# Patient Record
Sex: MALE | Race: WHITE | Employment: OTHER | URBAN - METROPOLITAN AREA
[De-identification: names, ages, dates, MRNs, and addresses within clinical notes are randomized per-mention and may not be internally consistent; named-entity substitution may affect disease eponyms.]

---

## 2017-01-17 RX ORDER — IBUPROFEN 600 MG/1
TABLET ORAL
Qty: 180 TABLET | Refills: 0 | Status: SHIPPED | OUTPATIENT
Start: 2017-01-17 | End: 2019-02-08

## 2017-01-17 NOTE — TELEPHONE ENCOUNTER
Refill Protocol Appointment Criteria  · Appointment scheduled in the past 6 months or in the next 3 months    Future Appointments       Provider Department Appt Notes    In 4 months Michelle Leonard, [address] [facility], [address] [facility]

## 2017-01-25 ENCOUNTER — OFFICE VISIT (OUTPATIENT)
Dept: INTERNAL MEDICINE CLINIC | Facility: CLINIC | Age: 67
End: 2017-01-25

## 2017-01-25 VITALS
DIASTOLIC BLOOD PRESSURE: 73 MMHG | RESPIRATION RATE: 16 BRPM | SYSTOLIC BLOOD PRESSURE: 128 MMHG | BODY MASS INDEX: 30.78 KG/M2 | HEART RATE: 63 BPM | HEIGHT: 70 IN | WEIGHT: 215 LBS

## 2017-01-25 DIAGNOSIS — E66.01 MORBID OBESITY DUE TO EXCESS CALORIES (HCC): ICD-10-CM

## 2017-01-25 DIAGNOSIS — M54.2 CERVICALGIA: ICD-10-CM

## 2017-01-25 DIAGNOSIS — L98.9 SKIN LESION: ICD-10-CM

## 2017-01-25 DIAGNOSIS — R05.9 COUGHING: Primary | ICD-10-CM

## 2017-01-25 PROCEDURE — G0463 HOSPITAL OUTPT CLINIC VISIT: HCPCS | Performed by: INTERNAL MEDICINE

## 2017-01-25 PROCEDURE — 99214 OFFICE O/P EST MOD 30 MIN: CPT | Performed by: INTERNAL MEDICINE

## 2017-01-25 RX ORDER — HYDROCODONE BITARTRATE AND ACETAMINOPHEN 10; 325 MG/1; MG/1
TABLET ORAL
Qty: 90 TABLET | Refills: 0 | Status: SHIPPED | OUTPATIENT
Start: 2017-01-25 | End: 2017-07-11

## 2017-01-25 RX ORDER — BENZONATATE 100 MG/1
100 CAPSULE ORAL 3 TIMES DAILY PRN
Qty: 30 CAPSULE | Refills: 1 | Status: SHIPPED | OUTPATIENT
Start: 2017-01-25 | End: 2017-04-05

## 2017-01-25 NOTE — PROGRESS NOTES
Valentino Barroso is a 77year old male. HPI:   1. Coughing    Has been coughing the last few weeks up to 5-7 times in a row. No known allergens now or in the past but the sneezing has increased over the last week.      2. Cervicalgia    Has had some interm 1960   • Appendicitis 1963   • Rotator cuff tear      R - 2007   • Elevated PSA 2008     bx negative for cancer   • Obesity, unspecified    • PVD (peripheral vascular disease) ([facility] [address]       Social History:    Smoking Status: Never Smoker too painful. 3. Morbid obesity due to excess calories ([address]    The patient is obese with BMI near 32. Discussed with the patient obesity complications including risk for cardiovascular risk events, also degenerative arthritis of joints and spine.  Discu

## 2017-03-27 RX ORDER — HYDROCHLOROTHIAZIDE 25 MG/1
TABLET ORAL
Qty: 90 TABLET | Refills: 0 | Status: SHIPPED | OUTPATIENT
Start: 2017-03-27 | End: 2017-06-23

## 2017-03-27 RX ORDER — SIMVASTATIN 20 MG
TABLET ORAL
Qty: 90 TABLET | Refills: 0 | Status: SHIPPED | OUTPATIENT
Start: 2017-03-27 | End: 2017-06-23

## 2017-04-05 ENCOUNTER — HOSPITAL ENCOUNTER (OUTPATIENT)
Dept: GENERAL RADIOLOGY | Facility: HOSPITAL | Age: 67
Discharge: HOME | End: 2017-04-05
Attending: INTERNAL MEDICINE | Admitting: INTERNAL MEDICINE
Payer: MEDICARE

## 2017-04-05 ENCOUNTER — OFFICE VISIT (OUTPATIENT)
Dept: INTERNAL MEDICINE CLINIC | Facility: CLINIC | Age: 67
End: 2017-04-05

## 2017-04-05 VITALS
DIASTOLIC BLOOD PRESSURE: 75 MMHG | BODY MASS INDEX: 31.5 KG/M2 | RESPIRATION RATE: 16 BRPM | SYSTOLIC BLOOD PRESSURE: 122 MMHG | WEIGHT: 220 LBS | HEIGHT: 70 IN | HEART RATE: 51 BPM

## 2017-04-05 DIAGNOSIS — E78.2 MIXED HYPERLIPIDEMIA: ICD-10-CM

## 2017-04-05 DIAGNOSIS — M25.562 ACUTE PAIN OF LEFT KNEE: Primary | ICD-10-CM

## 2017-04-05 DIAGNOSIS — M25.562 ACUTE PAIN OF LEFT KNEE: ICD-10-CM

## 2017-04-05 DIAGNOSIS — I10 ESSENTIAL HYPERTENSION WITH GOAL BLOOD PRESSURE LESS THAN 140/90: ICD-10-CM

## 2017-04-05 DIAGNOSIS — E66.3 OVERWEIGHT(278.02): ICD-10-CM

## 2017-04-05 DIAGNOSIS — Z12.5 SCREENING FOR PROSTATE CANCER: ICD-10-CM

## 2017-04-05 PROCEDURE — 73564 X-RAY EXAM KNEE 4 OR MORE: CPT

## 2017-04-05 PROCEDURE — G0463 HOSPITAL OUTPT CLINIC VISIT: HCPCS | Performed by: INTERNAL MEDICINE

## 2017-04-05 PROCEDURE — 99214 OFFICE O/P EST MOD 30 MIN: CPT | Performed by: INTERNAL MEDICINE

## 2017-04-05 RX ORDER — HYDROCODONE BITARTRATE AND ACETAMINOPHEN 10; 325 MG/1; MG/1
1 TABLET ORAL EVERY 4 HOURS PRN
Qty: 75 TABLET | Refills: 0 | Status: SHIPPED | OUTPATIENT
Start: 2017-04-05 | End: 2017-07-11

## 2017-04-05 RX ORDER — HYDROCODONE BITARTRATE AND ACETAMINOPHEN 10; 325 MG/1; MG/1
1 TABLET ORAL EVERY 4 HOURS PRN
Qty: 75 TABLET | Refills: 0 | Status: SHIPPED | OUTPATIENT
Start: 2017-06-04 | End: 2017-07-11

## 2017-04-05 RX ORDER — HYDROCODONE BITARTRATE AND ACETAMINOPHEN 10; 325 MG/1; MG/1
1 TABLET ORAL EVERY 4 HOURS PRN
Qty: 75 TABLET | Refills: 0 | Status: SHIPPED | OUTPATIENT
Start: 2017-05-05 | End: 2017-07-11

## 2017-04-05 NOTE — PROGRESS NOTES
Jayce Pearl is a 77year old male. HPI:   1. Acute pain of Left knee pain    Has developed a sharp pain in his left knee a week ago. Hurt to bend the knee or walk on it well.  He awoke with pain one morning and had no trauma, fall or other injury to t tear 2007     right   • Prostate cancer [facility])      brachytherapy   • Carpal tunnel syndrome    • Benign prostatic hypertrophy    • History of brachytherapy    • Tonsillitis 1960   • Appendicitis 1963   • Rotator cuff tear      R - 2007   • Elevated  weight and lead to better blood pressure control. Record blood pressures at home and bring readings to your next office visit to review.     3. Mixed hyperlipidemia    Patient instructed to take cholesterol lowering medications as prescribed and to continue

## 2017-04-07 ENCOUNTER — TELEPHONE (OUTPATIENT)
Dept: INTERNAL MEDICINE CLINIC | Facility: CLINIC | Age: 67
End: 2017-04-07

## 2017-04-07 NOTE — TELEPHONE ENCOUNTER
----- Message from Brigette Moore MD sent at 4/6/2017  4:34 PM CDT -----  X rays show mild to moderate artjhritis is developing in both knees. Weight loss should greatly improve the discomfort. Letter mailed to pt.

## 2017-05-16 ENCOUNTER — APPOINTMENT (OUTPATIENT)
Dept: LAB | Facility: HOSPITAL | Age: 67
End: 2017-05-16
Attending: UROLOGY
Payer: MEDICARE

## 2017-05-16 DIAGNOSIS — C61 PROSTATE CANCER (HCC): ICD-10-CM

## 2017-05-16 DIAGNOSIS — R35.1 NOCTURIA: ICD-10-CM

## 2017-05-16 PROCEDURE — 84153 ASSAY OF PSA TOTAL: CPT

## 2017-05-16 PROCEDURE — 81003 URINALYSIS AUTO W/O SCOPE: CPT

## 2017-05-16 PROCEDURE — 36415 COLL VENOUS BLD VENIPUNCTURE: CPT

## 2017-05-19 ENCOUNTER — OFFICE VISIT (OUTPATIENT)
Dept: SURGERY | Facility: CLINIC | Age: 67
End: 2017-05-19

## 2017-05-19 VITALS
HEIGHT: 70 IN | RESPIRATION RATE: 16 BRPM | BODY MASS INDEX: 31.5 KG/M2 | TEMPERATURE: 98 F | WEIGHT: 220 LBS | DIASTOLIC BLOOD PRESSURE: 74 MMHG | HEART RATE: 50 BPM | SYSTOLIC BLOOD PRESSURE: 118 MMHG

## 2017-05-19 DIAGNOSIS — N19 RENAL FAILURE, UNSPECIFIED CHRONICITY: ICD-10-CM

## 2017-05-19 DIAGNOSIS — C61 PROSTATE CANCER (HCC): Primary | ICD-10-CM

## 2017-05-19 DIAGNOSIS — R35.1 NOCTURIA: ICD-10-CM

## 2017-05-19 DIAGNOSIS — N52.01 ERECTILE DYSFUNCTION DUE TO ARTERIAL INSUFFICIENCY: ICD-10-CM

## 2017-05-19 PROCEDURE — 99214 OFFICE O/P EST MOD 30 MIN: CPT | Performed by: UROLOGY

## 2017-05-19 PROCEDURE — G0463 HOSPITAL OUTPT CLINIC VISIT: HCPCS | Performed by: UROLOGY

## 2017-05-19 RX ORDER — TADALAFIL 20 MG/1
TABLET ORAL
Qty: 6 TABLET | Refills: 6 | Status: SHIPPED | OUTPATIENT
Start: 2017-05-19 | End: 2017-09-28

## 2017-05-19 NOTE — PROGRESS NOTES
HPI:    Patient ID: Leon Mandel is a 77year old male. HPI       1.   Voiding Dysfunction  Patient has current AUA score of 9.5, moderate voiding dysfunction category, compared to previous score of   14, moderate voiding dysfunction category, on 11/ inflammation but no cancer. Erectile dysfunction already before brachytherapy; on 9/5/13 I prescribed Cialis and 5-10 mg every 48 hours when necessary sexual activity.    1/9/14, saturation biopsy (40 biopsies performed) = left anterior apex, Tito 3+4 ; (peripheral vascular disease) ([facility] [address]           Past Surgical History    TONSILLECTOMY      APPENDECTOMY  1963    KNEE ARTHROSCOPY      Comment per NG: bilat knees 2x each; arthroscopy - brash    PROSTATE BIOPSIES  2013    REPAIR ROTATOR CUFF,ACUTE Right 200 Normocephalic and atraumatic. Eyes: Conjunctivae and EOM are normal.   Neck: Normal range of motion. Pulmonary/Chest: Effort normal. No respiratory distress. Genitourinary:   Prostate: small without any palpable nodules or indurations.     Musculoskel he will seriously consider penile self injection therapy. If that were not to be successful, I explained that penile implant at a [facility] would also be an option.  Patient has also brought in an advertisement for NON- FDA approved erectile dysfu direction and in the presence of Betty Jacobsen MD.   Electronically Signed: Jorge Alberto Rai, 5/19/2017, 12:28 PM.    Montana Mcnair MD,  personally performed the services described in this documentation.  All medical record entries made by the scrib

## 2017-05-19 NOTE — PATIENT INSTRUCTIONS
1. If you wish, you may try decreasing tamsulosin to 1 daily. If voiding problem remains unchanged you may continue. If after decreasing dose voiding problems worsens or you are unhappy, please go back to BID.      2. Take cialis 20 mg a few hours before

## 2017-06-19 ENCOUNTER — TELEPHONE (OUTPATIENT)
Dept: INTERNAL MEDICINE CLINIC | Facility: CLINIC | Age: 67
End: 2017-06-19

## 2017-06-19 NOTE — TELEPHONE ENCOUNTER
Pt was given orders for blood work by Dr. Mag Hathaway, and would like to speak with RN so that they can explain the orders to him, especially the PSA order. If pt does not answer, please, leave a message and he will return the call.

## 2017-06-23 RX ORDER — SIMVASTATIN 20 MG
TABLET ORAL
Qty: 90 TABLET | Refills: 0 | Status: SHIPPED | OUTPATIENT
Start: 2017-06-23 | End: 2017-09-19

## 2017-06-23 RX ORDER — HYDROCHLOROTHIAZIDE 25 MG/1
TABLET ORAL
Qty: 90 TABLET | Refills: 0 | Status: SHIPPED | OUTPATIENT
Start: 2017-06-23 | End: 2017-11-22

## 2017-07-11 ENCOUNTER — OFFICE VISIT (OUTPATIENT)
Dept: INTERNAL MEDICINE CLINIC | Facility: CLINIC | Age: 67
End: 2017-07-11

## 2017-07-11 VITALS
DIASTOLIC BLOOD PRESSURE: 83 MMHG | WEIGHT: 215 LBS | BODY MASS INDEX: 30.78 KG/M2 | HEIGHT: 70 IN | HEART RATE: 54 BPM | SYSTOLIC BLOOD PRESSURE: 139 MMHG

## 2017-07-11 DIAGNOSIS — I10 ESSENTIAL HYPERTENSION WITH GOAL BLOOD PRESSURE LESS THAN 140/90: ICD-10-CM

## 2017-07-11 DIAGNOSIS — E66.3 OVERWEIGHT: ICD-10-CM

## 2017-07-11 DIAGNOSIS — R10.32 PAIN IN THE GROIN, LEFT: Primary | ICD-10-CM

## 2017-07-11 DIAGNOSIS — E78.2 MIXED HYPERLIPIDEMIA: ICD-10-CM

## 2017-07-11 PROCEDURE — 99214 OFFICE O/P EST MOD 30 MIN: CPT | Performed by: INTERNAL MEDICINE

## 2017-07-11 PROCEDURE — G0463 HOSPITAL OUTPT CLINIC VISIT: HCPCS | Performed by: INTERNAL MEDICINE

## 2017-07-11 RX ORDER — HYDROCODONE BITARTRATE AND ACETAMINOPHEN 10; 325 MG/1; MG/1
1 TABLET ORAL EVERY 8 HOURS PRN
Qty: 90 TABLET | Refills: 0 | Status: SHIPPED | OUTPATIENT
Start: 2017-07-11 | End: 2017-08-10

## 2017-07-11 RX ORDER — HYDROCODONE BITARTRATE AND ACETAMINOPHEN 10; 325 MG/1; MG/1
1 TABLET ORAL EVERY 8 HOURS PRN
Qty: 90 TABLET | Refills: 0 | Status: SHIPPED | OUTPATIENT
Start: 2017-09-09 | End: 2017-10-03

## 2017-07-11 RX ORDER — HYDROCODONE BITARTRATE AND ACETAMINOPHEN 10; 325 MG/1; MG/1
1 TABLET ORAL EVERY 8 HOURS PRN
Qty: 90 TABLET | Refills: 0 | Status: SHIPPED | OUTPATIENT
Start: 2017-08-10 | End: 2017-09-09

## 2017-07-11 NOTE — PROGRESS NOTES
Linnette Loyd is a 77year old male. HPI:   1. Acute pain of Left groin    Has developed a sharp pain in his left groin a week ago. Hurts to bend the knee or walk on it well.  He awoke with pain one morning and had no trauma, fall or other injury to the Oral Tab TAKE 1 TABLET EVERY NIGHT Disp: 90 tablet Rfl: 0   HYDROCHLOROTHIAZIDE 25 MG Oral Tab TAKE 1 TABLET EVERY DAY Disp: 90 tablet Rfl: 0   METOPROLOL TARTRATE 25 MG Oral Tab TAKE 1 TABLET TWICE DAILY Disp: 180 tablet Rfl: 1   IBUPROFEN 600 MG Oral Tab in the left groin area. EXTREMITIES: no cyanosis, clubbing or edema/ Has a sore on left medial ankle. ASSESSMENT AND PLAN:   1. Left groin and leg pain. Has a strain in left groin area. 1 week ago the pain started. Norco helps the pain.  No injury months.

## 2017-07-18 ENCOUNTER — TELEPHONE (OUTPATIENT)
Dept: INTERNAL MEDICINE CLINIC | Facility: CLINIC | Age: 67
End: 2017-07-18

## 2017-07-18 NOTE — TELEPHONE ENCOUNTER
Pt stts he wants information prostate order. Pt wanted to know if he is due for another one.  Please advise

## 2017-09-11 ENCOUNTER — OFFICE VISIT (OUTPATIENT)
Dept: DERMATOLOGY CLINIC | Facility: CLINIC | Age: 67
End: 2017-09-11

## 2017-09-11 DIAGNOSIS — D23.70 BENIGN NEOPLASM OF SKIN OF LOWER LIMB, INCLUDING HIP, UNSPECIFIED LATERALITY: ICD-10-CM

## 2017-09-11 DIAGNOSIS — D23.4 BENIGN NEOPLASM OF SCALP AND SKIN OF NECK: ICD-10-CM

## 2017-09-11 DIAGNOSIS — D48.5 NEOPLASM OF UNCERTAIN BEHAVIOR OF SKIN: Primary | ICD-10-CM

## 2017-09-11 DIAGNOSIS — D23.60 BENIGN NEOPLASM OF SKIN OF UPPER LIMB, INCLUDING SHOULDER, UNSPECIFIED LATERALITY: ICD-10-CM

## 2017-09-11 DIAGNOSIS — D23.30 BENIGN NEOPLASM OF SKIN OF FACE: ICD-10-CM

## 2017-09-11 DIAGNOSIS — D23.5 BENIGN NEOPLASM OF SKIN OF TRUNK, EXCEPT SCROTUM: ICD-10-CM

## 2017-09-11 DIAGNOSIS — L30.9 DERMATITIS: ICD-10-CM

## 2017-09-11 DIAGNOSIS — D22.9 MULTIPLE NEVI: ICD-10-CM

## 2017-09-11 PROCEDURE — 11101 BIOPSY, EACH ADDED LESION: CPT | Performed by: DERMATOLOGY

## 2017-09-11 PROCEDURE — 88305 TISSUE EXAM BY PATHOLOGIST: CPT | Performed by: DERMATOLOGY

## 2017-09-11 PROCEDURE — 99202 OFFICE O/P NEW SF 15 MIN: CPT | Performed by: DERMATOLOGY

## 2017-09-11 PROCEDURE — 11100 BIOPSY OF SKIN LESION: CPT | Performed by: DERMATOLOGY

## 2017-09-13 NOTE — PROGRESS NOTES
The pathology report from last visit showed compound nevus, no treatment right mid abdomen. Left flank-mildly atypical nevus-re-check in 4 -5 months. Skin check then also. We will watch the darker moles on back also   . Please log in test results.   Theodore

## 2017-09-21 NOTE — TELEPHONE ENCOUNTER
please advise as does not meet RN protocol for refill criteria        Hypertensive Medications  Protocol Criteria:  · Appointment scheduled in the past 6 months or in the next 3 months  · BMP or CMP in the past 12 months  · Creatinine result < 2  Recent O <130   Recent Outpatient Visits            1 week ago Neoplasm of uncertain behavior of skin    [facility] Jaciel Rosenbaum MD    Office Visit    2 months ago Pain in the groin, left    [address], [address] [facility]

## 2017-09-22 RX ORDER — SIMVASTATIN 20 MG
TABLET ORAL
Qty: 90 TABLET | Refills: 0 | Status: SHIPPED | OUTPATIENT
Start: 2017-09-22 | End: 2018-02-05

## 2017-09-24 NOTE — PROGRESS NOTES
Mariam Kaminski is a 77year old male. HPI:     CC:  Patient presents with:  Full Skin Exam: established pt. presents for a skin exam, last visit in 2012 by SD - pt concerned about crusty lesions to right temple.  No hx of skin CA        Allergies:  Revie capsule Rfl: 3   METOPROLOL TARTRATE 25 MG Oral Tab TAKE 1 TABLET TWICE DAILY Disp: 180 tablet Rfl: 1   SIMVASTATIN 20 MG Oral Tab TAKE 1 TABLET EVERY NIGHT Disp: 90 tablet Rfl: 0     Allergies:   No Known Allergies    Past Medical History:   Diagnosis Ang above. Patient has been in their usual state of health. History, medications, allergies reviewed as noted. ROS:  Denies any other systemic complaints. No new or changeing lesions other than noted above.  No fevers, chills, night sweats, unusual casiano Remarkable for:    Dark brown black no elevated papule at left flank hazy border. Irregular red brown darker brown papules eccentric pigmentation at right mid abdomen. Darker brown macule 8 mm at right inframammary area observe.     Rule out atypical lesi

## 2017-09-28 ENCOUNTER — OFFICE VISIT (OUTPATIENT)
Dept: PAIN CLINIC | Facility: HOSPITAL | Age: 67
End: 2017-09-28
Attending: ANESTHESIOLOGY
Payer: MEDICARE

## 2017-09-28 VITALS
WEIGHT: 215 LBS | HEIGHT: 70 IN | BODY MASS INDEX: 30.78 KG/M2 | SYSTOLIC BLOOD PRESSURE: 140 MMHG | DIASTOLIC BLOOD PRESSURE: 78 MMHG | HEART RATE: 58 BPM | RESPIRATION RATE: 18 BRPM

## 2017-09-28 DIAGNOSIS — M54.2 CERVICALGIA: Primary | ICD-10-CM

## 2017-09-28 DIAGNOSIS — M54.50 CHRONIC BILATERAL LOW BACK PAIN WITHOUT SCIATICA: ICD-10-CM

## 2017-09-28 DIAGNOSIS — G89.29 CHRONIC BILATERAL LOW BACK PAIN WITHOUT SCIATICA: ICD-10-CM

## 2017-09-28 PROCEDURE — 99201 HC OUTPT EVAL AND MGNT NEW PT LEVEL 1: CPT

## 2017-09-28 NOTE — PROGRESS NOTES
09/28/17  PRESENTS AMBULATORY TO CPM;  NEW CONSULT C/O OF CERVICAL NECK PAIN RADIATING INTO THE BILAT SHLDRS, AND LBP;  PT STATES 1 1/2 YR HX OF PAIN;  \"JUST DEALT WITH THE PAIN\";  HAS USED IBUPROFEN, NOW USING NORCO  10/325 USING 3/DAY;   REPORTS HX OF

## 2017-09-28 NOTE — CHRONIC PAIN
Initial Consultation Note      HISTORY OF PRESENT ILLNESS:  Garrison Mosquera is a 77year old old male referred to the pain clinic  for evaluation and treatment of his chronic low back pain and chronic neck pain he also has shoulder issues with inability t TWICE DAILY Disp: 180 capsule Rfl: 3        ALLERGIES:  No Known Allergies    SURGICAL HISTORY:  Past Surgical History:  1963: APPENDECTOMY  No date: KNEE ARTHROSCOPY      Comment: per NG: bilat knees 2x each; arthroscopy -                bras  2013: Maylin Jeo tobacco: Never Used    Alcohol use No    Drug use: No    Sexual activity: Not on file     Other Topics Concern    Caffeine Concern Yes    Comment: soda, 1 liter/day    Pt has a pacemaker No    Pt has a defibrillator No    Reaction to local anesthetic No (L) 03/10/2016   MCV 79.4 (L) 03/10/2016   MCH 25.3 (L) 03/10/2016   MCHC 31.9 (L) 03/10/2016   RDW 17.3 (H) 03/10/2016    03/10/2016   MPV 8.1 03/10/2016       Lab Results  Component Value Date    02/15/2016   K 4.0 02/15/2016    02/15/

## 2017-10-03 ENCOUNTER — OFFICE VISIT (OUTPATIENT)
Dept: INTERNAL MEDICINE CLINIC | Facility: CLINIC | Age: 67
End: 2017-10-03

## 2017-10-03 VITALS
DIASTOLIC BLOOD PRESSURE: 79 MMHG | WEIGHT: 215 LBS | HEIGHT: 70 IN | SYSTOLIC BLOOD PRESSURE: 130 MMHG | HEART RATE: 55 BPM | BODY MASS INDEX: 30.78 KG/M2

## 2017-10-03 DIAGNOSIS — E66.3 OVERWEIGHT: ICD-10-CM

## 2017-10-03 DIAGNOSIS — E78.2 MIXED HYPERLIPIDEMIA: ICD-10-CM

## 2017-10-03 DIAGNOSIS — G89.29 CHRONIC LEFT SHOULDER PAIN: Primary | ICD-10-CM

## 2017-10-03 DIAGNOSIS — I10 ESSENTIAL HYPERTENSION WITH GOAL BLOOD PRESSURE LESS THAN 140/90: ICD-10-CM

## 2017-10-03 DIAGNOSIS — M25.512 CHRONIC LEFT SHOULDER PAIN: Primary | ICD-10-CM

## 2017-10-03 PROCEDURE — G0463 HOSPITAL OUTPT CLINIC VISIT: HCPCS | Performed by: INTERNAL MEDICINE

## 2017-10-03 PROCEDURE — 99214 OFFICE O/P EST MOD 30 MIN: CPT | Performed by: INTERNAL MEDICINE

## 2017-10-03 RX ORDER — HYDROCODONE BITARTRATE AND ACETAMINOPHEN 10; 325 MG/1; MG/1
1 TABLET ORAL EVERY 8 HOURS PRN
Qty: 75 TABLET | Refills: 0 | Status: SHIPPED | OUTPATIENT
Start: 2017-10-03 | End: 2017-11-02

## 2017-10-03 RX ORDER — HYDROCODONE BITARTRATE AND ACETAMINOPHEN 10; 325 MG/1; MG/1
1 TABLET ORAL EVERY 8 HOURS PRN
Qty: 75 TABLET | Refills: 0 | Status: SHIPPED | OUTPATIENT
Start: 2017-12-02 | End: 2018-01-01

## 2017-10-03 RX ORDER — HYDROCODONE BITARTRATE AND ACETAMINOPHEN 10; 325 MG/1; MG/1
1 TABLET ORAL EVERY 8 HOURS PRN
Qty: 75 TABLET | Refills: 0 | Status: SHIPPED | OUTPATIENT
Start: 2017-11-02 | End: 2017-11-21

## 2017-10-03 RX ORDER — HYDROCODONE BITARTRATE AND ACETAMINOPHEN 10; 325 MG/1; MG/1
1 TABLET ORAL EVERY 8 HOURS PRN
Qty: 90 TABLET | Refills: 0 | Status: SHIPPED | OUTPATIENT
Start: 2017-10-03 | End: 2017-11-02

## 2017-10-03 NOTE — PROGRESS NOTES
Francisco J Duvall is a 77year old male. HPI:   1. Acute pain of Left Shoulder    Has developed a sharp pain in his left shoulder area the last few months. No injury noted to the left shoulder recently.  Hurts to lift the shoulder with pain in the biceps ar Diagnosis Date   • Appendicitis 1963   • Back problem    • Benign prostatic hypertrophy    • Carpal tunnel syndrome    • Dysplastic nevus 2017    left flank   • Elevated PSA 2008    bx negative for cancer   • H/O arthroscopy of knee bilateral   • History Prabhjot of the pain clinic for low back and SHOULDER pain. Will give #75 norco  a month x 3 months. No refills until 1/11/2018. We will decrease #15 per month every 3 months.     2. Essential hypertension with goal blood pressure less than 140/90    Azanushka Alberta

## 2017-10-07 ENCOUNTER — HOSPITAL ENCOUNTER (OUTPATIENT)
Dept: MRI IMAGING | Facility: HOSPITAL | Age: 67
Discharge: HOME | End: 2017-10-07
Attending: ANESTHESIOLOGY
Payer: MEDICARE

## 2017-10-07 DIAGNOSIS — G89.29 CHRONIC BILATERAL LOW BACK PAIN WITHOUT SCIATICA: ICD-10-CM

## 2017-10-07 DIAGNOSIS — M54.2 CERVICALGIA: ICD-10-CM

## 2017-10-07 DIAGNOSIS — M54.50 CHRONIC BILATERAL LOW BACK PAIN WITHOUT SCIATICA: ICD-10-CM

## 2017-10-07 PROCEDURE — 72148 MRI LUMBAR SPINE W/O DYE: CPT | Performed by: ANESTHESIOLOGY

## 2017-10-12 ENCOUNTER — HOSPITAL ENCOUNTER (OUTPATIENT)
Dept: MRI IMAGING | Facility: HOSPITAL | Age: 67
Discharge: HOME | End: 2017-10-12
Attending: ANESTHESIOLOGY
Payer: MEDICARE

## 2017-10-12 PROCEDURE — 72141 MRI NECK SPINE W/O DYE: CPT | Performed by: ANESTHESIOLOGY

## 2017-10-17 ENCOUNTER — OFFICE VISIT (OUTPATIENT)
Dept: PAIN CLINIC | Facility: HOSPITAL | Age: 67
End: 2017-10-17
Attending: ANESTHESIOLOGY
Payer: MEDICARE

## 2017-10-17 VITALS
SYSTOLIC BLOOD PRESSURE: 138 MMHG | BODY MASS INDEX: 30.78 KG/M2 | HEIGHT: 70 IN | WEIGHT: 215 LBS | DIASTOLIC BLOOD PRESSURE: 78 MMHG

## 2017-10-17 DIAGNOSIS — N88.9 CERVICAL LESION: ICD-10-CM

## 2017-10-17 DIAGNOSIS — M48.061 SPINAL STENOSIS OF LUMBAR REGION WITHOUT NEUROGENIC CLAUDICATION: ICD-10-CM

## 2017-10-17 DIAGNOSIS — M48.02 SPINAL STENOSIS IN CERVICAL REGION: ICD-10-CM

## 2017-10-17 DIAGNOSIS — G96.89 SPINAL CORD CYSTS: Primary | ICD-10-CM

## 2017-10-17 PROCEDURE — 99211 OFF/OP EST MAY X REQ PHY/QHP: CPT

## 2017-10-17 NOTE — CHRONIC PAIN
Follow-up Note    HISTORY OF PRESENT ILLNESS:  Jaron Taoist is a 79year old old male, returns to the clinic for evaluation treatment of his cervical and lumbar pain   went over the ordered MRI scans and his last visit cervical and lumbar stenosis are REVIEW OF SYSTEMS:   Bowel/Bladder Incontinence: as above  Coughing/sneezing/straining does not exacerbate the pain.   Numbness/tingling: as above  Weakness: as above  Weight Loss: Negative   Fever: Negative   Cardiovascular:  No current chest pain o Right  No date: TONSILLECTOMY    FAMILY HISTORY:  Family History   Problem Relation Age of Onset   • Diabetes Mother    • Diabetes Sister    • Obesity Sister    • Lipids Other      family h/o hyperlipidemia and vascular disease       SOCIAL HISTORY:    Soc male, with lumbar and cervical spinal stenosis chronic pain been on narcotics for quite some time currently prescribed Dr. Obey Patrick MRI finding in the cervical spine was suggest possible arachnoid cyst but the intramedullary extradural lesion cannot

## 2017-10-19 ENCOUNTER — TELEPHONE (OUTPATIENT)
Dept: PAIN CLINIC | Facility: HOSPITAL | Age: 67
End: 2017-10-19

## 2017-10-20 ENCOUNTER — HOSPITAL ENCOUNTER (OUTPATIENT)
Dept: MRI IMAGING | Age: 67
Discharge: HOME | End: 2017-10-20
Attending: ANESTHESIOLOGY
Payer: MEDICARE

## 2017-10-20 DIAGNOSIS — N88.9 CERVICAL LESION: ICD-10-CM

## 2017-10-20 DIAGNOSIS — G96.89 SPINAL CORD CYSTS: ICD-10-CM

## 2017-10-20 PROCEDURE — 70553 MRI BRAIN STEM W/O & W/DYE: CPT | Performed by: ANESTHESIOLOGY

## 2017-10-20 PROCEDURE — 82565 ASSAY OF CREATININE: CPT

## 2017-10-20 PROCEDURE — A9575 INJ GADOTERATE MEGLUMI 0.1ML: HCPCS | Performed by: ANESTHESIOLOGY

## 2017-11-21 ENCOUNTER — OFFICE VISIT (OUTPATIENT)
Dept: PAIN CLINIC | Facility: HOSPITAL | Age: 67
End: 2017-11-21
Attending: ANESTHESIOLOGY
Payer: MEDICARE

## 2017-11-21 ENCOUNTER — TELEPHONE (OUTPATIENT)
Dept: SURGERY | Facility: CLINIC | Age: 67
End: 2017-11-21

## 2017-11-21 ENCOUNTER — APPOINTMENT (OUTPATIENT)
Dept: LAB | Facility: HOSPITAL | Age: 67
End: 2017-11-21
Attending: ANESTHESIOLOGY
Payer: MEDICARE

## 2017-11-21 VITALS
HEART RATE: 61 BPM | BODY MASS INDEX: 30.78 KG/M2 | DIASTOLIC BLOOD PRESSURE: 80 MMHG | HEIGHT: 70 IN | SYSTOLIC BLOOD PRESSURE: 136 MMHG | WEIGHT: 215 LBS | RESPIRATION RATE: 18 BRPM

## 2017-11-21 DIAGNOSIS — M48.02 SPINAL STENOSIS IN CERVICAL REGION: Primary | ICD-10-CM

## 2017-11-21 DIAGNOSIS — C61 PROSTATE CANCER (HCC): ICD-10-CM

## 2017-11-21 DIAGNOSIS — M48.061 SPINAL STENOSIS OF LUMBAR REGION WITHOUT NEUROGENIC CLAUDICATION: ICD-10-CM

## 2017-11-21 PROCEDURE — 36415 COLL VENOUS BLD VENIPUNCTURE: CPT

## 2017-11-21 PROCEDURE — 84153 ASSAY OF PSA TOTAL: CPT

## 2017-11-21 PROCEDURE — 99211 OFF/OP EST MAY X REQ PHY/QHP: CPT

## 2017-11-21 RX ORDER — HYDROCODONE BITARTRATE AND ACETAMINOPHEN 10; 325 MG/1; MG/1
1 TABLET ORAL EVERY 8 HOURS PRN
Qty: 90 TABLET | Refills: 0 | Status: SHIPPED | OUTPATIENT
Start: 2017-11-21 | End: 2017-11-21

## 2017-11-21 RX ORDER — HYDROCODONE BITARTRATE AND ACETAMINOPHEN 10; 325 MG/1; MG/1
1 TABLET ORAL EVERY 8 HOURS PRN
Qty: 90 TABLET | Refills: 0 | Status: SHIPPED | OUTPATIENT
Start: 2017-12-21 | End: 2018-01-15

## 2017-11-21 NOTE — CHRONIC PAIN
Follow-up Note    HISTORY OF PRESENT ILLNESS:  Linnette Loyd is a 79year old old male, returns to the clinic for evaluation of her lumbar and cervical pain we did a lumbar epidural steroid injection in late October he got really good results with that ulcer  Genitourinary:  Negative  Integumentary :  Negative  Psychiatric:  Negative  Hematologic: No active bleeding  Lymphatic: No current lymphedema  Allergic/Immunologic:  Negative  Musculoskeletal: As above  Neurological: As above  Denies chest pain, sh N/A  Number of children: 2     Occupational History  ordering  retired     Social History Main Topics   Smoking status: Never Smoker    Smokeless tobacco: Never Used    Alcohol use No    Drug use: No    Sexual activity: Not on file     Other Topics Concern on how he is feeling at that time

## 2017-11-21 NOTE — TELEPHONE ENCOUNTER
[facility] lab called. Pt came to the lab today but only had order for a blood test (psa). In the past pt would also have a urine test. Caller also took urine and will need the order. Urine is in the frig. Please send order.

## 2017-11-21 NOTE — PROGRESS NOTES
10/17/17  PRESENTS AMBULATORY TO CPM;  F/U REVIEW MRI'S OF CERVICAL & LUMBAR SPINE;  SEEN BY DR. DOWELL;  REFER TO ENCOUNTER FOR THE CONTINUED POC.     11/21/17  PRESENTS AMBULATORY TO CPM;  F/U POST LESI 10/24/17;  PT REPORTS \"IT DID HELP SOME\"  SEEN B

## 2017-11-21 NOTE — TELEPHONE ENCOUNTER
Pt states he went downstairs to get his blood work(PSA) done and he has also been giving urine, however was that an order was not there pt wants to confirm if urine was needed pls advise.

## 2017-11-24 RX ORDER — TAMSULOSIN HYDROCHLORIDE 0.4 MG/1
CAPSULE ORAL
Qty: 180 CAPSULE | Refills: 3 | Status: SHIPPED | OUTPATIENT
Start: 2017-11-24 | End: 2018-05-14

## 2017-11-24 NOTE — TELEPHONE ENCOUNTER
Dr. Juan Carlos Treviño, pt [address] [facility] 5/19/17 pt pharmacy requesting refill on tamsulosin if you agree please review and sign med, thank you! 1. If you wish, you may try decreasing tamsulosin to 1 daily. If voiding problem remains unchanged you may continue.  If after d

## 2017-11-24 NOTE — TELEPHONE ENCOUNTER
DR TORO:  unable to fill per triage dept protocol criteria. please advise on further refills.        Hypertensive Medications  Protocol Criteria:  · Appointment scheduled in the past 6 months or in the next 3 months  · BMP or CMP in the past 12 months  · Cre

## 2017-11-25 RX ORDER — HYDROCHLOROTHIAZIDE 25 MG/1
TABLET ORAL
Qty: 90 TABLET | Refills: 0 | Status: SHIPPED | OUTPATIENT
Start: 2017-11-25 | End: 2018-03-26

## 2017-11-27 ENCOUNTER — OFFICE VISIT (OUTPATIENT)
Dept: SURGERY | Facility: CLINIC | Age: 67
End: 2017-11-27

## 2017-11-27 VITALS
TEMPERATURE: 98 F | RESPIRATION RATE: 16 BRPM | WEIGHT: 215 LBS | DIASTOLIC BLOOD PRESSURE: 82 MMHG | BODY MASS INDEX: 30.78 KG/M2 | HEART RATE: 53 BPM | HEIGHT: 70 IN | SYSTOLIC BLOOD PRESSURE: 126 MMHG

## 2017-11-27 DIAGNOSIS — C61 PROSTATE CANCER (HCC): Primary | ICD-10-CM

## 2017-11-27 DIAGNOSIS — R35.1 NOCTURIA: ICD-10-CM

## 2017-11-27 DIAGNOSIS — N52.01 ERECTILE DYSFUNCTION DUE TO ARTERIAL INSUFFICIENCY: ICD-10-CM

## 2017-11-27 PROCEDURE — G0463 HOSPITAL OUTPT CLINIC VISIT: HCPCS | Performed by: UROLOGY

## 2017-11-27 PROCEDURE — 99214 OFFICE O/P EST MOD 30 MIN: CPT | Performed by: UROLOGY

## 2017-11-27 RX ORDER — TADALAFIL 5 MG/1
5 TABLET ORAL
Qty: 30 TABLET | Refills: 11 | Status: SHIPPED | OUTPATIENT
Start: 2017-11-27 | End: 2018-11-16

## 2017-11-27 NOTE — PATIENT INSTRUCTIONS
1.  Continue to tamsulosin daily. 2.  Continue Cialis 20 mg a few hours or several hours before planned sexual activity    3. Visit in 3 months. Blood draw for PSA 1--7 days before visit.   No sexual stimulation whatsoever for 5 days before the actual injection process to you. · Risks and complications may include pain, bleeding, bruising, or scarring. Get medical help right away if you have an erection that lasts for longer than 4 hours.     Medication injected into the side of the penis causes an erec

## 2017-11-27 NOTE — PROGRESS NOTES
HPI:    Patient ID: Cinthya Reis is a 79year old male. HPI     1.   Voiding Dysfunction  Patient has current AUA score of 12.5, moderate voiding dysfunction category, worse compared to previous score of 9.5, moderate voiding dysfunction category, on SECOND prostate biopsy; prostate volume 39.3 g; 14 biopsy; pathology showed chronic and acute inflammation but no cancer.   Erectile dysfunction already before brachytherapy; on 9/5/13 I prescribed Cialis and 5-10 mg every 48 hours when necessary sexual act not nervous/anxious.         HISTORY:  Past Medical History:   Diagnosis Date   • Appendicitis 1963   • Back problem    • Benign prostatic hypertrophy    • Carpal tunnel syndrome    • Dysplastic nevus 2017    left flank   • Elevated PSA 2008    bx negative Oral Tab TAKE 1 TABLET EVERY NIGHT Disp: 90 tablet Rfl: 0   IBUPROFEN 600 MG Oral Tab TAKE 1 TABLET EVERY 12 HOURS AS NEEDED FOR PAIN (SUBSTITUTED FOR MOTRIN) Disp: 180 tablet Rfl: 0     Allergies:No Known Allergies   PHYSICAL EXAM:   Physical Exam   Genit chooses to restart Cialis 20 mg.    (N19) Renal failure, unspecified chronicity  10/20/2017 Post Creatinine = ISTAT Creatinine - 1.2; eGFR = 60.  Patient feels this is stable and wants to observe.     (R35.1) Nocturia  Patient has current AUA score of 12.5,

## 2018-01-16 ENCOUNTER — OFFICE VISIT (OUTPATIENT)
Dept: PAIN CLINIC | Facility: HOSPITAL | Age: 68
End: 2018-01-16
Attending: ANESTHESIOLOGY
Payer: MEDICARE

## 2018-01-16 VITALS
WEIGHT: 215 LBS | DIASTOLIC BLOOD PRESSURE: 78 MMHG | BODY MASS INDEX: 30.78 KG/M2 | SYSTOLIC BLOOD PRESSURE: 132 MMHG | HEART RATE: 55 BPM | HEIGHT: 70 IN

## 2018-01-16 DIAGNOSIS — M48.02 SPINAL STENOSIS IN CERVICAL REGION: ICD-10-CM

## 2018-01-16 DIAGNOSIS — M48.061 SPINAL STENOSIS OF LUMBAR REGION WITHOUT NEUROGENIC CLAUDICATION: Primary | ICD-10-CM

## 2018-01-16 PROCEDURE — 99211 OFF/OP EST MAY X REQ PHY/QHP: CPT

## 2018-01-16 RX ORDER — HYDROCODONE BITARTRATE AND ACETAMINOPHEN 10; 325 MG/1; MG/1
1 TABLET ORAL EVERY 8 HOURS PRN
Qty: 90 TABLET | Refills: 0 | Status: SHIPPED | OUTPATIENT
Start: 2018-01-19 | End: 2018-02-18

## 2018-01-16 RX ORDER — HYDROCODONE BITARTRATE AND ACETAMINOPHEN 10; 325 MG/1; MG/1
1 TABLET ORAL EVERY 8 HOURS PRN
Qty: 90 TABLET | Refills: 0 | Status: SHIPPED | OUTPATIENT
Start: 2018-02-17 | End: 2018-03-13

## 2018-01-16 NOTE — CHRONIC PAIN
Follow-up Note    HISTORY OF PRESENT ILLNESS:  Valentino Barroso is a 79year old old male, returns to the clinic for evaluation treatment of his low back pain and cervical neck pain Urmila Ramon underwent an epidural steroid injection lumbar back in October 201 above  Coughing/sneezing/straining does not exacerbate the pain.   Numbness/tingling: as above  Weakness: as above  Weight Loss: Negative   Fever: Negative   Cardiovascular:  No current chest pain or palpitations   Respiratory:  No current shortness of ellie History   Problem Relation Age of Onset   • Diabetes Mother    • Diabetes Sister    • Obesity Sister    • Lipids Other      family h/o hyperlipidemia and vascular disease       SOCIAL HISTORY:    Social History  Social History   Marital status:   Sp injection lumbar with good results he reports about a 60-70% improvement with the combination of 3 Norco a day and his prior lumbar epidural steroid injection seen by Dr. Naif Agee  for his cervical MRI and was cleared       PLAN:  RECOMMENDATIONS:  Maintain on

## 2018-01-16 NOTE — PROGRESS NOTES
j01/16/18  PRESENTS AMBULATORY TO CPM; MEDICAL MANAGEMENT CHRONIC LUPE/NECK PAIN;  RATES HIS LEVEL 2/10 WITH MEDS;  FOR HIS SHLDR PAIN HE SAW ORTHO AT [facility] OFFICE RECEIVED LT SHLDR INJECTION WHICH HAS REALLY HELPED;  \"SO WITH MY NECK INJECTION, THE

## 2018-02-05 RX ORDER — SIMVASTATIN 20 MG
TABLET ORAL
Qty: 90 TABLET | Refills: 0 | Status: SHIPPED | OUTPATIENT
Start: 2018-02-05 | End: 2018-04-09

## 2018-02-20 ENCOUNTER — HOSPITAL ENCOUNTER (OUTPATIENT)
Dept: GENERAL RADIOLOGY | Facility: HOSPITAL | Age: 68
Discharge: HOME | End: 2018-02-20
Attending: INTERNAL MEDICINE | Admitting: INTERNAL MEDICINE
Payer: MEDICARE

## 2018-02-20 ENCOUNTER — OFFICE VISIT (OUTPATIENT)
Dept: INTERNAL MEDICINE CLINIC | Facility: CLINIC | Age: 68
End: 2018-02-20

## 2018-02-20 VITALS
DIASTOLIC BLOOD PRESSURE: 90 MMHG | HEART RATE: 57 BPM | WEIGHT: 215 LBS | RESPIRATION RATE: 16 BRPM | SYSTOLIC BLOOD PRESSURE: 144 MMHG | BODY MASS INDEX: 30.78 KG/M2 | HEIGHT: 70 IN | TEMPERATURE: 98 F

## 2018-02-20 DIAGNOSIS — I10 ESSENTIAL HYPERTENSION WITH GOAL BLOOD PRESSURE LESS THAN 140/90: ICD-10-CM

## 2018-02-20 DIAGNOSIS — E78.2 MIXED HYPERLIPIDEMIA: ICD-10-CM

## 2018-02-20 DIAGNOSIS — R05.9 COUGH: ICD-10-CM

## 2018-02-20 DIAGNOSIS — R05.9 COUGH: Primary | ICD-10-CM

## 2018-02-20 PROCEDURE — 99214 OFFICE O/P EST MOD 30 MIN: CPT | Performed by: INTERNAL MEDICINE

## 2018-02-20 PROCEDURE — G0463 HOSPITAL OUTPT CLINIC VISIT: HCPCS | Performed by: INTERNAL MEDICINE

## 2018-02-20 PROCEDURE — 71046 X-RAY EXAM CHEST 2 VIEWS: CPT | Performed by: INTERNAL MEDICINE

## 2018-02-20 NOTE — PROGRESS NOTES
Nic Leavitt is a 79year old male. HPI:   1. Cough    Has a chronic cough. Has been coughing for at least 2 weeks and some phlegm. No fever or chills. Throat is not too sore now.     2. Essential hypertension with goal blood pressure less than 140/90 Date   • Appendicitis 1963   • Back problem    • Benign prostatic hypertrophy    • Carpal tunnel syndrome    • Dysplastic nevus 2017    left flank   • Elevated PSA 2008    bx negative for cancer   • H/O arthroscopy of knee bilateral   • History of brachyth 140/90    Patient instructed to take anti-hypertensive medicines exactly as prescribed and to follow a low salt diet as discussed.  Regular exercise at least 3 times weekly will help to curb one's appetite, control weight and lead to better blood pressure c

## 2018-02-26 ENCOUNTER — APPOINTMENT (OUTPATIENT)
Dept: LAB | Facility: HOSPITAL | Age: 68
End: 2018-02-26
Attending: UROLOGY
Payer: MEDICARE

## 2018-02-26 DIAGNOSIS — C61 PROSTATE CANCER (HCC): ICD-10-CM

## 2018-02-26 LAB — PSA SERPL-MCNC: 1.6 NG/ML (ref 0–4)

## 2018-02-26 PROCEDURE — 84153 ASSAY OF PSA TOTAL: CPT

## 2018-02-26 PROCEDURE — 36415 COLL VENOUS BLD VENIPUNCTURE: CPT

## 2018-03-01 ENCOUNTER — OFFICE VISIT (OUTPATIENT)
Dept: SURGERY | Facility: CLINIC | Age: 68
End: 2018-03-01

## 2018-03-01 VITALS
DIASTOLIC BLOOD PRESSURE: 64 MMHG | WEIGHT: 215 LBS | BODY MASS INDEX: 30.78 KG/M2 | HEIGHT: 70 IN | SYSTOLIC BLOOD PRESSURE: 138 MMHG

## 2018-03-01 DIAGNOSIS — C61 PROSTATE CANCER (HCC): Primary | ICD-10-CM

## 2018-03-01 DIAGNOSIS — R35.1 NOCTURIA: ICD-10-CM

## 2018-03-01 DIAGNOSIS — N19 RENAL FAILURE, UNSPECIFIED CHRONICITY: ICD-10-CM

## 2018-03-01 DIAGNOSIS — N52.01 ERECTILE DYSFUNCTION DUE TO ARTERIAL INSUFFICIENCY: ICD-10-CM

## 2018-03-01 PROCEDURE — 99214 OFFICE O/P EST MOD 30 MIN: CPT | Performed by: UROLOGY

## 2018-03-01 PROCEDURE — G0463 HOSPITAL OUTPT CLINIC VISIT: HCPCS | Performed by: UROLOGY

## 2018-03-01 NOTE — PROGRESS NOTES
HPI:    Patient ID: Khushi Maddox is a 79year old male. HPI     1. Prostate Cancer   Status post brachytherapy February 2014.  The patient denies any symptoms to suggest prostate cancer such as new bone pain, appetite change or unintended weight los volume 39.3 g; 14 biopsy; pathology showed chronic and acute inflammation but no cancer. Erectile dysfunction already before brachytherapy; on 9/5/13 I prescribed Cialis and 5-10 mg every 48 hours when necessary sexual activity.    1/9/14, saturation biops Negative for speech difficulty. Psychiatric/Behavioral: The patient is not nervous/anxious.             Current Outpatient Prescriptions:  SIMVASTATIN 20 MG Oral Tab TAKE 1 TABLET EVERY NIGHT Disp: 90 tablet Rfl: 0   HYDROcodone-acetaminophen  MG Or Smokeless tobacco: Never Used                      Alcohol use:  No                 Over the past month, how often have you had a sensation of not emptying your bladder completely after you finish urinating?: Less irineo index is 30.85 kg/m².     LABORATORIES  02/26/2018 PSA = 1.6  11/21/2017 PSA = 1.6  10/20/2017 Post Creatinine = ISTAT Creatinine - 1.2; eGFR = 60  5/16/17 PSA 1.3  5/16/17 Microscopic not indicated; nml UA.  11/15/16 PSA 1.4  5/11/2016 PSA 1.6  1/12/2016 P Reken to be completed in the next 4 weeks; await results of the test.     I fully explained benefits, risks, and alternatives of treatment options listed above. Treatment Plan & Patient Instructions    1. Continue   tamsulosin 0.4 mg twice daily.

## 2018-03-01 NOTE — PATIENT INSTRUCTIONS
1.  Continue   tamsulosin 0.4 mg twice daily. 2.  Continue Cialis 20 mg a few hours or several hours before planned sexual activity    3. Visit in 4 months. Blood draw for PSA    1--7 days before visit.   No sexual stimulation whatsoever for 5 days bef

## 2018-03-13 ENCOUNTER — OFFICE VISIT (OUTPATIENT)
Dept: PAIN CLINIC | Facility: HOSPITAL | Age: 68
End: 2018-03-13
Attending: ANESTHESIOLOGY
Payer: MEDICARE

## 2018-03-13 VITALS — SYSTOLIC BLOOD PRESSURE: 141 MMHG | DIASTOLIC BLOOD PRESSURE: 73 MMHG | OXYGEN SATURATION: 96 % | HEART RATE: 53 BPM

## 2018-03-13 DIAGNOSIS — M48.061 SPINAL STENOSIS OF LUMBAR REGION WITHOUT NEUROGENIC CLAUDICATION: Primary | ICD-10-CM

## 2018-03-13 DIAGNOSIS — M48.02 SPINAL STENOSIS IN CERVICAL REGION: ICD-10-CM

## 2018-03-13 PROCEDURE — 99211 OFF/OP EST MAY X REQ PHY/QHP: CPT | Performed by: NURSE PRACTITIONER

## 2018-03-13 RX ORDER — HYDROCODONE BITARTRATE AND ACETAMINOPHEN 10; 325 MG/1; MG/1
1 TABLET ORAL EVERY 8 HOURS PRN
Qty: 90 TABLET | Refills: 0 | Status: SHIPPED | OUTPATIENT
Start: 2018-03-18 | End: 2018-03-13

## 2018-03-13 RX ORDER — HYDROCODONE BITARTRATE AND ACETAMINOPHEN 10; 325 MG/1; MG/1
1 TABLET ORAL EVERY 8 HOURS PRN
Qty: 90 TABLET | Refills: 0 | Status: SHIPPED | OUTPATIENT
Start: 2018-04-17 | End: 2018-05-15

## 2018-03-13 NOTE — PROGRESS NOTES
Pt in for follow up appointment. He is hoping to avoid surgery - he is inquiring about injections. States he is unable to straighten his spine. The Pain medication is helping. MD Geiger to assess, see providers notes.    Patient denies any new medicatio

## 2018-03-13 NOTE — CHRONIC PAIN
Follow-up Note    HISTORY OF PRESENT ILLNESS:  Ellie Chavez is a 79year old old male, returns to the clinic for evaluation treatment of his low back pain and cervical neck pain Jo Ann Pardo underwent an epidural steroid injection lumbar back in October 201 above  Weakness: as above  Weight Loss: Negative   Fever: Negative   Cardiovascular:  No current chest pain or palpitations   Respiratory:  No current shortness of breath   Gastrointestinal:  No active ulcer  Genitourinary:  Negative  Integumentary :  Faisal Petty Sister    • Lipids Other      family h/o hyperlipidemia and vascular disease       SOCIAL HISTORY:    Social History  Social History   Marital status:   Spouse name: N/A    Years of education: N/A  Number of children: 2     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 Hector Mcdaniels  for his cervical MRI and was cleared       PLAN:  RECOMMENDATIONS:  Maintain on 3 Norco a day  He will follow-up with me in a couple months in     So far as lumbar injec

## 2018-03-15 ENCOUNTER — TELEPHONE (OUTPATIENT)
Dept: PAIN CLINIC | Facility: HOSPITAL | Age: 68
End: 2018-03-15

## 2018-03-26 NOTE — TELEPHONE ENCOUNTER
Please call pt to schedule follow up.        Hypertensive Medications  Protocol Criteria:  · Appointment scheduled in the past 6 months or in the next 3 months  · BMP or CMP in the past 12 months  · Creatinine result < 2  Recent Outpatient Visits

## 2018-03-27 RX ORDER — HYDROCHLOROTHIAZIDE 25 MG/1
25 TABLET ORAL
Qty: 90 TABLET | Refills: 0 | Status: SHIPPED | OUTPATIENT
Start: 2018-03-27 | End: 2018-06-21

## 2018-03-29 ENCOUNTER — LAB ENCOUNTER (OUTPATIENT)
Dept: LAB | Facility: HOSPITAL | Age: 68
End: 2018-03-29
Attending: INTERNAL MEDICINE
Payer: MEDICARE

## 2018-03-29 DIAGNOSIS — I10 ESSENTIAL HYPERTENSION WITH GOAL BLOOD PRESSURE LESS THAN 140/90: ICD-10-CM

## 2018-03-29 DIAGNOSIS — E78.2 MIXED HYPERLIPIDEMIA: ICD-10-CM

## 2018-03-29 LAB
ALBUMIN SERPL BCP-MCNC: 3.8 G/DL (ref 3.5–4.8)
ALBUMIN/GLOB SERPL: 0.9 {RATIO} (ref 1–2)
ALP SERPL-CCNC: 101 U/L (ref 32–100)
ALT SERPL-CCNC: 24 U/L (ref 17–63)
ANION GAP SERPL CALC-SCNC: 9 MMOL/L (ref 0–18)
AST SERPL-CCNC: 18 U/L (ref 15–41)
BASOPHILS # BLD: 0.1 K/UL (ref 0–0.2)
BASOPHILS NFR BLD: 1 %
BILIRUB SERPL-MCNC: 0.7 MG/DL (ref 0.3–1.2)
BILIRUB UR QL: NEGATIVE
BUN SERPL-MCNC: 25 MG/DL (ref 8–20)
BUN/CREAT SERPL: 19.2 (ref 10–20)
CALCIUM SERPL-MCNC: 9.4 MG/DL (ref 8.5–10.5)
CHLORIDE SERPL-SCNC: 101 MMOL/L (ref 95–110)
CHOLEST SERPL-MCNC: 170 MG/DL (ref 110–200)
CLARITY UR: CLEAR
CO2 SERPL-SCNC: 30 MMOL/L (ref 22–32)
COLOR UR: YELLOW
CREAT SERPL-MCNC: 1.3 MG/DL (ref 0.5–1.5)
EOSINOPHIL # BLD: 0.1 K/UL (ref 0–0.7)
EOSINOPHIL NFR BLD: 1 %
ERYTHROCYTE [DISTWIDTH] IN BLOOD BY AUTOMATED COUNT: 18 % (ref 11–15)
GLOBULIN PLAS-MCNC: 4.1 G/DL (ref 2.5–3.7)
GLUCOSE SERPL-MCNC: 91 MG/DL (ref 70–99)
GLUCOSE UR-MCNC: NEGATIVE MG/DL
HCT VFR BLD AUTO: 46.3 % (ref 41–52)
HDLC SERPL-MCNC: 60 MG/DL
HGB BLD-MCNC: 15.1 G/DL (ref 13.5–17.5)
KETONES UR-MCNC: NEGATIVE MG/DL
LDLC SERPL CALC-MCNC: 94 MG/DL (ref 0–99)
LEUKOCYTE ESTERASE UR QL STRIP.AUTO: NEGATIVE
LYMPHOCYTES # BLD: 1.4 K/UL (ref 1–4)
LYMPHOCYTES NFR BLD: 12 %
MCH RBC QN AUTO: 26.5 PG (ref 27–32)
MCHC RBC AUTO-ENTMCNC: 32.6 G/DL (ref 32–37)
MCV RBC AUTO: 81.1 FL (ref 80–100)
MONOCYTES # BLD: 0.8 K/UL (ref 0–1)
MONOCYTES NFR BLD: 6 %
NEUTROPHILS # BLD AUTO: 10.1 K/UL (ref 1.8–7.7)
NEUTROPHILS NFR BLD: 81 %
NITRITE UR QL STRIP.AUTO: NEGATIVE
NONHDLC SERPL-MCNC: 110 MG/DL
OSMOLALITY UR CALC.SUM OF ELEC: 294 MOSM/KG (ref 275–295)
PATIENT FASTING: YES
PH UR: 5 [PH] (ref 5–8)
PLATELET # BLD AUTO: 404 K/UL (ref 140–400)
PMV BLD AUTO: 8.3 FL (ref 7.4–10.3)
POTASSIUM SERPL-SCNC: 4.3 MMOL/L (ref 3.3–5.1)
PROT SERPL-MCNC: 7.9 G/DL (ref 5.9–8.4)
PROT UR-MCNC: 30 MG/DL
RBC # BLD AUTO: 5.71 M/UL (ref 4.5–5.9)
RBC #/AREA URNS AUTO: 2 /HPF
SODIUM SERPL-SCNC: 140 MMOL/L (ref 136–144)
SP GR UR STRIP: 1.02 (ref 1–1.03)
TRIGL SERPL-MCNC: 82 MG/DL (ref 1–149)
TSH SERPL-ACNC: 2.2 UIU/ML (ref 0.45–5.33)
UROBILINOGEN UR STRIP-ACNC: <2
VIT C UR-MCNC: NEGATIVE MG/DL
WBC # BLD AUTO: 12.5 K/UL (ref 4–11)
WBC #/AREA URNS AUTO: 1 /HPF

## 2018-03-29 PROCEDURE — 85025 COMPLETE CBC W/AUTO DIFF WBC: CPT

## 2018-03-29 PROCEDURE — 80061 LIPID PANEL: CPT

## 2018-03-29 PROCEDURE — 80053 COMPREHEN METABOLIC PANEL: CPT

## 2018-03-29 PROCEDURE — 81001 URINALYSIS AUTO W/SCOPE: CPT

## 2018-03-29 PROCEDURE — 36415 COLL VENOUS BLD VENIPUNCTURE: CPT

## 2018-03-29 PROCEDURE — 84443 ASSAY THYROID STIM HORMONE: CPT

## 2018-04-05 ENCOUNTER — TELEPHONE (OUTPATIENT)
Dept: INTERNAL MEDICINE CLINIC | Facility: CLINIC | Age: 68
End: 2018-04-05

## 2018-04-05 DIAGNOSIS — I10 ESSENTIAL HYPERTENSION WITH GOAL BLOOD PRESSURE LESS THAN 140/90: Primary | ICD-10-CM

## 2018-04-05 NOTE — TELEPHONE ENCOUNTER
LMTCB transfer to triage    Notes recorded by Alden Stevenson MD on 4/5/2018 at 2:28 PM CDT  Blood test is good with normal blood  sugar, liver, thyroid, urine, lipids and kidney tests.  Your white blood count and platelet counts are a little elevated

## 2018-04-05 NOTE — TELEPHONE ENCOUNTER
Spoke with patient (identified name and ) ,results reviewed and agrees with  plan. Patient requesting a copy of the test results to be mail to the address on file. Lab order generated. States that he had cold symptoms the time they victoriano the blood, but

## 2018-04-09 RX ORDER — SIMVASTATIN 20 MG
TABLET ORAL
Qty: 90 TABLET | Refills: 0 | Status: SHIPPED | OUTPATIENT
Start: 2018-04-09 | End: 2018-08-02

## 2018-05-03 ENCOUNTER — OFFICE VISIT (OUTPATIENT)
Dept: PODIATRY CLINIC | Facility: CLINIC | Age: 68
End: 2018-05-03

## 2018-05-03 DIAGNOSIS — Q82.8 POROKERATOSIS: ICD-10-CM

## 2018-05-03 DIAGNOSIS — M79.672 LEFT FOOT PAIN: Primary | ICD-10-CM

## 2018-05-03 PROCEDURE — G0463 HOSPITAL OUTPT CLINIC VISIT: HCPCS | Performed by: PODIATRIST

## 2018-05-03 PROCEDURE — 99213 OFFICE O/P EST LOW 20 MIN: CPT | Performed by: PODIATRIST

## 2018-05-03 NOTE — PROGRESS NOTES
HPI:    Patient ID: Eladia Quinones is a 79year old male. HPI  This 66-year-old male presents as a new patient to me. He saw Dr. Kinjal Albright well over 3 years ago. He is having pain with the left foot that has been present for the last month or so.   The nucleation. It is not open or draining. It appears to be porokeratotic. It does not seem to be associated with any significant lateral prominence of the metatarsal head. It is not a wart.   After complete debridement I covered the area and expect comple

## 2018-05-10 ENCOUNTER — NURSE TRIAGE (OUTPATIENT)
Dept: OTHER | Age: 68
End: 2018-05-10

## 2018-05-10 NOTE — TELEPHONE ENCOUNTER
Maybe a local reaction but if he is concerned he should seen an earlier apt - no signs of infection which is reassuring but if any such sympt should develop as well as if he feels it is getting bigger than he needs to have it evaluated

## 2018-05-10 NOTE — TELEPHONE ENCOUNTER
Action Requested: Summary for Provider     []  Critical Lab, Recommendations Needed  [] Need Additional Advice  []   FYI    []   Need Orders  [] Need Medications Sent to Pharmacy  []  Other     SUMMARY: ID.[id number] for Eduarda Yun to wait until Monday?

## 2018-05-14 ENCOUNTER — OFFICE VISIT (OUTPATIENT)
Dept: INTERNAL MEDICINE CLINIC | Facility: CLINIC | Age: 68
End: 2018-05-14

## 2018-05-14 ENCOUNTER — LAB ENCOUNTER (OUTPATIENT)
Dept: LAB | Facility: HOSPITAL | Age: 68
End: 2018-05-14
Attending: INTERNAL MEDICINE
Payer: MEDICARE

## 2018-05-14 VITALS
WEIGHT: 220 LBS | BODY MASS INDEX: 31.5 KG/M2 | DIASTOLIC BLOOD PRESSURE: 89 MMHG | HEART RATE: 53 BPM | SYSTOLIC BLOOD PRESSURE: 138 MMHG | RESPIRATION RATE: 16 BRPM | HEIGHT: 70 IN

## 2018-05-14 DIAGNOSIS — M79.89 LEFT ARM SWELLING: Primary | ICD-10-CM

## 2018-05-14 DIAGNOSIS — I10 ESSENTIAL HYPERTENSION WITH GOAL BLOOD PRESSURE LESS THAN 140/90: ICD-10-CM

## 2018-05-14 DIAGNOSIS — E66.9 OBESITY (BMI 30.0-34.9): ICD-10-CM

## 2018-05-14 DIAGNOSIS — E78.2 MIXED HYPERLIPIDEMIA: ICD-10-CM

## 2018-05-14 PROBLEM — E66.811 OBESITY (BMI 30.0-34.9): Status: ACTIVE | Noted: 2018-05-14

## 2018-05-14 PROCEDURE — 99214 OFFICE O/P EST MOD 30 MIN: CPT | Performed by: INTERNAL MEDICINE

## 2018-05-14 PROCEDURE — 36415 COLL VENOUS BLD VENIPUNCTURE: CPT

## 2018-05-14 PROCEDURE — G0463 HOSPITAL OUTPT CLINIC VISIT: HCPCS | Performed by: INTERNAL MEDICINE

## 2018-05-14 PROCEDURE — 81001 URINALYSIS AUTO W/SCOPE: CPT

## 2018-05-14 PROCEDURE — 85025 COMPLETE CBC W/AUTO DIFF WBC: CPT

## 2018-05-14 NOTE — PROGRESS NOTES
Ellie Chavez is a 79year old male. HPI:   1. Swelling of Left arm    Has developed a mild pain in his left elbow area the last few months. No injury noted but did some heavy lifting a few months age. Mild pain in the left biceps area.  Has been using mouth daily as needed (for voiding dysfunction from enlarged prostate).  Disp: 30 tablet Rfl: 11   IBUPROFEN 600 MG Oral Tab TAKE 1 TABLET EVERY 12 HOURS AS NEEDED FOR PAIN (SUBSTITUTED FOR MOTRIN) Disp: 180 tablet Rfl: 0   TAMSULOSIN HCL 0.4 MG Oral Cap TA have torn something at that time. Ice joint area intermittently as tolerated for short periods of time to decrease any inflammation and give some pain relief.  Take ibuprofen 400 mg (2 of the 200 mg pills or capsules) every 6 hours or tylenol XS (500 mg) 3 months.

## 2018-05-15 RX ORDER — HYDROCODONE BITARTRATE AND ACETAMINOPHEN 10; 325 MG/1; MG/1
1 TABLET ORAL EVERY 8 HOURS PRN
COMMUNITY
End: 2018-05-15

## 2018-05-17 ENCOUNTER — OFFICE VISIT (OUTPATIENT)
Dept: PAIN CLINIC | Facility: HOSPITAL | Age: 68
End: 2018-05-17
Attending: ANESTHESIOLOGY
Payer: MEDICARE

## 2018-05-17 VITALS
RESPIRATION RATE: 18 BRPM | BODY MASS INDEX: 30.78 KG/M2 | HEIGHT: 70 IN | WEIGHT: 215 LBS | SYSTOLIC BLOOD PRESSURE: 142 MMHG | DIASTOLIC BLOOD PRESSURE: 82 MMHG

## 2018-05-17 DIAGNOSIS — M48.061 SPINAL STENOSIS OF LUMBAR REGION WITHOUT NEUROGENIC CLAUDICATION: Primary | ICD-10-CM

## 2018-05-17 DIAGNOSIS — M48.02 SPINAL STENOSIS IN CERVICAL REGION: ICD-10-CM

## 2018-05-17 PROCEDURE — 99211 OFF/OP EST MAY X REQ PHY/QHP: CPT

## 2018-05-17 RX ORDER — HYDROCODONE BITARTRATE AND ACETAMINOPHEN 10; 325 MG/1; MG/1
1 TABLET ORAL EVERY 8 HOURS PRN
Qty: 90 TABLET | Refills: 0 | Status: SHIPPED | OUTPATIENT
Start: 2018-05-17 | End: 2018-06-16

## 2018-05-17 RX ORDER — HYDROCODONE BITARTRATE AND ACETAMINOPHEN 10; 325 MG/1; MG/1
1 TABLET ORAL EVERY 8 HOURS PRN
Qty: 90 TABLET | Refills: 0 | Status: SHIPPED | OUTPATIENT
Start: 2018-06-15 | End: 2018-07-15

## 2018-05-17 NOTE — PROGRESS NOTES
NEW PT SINCE  09/28/17   C/O OF CERVICAL NECK PAIN RADIATING INTO THE BILAT SHLDRS, AND LBP;  PT STATES 1 1/2 YR HX OF PAIN;  \"JUST DEALT WITH THE PAIN\";  HAS USED IBUPROFEN, NOW USING NORCO  10/325 USING 3/DAY;   REPORTS HX OF SHLDR SURGERY-\"DON'T JO ANN

## 2018-05-17 NOTE — CHRONIC PAIN
Initial Consultation Note      HISTORY OF PRESENT ILLNESS:  Kaley Leon is a 79year old old male referred to the pain clinic  for evaluation treatment of his low back and cervical neck pain Tammy Benntet received a cervical injection a few weeks ago withou Right  No date: TONSILLECTOMY    REVIEW OF SYSTEMS:   A comprehensive review of systems was negative except for:   MEDICAL HISTORY:  Patient Active Problem List:     Prostate cancer [facility])     Erectile dysfunction     Knee pain     Pain in the groin     Anastasia Reaction to local anesthetic No     Social History Narrative   None on file       ADVANCE CARE PLANNING:  Advance Care Plan NOT discussed.     PHYSICAL EXAMINATION:   05/17/18  1157   BP: 142/82   Resp: 18   Weight: 215 lb (97.5 kg)   Height: 5' 10\" (1.778 03/29/2018   BUN 25 (H) 03/29/2018   GLU 91 03/29/2018   CA 9.4 03/29/2018     No results found for: PT    [place] PHYSICIAN MONITORING PROGRAM REVIEWED  Yes      ASSESSMENT:   79year old old male with cervical lumbar spinal stenosis status post lumbar i

## 2018-05-22 ENCOUNTER — TELEPHONE (OUTPATIENT)
Dept: OTHER | Age: 68
End: 2018-05-22

## 2018-05-22 DIAGNOSIS — D72.829 LEUKOCYTOSIS, UNSPECIFIED TYPE: Primary | ICD-10-CM

## 2018-05-22 NOTE — TELEPHONE ENCOUNTER
Pt informed of lab result & MD recommendation, pt stated understanding. Hematology info provided to pt and order placed. Letter mailed as pt requested.

## 2018-05-22 NOTE — TELEPHONE ENCOUNTER
----- Message from Diego Matt, Isi Parrish Sharon sent at 5/22/2018  9:19 AM CDT -----  Routed to RN            Notes recorded by Toy Torres MD on 5/22/2018 at 8:17 AM CDT  White blood count remains elevated as does platelet count.  I would suggest patient

## 2018-05-25 ENCOUNTER — OFFICE VISIT (OUTPATIENT)
Dept: HEMATOLOGY/ONCOLOGY | Facility: HOSPITAL | Age: 68
End: 2018-05-25
Attending: INTERNAL MEDICINE
Payer: MEDICARE

## 2018-05-25 VITALS
WEIGHT: 220 LBS | BODY MASS INDEX: 31.5 KG/M2 | RESPIRATION RATE: 18 BRPM | SYSTOLIC BLOOD PRESSURE: 126 MMHG | TEMPERATURE: 99 F | HEART RATE: 52 BPM | DIASTOLIC BLOOD PRESSURE: 63 MMHG | HEIGHT: 70 IN

## 2018-05-25 DIAGNOSIS — C61 PROSTATE CANCER (HCC): ICD-10-CM

## 2018-05-25 DIAGNOSIS — D72.829 LEUKOCYTOSIS, UNSPECIFIED TYPE: ICD-10-CM

## 2018-05-25 DIAGNOSIS — D75.839 THROMBOCYTOSIS: Primary | ICD-10-CM

## 2018-05-25 PROCEDURE — 99205 OFFICE O/P NEW HI 60 MIN: CPT | Performed by: INTERNAL MEDICINE

## 2018-05-25 NOTE — CONSULTS
[facility]    PATIENT'S NAME: Yvonne Mcclellandlist   CONSULTING PHYSICIAN: Gloris Meckel.  Danielle Duke MD   PATIENT ACCOUNT #: [de-identified] LOCATION: [address] [place] [address] RECORD #: [id number] YOB: 1950   CONSULTATION DATE: 05/25/2018       CANCER CE maternal grandmother diagnosed with breast cancer in her 76s. His mother has been diagnosed breast cancer in her [de-identified]. SOCIAL HISTORY:  The patient is a never smoker. He denies any current alcohol or illicit drug use.       REVIEW OF SYSTEMS:  All other localized Tito 3 + 4 prostate cancer, treated with brachytherapy in February 2014, he has had no evidence of recurrence/progression. He does continue to follow with Urology.     Thank you very much for the consultation request and for allowing us to par

## 2018-06-22 RX ORDER — HYDROCHLOROTHIAZIDE 25 MG/1
25 TABLET ORAL
Qty: 90 TABLET | Refills: 0 | Status: SHIPPED | OUTPATIENT
Start: 2018-06-22 | End: 2018-10-15

## 2018-06-22 NOTE — TELEPHONE ENCOUNTER
LOV: 5-14-18 Last rX; 3-27-18    No protocol     Please advise in regards to refill request. Thank You

## 2018-06-28 ENCOUNTER — LAB ENCOUNTER (OUTPATIENT)
Dept: LAB | Facility: HOSPITAL | Age: 68
End: 2018-06-28
Attending: UROLOGY
Payer: MEDICARE

## 2018-06-28 DIAGNOSIS — C61 PROSTATE CANCER (HCC): ICD-10-CM

## 2018-06-28 DIAGNOSIS — D75.839 THROMBOCYTOSIS: ICD-10-CM

## 2018-06-28 PROCEDURE — 85025 COMPLETE CBC W/AUTO DIFF WBC: CPT

## 2018-06-28 PROCEDURE — 81270 JAK2 GENE: CPT

## 2018-06-28 PROCEDURE — 36415 COLL VENOUS BLD VENIPUNCTURE: CPT

## 2018-07-03 ENCOUNTER — APPOINTMENT (OUTPATIENT)
Dept: LAB | Facility: HOSPITAL | Age: 68
End: 2018-07-03
Attending: UROLOGY
Payer: MEDICARE

## 2018-07-03 ENCOUNTER — OFFICE VISIT (OUTPATIENT)
Dept: SURGERY | Facility: CLINIC | Age: 68
End: 2018-07-03

## 2018-07-03 VITALS — HEART RATE: 47 BPM | TEMPERATURE: 98 F | DIASTOLIC BLOOD PRESSURE: 66 MMHG | SYSTOLIC BLOOD PRESSURE: 118 MMHG

## 2018-07-03 DIAGNOSIS — N52.01 ERECTILE DYSFUNCTION DUE TO ARTERIAL INSUFFICIENCY: ICD-10-CM

## 2018-07-03 DIAGNOSIS — C61 PROSTATE CANCER (HCC): Primary | ICD-10-CM

## 2018-07-03 DIAGNOSIS — R35.1 NOCTURIA: ICD-10-CM

## 2018-07-03 DIAGNOSIS — N19 RENAL FAILURE, UNSPECIFIED CHRONICITY: ICD-10-CM

## 2018-07-03 LAB — PSA SERPL-MCNC: 1.5 NG/ML (ref 0–4)

## 2018-07-03 PROCEDURE — G0463 HOSPITAL OUTPT CLINIC VISIT: HCPCS | Performed by: UROLOGY

## 2018-07-03 PROCEDURE — 36415 COLL VENOUS BLD VENIPUNCTURE: CPT

## 2018-07-03 PROCEDURE — 84153 ASSAY OF PSA TOTAL: CPT

## 2018-07-03 PROCEDURE — 99214 OFFICE O/P EST MOD 30 MIN: CPT | Performed by: UROLOGY

## 2018-07-03 NOTE — PROGRESS NOTES
HPI:    Patient ID: Braden Zuleta is a 79year old male. HPI    1. Prostate Cancer   Chronic. Status post brachytherapy February 2014.  The patient denies any symptoms to suggest prostate cancer such as new bone pain, appetite change or unintended we biopsy; prostate volume 39.3 g; 14 biopsy; pathology showed chronic and acute inflammation but no cancer. Erectile dysfunction already before brachytherapy; on 9/5/13 I prescribed Cialis and 5-10 mg every 48 hours when necessary sexual activity.    1/9/14, left flank   • Elevated PSA 2008    bx negative for cancer   • H/O arthroscopy of knee bilateral   • History of brachytherapy    • Neck pain    • Obesity, unspecified    • Prostate cancer [facility])     brachytherapy   • PVD (peripheral vascular disease) ([facility] Pulmonary/Chest: No respiratory distress. Genitourinary:   Genitourinary Comments: On PARISH, prostate small, flat, no palpable nodules or indurations      Musculoskeletal: Normal range of motion. Neurological: He is alert. Skin: Skin is dry.    Psychi treatment and I answered questions on treatment; patient understands all of this and wants to proceed.    (N52.01) Erectile dysfunction due to arterial insufficiency  Plan: Patient is not currently sexually active and is not taking Cialis 20 mg; pt feels t Instructions    1. Please get blood draw for PSA during the next 2 weeks; you can call and leave message after the blood draw and we will get back to you.   Please note that 2 /26/18 PSA was 1.6 and it was the same 11/21/17 PSA 1.6.    2.    IF voiding pro Christa Jordan MD, 7/3/2018, 5:36 PM

## 2018-07-03 NOTE — PATIENT INSTRUCTIONS
1.  Please get blood draw for PSA during the next 2 weeks; you can call and leave message after the blood draw and we will get back to you.   Please note that 2 /26/18 PSA was 1.6 and it was the same 11/21/17 PSA 1.6.    2.    IF voiding problem were to bec

## 2018-07-05 ENCOUNTER — TELEPHONE (OUTPATIENT)
Dept: SURGERY | Facility: CLINIC | Age: 68
End: 2018-07-05

## 2018-07-05 NOTE — TELEPHONE ENCOUNTER
Phoned pt and spoke with him. Read to him 's result note as outlined below. Pt verbalized understanding, agrees to plan, and is thankful.

## 2018-07-05 NOTE — TELEPHONE ENCOUNTER
PSA   Order: [id number]   Collected:  7/3/2018  1:55 PM Status:  Final result Dx:  Prostate cancer ([facility] [address]   Notes recorded by Mariza Culver MD on 7/4/2018 at 11:33 PM CDT  Nurses, please call patient.      7/3/18 PSA 1.5, slightly improved compared to 4

## 2018-07-05 NOTE — TELEPHONE ENCOUNTER
Pt requesting 7/3 PSA results, pt states he wants cb by 9:00am today, [facility] did not guarantee cb by that time. Pls call thank you.

## 2018-07-06 ENCOUNTER — TELEPHONE (OUTPATIENT)
Dept: SURGERY | Facility: CLINIC | Age: 68
End: 2018-07-06

## 2018-07-06 NOTE — TELEPHONE ENCOUNTER
Notes recorded by Cele Feng MD on 7/4/2018 at 11:33 PM CDT  Nurses, please call patient.      7/3/18 PSA 1.5, slightly improved compared to 4 months ago when PSA was 1.6.  Please continue urology plans as last discussed.  I wish you the best of he

## 2018-07-06 NOTE — TELEPHONE ENCOUNTER
----- Message from Taina Loaiza RN sent at 7/5/2018  8:44 AM CDT -----      ----- Message -----  From: Haydee Shabazz MD  Sent: 7/4/2018  11:33 PM  To: [facility] Clinical Staff    Nurses, please call patient.       7/3/18 PSA 1.5, slightly improved c

## 2018-07-13 ENCOUNTER — OFFICE VISIT (OUTPATIENT)
Dept: HEMATOLOGY/ONCOLOGY | Facility: HOSPITAL | Age: 68
End: 2018-07-13
Attending: INTERNAL MEDICINE
Payer: MEDICARE

## 2018-07-13 ENCOUNTER — TELEPHONE (OUTPATIENT)
Dept: SURGERY | Facility: CLINIC | Age: 68
End: 2018-07-13

## 2018-07-13 VITALS
HEART RATE: 47 BPM | WEIGHT: 215 LBS | RESPIRATION RATE: 16 BRPM | TEMPERATURE: 98 F | HEIGHT: 70 IN | SYSTOLIC BLOOD PRESSURE: 138 MMHG | DIASTOLIC BLOOD PRESSURE: 65 MMHG | BODY MASS INDEX: 30.78 KG/M2

## 2018-07-13 DIAGNOSIS — D75.839 THROMBOCYTOSIS: Primary | ICD-10-CM

## 2018-07-13 DIAGNOSIS — D72.829 LEUKOCYTOSIS, UNSPECIFIED TYPE: ICD-10-CM

## 2018-07-13 DIAGNOSIS — C61 PROSTATE CANCER (HCC): ICD-10-CM

## 2018-07-13 PROCEDURE — 99214 OFFICE O/P EST MOD 30 MIN: CPT | Performed by: INTERNAL MEDICINE

## 2018-07-13 NOTE — TELEPHONE ENCOUNTER
Pt at [address] [place] [address] / Pt states he saw PVK 7/3/18 and was given orders for labwork/     Pt completed the lab work & discussed results with PVK    Pt is asking for a written report of his lab results/    Advised Pt would send a message to the RNS    Pt mikaela

## 2018-07-13 NOTE — PROGRESS NOTES
Cancer Center Progress Note    Patient Name: Gina Barrera   YOB: 1950   Medical Record Number: [id number]   Attending Physician: Snehal Brown M.D. 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 Lymphatics: There is no palpable peripheral lymphadenopathy   Chest: Symmetric expansion, nonlabored breathing  Cardiovascular: Regular with palpable distal pulses   Abdomen: Soft, non tender. Extremities: No edema.   Priyanka discussed with the pateint and family.      Nikita Nichole MD

## 2018-07-17 NOTE — PROGRESS NOTES
Patient presents to [facility] ambulatory for med eval.  He has chronic neck and back pain. He takes norco tid which helps him function. He states that he is controlling his pain with the medication. He states injections have not helped.   He tried to continue

## 2018-07-17 NOTE — CHRONIC PAIN
MEDICATION EVALUATION  HISTORY OF PRESENT ILLNESS:  Ros Chew is a 79year old old male with history of Chronic, continuous use of opioids  (primary encounter diagnosis) who returns for medication evaluation.  Patient continues to report neck pain an exacerbate the pain.   Numbness/tingling: as above  Weakness: as above  Weight Loss: Negative   Fever: Negative   Cardiovascular:  No current chest pain or palpitations   Respiratory:  No current shortness of breath   Gastrointestinal:  No active ulcer  Gen family h/o hyperlipidemia and vascular disease       SOCIAL HISTORY:    Social History  Social History   Marital status:   Spouse name: N/A    Years of education: N/A  Number of children: 2     Occupational History  ordering  retired     Social Hist while taking this medication.  Patient verbalized understanding.  Informed patient that no refills will be given over the phone. Instructed patient he is responsible for medications and  refills.  Patient verbalized understanding.   - Narcan: NA, less than

## 2018-08-02 RX ORDER — SIMVASTATIN 20 MG
TABLET ORAL
Qty: 90 TABLET | Refills: 0 | Status: SHIPPED | OUTPATIENT
Start: 2018-08-02 | End: 2018-10-15

## 2018-08-02 RX ORDER — SIMVASTATIN 20 MG
TABLET ORAL
Qty: 90 TABLET | Refills: 0 | OUTPATIENT
Start: 2018-08-02

## 2018-08-03 NOTE — TELEPHONE ENCOUNTER
Cholesterol Medications  Protocol Criteria:  · Appointment scheduled in the past 12 months or in the next 3 months  · ALT & LDL on file in the past 12 months  · ALT result < 80  · LDL result <130   Recent Outpatient Visits            2 weeks ago Chronic, c

## 2018-09-17 ENCOUNTER — NURSE TRIAGE (OUTPATIENT)
Dept: INTERNAL MEDICINE CLINIC | Facility: CLINIC | Age: 68
End: 2018-09-17

## 2018-09-17 RX ORDER — HYDROCODONE BITARTRATE AND ACETAMINOPHEN 10; 325 MG/1; MG/1
1 TABLET ORAL EVERY 8 HOURS PRN
COMMUNITY
End: 2018-09-18

## 2018-09-17 NOTE — TELEPHONE ENCOUNTER
Action Requested: Summary for Provider     []  Critical Lab, Recommendations Needed  [] Need Additional Advice  [x]   FYI    []   Need Orders  [] Need Medications Sent to Pharmacy  []  Other     SUMMARY: Appt scheduled for Tue 9/18/18 11:20am with Dr Ana Evans,

## 2018-09-18 ENCOUNTER — OFFICE VISIT (OUTPATIENT)
Dept: INTERNAL MEDICINE CLINIC | Facility: CLINIC | Age: 68
End: 2018-09-18
Payer: MEDICARE

## 2018-09-18 VITALS
BODY MASS INDEX: 31 KG/M2 | HEART RATE: 53 BPM | SYSTOLIC BLOOD PRESSURE: 138 MMHG | RESPIRATION RATE: 16 BRPM | WEIGHT: 215 LBS | DIASTOLIC BLOOD PRESSURE: 88 MMHG

## 2018-09-18 DIAGNOSIS — I83.003 VENOUS STASIS ULCER OF ANKLE LIMITED TO BREAKDOWN OF SKIN WITH VARICOSE VEINS, UNSPECIFIED LATERALITY (HCC): Primary | ICD-10-CM

## 2018-09-18 DIAGNOSIS — I10 ESSENTIAL HYPERTENSION WITH GOAL BLOOD PRESSURE LESS THAN 140/90: ICD-10-CM

## 2018-09-18 DIAGNOSIS — E66.9 OBESITY (BMI 30.0-34.9): ICD-10-CM

## 2018-09-18 DIAGNOSIS — E78.2 MIXED HYPERLIPIDEMIA: ICD-10-CM

## 2018-09-18 DIAGNOSIS — L97.301 VENOUS STASIS ULCER OF ANKLE LIMITED TO BREAKDOWN OF SKIN WITH VARICOSE VEINS, UNSPECIFIED LATERALITY (HCC): Primary | ICD-10-CM

## 2018-09-18 PROCEDURE — G0463 HOSPITAL OUTPT CLINIC VISIT: HCPCS | Performed by: INTERNAL MEDICINE

## 2018-09-18 PROCEDURE — 99214 OFFICE O/P EST MOD 30 MIN: CPT | Performed by: INTERNAL MEDICINE

## 2018-09-18 RX ORDER — FUROSEMIDE 20 MG/1
20 TABLET ORAL DAILY
Qty: 30 TABLET | Refills: 0 | Status: SHIPPED | OUTPATIENT
Start: 2018-09-18 | End: 2018-10-16

## 2018-09-18 NOTE — PROGRESS NOTES
Niecy Hall is a 79year old male. HPI:   1. Venous stasis ulcers of legs    Patient has had a venous ulcer on LLE for months and has now developed another bleb on the lateral aspect of the left ankle.  Has few blebs appearing on medial right ankle as TABLET (25 MG TOTAL) BY MOUTH ONCE DAILY.  Disp: 90 tablet Rfl: 0   METOPROLOL TARTRATE 25 MG Oral Tab TAKE 1 TABLET TWICE DAILY Disp: 180 tablet Rfl: 1   tadalafil (CIALIS) 5 MG Oral Tab Take 1 tablet (5 mg total) by mouth daily as needed (for voiding dysf lateral ankle as well. ASSESSMENT AND PLAN:     1. Venous stasis ulcer of ankle limited to breakdown of skin with varicose veins, unspecified laterality ([address]    Advised to start furosemide 20 mg daily and increase fresh fruit intake.  Use low compressi

## 2018-09-21 NOTE — CHRONIC PAIN
Initial Consultation Note      HISTORY OF PRESENT ILLNESS:  Bessy Marques is a 79year old old male referred to the pain clinic  for evaluation treatment of his low back and cervical neck pain Yajaira Almaguer received a cervical injection a few weeks ago withou arthroscopy - bras  2013: PROSTATE BIOPSIES  2007: [address]; Right  No date: TONSILLECTOMY    REVIEW OF SYSTEMS:   A comprehensive review of systems was negative except for:   MEDICAL HISTORY:  Patient Active Problem List:     Prostate ca on file      Food insecurity - worry: Not on file      Food insecurity - inability: Not on file      Transportation needs - medical: Not on file      Transportation needs - non-medical: Not on file    Occupational History      Occupation: ordering        C Hand 5/5 5/5    5/5 5/5     LOWER EXTREMITY      LEFT RIGHT   Iliopsoas 5/5 5/5   Quadriceps 5/5 5/5   Foot DF 5/5 5/5   Foot EHL 5/5 5/5   Gastrocnemius 5/5 5/5     PULSES      LEFT RIGHT   Radial 2/4 2/4   Dorsalis Pedis 2/4 2/4   Posterior Tibial 2/

## 2018-09-21 NOTE — PROGRESS NOTES
NEW PT SINCE  09/28/17   C/O OF CERVICAL NECK PAIN RADIATING INTO THE BILAT SHLDRS, AND LBP;  PT STATES 1 1/2 YR HX OF PAIN;  \"JUST DEALT WITH THE PAIN\";  HAS USED IBUPROFEN, NOW USING NORCO  10/325 USING 3/DAY;   REPORTS HX OF SHLDR SURGERY-\"DON'T REM

## 2018-09-25 ENCOUNTER — NURSE TRIAGE (OUTPATIENT)
Dept: INTERNAL MEDICINE CLINIC | Facility: CLINIC | Age: 68
End: 2018-09-25

## 2018-09-25 NOTE — TELEPHONE ENCOUNTER
Action Requested: Summary for Provider     []  Critical Lab, Recommendations Needed  [x] Need Additional Advice  []   FYI    []   Need Orders  [] Need Medications Sent to Pharmacy  []  Other     SUMMARY: Dr Merline Rather, please advise. Add to schedule today?

## 2018-09-25 NOTE — TELEPHONE ENCOUNTER
Scheduled to see Dr Carlos Conejos 9/26/18 at 10:10 am Reba. Advised the patient that if pain worsens, to go to ER, and if any chest pain, dyspnea to call 911; he agreed.

## 2018-09-26 ENCOUNTER — OFFICE VISIT (OUTPATIENT)
Dept: INTERNAL MEDICINE CLINIC | Facility: CLINIC | Age: 68
End: 2018-09-26
Payer: MEDICARE

## 2018-09-26 VITALS
DIASTOLIC BLOOD PRESSURE: 81 MMHG | HEIGHT: 70 IN | BODY MASS INDEX: 30.78 KG/M2 | RESPIRATION RATE: 16 BRPM | HEART RATE: 51 BPM | SYSTOLIC BLOOD PRESSURE: 136 MMHG | WEIGHT: 215 LBS

## 2018-09-26 DIAGNOSIS — I83.003 VENOUS STASIS ULCER OF ANKLE WITH OTHER ULCER SEVERITY WITH VARICOSE VEINS, UNSPECIFIED LATERALITY (HCC): ICD-10-CM

## 2018-09-26 DIAGNOSIS — E66.09 CLASS 1 OBESITY DUE TO EXCESS CALORIES WITH SERIOUS COMORBIDITY IN ADULT, UNSPECIFIED BMI: ICD-10-CM

## 2018-09-26 DIAGNOSIS — L97.308 VENOUS STASIS ULCER OF ANKLE WITH OTHER ULCER SEVERITY WITH VARICOSE VEINS, UNSPECIFIED LATERALITY (HCC): ICD-10-CM

## 2018-09-26 DIAGNOSIS — M25.562 ACUTE PAIN OF LEFT KNEE: Primary | ICD-10-CM

## 2018-09-26 DIAGNOSIS — I10 ESSENTIAL HYPERTENSION WITH GOAL BLOOD PRESSURE LESS THAN 140/90: ICD-10-CM

## 2018-09-26 PROBLEM — E66.811 CLASS 1 OBESITY DUE TO EXCESS CALORIES IN ADULT: Status: ACTIVE | Noted: 2018-09-26

## 2018-09-26 PROCEDURE — 99214 OFFICE O/P EST MOD 30 MIN: CPT | Performed by: INTERNAL MEDICINE

## 2018-09-26 PROCEDURE — G0463 HOSPITAL OUTPT CLINIC VISIT: HCPCS | Performed by: INTERNAL MEDICINE

## 2018-09-26 NOTE — PROGRESS NOTES
Cinthya Reis is a 79year old male. HPI:   2. Pain in left knee    Has been having some increased warmth and pain left knee the last 3-5 days. No injury sustained. Hurts to walk on the left knee. Hurts to walk on the knee Denies palpitations or SOB.  U HYDROcodone-acetaminophen  MG Oral Tab Take 1 tablet by mouth every 6 (six) hours as needed for Pain. Disp: 90 tablet Rfl: 0   furosemide 20 MG Oral Tab Take 1 tablet (20 mg total) by mouth daily.  Disp: 30 tablet Rfl: 0   simvastatin 20 MG Oral Tab distress  SKIN: no rashes,no suspicious lesions  HEENT: atraumatic, normocephalic,ears and throat are clear  NECK: supple,no adenopathy,no bruits  LUNGS: clear to auscultation  CARDIO: RRR without murmur  GI: good BS's,no masses, HSM or tenderness  EXTREMI comorbidity in adult, unspecified BMI    The patient is obese with BMI near 31. Discussed with the patient obesity complications including risk for cardiovascular risk events, also degenerative arthritis of joints and spine.  Discussed weight reduction diet

## 2018-09-28 ENCOUNTER — HOSPITAL ENCOUNTER (OUTPATIENT)
Dept: ULTRASOUND IMAGING | Facility: HOSPITAL | Age: 68
Discharge: HOME | End: 2018-09-28
Attending: ORTHOPAEDIC SURGERY
Payer: MEDICARE

## 2018-09-28 DIAGNOSIS — R60.9 SWELLING: ICD-10-CM

## 2018-09-28 PROCEDURE — 93971 EXTREMITY STUDY: CPT | Performed by: ORTHOPAEDIC SURGERY

## 2018-10-10 ENCOUNTER — APPOINTMENT (OUTPATIENT)
Dept: LAB | Facility: HOSPITAL | Age: 68
End: 2018-10-10
Attending: INTERNAL MEDICINE
Payer: MEDICARE

## 2018-10-10 DIAGNOSIS — I10 ESSENTIAL HYPERTENSION WITH GOAL BLOOD PRESSURE LESS THAN 140/90: ICD-10-CM

## 2018-10-10 PROCEDURE — 36415 COLL VENOUS BLD VENIPUNCTURE: CPT

## 2018-10-10 PROCEDURE — 80048 BASIC METABOLIC PNL TOTAL CA: CPT

## 2018-10-15 ENCOUNTER — OFFICE VISIT (OUTPATIENT)
Dept: INTERNAL MEDICINE CLINIC | Facility: CLINIC | Age: 68
End: 2018-10-15
Payer: MEDICARE

## 2018-10-15 ENCOUNTER — TELEPHONE (OUTPATIENT)
Dept: OTHER | Age: 68
End: 2018-10-15

## 2018-10-15 VITALS
RESPIRATION RATE: 16 BRPM | SYSTOLIC BLOOD PRESSURE: 140 MMHG | BODY MASS INDEX: 30.78 KG/M2 | WEIGHT: 215 LBS | DIASTOLIC BLOOD PRESSURE: 84 MMHG | HEIGHT: 70 IN | HEART RATE: 48 BPM

## 2018-10-15 DIAGNOSIS — E78.2 MIXED HYPERLIPIDEMIA: ICD-10-CM

## 2018-10-15 DIAGNOSIS — I83.001: ICD-10-CM

## 2018-10-15 DIAGNOSIS — L97.308 VENOUS STASIS ULCER OF ANKLE WITH OTHER ULCER SEVERITY WITH VARICOSE VEINS, UNSPECIFIED LATERALITY (HCC): Primary | ICD-10-CM

## 2018-10-15 DIAGNOSIS — I83.003 VENOUS STASIS ULCER OF ANKLE WITH OTHER ULCER SEVERITY WITH VARICOSE VEINS, UNSPECIFIED LATERALITY (HCC): Primary | ICD-10-CM

## 2018-10-15 DIAGNOSIS — L97.103: ICD-10-CM

## 2018-10-15 DIAGNOSIS — I10 ESSENTIAL HYPERTENSION WITH GOAL BLOOD PRESSURE LESS THAN 140/90: ICD-10-CM

## 2018-10-15 PROCEDURE — G0463 HOSPITAL OUTPT CLINIC VISIT: HCPCS | Performed by: INTERNAL MEDICINE

## 2018-10-15 PROCEDURE — 99214 OFFICE O/P EST MOD 30 MIN: CPT | Performed by: INTERNAL MEDICINE

## 2018-10-15 RX ORDER — POTASSIUM CHLORIDE 14.9 MG/ML
INJECTION INTRAVENOUS
Refills: 0 | OUTPATIENT
Start: 2018-10-15

## 2018-10-15 RX ORDER — SIMVASTATIN 20 MG
TABLET ORAL
Qty: 90 TABLET | Refills: 3 | Status: SHIPPED | OUTPATIENT
Start: 2018-10-15 | End: 2019-10-24

## 2018-10-15 RX ORDER — POTASSIUM CHLORIDE 750 MG/1
10 TABLET, FILM COATED, EXTENDED RELEASE ORAL DAILY
Qty: 90 TABLET | Refills: 3 | Status: SHIPPED | OUTPATIENT
Start: 2018-10-15 | End: 2019-03-19

## 2018-10-15 NOTE — PROGRESS NOTES
Guevara Joshi is a 76year old male. HPI:   1. Venous stasis ulcers of legs    Patient has had a venous ulcer on LLE for months and has now developed another bleb on the lateral aspect of the left ankle.  Has few blebs appearing on medial right ankle as Medications:  simvastatin 20 MG Oral Tab TAKE 1 TABLET EVERY NIGHT Disp: 90 tablet Rfl: 3   metoprolol Tartrate 25 MG Oral Tab TAKE 1 TABLET TWICE DAILY Disp: 180 tablet Rfl: 3   Potassium Chloride ER 10 MEQ Oral Tab CR Take 1 tablet (10 mEq total) by lico well developed, well nourished,in no apparent distress  SKIN: no rashes,no suspicious lesions  HEENT: atraumatic, normocephalic,ears and throat are clear  NECK: supple,no adenopathy,no bruits  LUNGS: clear to auscultation  CARDIO: RRR without murmur  GI: g BMI near 31. Discussed with the patient obesity complications including risk for cardiovascular risk events, also degenerative arthritis of joints and spine. Discussed weight reduction diet and the need to exercise routinely at least 3 times per week.  This

## 2018-10-15 NOTE — TELEPHONE ENCOUNTER
----- Message from Bibiana Mcwilliams MD sent at 10/15/2018  2:38 PM CDT -----  Discussed potassium.  Start KCL 10 daily

## 2018-10-16 RX ORDER — FUROSEMIDE 20 MG/1
20 TABLET ORAL DAILY
Qty: 90 TABLET | Refills: 0 | Status: SHIPPED | OUTPATIENT
Start: 2018-10-16 | End: 2019-03-19

## 2018-10-16 NOTE — TELEPHONE ENCOUNTER
Pt called in stating that during his OV yesterday he was supposed to get a new script for medication below, but it was not given. Pt states he has 4 pills left, but would like the script to go to his [facility] mail order pharmacy on chart. Please advise.

## 2018-10-16 NOTE — TELEPHONE ENCOUNTER
Refilled per protocol    Hypertensive Medications  Protocol Criteria:  · Appointment scheduled in the past 6 months or in the next 3 months  · BMP or CMP in the past 12 months  · Creatinine result < 2  Recent Outpatient Visits            Yesterday Venous s

## 2018-11-02 ENCOUNTER — APPOINTMENT (OUTPATIENT)
Dept: LAB | Facility: HOSPITAL | Age: 68
End: 2018-11-02
Attending: UROLOGY
Payer: MEDICARE

## 2018-11-02 DIAGNOSIS — C61 PROSTATE CANCER (HCC): ICD-10-CM

## 2018-11-02 PROCEDURE — 36415 COLL VENOUS BLD VENIPUNCTURE: CPT

## 2018-11-02 PROCEDURE — 84153 ASSAY OF PSA TOTAL: CPT

## 2018-11-06 ENCOUNTER — OFFICE VISIT (OUTPATIENT)
Dept: SURGERY | Facility: CLINIC | Age: 68
End: 2018-11-06
Payer: MEDICARE

## 2018-11-06 ENCOUNTER — TELEPHONE (OUTPATIENT)
Dept: SURGERY | Facility: CLINIC | Age: 68
End: 2018-11-06

## 2018-11-06 VITALS
HEART RATE: 69 BPM | HEIGHT: 70 IN | BODY MASS INDEX: 30.78 KG/M2 | DIASTOLIC BLOOD PRESSURE: 78 MMHG | SYSTOLIC BLOOD PRESSURE: 140 MMHG | WEIGHT: 215 LBS

## 2018-11-06 DIAGNOSIS — N52.01 ERECTILE DYSFUNCTION DUE TO ARTERIAL INSUFFICIENCY: ICD-10-CM

## 2018-11-06 DIAGNOSIS — R35.1 NOCTURIA: ICD-10-CM

## 2018-11-06 DIAGNOSIS — R97.21 RISING PSA FOLLOWING TREATMENT FOR MALIGNANT NEOPLASM OF PROSTATE: ICD-10-CM

## 2018-11-06 DIAGNOSIS — C61 PROSTATE CANCER (HCC): Primary | ICD-10-CM

## 2018-11-06 DIAGNOSIS — N19 RENAL FAILURE, UNSPECIFIED CHRONICITY: ICD-10-CM

## 2018-11-06 PROCEDURE — G0463 HOSPITAL OUTPT CLINIC VISIT: HCPCS | Performed by: UROLOGY

## 2018-11-06 PROCEDURE — 99214 OFFICE O/P EST MOD 30 MIN: CPT | Performed by: UROLOGY

## 2018-11-06 NOTE — PROGRESS NOTES
HPI:    Patient ID: Mariam Kaminski is a 76year old male. HPI     Prostate Cancer   Chronic. Status post brachytherapy February 2014.  The patient denies any symptoms to suggest prostate cancer such as new bone pain, appetite change or unintended weigh and pathology Negative. // 12-23-11, because of elevated PSA, SECOND prostate biopsy; prostate volume 39.3 g; 14 biopsy; pathology showed chronic and acute inflammation but no cancer.   Erectile dysfunction already before brachytherapy; on 9/5/13 I prescrib prostatic hypertrophy    • Carpal tunnel syndrome    • Dysplastic nevus 2017    left flank   • Elevated PSA 2008    bx negative for cancer   • H/O arthroscopy of knee bilateral   • History of brachytherapy    • Neck pain    • Obesity, unspecified    • Pros appears well-developed and well-nourished. No distress. HENT:   Head: Normocephalic and atraumatic. Eyes: EOM are normal.   Neck: Normal range of motion. Pulmonary/Chest: No respiratory distress. Genitourinary:   Genitourinary Comments:  On PARISH, pro patient the benefits, risks, complications, side effects, reasons for, nature of, alternatives of MRI of the prostate vs opinion from Dr. Janeth Lombardo vs observation and I answered patient's questions on treatment; patient understands all of this and decides to r answered questions on treatment; patient understands all of this and wants to proceed with continuing observation. I fully discussed benefits, risks and alternatives to the treatment plan. Treatment Plan & Patient Instructions    1.    Conside

## 2018-11-06 NOTE — TELEPHONE ENCOUNTER
Patient has appt today with PVK. States wasn't feeling well and was looking to possibly cancel. Patient requesting PSA done last week. - call was transferred to RN.

## 2018-11-06 NOTE — PATIENT INSTRUCTIONS
1.   Consider applying over the counter DESITIN cream to the anus  / inner buttock after bowel movements as needed     2. Visit in 4 months. Blood draw for PSA 1--10 days before visit.   NO sexual stimulation whatsoever for 5 days before the actual PSA bl

## 2018-11-06 NOTE — TELEPHONE ENCOUNTER
Spoke to patient. Patient states he has a cold, and is asking for his PSA result, and is contemplating coming to today's appt. Informed patient that his 11/2/18 PSA = 2.0. Patient's last office visit 7/2018.   Patient states he would like to keep his a

## 2018-11-16 ENCOUNTER — OFFICE VISIT (OUTPATIENT)
Dept: PAIN CLINIC | Facility: HOSPITAL | Age: 68
End: 2018-11-16
Attending: ANESTHESIOLOGY
Payer: MEDICARE

## 2018-11-16 VITALS
HEIGHT: 70 IN | WEIGHT: 215 LBS | HEART RATE: 49 BPM | DIASTOLIC BLOOD PRESSURE: 79 MMHG | BODY MASS INDEX: 30.78 KG/M2 | SYSTOLIC BLOOD PRESSURE: 143 MMHG

## 2018-11-16 DIAGNOSIS — M48.061 SPINAL STENOSIS OF LUMBAR REGION WITHOUT NEUROGENIC CLAUDICATION: Primary | ICD-10-CM

## 2018-11-16 DIAGNOSIS — M48.02 SPINAL STENOSIS IN CERVICAL REGION: ICD-10-CM

## 2018-11-16 PROCEDURE — 99211 OFF/OP EST MAY X REQ PHY/QHP: CPT

## 2018-11-16 RX ORDER — HYDROCODONE BITARTRATE AND ACETAMINOPHEN 10; 325 MG/1; MG/1
1 TABLET ORAL EVERY 8 HOURS PRN
Qty: 90 TABLET | Refills: 0 | Status: SHIPPED | OUTPATIENT
Start: 2018-12-19 | End: 2019-01-07

## 2018-11-16 RX ORDER — HYDROCODONE BITARTRATE AND ACETAMINOPHEN 10; 325 MG/1; MG/1
1 TABLET ORAL EVERY 8 HOURS PRN
Qty: 90 TABLET | Refills: 0 | Status: SHIPPED | OUTPATIENT
Start: 2018-11-19 | End: 2018-12-19

## 2018-11-16 NOTE — CHRONIC PAIN
Initial Consultation Note      HISTORY OF PRESENT ILLNESS:  Crispin Schmitt is a 76year old old male referred to the pain clinic  for evaluation treatment of his low back and cervical neck pain .    he continues to take 3 Norco a day for pain control over TONSILLECTOMY         REVIEW OF SYSTEMS:   A comprehensive review of systems was negative except for:   MEDICAL HISTORY:  Patient Active Problem List:     Prostate cancer [facility])     Erectile dysfunction     Acute pain of left knee     Pain in the groin History      Occupation: ordering        Comment: retired    Tobacco Use      Smoking status: Never Smoker      Smokeless tobacco: Never Used    Substance and Sexual Activity      Alcohol use: No        Alcohol/week: 0.0 oz      Drug use: No      Sexual ac Posterior Tibial 2/4 2/4   Brachial 2/4 2/4     SLR: negative  SIJ tenderness negative  Joint Exam: Normal  TPs:  Present within lumbo-sacral paraspinal muscles    Right Deep tendon reflexes: Normal   Left Deep tendon reflexes: Normal   Babinski Reflex:

## 2018-11-19 ENCOUNTER — TELEPHONE (OUTPATIENT)
Dept: INTERNAL MEDICINE CLINIC | Facility: CLINIC | Age: 68
End: 2018-11-19

## 2018-11-19 RX ORDER — HYDROCHLOROTHIAZIDE 25 MG/1
25 TABLET ORAL
Qty: 90 TABLET | Refills: 1 | Status: SHIPPED | OUTPATIENT
Start: 2018-11-19 | End: 2019-06-05

## 2018-11-19 NOTE — TELEPHONE ENCOUNTER
Pt states used to be on HCTZ but at [address] [place] [address] (10/15/18) PVR changed it to furosemide due to leg swelling and ulcers.  Pt states he no longer has the leg ulcers or swelling and would like to go back to HCTZ as, per pt, since starting  furosemide has had to [facility]

## 2018-11-19 NOTE — TELEPHONE ENCOUNTER
Verified pt name and . Reviewed doctor's medication instructions as noted below. Pt agreed with plan of care.

## 2019-01-14 ENCOUNTER — OFFICE VISIT (OUTPATIENT)
Dept: PAIN CLINIC | Facility: HOSPITAL | Age: 69
End: 2019-01-14
Attending: ANESTHESIOLOGY
Payer: MEDICARE

## 2019-01-14 VITALS
WEIGHT: 215 LBS | SYSTOLIC BLOOD PRESSURE: 132 MMHG | HEIGHT: 70 IN | HEART RATE: 60 BPM | BODY MASS INDEX: 30.78 KG/M2 | DIASTOLIC BLOOD PRESSURE: 74 MMHG

## 2019-01-14 DIAGNOSIS — M48.061 SPINAL STENOSIS OF LUMBAR REGION WITHOUT NEUROGENIC CLAUDICATION: ICD-10-CM

## 2019-01-14 DIAGNOSIS — M54.2 NECK PAIN ON LEFT SIDE: Primary | ICD-10-CM

## 2019-01-14 DIAGNOSIS — F11.90 CHRONIC, CONTINUOUS USE OF OPIOIDS: ICD-10-CM

## 2019-01-14 PROCEDURE — 20552 NJX 1/MLT TRIGGER POINT 1/2: CPT

## 2019-01-14 PROCEDURE — 99213 OFFICE O/P EST LOW 20 MIN: CPT

## 2019-01-14 PROCEDURE — 99211 OFF/OP EST MAY X REQ PHY/QHP: CPT

## 2019-01-14 RX ORDER — HYDROCODONE BITARTRATE AND ACETAMINOPHEN 10; 325 MG/1; MG/1
1 TABLET ORAL EVERY 8 HOURS PRN
Qty: 90 TABLET | Refills: 0 | Status: SHIPPED | OUTPATIENT
Start: 2019-01-18 | End: 2019-02-17

## 2019-01-14 RX ORDER — BUPIVACAINE HYDROCHLORIDE 2.5 MG/ML
10 INJECTION, SOLUTION EPIDURAL; INFILTRATION; INTRACAUDAL ONCE
Status: DISCONTINUED | OUTPATIENT
Start: 2019-01-14 | End: 2019-03-19

## 2019-01-14 RX ORDER — METHYLPREDNISOLONE ACETATE 40 MG/ML
40 INJECTION, SUSPENSION INTRA-ARTICULAR; INTRALESIONAL; INTRAMUSCULAR; SOFT TISSUE ONCE
Status: DISCONTINUED | OUTPATIENT
Start: 2019-01-14 | End: 2019-03-19

## 2019-01-14 RX ORDER — HYDROCODONE BITARTRATE AND ACETAMINOPHEN 10; 325 MG/1; MG/1
1 TABLET ORAL EVERY 8 HOURS PRN
Qty: 90 TABLET | Refills: 0 | Status: SHIPPED | OUTPATIENT
Start: 2019-02-17 | End: 2019-03-11

## 2019-01-14 NOTE — PROGRESS NOTES
01/14/18  PATIENT PRESENTS TO [facility] AMBULATORY. 01/14/2019. HERE FOR MED EVAL. 3/10 PAIN. HERE TO FOR CHRONIC NECK AND SHOULDER PAIN. ADDITIONALLY C/O MINOR HIP PAIN. CARE PROVIDED BY DR Dion Haque. REFER TO DICTATION FOR POC.

## 2019-01-14 NOTE — CHRONIC PAIN
Initial Consultation Note      HISTORY OF PRESENT ILLNESS:  Evy Dougherty is a 76year old old male referred to the pain clinic  for evaluation treatment of his low back and cervical neck pain .    he continues to take 3 Norco a day for pain control over Laterality Date   • APPENDECTOMY  1963   • KNEE ARTHROSCOPY      per NG: bilat knees 2x each; arthroscopy - bras   • PROSTATE BIOPSIES  2013   • REPAIR ROTATOR CUFF,ACUTE Right 2007   • TONSILLECTOMY         REVIEW OF SYSTEMS:   A comprehensive review of insecurity - worry: Not on file      Food insecurity - inability: Not on file      Transportation needs - medical: Not on file      Transportation needs - non-medical: Not on file    Occupational History      Occupation: ordering        Comment: retired LEFT RIGHT   Iliopsoas 5/5 5/5   Quadriceps 5/5 5/5   Foot DF 5/5 5/5   Foot EHL 5/5 5/5   Gastrocnemius 5/5 5/5     PULSES      LEFT RIGHT   Radial 2/4 2/4   Dorsalis Pedis 2/4 2/4   Posterior Tibial 2/4 2/4   Brachial 2/4 2/4     SLR: negative  SIJ does not want to pursue that currently  We will plan on doing a left occipital trigger point injection today he was going to pursue that     procedure note left occipital trigger point injection indication left occipital neuralgia all the risk and benefits

## 2019-02-08 NOTE — TELEPHONE ENCOUNTER
Received fax from [address] requesting Ibufrofen 600mg---90 day supply    Current Outpatient Medications:   •  IBUPROFEN 600 MG Oral Tab, TAKE 1 TABLET EVERY 12 HOURS AS NEEDED FOR PAIN (SUBSTITUTED FOR MOTRIN), Disp: 180 tablet, Rfl: 0

## 2019-02-09 NOTE — TELEPHONE ENCOUNTER
Please advise in regards to refill request. Thank You  Refill Protocol Appointment Criteria  · Appointment scheduled in the past 6 months or in the next 3 months  Recent Outpatient Visits            3 weeks ago Neck pain on left side    [facility]

## 2019-02-12 RX ORDER — IBUPROFEN 600 MG/1
TABLET ORAL
Qty: 180 TABLET | Refills: 0 | Status: SHIPPED | OUTPATIENT
Start: 2019-02-12 | End: 2020-03-11

## 2019-03-01 ENCOUNTER — APPOINTMENT (OUTPATIENT)
Dept: LAB | Facility: HOSPITAL | Age: 69
End: 2019-03-01
Attending: UROLOGY
Payer: MEDICARE

## 2019-03-01 DIAGNOSIS — C61 PROSTATE CANCER (HCC): ICD-10-CM

## 2019-03-01 LAB — PSA SERPL-MCNC: 1.73 NG/ML (ref ?–4)

## 2019-03-01 PROCEDURE — 84153 ASSAY OF PSA TOTAL: CPT

## 2019-03-01 PROCEDURE — 36415 COLL VENOUS BLD VENIPUNCTURE: CPT

## 2019-03-04 ENCOUNTER — OFFICE VISIT (OUTPATIENT)
Dept: SURGERY | Facility: CLINIC | Age: 69
End: 2019-03-04
Payer: MEDICARE

## 2019-03-04 VITALS — SYSTOLIC BLOOD PRESSURE: 126 MMHG | DIASTOLIC BLOOD PRESSURE: 77 MMHG | TEMPERATURE: 98 F | HEART RATE: 54 BPM

## 2019-03-04 DIAGNOSIS — C61 PROSTATE CANCER (HCC): Primary | ICD-10-CM

## 2019-03-04 DIAGNOSIS — R97.21 RISING PSA FOLLOWING TREATMENT FOR MALIGNANT NEOPLASM OF PROSTATE: ICD-10-CM

## 2019-03-04 DIAGNOSIS — R35.1 NOCTURIA: ICD-10-CM

## 2019-03-04 DIAGNOSIS — N52.01 ERECTILE DYSFUNCTION DUE TO ARTERIAL INSUFFICIENCY: ICD-10-CM

## 2019-03-04 PROCEDURE — 99214 OFFICE O/P EST MOD 30 MIN: CPT | Performed by: UROLOGY

## 2019-03-04 PROCEDURE — G0463 HOSPITAL OUTPT CLINIC VISIT: HCPCS | Performed by: UROLOGY

## 2019-03-04 RX ORDER — TADALAFIL 10 MG/1
10 TABLET ORAL
Qty: 30 TABLET | Refills: 3 | Status: SHIPPED | OUTPATIENT
Start: 2019-03-04 | End: 2019-03-19

## 2019-03-04 NOTE — PROGRESS NOTES
HPI:    Patient ID: Nae Armstrong is a 76year old male. HPI  Prostate Cancer   Chronic. Status post brachytherapy February 2014.  The patient denies any symptoms to suggest prostate cancer such as new bone pain, appetite change, or unintended weight biopsies in the past --11-27-09 office prostate biopsy because of PSA 4.9 (September 2009); prostate volume 38.3 g; relatively large amounts of echogenic corpora amylacea; 14 biopsies and pathology Negative. // 12-23-11, because of elevated PSA, SECOND pro Genitourinary: Negative for dysuria, flank pain and hematuria ([place]).         Positive for sensation of not emptying bladder, urinary frequency less than 2 hours, intermittent stream, difficulty postponing urination, weak stream, urinary hesitancy and no APPENDECTOMY  1963   • KNEE ARTHROSCOPY      per NG: bilat knees 2x each; arthroscopy - [facility]   • PROSTATE BIOPSIES  2013   • REPAIR ROTATOR CUFF,ACUTE Right 2007   • TONSILLECTOMY        Family History   Problem Relation Age of Onset   • Diabetes Mother low          ASSESSMENT/PLAN:     (C61) Prostate cancer ([facility] [address]  (primary encounter diagnosis)  Status post brachytherapy February 2014. PSA 3/1/19 = 1.73 compared to PSA 11/2/18 PSA 2.0. On 5/16/17 PSA junior 1.3.  He is not on any hormone ablation therapy or this medication, it will be in your \"system\" for more than 24 hours.   If 10 mg dose is not adequate to produce functional rigid erection, and if no side effects from the medication, next time you can take 2 tablets 1 hour before planned sexual activity a

## 2019-03-04 NOTE — PATIENT INSTRUCTIONS
Harrison Hollingsworth M.D.    1.  For erectile dysfunction, tadalafil (generic Cialis) 10 mg tablet at least 1 hour before planned sexual activity; once you take this medication, it will be in your \"system\" for more than 24 hours.

## 2019-03-12 ENCOUNTER — LAB ENCOUNTER (OUTPATIENT)
Dept: LAB | Facility: HOSPITAL | Age: 69
End: 2019-03-12
Attending: INTERNAL MEDICINE
Payer: MEDICARE

## 2019-03-12 DIAGNOSIS — D75.839 THROMBOCYTOSIS: ICD-10-CM

## 2019-03-12 DIAGNOSIS — D72.829 LEUKOCYTOSIS, UNSPECIFIED TYPE: ICD-10-CM

## 2019-03-12 LAB
BASOPHILS # BLD AUTO: 0.09 X10(3) UL (ref 0–0.2)
BASOPHILS NFR BLD AUTO: 0.8 %
DEPRECATED RDW RBC AUTO: 49.5 FL (ref 35.1–46.3)
EOSINOPHIL # BLD AUTO: 0.27 X10(3) UL (ref 0–0.7)
EOSINOPHIL NFR BLD AUTO: 2.5 %
ERYTHROCYTE [DISTWIDTH] IN BLOOD BY AUTOMATED COUNT: 16.7 % (ref 11–15)
HCT VFR BLD AUTO: 49 % (ref 39–53)
HGB BLD-MCNC: 15.1 G/DL (ref 13–17.5)
IMM GRANULOCYTES # BLD AUTO: 0.05 X10(3) UL (ref 0–1)
IMM GRANULOCYTES NFR BLD: 0.5 %
LYMPHOCYTES # BLD AUTO: 1.32 X10(3) UL (ref 1–4)
LYMPHOCYTES NFR BLD AUTO: 12.4 %
MCH RBC QN AUTO: 26.1 PG (ref 26–34)
MCHC RBC AUTO-ENTMCNC: 30.8 G/DL (ref 31–37)
MCV RBC AUTO: 84.6 FL (ref 80–100)
MONOCYTES # BLD AUTO: 0.73 X10(3) UL (ref 0.1–1)
MONOCYTES NFR BLD AUTO: 6.9 %
NEUTROPHILS # BLD AUTO: 8.15 X10 (3) UL (ref 1.5–7.7)
NEUTROPHILS # BLD AUTO: 8.15 X10(3) UL (ref 1.5–7.7)
NEUTROPHILS NFR BLD AUTO: 76.9 %
PLATELET # BLD AUTO: 363 10(3)UL (ref 150–450)
RBC # BLD AUTO: 5.79 X10(6)UL (ref 3.8–5.8)
WBC # BLD AUTO: 10.6 X10(3) UL (ref 4–11)

## 2019-03-12 PROCEDURE — 36415 COLL VENOUS BLD VENIPUNCTURE: CPT

## 2019-03-12 PROCEDURE — 85025 COMPLETE CBC W/AUTO DIFF WBC: CPT

## 2019-03-15 ENCOUNTER — OFFICE VISIT (OUTPATIENT)
Dept: HEMATOLOGY/ONCOLOGY | Facility: HOSPITAL | Age: 69
End: 2019-03-15
Attending: INTERNAL MEDICINE
Payer: MEDICARE

## 2019-03-15 VITALS
DIASTOLIC BLOOD PRESSURE: 81 MMHG | TEMPERATURE: 98 F | BODY MASS INDEX: 31 KG/M2 | HEIGHT: 70 IN | RESPIRATION RATE: 18 BRPM | SYSTOLIC BLOOD PRESSURE: 164 MMHG | HEART RATE: 56 BPM

## 2019-03-15 DIAGNOSIS — D75.839 THROMBOCYTOSIS: Primary | ICD-10-CM

## 2019-03-15 DIAGNOSIS — D72.829 LEUKOCYTOSIS, UNSPECIFIED TYPE: ICD-10-CM

## 2019-03-15 DIAGNOSIS — C61 PROSTATE CANCER (HCC): ICD-10-CM

## 2019-03-15 PROCEDURE — 99214 OFFICE O/P EST MOD 30 MIN: CPT | Performed by: INTERNAL MEDICINE

## 2019-03-15 NOTE — PROGRESS NOTES
Cancer Center Progress Note    Patient Name: Nae Armstrong   YOB: 1950   Medical Record Number: [id number]   Attending Physician: Lissy Tellez M.D. Chief Complaint:   Thrombocytosis leukocytosis history of prostate cancer    History of Sister    • Lipids Other         family h/o hyperlipidemia and vascular disease       Social History:  Social History    Socioeconomic History      Marital status:       Spouse name: Not on file      Number of children: 2      Years of education: No Asked        Self-Exams: Not Asked        Grew up on a farm: Not Asked        History of tanning: Not Asked        Outdoor occupation: Not Asked        Pt has a pacemaker: No        Pt has a defibrillator: No        Reaction to local anesthetic: No    Soci pulses   Abdomen: Soft, non tender. Extremities: No edema. Neurological: 5/5 motor x4.         Laboratory:  Recent Labs   Lab  03/12/19   1039   WBC  10.6   HGB  15.1   PLT  363.0   NE  8.15*         Lab Results   Component Value Date    GLU 89 10/10/201

## 2019-03-19 ENCOUNTER — OFFICE VISIT (OUTPATIENT)
Dept: PAIN CLINIC | Facility: HOSPITAL | Age: 69
End: 2019-03-19
Attending: ANESTHESIOLOGY
Payer: MEDICARE

## 2019-03-19 VITALS
BODY MASS INDEX: 30.78 KG/M2 | SYSTOLIC BLOOD PRESSURE: 132 MMHG | WEIGHT: 215 LBS | HEIGHT: 70 IN | HEART RATE: 60 BPM | DIASTOLIC BLOOD PRESSURE: 72 MMHG

## 2019-03-19 DIAGNOSIS — M48.02 SPINAL STENOSIS IN CERVICAL REGION: ICD-10-CM

## 2019-03-19 DIAGNOSIS — M48.061 SPINAL STENOSIS OF LUMBAR REGION WITHOUT NEUROGENIC CLAUDICATION: Primary | ICD-10-CM

## 2019-03-19 PROCEDURE — 99211 OFF/OP EST MAY X REQ PHY/QHP: CPT

## 2019-03-19 RX ORDER — HYDROCODONE BITARTRATE AND ACETAMINOPHEN 10; 325 MG/1; MG/1
1 TABLET ORAL EVERY 8 HOURS PRN
Qty: 90 TABLET | Refills: 0 | Status: SHIPPED | OUTPATIENT
Start: 2019-04-18 | End: 2019-05-01

## 2019-03-19 RX ORDER — HYDROCODONE BITARTRATE AND ACETAMINOPHEN 10; 325 MG/1; MG/1
1 TABLET ORAL EVERY 8 HOURS PRN
Qty: 90 TABLET | Refills: 0 | Status: SHIPPED | OUTPATIENT
Start: 2019-03-19 | End: 2019-04-18

## 2019-03-19 NOTE — PROGRESS NOTES
03/19/19  PATIENT PRESENTS  AMBULATORY TO [facility];  MEDICAL MANAGEMENT CHRONIC NECK & BACK PAIN;  RATES 3/10 WITH MEDICATIONS;  STATES HE IS WELL;  MEDICATIONS ARE HELPFUL;  SEEN BY DR. DOWELL;  REFER TO DICTATION FOR THE POC.   NA 5/17/18  UDS 7/17/18 MEDICA

## 2019-03-19 NOTE — CHRONIC PAIN
Initial Consultation Note      HISTORY OF PRESENT ILLNESS:  Darvin Blum is a 76year old old male referred to the pain clinic  for evaluation treatment of his low back and cervical neck pain .    he continues to take 3 Norco a day for pain control over A comprehensive review of systems was negative except for:   MEDICAL HISTORY:  Patient Active Problem List:     Prostate cancer [facility])     Erectile dysfunction     Acute pain of left knee     Pain in the groin     Diarrhea     Cervical radicular pain Tobacco Use      Smoking status: Never Smoker      Smokeless tobacco: Never Used    Substance and Sexual Activity      Alcohol use: No        Alcohol/week: 0.0 oz      Drug use: No      Sexual activity: Not on file    Lifestyle      Physical activity: RRR  Respiratory: CTAB  Gait: Normal; cane user - No  Spine: Normal    ROM:   Lumbar spine  Flexion  dec  Extension dec  Cervical Spine  Flexion dec  Extensiondec  MOTOR EXAMINATION:  UPPER EXTREMITY      LEFT RIGHT   Deltoid 5/5 5/5   Biceps 5/5 5/5   Tri of his low back pain cervical injection did not help that much  Chronic use of small amount of Norco a day for pain control  Currently happy with his pain control       PLAN:  RECOMMENDATIONS:  Maintain on 3 Norco a day we will hold off on any further inje

## 2019-05-16 ENCOUNTER — OFFICE VISIT (OUTPATIENT)
Dept: PAIN CLINIC | Facility: HOSPITAL | Age: 69
End: 2019-05-16
Attending: ANESTHESIOLOGY
Payer: MEDICARE

## 2019-05-16 VITALS
HEIGHT: 70 IN | DIASTOLIC BLOOD PRESSURE: 78 MMHG | WEIGHT: 215 LBS | RESPIRATION RATE: 18 BRPM | SYSTOLIC BLOOD PRESSURE: 140 MMHG | BODY MASS INDEX: 30.78 KG/M2 | HEART RATE: 58 BPM

## 2019-05-16 DIAGNOSIS — M48.061 SPINAL STENOSIS OF LUMBAR REGION WITHOUT NEUROGENIC CLAUDICATION: Primary | ICD-10-CM

## 2019-05-16 PROCEDURE — 99211 OFF/OP EST MAY X REQ PHY/QHP: CPT

## 2019-05-16 RX ORDER — HYDROCODONE BITARTRATE AND ACETAMINOPHEN 10; 325 MG/1; MG/1
1 TABLET ORAL EVERY 8 HOURS PRN
Qty: 90 TABLET | Refills: 0 | Status: SHIPPED | OUTPATIENT
Start: 2019-05-18 | End: 2019-06-17

## 2019-05-16 RX ORDER — HYDROCODONE BITARTRATE AND ACETAMINOPHEN 10; 325 MG/1; MG/1
1 TABLET ORAL EVERY 8 HOURS PRN
Qty: 90 TABLET | Refills: 0 | Status: SHIPPED | OUTPATIENT
Start: 2019-06-17 | End: 2019-07-09

## 2019-05-16 NOTE — CHRONIC PAIN
Initial Consultation Note      HISTORY OF PRESENT ILLNESS:  Linnette Gutierrez is a 76year old old male referred to the pain clinic  for evaluation treatment of his low back and cervical neck pain .    he continues to take 3 Norco a day for pain control over SYSTEMS:   A comprehensive review of systems was negative except for:   MEDICAL HISTORY:  Patient Active Problem List:     Prostate cancer [facility])     Erectile dysfunction     Acute pain of left knee     Pain in the groin     Diarrhea     Cervical radicular file    Tobacco Use      Smoking status: Never Smoker      Smokeless tobacco: Never Used    Substance and Sexual Activity      Alcohol use: No        Alcohol/week: 0.0 oz      Drug use: No      Sexual activity: Not on file    Lifestyle      Physical Lincoln Schrader injection  Chest: S1, S2, RRR  Respiratory: CTAB  Gait: Normal; cane user - No  Spine: Normal    ROM:   Lumbar spine  Flexion  dec  Extension dec  Cervical Spine  Flexion dec  Extensiondec  MOTOR EXAMINATION:  UPPER EXTREMITY      LEFT RIGHT   Deltoid 5/5 injection with good relief of his low back pain cervical injection did not help that much  Chronic use of small amount of Norco a day for pain control  Would like to try another epidural injection the last lumbar injection helped quite a bit       PLAN:  R

## 2019-05-16 NOTE — PROGRESS NOTES
05/16/19   PATIENT PRESENTS  AMBULATORY TO [facility];  MEDICAL MANAGEMENT CHRONIC NECK & BACK PAIN;  RATES 3/10 WITH MEDICATIONS;  STATES HE HAD A SHLR & KNEE INJECTION 3WKS AGO BY DR. FULTON;  PT STATES HE IS HAVE LBP >ON THE LT;  MEDICATIONS ARE HELPFU

## 2019-05-17 ENCOUNTER — DOCUMENTATION ONLY (OUTPATIENT)
Dept: PAIN CLINIC | Facility: HOSPITAL | Age: 69
End: 2019-05-17

## 2019-05-17 NOTE — PROGRESS NOTES
Procedure code [id number] scheduled for 05/28/19    No PA needed per pt's insurance     Order form sent to the surgery center, confirmation rcv'd.

## 2019-06-06 RX ORDER — HYDROCHLOROTHIAZIDE 25 MG/1
TABLET ORAL
Qty: 90 TABLET | Refills: 0 | Status: SHIPPED | OUTPATIENT
Start: 2019-06-06 | End: 2019-09-04

## 2019-06-07 ENCOUNTER — TELEPHONE (OUTPATIENT)
Dept: INTERNAL MEDICINE CLINIC | Facility: CLINIC | Age: 69
End: 2019-06-07

## 2019-06-07 DIAGNOSIS — I10 ESSENTIAL HYPERTENSION WITH GOAL BLOOD PRESSURE LESS THAN 140/90: Primary | ICD-10-CM

## 2019-06-07 DIAGNOSIS — C61 PROSTATE CANCER (HCC): ICD-10-CM

## 2019-06-07 DIAGNOSIS — E78.2 MIXED HYPERLIPIDEMIA: ICD-10-CM

## 2019-06-07 NOTE — TELEPHONE ENCOUNTER
Pt scheduled his medicare physical for 7-8-19 and would like an order to get his blood work done prior to his appointment. Please, call pt when the order is in the system.

## 2019-06-10 NOTE — TELEPHONE ENCOUNTER
Lab orders have been entered, please call and let patient know to have them done before physical and make sure he is fasting 12 hours, can drink water

## 2019-06-10 NOTE — TELEPHONE ENCOUNTER
Patient notified that labs have been completed and shoukd be fasting 12 hours before having completed

## 2019-07-03 ENCOUNTER — LAB ENCOUNTER (OUTPATIENT)
Dept: LAB | Facility: HOSPITAL | Age: 69
End: 2019-07-03
Attending: PHYSICIAN ASSISTANT
Payer: MEDICARE

## 2019-07-03 DIAGNOSIS — E78.2 MIXED HYPERLIPIDEMIA: ICD-10-CM

## 2019-07-03 DIAGNOSIS — I10 ESSENTIAL HYPERTENSION WITH GOAL BLOOD PRESSURE LESS THAN 140/90: ICD-10-CM

## 2019-07-03 LAB
ALBUMIN SERPL-MCNC: 3.3 G/DL (ref 3.4–5)
ALBUMIN/GLOB SERPL: 0.8 {RATIO} (ref 1–2)
ALP LIVER SERPL-CCNC: 102 U/L (ref 45–117)
ALT SERPL-CCNC: 27 U/L (ref 16–61)
ANION GAP SERPL CALC-SCNC: 6 MMOL/L (ref 0–18)
AST SERPL-CCNC: 20 U/L (ref 15–37)
BACTERIA UR QL AUTO: NEGATIVE /HPF
BASOPHILS # BLD AUTO: 0.08 X10(3) UL (ref 0–0.2)
BASOPHILS NFR BLD AUTO: 0.7 %
BILIRUB SERPL-MCNC: 0.6 MG/DL (ref 0.1–2)
BILIRUB UR QL: NEGATIVE
BUN BLD-MCNC: 21 MG/DL (ref 7–18)
BUN/CREAT SERPL: 17.5 (ref 10–20)
CALCIUM BLD-MCNC: 9.3 MG/DL (ref 8.5–10.1)
CHLORIDE SERPL-SCNC: 103 MMOL/L (ref 98–112)
CHOLEST SMN-MCNC: 155 MG/DL (ref ?–200)
CLARITY UR: CLEAR
CO2 SERPL-SCNC: 28 MMOL/L (ref 21–32)
COLOR UR: YELLOW
CREAT BLD-MCNC: 1.2 MG/DL (ref 0.7–1.3)
DEPRECATED RDW RBC AUTO: 54.6 FL (ref 35.1–46.3)
EOSINOPHIL # BLD AUTO: 0.2 X10(3) UL (ref 0–0.7)
EOSINOPHIL NFR BLD AUTO: 1.8 %
ERYTHROCYTE [DISTWIDTH] IN BLOOD BY AUTOMATED COUNT: 18.3 % (ref 11–15)
GLOBULIN PLAS-MCNC: 4.4 G/DL (ref 2.8–4.4)
GLUCOSE BLD-MCNC: 90 MG/DL (ref 70–99)
GLUCOSE UR-MCNC: NEGATIVE MG/DL
HCT VFR BLD AUTO: 46.8 % (ref 39–53)
HDLC SERPL-MCNC: 57 MG/DL (ref 40–59)
HGB BLD-MCNC: 14.7 G/DL (ref 13–17.5)
IMM GRANULOCYTES # BLD AUTO: 0.08 X10(3) UL (ref 0–1)
IMM GRANULOCYTES NFR BLD: 0.7 %
KETONES UR-MCNC: NEGATIVE MG/DL
LDLC SERPL CALC-MCNC: 82 MG/DL (ref ?–100)
LEUKOCYTE ESTERASE UR QL STRIP.AUTO: NEGATIVE
LYMPHOCYTES # BLD AUTO: 1.32 X10(3) UL (ref 1–4)
LYMPHOCYTES NFR BLD AUTO: 12.1 %
M PROTEIN MFR SERPL ELPH: 7.7 G/DL (ref 6.4–8.2)
MCH RBC QN AUTO: 26.9 PG (ref 26–34)
MCHC RBC AUTO-ENTMCNC: 31.4 G/DL (ref 31–37)
MCV RBC AUTO: 85.6 FL (ref 80–100)
MONOCYTES # BLD AUTO: 0.69 X10(3) UL (ref 0.1–1)
MONOCYTES NFR BLD AUTO: 6.3 %
NEUTROPHILS # BLD AUTO: 8.51 X10 (3) UL (ref 1.5–7.7)
NEUTROPHILS # BLD AUTO: 8.51 X10(3) UL (ref 1.5–7.7)
NEUTROPHILS NFR BLD AUTO: 78.4 %
NITRITE UR QL STRIP.AUTO: NEGATIVE
NONHDLC SERPL-MCNC: 98 MG/DL (ref ?–130)
OSMOLALITY SERPL CALC.SUM OF ELEC: 287 MOSM/KG (ref 275–295)
PATIENT FASTING: YES
PATIENT FASTING: YES
PH UR: 6 [PH] (ref 5–8)
PLATELET # BLD AUTO: 375 10(3)UL (ref 150–450)
POTASSIUM SERPL-SCNC: 3.5 MMOL/L (ref 3.5–5.1)
PROT UR-MCNC: 30 MG/DL
RBC # BLD AUTO: 5.47 X10(6)UL (ref 3.8–5.8)
RBC #/AREA URNS AUTO: <1 /HPF
SODIUM SERPL-SCNC: 137 MMOL/L (ref 136–145)
SP GR UR STRIP: 1.02 (ref 1–1.03)
TRIGL SERPL-MCNC: 81 MG/DL (ref 30–149)
TSI SER-ACNC: 2.32 MIU/ML (ref 0.36–3.74)
UROBILINOGEN UR STRIP-ACNC: <2
VIT C UR-MCNC: NEGATIVE MG/DL
VLDLC SERPL CALC-MCNC: 16 MG/DL (ref 0–30)
WBC # BLD AUTO: 10.9 X10(3) UL (ref 4–11)
WBC #/AREA URNS AUTO: 2 /HPF

## 2019-07-03 PROCEDURE — 85025 COMPLETE CBC W/AUTO DIFF WBC: CPT

## 2019-07-03 PROCEDURE — 36415 COLL VENOUS BLD VENIPUNCTURE: CPT

## 2019-07-03 PROCEDURE — 80061 LIPID PANEL: CPT

## 2019-07-03 PROCEDURE — 81001 URINALYSIS AUTO W/SCOPE: CPT

## 2019-07-03 PROCEDURE — 84443 ASSAY THYROID STIM HORMONE: CPT

## 2019-07-03 PROCEDURE — 80053 COMPREHEN METABOLIC PANEL: CPT

## 2019-07-08 ENCOUNTER — OFFICE VISIT (OUTPATIENT)
Dept: INTERNAL MEDICINE CLINIC | Facility: CLINIC | Age: 69
End: 2019-07-08
Payer: MEDICARE

## 2019-07-08 VITALS — SYSTOLIC BLOOD PRESSURE: 142 MMHG | DIASTOLIC BLOOD PRESSURE: 9 MMHG | HEART RATE: 76 BPM

## 2019-07-08 DIAGNOSIS — H54.7 DECREASED VISUAL ACUITY: ICD-10-CM

## 2019-07-08 DIAGNOSIS — E66.9 OBESITY (BMI 30.0-34.9): ICD-10-CM

## 2019-07-08 DIAGNOSIS — Z00.00 PHYSICAL EXAM, ANNUAL: Primary | ICD-10-CM

## 2019-07-08 DIAGNOSIS — E78.2 MIXED HYPERLIPIDEMIA: ICD-10-CM

## 2019-07-08 PROBLEM — E66.811 CLASS 1 OBESITY DUE TO EXCESS CALORIES WITH SERIOUS COMORBIDITY IN ADULT: Status: ACTIVE | Noted: 2018-09-26

## 2019-07-08 PROBLEM — E66.09 CLASS 1 OBESITY DUE TO EXCESS CALORIES WITH SERIOUS COMORBIDITY IN ADULT: Status: ACTIVE | Noted: 2018-09-26

## 2019-07-08 PROCEDURE — G0439 PPPS, SUBSEQ VISIT: HCPCS | Performed by: INTERNAL MEDICINE

## 2019-07-08 NOTE — PROGRESS NOTES
REASON FOR VISIT:    Yaz Boles is a 76year old male who presents for a Medicare Annual Wellness visit.      Patient Care Team: Patient Care Team:  Bharti Mcgovern MD as PCP - General (Internal Medicine)  John Madrigal MD as PCP - [facility]  Diana Wu mg/dL 57 60 39 39(L) 33(L) 39(L) 36(L)   LDL <100 mg/dL 82 94 81 98 97 89 86     BUN and Cr Latest Ref Rng & Units 7/3/2019 10/10/2018 3/29/2018 2/15/2016 3/31/2015 7/28/2013 11/1/2012   BUN 7 - 18 mg/dL 21(H) 20 25(H) 20 22(H) 20 20   Creatinine 0.70 - 1. No  Vision Problems? : No  Difficulty walking?: No  Difficulty dressing or bathing?: No  Problems with daily activities? : No  Memory Problems?: No      Fall/Risk Assessment     Have you fallen in the last 12 months?: 0-No  Fall/Risk Scorin        Depr (mg/dL)   Date Value   07/03/2019 1.20       Digoxin Serum Conc  Annually No results found for: DIGOXIN          ALLERGIES:   No Known Allergies  MEDICAL INFORMATION:   Past Medical History:   Diagnosis Date   • Appendicitis 1963   • Back problem    • [facility] denies hx of allergy or asthma    EXAM:   BP (!) 142/9 (BP Location: Right arm, Patient Position: Sitting, Cuff Size: large)   Pulse 76    > BP Readings from Last 3 Encounters:  07/08/19 : (!) 142/9  05/16/19 : 140/78  03/19/19 : 132/72    GENERAL: well de Discussed decreasing alcohol consumption Try to exercise at least 3 times weekly to build strength, burn calories and help to achieve ideal body weight. 4. Obesity (BMI 30.0-34. 9)    The patient is obese with BMI near 34  Discussed with the patient obes

## 2019-07-16 NOTE — CHRONIC PAIN
Initial Consultation Note      HISTORY OF PRESENT ILLNESS:  Nic Leavitt is a 76year old old male referred to the pain clinic  for evaluation treatment of his low back and cervical neck pain .    he continues to take 3 Norco a day for pain control over Active Problem List:     Prostate cancer [facility])     Erectile dysfunction     Acute pain of left knee     Pain in the groin     Diarrhea     Cervical radicular pain     Mixed hyperlipidemia     Arthritis     Iron deficiency anemia     Ulcer, venous stasis (H Smoking status: Never Smoker      Smokeless tobacco: Never Used    Substance and Sexual Activity      Alcohol use: No        Alcohol/week: 0.0 oz      Drug use: No      Sexual activity: Not on file    Lifestyle      Physical activity:        Days per wee CTAB  Gait: Normal; cane user - No  Spine: Normal    ROM:   Lumbar spine  Flexion  dec  Extension dec  Cervical Spine  Flexion dec  Extensiondec  MOTOR EXAMINATION:  UPPER EXTREMITY      LEFT RIGHT   Deltoid 5/5 5/5   Biceps 5/5 5/5   Triceps 5/5 5/5   Bra pain cervical injection did not help that much  Chronic use of small amount of Norco a day for pain control         PLAN:  RECOMMENDATIONS:  Maintain on 3 Norco a day      good results with the epidural injection will hold off on further injection therapy

## 2019-07-16 NOTE — PROGRESS NOTES
07/17/19  PRESENTS AMBULATORY TO [facility];  F/U POST LESI 5/28/19  [id number];  PT REPORTS 25% IMPROVEMENT POST INJECTION;  RATES HIS PIAN 4/10;  ALSO MEDICAL MANAGEMENT CHRONIC LBP & CERVICAL NECK PAIN;  CURRENTLY ON NORCO 10/325 Q8 #90/MO;  MEDICATIONS ARE

## 2019-07-22 ENCOUNTER — NURSE TRIAGE (OUTPATIENT)
Dept: OTHER | Age: 69
End: 2019-07-22

## 2019-07-22 NOTE — TELEPHONE ENCOUNTER
Action Requested: Summary for Provider     []  Critical Lab, Recommendations Needed  [] Need Additional Advice  []   FYI    []   Need Orders  [] Need Medications Sent to Pharmacy  []  Other     SUMMARY: Pt reports blister on left leg above the ankle.  Onset

## 2019-07-23 ENCOUNTER — OFFICE VISIT (OUTPATIENT)
Dept: INTERNAL MEDICINE CLINIC | Facility: CLINIC | Age: 69
End: 2019-07-23
Payer: MEDICARE

## 2019-07-23 VITALS — HEART RATE: 54 BPM | SYSTOLIC BLOOD PRESSURE: 149 MMHG | DIASTOLIC BLOOD PRESSURE: 81 MMHG

## 2019-07-23 DIAGNOSIS — S80.822A BLISTER OF LEFT LOWER EXTREMITY, INITIAL ENCOUNTER: Primary | ICD-10-CM

## 2019-07-23 PROCEDURE — 99213 OFFICE O/P EST LOW 20 MIN: CPT | Performed by: INTERNAL MEDICINE

## 2019-07-23 PROCEDURE — G0463 HOSPITAL OUTPT CLINIC VISIT: HCPCS | Performed by: INTERNAL MEDICINE

## 2019-07-23 NOTE — TELEPHONE ENCOUNTER
Pt is upset that no one called him back yesterday. I advised and scheduled an OV today as his leg has not improved and he says that this sore appears intermittently. The sore is the size of a quarter and is fluid filled. The area is swollen.   Pt only wa

## 2019-07-23 NOTE — PROGRESS NOTES
Kailey Hernandes is a 76year old male. Patient presents with:  Ulcer: left lower leg. Knee Pain: right knee pain    HPI:   Recently he has noticed a blister which remains intact on the medial aspect of his left lower leg.   He has had a venous ulcer at t • Rotator cuff tear 2007    right   • Rotator cuff tear     R - 2007   • Tonsillitis 1960     Past Surgical History:   Procedure Laterality Date   • APPENDECTOMY  1963   • KNEE ARTHROSCOPY      per NG: bilat knees 2x each; arthroscopy - brash   • PROSTAT

## 2019-07-23 NOTE — PATIENT INSTRUCTIONS
Apply a dry dressing regularly to your blister on your lower left leg. Monitor closely for symptoms and signs of infection.

## 2019-08-29 ENCOUNTER — APPOINTMENT (OUTPATIENT)
Dept: LAB | Facility: HOSPITAL | Age: 69
End: 2019-08-29
Attending: UROLOGY
Payer: MEDICARE

## 2019-08-29 DIAGNOSIS — R35.1 NOCTURIA: ICD-10-CM

## 2019-08-29 DIAGNOSIS — C61 PROSTATE CANCER (HCC): ICD-10-CM

## 2019-08-29 LAB
BACTERIA UR QL AUTO: NEGATIVE /HPF
BILIRUB UR QL: NEGATIVE
CLARITY UR: CLEAR
COLOR UR: YELLOW
GLUCOSE UR-MCNC: NEGATIVE MG/DL
HGB UR QL STRIP.AUTO: NEGATIVE
KETONES UR-MCNC: NEGATIVE MG/DL
LEUKOCYTE ESTERASE UR QL STRIP.AUTO: NEGATIVE
NITRITE UR QL STRIP.AUTO: NEGATIVE
PH UR: 6 [PH] (ref 5–8)
PROT UR-MCNC: 30 MG/DL
PSA SERPL-MCNC: 1.89 NG/ML (ref ?–4)
RBC #/AREA URNS AUTO: 1 /HPF
SP GR UR STRIP: 1.02 (ref 1–1.03)
UROBILINOGEN UR STRIP-ACNC: <2
VIT C UR-MCNC: NEGATIVE MG/DL
WBC #/AREA URNS AUTO: 2 /HPF

## 2019-08-29 PROCEDURE — 81001 URINALYSIS AUTO W/SCOPE: CPT

## 2019-08-29 PROCEDURE — 84153 ASSAY OF PSA TOTAL: CPT

## 2019-08-29 PROCEDURE — 36415 COLL VENOUS BLD VENIPUNCTURE: CPT

## 2019-09-04 ENCOUNTER — OFFICE VISIT (OUTPATIENT)
Dept: SURGERY | Facility: CLINIC | Age: 69
End: 2019-09-04
Payer: MEDICARE

## 2019-09-04 VITALS
BODY MASS INDEX: 31 KG/M2 | HEART RATE: 47 BPM | WEIGHT: 215 LBS | SYSTOLIC BLOOD PRESSURE: 148 MMHG | DIASTOLIC BLOOD PRESSURE: 76 MMHG

## 2019-09-04 DIAGNOSIS — R80.1 PERSISTENT PROTEINURIA: ICD-10-CM

## 2019-09-04 DIAGNOSIS — R35.1 NOCTURIA: ICD-10-CM

## 2019-09-04 DIAGNOSIS — N52.01 ERECTILE DYSFUNCTION DUE TO ARTERIAL INSUFFICIENCY: ICD-10-CM

## 2019-09-04 DIAGNOSIS — R97.21 RISING PSA FOLLOWING TREATMENT FOR MALIGNANT NEOPLASM OF PROSTATE: ICD-10-CM

## 2019-09-04 DIAGNOSIS — C61 PROSTATE CANCER (HCC): Primary | ICD-10-CM

## 2019-09-04 PROCEDURE — 99214 OFFICE O/P EST MOD 30 MIN: CPT | Performed by: UROLOGY

## 2019-09-04 PROCEDURE — G0463 HOSPITAL OUTPT CLINIC VISIT: HCPCS | Performed by: UROLOGY

## 2019-09-04 RX ORDER — TADALAFIL 20 MG/1
20 TABLET ORAL
Qty: 10 TABLET | Refills: 11 | Status: SHIPPED | OUTPATIENT
Start: 2019-09-04 | End: 2020-12-15

## 2019-09-04 NOTE — PATIENT INSTRUCTIONS
Mindy Vazquez M.D.    1.  Tadalafil (generic Cialis) 20 mg tablet--take 1/2 tablet 1 to 2 hours before planned sexual activity.   If upon trying that dose you do not experience a good functional erection, then NEX

## 2019-09-04 NOTE — PROGRESS NOTES
HPI:    Patient ID: Bing Glover is a 76year old male. HPI  Prostate Cancer with Rising PSA  Chronic. Status post brachytherapy 2/2014. PSA junior 1.3 on 5/16/17.  The patient denies any symptoms to suggest prostate cancer such as new bone pain, appe biopsies in the past --11-27-09 office prostate biopsy because of PSA 4.9 (September 2009); prostate volume 38.3 g; relatively large amounts of echogenic corpora amylacea; 14 biopsies and pathology Negative. // 12-23-11, because of elevated PSA, SECOND pro 10 mg dose is not adequate to produce functional rigid erection, and if no side effects from the medication, next time you can take 2 tablets 1 hour before planned sexual activity as needed; PSA and urinalysis ordered      Review of Systems   Constitutiona brachytherapy    • Neck pain    • Obesity, unspecified    • Prostate cancer [facility])     brachytherapy   • PVD (peripheral vascular disease) ([facility] [address]    • Rotator cuff tear 2007    right   • Rotator cuff tear     R - 2007   • Tonsillitis 1960      Past Surgical Physical Exam   Constitutional: He is oriented to person, place, and time. He appears well-developed and well-nourished. No distress. HENT:   Head: Normocephalic and atraumatic. Eyes: EOM are normal.   Neck: Normal range of motion.    Pulmonary/Chest: E On PARISH, prostate very small and flat, no palpable nodules or indurations. We discussed follow up in 4 to 6 months.  He decides to follow up in 4 months with PSA before the visit.    (R97.21) Rising PSA following treatment for malignant neoplasm of prostate Signed: Rayne Dobbins, 9/4/2019, 11:20 AM.    I, Colan Homans, MD,  personally performed the services described in this documentation. All medical record entries made by the scribe were at my direction and in my presence.   I have reviewed the silver

## 2019-09-05 RX ORDER — HYDROCHLOROTHIAZIDE 25 MG/1
25 TABLET ORAL DAILY
Qty: 90 TABLET | Refills: 1 | Status: SHIPPED | OUTPATIENT
Start: 2019-09-05 | End: 2020-01-30

## 2019-09-05 NOTE — TELEPHONE ENCOUNTER
Refill passed per [facility], [facility] protocol.   Hypertensive Medications  Protocol Criteria:  · Appointment scheduled in the past 6 months or in the next 3 months  · BMP or CMP in the past 12 months  · Creatinine result < 2  Recent Outpatient Visits

## 2019-09-12 ENCOUNTER — OFFICE VISIT (OUTPATIENT)
Dept: PAIN CLINIC | Facility: HOSPITAL | Age: 69
End: 2019-09-12
Attending: ANESTHESIOLOGY
Payer: MEDICARE

## 2019-09-12 VITALS — DIASTOLIC BLOOD PRESSURE: 73 MMHG | SYSTOLIC BLOOD PRESSURE: 122 MMHG | HEART RATE: 49 BPM | RESPIRATION RATE: 16 BRPM

## 2019-09-12 DIAGNOSIS — M48.061 SPINAL STENOSIS OF LUMBAR REGION WITHOUT NEUROGENIC CLAUDICATION: ICD-10-CM

## 2019-09-12 DIAGNOSIS — M25.562 BILATERAL CHRONIC KNEE PAIN: Primary | ICD-10-CM

## 2019-09-12 DIAGNOSIS — M25.561 BILATERAL CHRONIC KNEE PAIN: Primary | ICD-10-CM

## 2019-09-12 DIAGNOSIS — G89.29 BILATERAL CHRONIC KNEE PAIN: Primary | ICD-10-CM

## 2019-09-12 PROCEDURE — 99211 OFF/OP EST MAY X REQ PHY/QHP: CPT

## 2019-09-12 RX ORDER — HYDROCODONE BITARTRATE AND ACETAMINOPHEN 10; 325 MG/1; MG/1
1 TABLET ORAL EVERY 8 HOURS PRN
Qty: 90 TABLET | Refills: 0 | Status: SHIPPED | OUTPATIENT
Start: 2019-09-16 | End: 2019-10-16

## 2019-09-12 RX ORDER — HYDROCODONE BITARTRATE AND ACETAMINOPHEN 10; 325 MG/1; MG/1
1 TABLET ORAL EVERY 8 HOURS PRN
Qty: 90 TABLET | Refills: 0 | Status: SHIPPED | OUTPATIENT
Start: 2019-10-15 | End: 2019-11-06

## 2019-09-12 NOTE — CHRONIC PAIN
Initial Consultation Note      HISTORY OF PRESENT ILLNESS:  Cinthya Reis is a 76year old old male referred to the pain clinic  for evaluation treatment of his low back and cervical neck pain .    he continues to take 3 Norco a day for pain control over Surgical History:   Procedure Laterality Date   • APPENDECTOMY  1963   • KNEE ARTHROSCOPY      per NG: bilat knees 2x each; arthroscopy - bras   • PROSTATE BIOPSIES  2013   • REPAIR ROTATOR CUFF,ACUTE Right 2007   • TONSILLECTOMY         REVIEW OF SYSTEMS Highest education level: Not on file    Occupational History      Occupation: ordering        Comment: retired    Social Needs      Financial resource strain: Not on file      Food insecurity:        Worry: Not on file        Inability: Not on file      Tr History Narrative      Not on file      ADVANCE CARE PLANNING:  Advance Care Plan NOT discussed.     PHYSICAL EXAMINATION:   09/12/19  1105   BP: 122/73   Pulse: (!) 49   Resp: 16     General: Alert and oriented x3  Affect:  NAD  Head: normocephalic, atraum 07/03/2019    K 3.5 07/03/2019     07/03/2019    CO2 28.0 07/03/2019    BUN 21 (H) 07/03/2019    GLU 90 07/03/2019    CA 9.3 07/03/2019     No results found for: PT    [place] PHYSICIAN MONITORING PROGRAM REVIEWED  Yes      ASSESSMENT:   76year old

## 2019-09-12 NOTE — PROGRESS NOTES
Pt presents to [facility] ambulatory for med evaluation, c/o low back pain and new onset of bilateral knee pain. Pt did see Dr. Ketty Hankins and had synvisc injection with no relief. Pt is getting a second opinion tomorrow.   Pt state he is taking Norco 10/325mg 1 pil

## 2019-10-25 NOTE — TELEPHONE ENCOUNTER
Refill passed per [facility], [facility] protocol.   Hypertensive Medications  Protocol Criteria:  · Appointment scheduled in the past 6 months or in the next 3 months  · BMP or CMP in the past 12 months  · Creatinine result < 2  Recent Outpatient Visits

## 2019-10-26 RX ORDER — SIMVASTATIN 20 MG
TABLET ORAL
Qty: 90 TABLET | Refills: 1 | Status: SHIPPED | OUTPATIENT
Start: 2019-10-26 | End: 2020-05-09

## 2019-10-26 NOTE — TELEPHONE ENCOUNTER
Refill passed per [facility], [facility] protocol.   Cholesterol Medications  Protocol Criteria:  · Appointment scheduled in the past 12 months or in the next 3 months  · ALT & LDL on file in the past 12 months  · ALT result < 80  · LDL result <130   Recent Outpat

## 2019-11-13 ENCOUNTER — OFFICE VISIT (OUTPATIENT)
Dept: PAIN CLINIC | Facility: HOSPITAL | Age: 69
End: 2019-11-13
Attending: ANESTHESIOLOGY
Payer: MEDICARE

## 2019-11-13 VITALS
BODY MASS INDEX: 30.78 KG/M2 | SYSTOLIC BLOOD PRESSURE: 126 MMHG | DIASTOLIC BLOOD PRESSURE: 73 MMHG | HEIGHT: 70 IN | WEIGHT: 215 LBS | HEART RATE: 56 BPM

## 2019-11-13 DIAGNOSIS — M48.061 SPINAL STENOSIS OF LUMBAR REGION WITHOUT NEUROGENIC CLAUDICATION: ICD-10-CM

## 2019-11-13 DIAGNOSIS — M25.561 BILATERAL CHRONIC KNEE PAIN: Primary | ICD-10-CM

## 2019-11-13 DIAGNOSIS — M25.562 BILATERAL CHRONIC KNEE PAIN: Primary | ICD-10-CM

## 2019-11-13 DIAGNOSIS — G89.29 BILATERAL CHRONIC KNEE PAIN: Primary | ICD-10-CM

## 2019-11-13 DIAGNOSIS — M48.02 SPINAL STENOSIS IN CERVICAL REGION: ICD-10-CM

## 2019-11-13 DIAGNOSIS — F11.90 CHRONIC, CONTINUOUS USE OF OPIOIDS: ICD-10-CM

## 2019-11-13 RX ORDER — HYDROCODONE BITARTRATE AND ACETAMINOPHEN 10; 325 MG/1; MG/1
1 TABLET ORAL EVERY 8 HOURS PRN
Qty: 90 TABLET | Refills: 0 | Status: SHIPPED | OUTPATIENT
Start: 2019-12-14 | End: 2020-01-02

## 2019-11-13 RX ORDER — HYDROCODONE BITARTRATE AND ACETAMINOPHEN 10; 325 MG/1; MG/1
1 TABLET ORAL EVERY 8 HOURS PRN
Qty: 90 TABLET | Refills: 0 | Status: SHIPPED | OUTPATIENT
Start: 2019-11-14 | End: 2019-12-14

## 2019-11-13 NOTE — CHRONIC PAIN
Initial Consultation Note      HISTORY OF PRESENT ILLNESS:  Ellen Gaytan is a 71year old old male referred to the pain clinic  for evaluation treatment of his low back and cervical neck pain .    he continues to take 3 Norco a day for pain control over BIOPSIES  2013   • REPAIR ROTATOR CUFF,ACUTE Right 2007   • TONSILLECTOMY         REVIEW OF SYSTEMS:   A comprehensive review of systems was negative except for:   MEDICAL HISTORY:  Patient Active Problem List:     Prostate cancer [facility])     Erectile dysfun on file      Food insecurity:        Worry: Not on file        Inability: Not on file      Transportation needs:        Medical: Not on file        Non-medical: Not on file    Tobacco Use      Smoking status: Never Smoker      Smokeless tobacco: Never Used Weight: 215 lb (97.5 kg)   Height: 5' 10\" (1.778 m)     General: Alert and oriented x3  Affect:  NAD  Head: normocephalic, atraumatic  Eyes: anicteric; no injection  Chest: S1, S2, RRR  Respiratory: CTAB  Gait: Normal; cane user - No  Spine: Normal    R 9.3 07/03/2019     No results found for: PT    [place] PHYSICIAN MONITORING PROGRAM REVIEWED  Yes      ASSESSMENT:   71year old old male with cervical lumbar spinal stenosis status post lumbar injection with good relief of his low back pain cervical inje

## 2019-11-13 NOTE — PROGRESS NOTES
11/13/19     PATIENT PRESENTS  AMBULATORY TO [facility];  MEDICAL MANAGEMENT CHRONIC NECK & BACK PAIN;  RATES 3/10 WITH MEDICATIONS;   MEDICATIONS ARE HELPFUL;  SEEN BY DR. DOWELL;  REFER TO DICTATION FOR THE POC.   NA RENEWED  5/16/19  UDS 7/16/19    MEDICAL MA

## 2020-01-07 ENCOUNTER — OFFICE VISIT (OUTPATIENT)
Dept: PAIN CLINIC | Facility: HOSPITAL | Age: 70
End: 2020-01-07
Attending: ANESTHESIOLOGY
Payer: MEDICARE

## 2020-01-07 VITALS
RESPIRATION RATE: 18 BRPM | WEIGHT: 215 LBS | SYSTOLIC BLOOD PRESSURE: 128 MMHG | DIASTOLIC BLOOD PRESSURE: 78 MMHG | BODY MASS INDEX: 30.78 KG/M2 | HEIGHT: 70 IN

## 2020-01-07 DIAGNOSIS — M25.562 BILATERAL CHRONIC KNEE PAIN: Primary | ICD-10-CM

## 2020-01-07 DIAGNOSIS — M25.561 BILATERAL CHRONIC KNEE PAIN: Primary | ICD-10-CM

## 2020-01-07 DIAGNOSIS — F11.90 CHRONIC, CONTINUOUS USE OF OPIOIDS: ICD-10-CM

## 2020-01-07 DIAGNOSIS — G89.29 BILATERAL CHRONIC KNEE PAIN: Primary | ICD-10-CM

## 2020-01-07 DIAGNOSIS — M48.061 SPINAL STENOSIS OF LUMBAR REGION WITHOUT NEUROGENIC CLAUDICATION: ICD-10-CM

## 2020-01-07 RX ORDER — HYDROCODONE BITARTRATE AND ACETAMINOPHEN 10; 325 MG/1; MG/1
1 TABLET ORAL EVERY 8 HOURS PRN
Qty: 90 TABLET | Refills: 0 | Status: SHIPPED | OUTPATIENT
Start: 2020-02-12 | End: 2020-03-04

## 2020-01-07 RX ORDER — HYDROCODONE BITARTRATE AND ACETAMINOPHEN 10; 325 MG/1; MG/1
1 TABLET ORAL EVERY 6 HOURS PRN
Qty: 90 TABLET | Refills: 0 | Status: SHIPPED | OUTPATIENT
Start: 2020-01-13 | End: 2020-02-12

## 2020-01-07 NOTE — CHRONIC PAIN
Initial Consultation Note      HISTORY OF PRESENT ILLNESS:  Yaz Boles is a 71year old old male referred to the pain clinic  for evaluation treatment of his low back and cervical neck pain .    he continues to take 3 Norco a day for pain control over total) by mouth daily as needed for Erectile Dysfunction.  10 tablet 11   • ibuprofen 600 MG Oral Tab TAKE 1 TABLET EVERY 12 HOURS AS NEEDED FOR PAIN (SUBSTITUTED FOR MOTRIN) 180 tablet 0        ALLERGIES:  No Known Allergies    SURGICAL HISTORY:  Past Surg family h/o hyperlipidemia and vascular disease       SOCIAL HISTORY:  Social History    Socioeconomic History      Marital status:       Spouse name: Not on file      Number of children: 2      Years of education: Not on file      [facility] edu Self-Exams: Not Asked        Grew up on a farm: Not Asked        History of tanning: Not Asked        Outdoor occupation: Not Asked        Pt has a pacemaker: No        Pt has a defibrillator: No        Reaction to local anesthetic: No    Social History Na WBC 10.9 07/03/2019    RBC 5.47 07/03/2019    HGB 14.7 07/03/2019    HCT 46.8 07/03/2019    MCV 85.6 07/03/2019    MCH 26.9 07/03/2019    MCHC 31.4 07/03/2019    RDW 18.3 (H) 07/03/2019    .0 07/03/2019    MPV 8.1 06/28/2018     Lab Results   Com

## 2020-01-07 NOTE — PROGRESS NOTES
01/07/2020  PRESENTS AMBULATORY TO [facility];  MEDICAL MANAGEMENT CHRONIC NECK, LB, RT HIP;  PT ALSO STATES\"MY KNEES ARE GETTING BAD-IF I STAND FOR 10MIN I START GETTING PIN IN MY BUTTOCK THAT GOES DOWN THE  LEGS , LIKE A DULL ACHE\";  TODAY STATE PAIN IS 4/10;

## 2020-01-28 NOTE — TELEPHONE ENCOUNTER
Martha/ Technician is requesting refill of patient's medications METOPROLOL TARTRATE 25 MG Oral Tab, hydrochlorothiazide 25 MG Oral Tab, and ibuprofen 600 MG Oral Tab. Technician states patient will be out of medication in the next few days.

## 2020-01-30 RX ORDER — HYDROCHLOROTHIAZIDE 25 MG/1
25 TABLET ORAL DAILY
Qty: 90 TABLET | Refills: 0 | Status: SHIPPED | OUTPATIENT
Start: 2020-01-30 | End: 2020-05-08

## 2020-01-30 NOTE — TELEPHONE ENCOUNTER
Refill passed per [facility], [facility] protocol.     Hypertensive Medications  Protocol Criteria:  · Appointment scheduled in the past 6 months or in the next 3 months  · BMP or CMP in the past 12 months  · Creatinine result < 2  Recent Outpatient Visits

## 2020-02-05 ENCOUNTER — APPOINTMENT (OUTPATIENT)
Dept: LAB | Facility: HOSPITAL | Age: 70
End: 2020-02-05
Attending: UROLOGY
Payer: MEDICARE

## 2020-02-05 DIAGNOSIS — C61 PROSTATE CANCER (HCC): ICD-10-CM

## 2020-02-05 LAB — PSA SERPL-MCNC: 2.15 NG/ML (ref ?–4)

## 2020-02-05 PROCEDURE — 36415 COLL VENOUS BLD VENIPUNCTURE: CPT

## 2020-02-05 PROCEDURE — 84153 ASSAY OF PSA TOTAL: CPT

## 2020-02-07 ENCOUNTER — OFFICE VISIT (OUTPATIENT)
Dept: SURGERY | Facility: CLINIC | Age: 70
End: 2020-02-07
Payer: MEDICARE

## 2020-02-07 VITALS
WEIGHT: 220 LBS | SYSTOLIC BLOOD PRESSURE: 138 MMHG | HEIGHT: 69 IN | HEART RATE: 56 BPM | BODY MASS INDEX: 32.58 KG/M2 | TEMPERATURE: 98 F | DIASTOLIC BLOOD PRESSURE: 85 MMHG

## 2020-02-07 DIAGNOSIS — N52.01 ERECTILE DYSFUNCTION DUE TO ARTERIAL INSUFFICIENCY: ICD-10-CM

## 2020-02-07 DIAGNOSIS — R35.1 NOCTURIA: ICD-10-CM

## 2020-02-07 DIAGNOSIS — R97.21 RISING PSA FOLLOWING TREATMENT FOR MALIGNANT NEOPLASM OF PROSTATE: ICD-10-CM

## 2020-02-07 DIAGNOSIS — R80.1 PERSISTENT PROTEINURIA: ICD-10-CM

## 2020-02-07 DIAGNOSIS — C61 PROSTATE CANCER (HCC): Primary | ICD-10-CM

## 2020-02-07 PROCEDURE — 99214 OFFICE O/P EST MOD 30 MIN: CPT | Performed by: UROLOGY

## 2020-02-07 PROCEDURE — G0463 HOSPITAL OUTPT CLINIC VISIT: HCPCS | Performed by: UROLOGY

## 2020-02-07 NOTE — PROGRESS NOTES
HPI:    Patient ID: Zackery Wright is a 71year old male. HPI     Prostate Cancer with Rising PSA  Chronic. Status post brachytherapy 2/2014.  The patient denies any symptoms to suggest prostate cancer such as new bone pain, appetite change, or uninten because of elevated PSA, SECOND prostate biopsy; prostate volume 39.3 g; 14 biopsy; pathology showed chronic and acute inflammation but no cancer.   Erectile dysfunction already before brachytherapy; on 9/5/13 I prescribed Cialis and 5-10 mg every 48 hours ordered  09/04/2019 Office visit with me;  On PARISH, prostate very small and flat, no palpable nodules or indurations; start tadalafil (generic Cialis) 20 mg tablet--take 1/2 tablet 1 to 2 hours before planned sexual activity; PSA ordered.           Review of Dysplastic nevus 2017    left flank   • Elevated PSA 2008    bx negative for cancer   • H/O arthroscopy of knee bilateral   • History of brachytherapy    • Neck pain    • Obesity, unspecified    • Prostate cancer [facility])     brachytherapy   • PVD (peripheral PSA = 1.89; UA protein = 30, WBC = 2, RBC = 1  7/3/19 UA protein = 30, RBC < 1, WBC = 2; creatinine = 1.20, GFR = 62   3/1/19 PSA = 1.73  11/2/18 PSA = 2.0  10/10/18 Creatinine = 1.17; eGFR >60  7/3/18 PSA = 1.5  5/14/18 UA RBC = 2; WBC = 2; spec gravity = on Cialis.      (R80.1) Persistent proteinuria  On 8/29/19 UA protein = 30, stable compared to 7/3/19 UA protein = 30.  I recommend UA before next visit, patient agrees.      (R35.1) Nocturia  Patient has current AUA score of 21, severe voiding dysfunction

## 2020-02-07 NOTE — PATIENT INSTRUCTIONS
Chantell Dickey M.D.     1. Continue Tadalafil (generic Cialis) 20 mg tablet--take 1/2 tablet 1 to 2 hours before planned sexual activity.   If upon trying that dose you do not experience a good functional erection

## 2020-02-10 ENCOUNTER — TELEPHONE (OUTPATIENT)
Dept: SURGERY | Facility: CLINIC | Age: 70
End: 2020-02-10

## 2020-03-09 ENCOUNTER — OFFICE VISIT (OUTPATIENT)
Dept: PAIN CLINIC | Facility: HOSPITAL | Age: 70
End: 2020-03-09
Attending: ANESTHESIOLOGY
Payer: MEDICARE

## 2020-03-09 VITALS
SYSTOLIC BLOOD PRESSURE: 126 MMHG | BODY MASS INDEX: 32.58 KG/M2 | HEIGHT: 69 IN | WEIGHT: 220 LBS | HEART RATE: 56 BPM | DIASTOLIC BLOOD PRESSURE: 75 MMHG | RESPIRATION RATE: 18 BRPM

## 2020-03-09 DIAGNOSIS — M48.061 SPINAL STENOSIS OF LUMBAR REGION WITHOUT NEUROGENIC CLAUDICATION: ICD-10-CM

## 2020-03-09 DIAGNOSIS — M25.561 BILATERAL CHRONIC KNEE PAIN: Primary | ICD-10-CM

## 2020-03-09 DIAGNOSIS — G89.29 BILATERAL CHRONIC KNEE PAIN: Primary | ICD-10-CM

## 2020-03-09 DIAGNOSIS — M25.562 BILATERAL CHRONIC KNEE PAIN: Primary | ICD-10-CM

## 2020-03-09 DIAGNOSIS — F11.90 CHRONIC NARCOTIC USE: ICD-10-CM

## 2020-03-09 PROCEDURE — 99211 OFF/OP EST MAY X REQ PHY/QHP: CPT

## 2020-03-09 RX ORDER — HYDROCODONE BITARTRATE AND ACETAMINOPHEN 10; 325 MG/1; MG/1
1 TABLET ORAL EVERY 8 HOURS PRN
Qty: 90 TABLET | Refills: 0 | Status: SHIPPED | OUTPATIENT
Start: 2020-03-13 | End: 2020-04-12

## 2020-03-09 RX ORDER — HYDROCODONE BITARTRATE AND ACETAMINOPHEN 10; 325 MG/1; MG/1
1 TABLET ORAL EVERY 8 HOURS PRN
Qty: 90 TABLET | Refills: 0 | Status: SHIPPED | OUTPATIENT
Start: 2020-04-12 | End: 2020-05-07

## 2020-03-09 NOTE — CHRONIC PAIN
Initial Consultation Note      HISTORY OF PRESENT ILLNESS:  Jaron Yazdanism is a 71year old old male referred to the pain clinic  for evaluation treatment of his low back and cervical neck pain .    he continues to take 3 Norco a day for pain control over for Pain. 90 tablet 0   • metoprolol Tartrate 25 MG Oral Tab TAKE 1 TABLET TWICE DAILY 180 tablet 0   • hydrochlorothiazide 25 MG Oral Tab Take 1 tablet (25 mg total) by mouth daily.  90 tablet 0   • simvastatin 20 MG Oral Tab TAKE 1 TABLET BY MOUTH IN THE Prostate cancer [facility])     brachytherapy   • PVD (peripheral vascular disease) ([facility] [address]    • Rotator cuff tear 2007    right   • Rotator cuff tear     R - 2007   • Tonsillitis 1960       FAMILY HISTORY:  Family History   Problem Relation Age of Onset   • Diabe liter/day        Occupational Exposure: Not Asked        Hobby Hazards: Not Asked        Sleep Concern: Not Asked        Stress Concern: Not Asked        Weight Concern: Not Asked        Special Diet: Not Asked        Back Care: Not Asked        Exercise: bilateral upper and lower extremities  Edema - Absent  Stasis ulcer on the left leg is healed    Sensation (light touch/pinprick/temperature):     Right Lower Extremity:  Normal  Left Lower Extremity: Normal  Right Upper Extremity:  Normal  Left Upper Extr

## 2020-03-09 NOTE — TELEPHONE ENCOUNTER
Patient is requesting prescription strength ibuprofen 600 mg be sent to pharmacy listed. Please advise patient when script has been sent. Thank you.

## 2020-03-09 NOTE — PROGRESS NOTES
3/9/2020     PATIENT PRESENTS  AMBULATORY TO [facility];  MEDICAL MANAGEMENT CHRONIC NECK & BACK PAIN;  RATES 3-4/10 WITH MEDICATIONS; PT C/O PAIN IS RADIATING DOWN THE BILAT.  THIGHS; STATES HE ALSO HAS BILAT KNEE PAIN -\"MY KNEES FEEL STIFF AND FULL, PRESSURE ES

## 2020-03-11 RX ORDER — IBUPROFEN 600 MG/1
TABLET ORAL
Qty: 180 TABLET | Refills: 0 | Status: SHIPPED | OUTPATIENT
Start: 2020-03-11 | End: 2021-03-09

## 2020-03-18 ENCOUNTER — TELEPHONE (OUTPATIENT)
Dept: INTERNAL MEDICINE CLINIC | Facility: CLINIC | Age: 70
End: 2020-03-18

## 2020-03-18 DIAGNOSIS — I10 ESSENTIAL HYPERTENSION WITH GOAL BLOOD PRESSURE LESS THAN 140/90: ICD-10-CM

## 2020-03-18 DIAGNOSIS — E78.2 MIXED HYPERLIPIDEMIA: Primary | ICD-10-CM

## 2020-03-18 DIAGNOSIS — C61 PROSTATE CANCER (HCC): ICD-10-CM

## 2020-03-18 NOTE — TELEPHONE ENCOUNTER
Patient requesting lab work for his physical appointment scheduled for 7/9. States he does not need a PSA because he already has one in system.

## 2020-03-18 NOTE — TELEPHONE ENCOUNTER
Dr. Staci Woo, patient is schedule for a Physical on 07/09/2020. Patient is requesting blood test order for appointment. Please advise.

## 2020-05-08 RX ORDER — HYDROCHLOROTHIAZIDE 25 MG/1
TABLET ORAL
Qty: 30 TABLET | Refills: 0 | Status: SHIPPED | OUTPATIENT
Start: 2020-05-08 | End: 2020-06-07

## 2020-05-09 RX ORDER — SIMVASTATIN 20 MG
TABLET ORAL
Qty: 90 TABLET | Refills: 0 | Status: SHIPPED | OUTPATIENT
Start: 2020-05-09 | End: 2020-07-09

## 2020-06-05 ENCOUNTER — MED REC SCAN ONLY (OUTPATIENT)
Dept: INTERNAL MEDICINE CLINIC | Facility: CLINIC | Age: 70
End: 2020-06-05

## 2020-06-08 NOTE — TELEPHONE ENCOUNTER
Patient is following up on refill request. Physical is scheduled for 7/9. Patient is requesting a 90 day supply. He is out of medication. Please advise.

## 2020-06-11 ENCOUNTER — TELEPHONE (OUTPATIENT)
Dept: SURGERY | Facility: CLINIC | Age: 70
End: 2020-06-11

## 2020-06-11 ENCOUNTER — OFFICE VISIT (OUTPATIENT)
Dept: PAIN CLINIC | Facility: HOSPITAL | Age: 70
End: 2020-06-11
Attending: ANESTHESIOLOGY
Payer: MEDICARE

## 2020-06-11 VITALS
SYSTOLIC BLOOD PRESSURE: 112 MMHG | BODY MASS INDEX: 32.58 KG/M2 | DIASTOLIC BLOOD PRESSURE: 71 MMHG | HEART RATE: 43 BPM | HEIGHT: 69 IN | WEIGHT: 220 LBS

## 2020-06-11 DIAGNOSIS — M48.061 SPINAL STENOSIS OF LUMBAR REGION WITHOUT NEUROGENIC CLAUDICATION: Primary | ICD-10-CM

## 2020-06-11 DIAGNOSIS — F11.90 CHRONIC, CONTINUOUS USE OF OPIOIDS: ICD-10-CM

## 2020-06-11 PROCEDURE — 99211 OFF/OP EST MAY X REQ PHY/QHP: CPT

## 2020-06-11 RX ORDER — HYDROCODONE BITARTRATE AND ACETAMINOPHEN 10; 325 MG/1; MG/1
1 TABLET ORAL EVERY 8 HOURS PRN
Qty: 90 TABLET | Refills: 0 | Status: SHIPPED | OUTPATIENT
Start: 2020-07-11 | End: 2020-07-28

## 2020-06-11 RX ORDER — HYDROCODONE BITARTRATE AND ACETAMINOPHEN 10; 325 MG/1; MG/1
1 TABLET ORAL EVERY 8 HOURS PRN
Qty: 90 TABLET | Refills: 0 | Status: SHIPPED | OUTPATIENT
Start: 2020-06-11 | End: 2020-07-09

## 2020-06-11 NOTE — PROGRESS NOTES
6/11/2020  Patient presents to [facility] ambulatory for med eval.  He has chronic neck and back pain. Rates his pain 2 1/2-3;  Medications are helpful;  Reports he had bilat. Knee injections last week that has helped;  Seen by Dr. Dimple Alexander;   Refer to dictation f

## 2020-06-11 NOTE — TELEPHONE ENCOUNTER
LMTCKULWINDER and I explained on the identified VM why we wanted to change his appt to phoned video visit because of the Covid-19 pandemic the MD's are limiting their time in the office to 2 days a week and they are trying to do as many phone or video visits inste

## 2020-06-11 NOTE — TELEPHONE ENCOUNTER
Pt returned the call. Offered to change appointment on 6-19-20 to video or a phone visit. Pt declined video. Pt states why should pt change the appointment if pt needs to have tests done. Pt declined to be transferred to the lab.   Unable to reach the [employer]

## 2020-06-11 NOTE — TELEPHONE ENCOUNTER
Tried to reach pt on the home and cell #'s listed and I LM on identified VM's that we need to reschd his o/v appt on 6/19 to a phone or video visit if we can get him signed into my chart.

## 2020-06-16 ENCOUNTER — APPOINTMENT (OUTPATIENT)
Dept: LAB | Facility: HOSPITAL | Age: 70
End: 2020-06-16
Attending: UROLOGY
Payer: MEDICARE

## 2020-06-16 DIAGNOSIS — R80.1 PERSISTENT PROTEINURIA: ICD-10-CM

## 2020-06-16 DIAGNOSIS — C61 PROSTATE CANCER (HCC): ICD-10-CM

## 2020-06-16 PROCEDURE — 84153 ASSAY OF PSA TOTAL: CPT

## 2020-06-16 PROCEDURE — 36415 COLL VENOUS BLD VENIPUNCTURE: CPT

## 2020-06-16 PROCEDURE — 81001 URINALYSIS AUTO W/SCOPE: CPT

## 2020-06-19 ENCOUNTER — VIRTUAL PHONE E/M (OUTPATIENT)
Dept: SURGERY | Facility: CLINIC | Age: 70
End: 2020-06-19
Payer: MEDICARE

## 2020-06-19 DIAGNOSIS — C61 PROSTATE CANCER (HCC): Primary | ICD-10-CM

## 2020-06-19 DIAGNOSIS — R80.1 PERSISTENT PROTEINURIA: ICD-10-CM

## 2020-06-19 DIAGNOSIS — R35.1 NOCTURIA: ICD-10-CM

## 2020-06-19 DIAGNOSIS — N19 RENAL FAILURE, UNSPECIFIED CHRONICITY: ICD-10-CM

## 2020-06-19 DIAGNOSIS — N52.01 ERECTILE DYSFUNCTION DUE TO ARTERIAL INSUFFICIENCY: ICD-10-CM

## 2020-06-19 PROCEDURE — 99442 PHONE E/M BY PHYS 11-20 MIN: CPT | Performed by: UROLOGY

## 2020-06-19 NOTE — PROGRESS NOTES
Virtual Telephone Check-In    Zackery Wright verbally consents to a Virtual/Telephone Check-In visit on 06/19/20. Patient has been referred to the [facility] website at [email or web address] to review the yearly Consent to Treat document.  This  is  a COVID last time. He states he urinates every 2 hours and voids small volumes. He states that he is use to his urination problems. Patient denies snoring.  He states that he does not restrict fluids before bedtime because he takes medication at night and his throa office visit 9/18/15 reports suboptimal response. 11/18/16 Patient is not longer taking cialis 5 mg.  5/16/17 PSA 1.3 junior  5/19/2017 office visit with me; Prostate: small without any palpable nodules or indurations;  If you wish, you may try decreasing or indurations         LABORATORIES   06/16/2020 PSA = 2.18; UA blood = small; WBC = 1; RBC = 1; protein = negative  02/05/2020 PSA = 2.15   8/29/19 PSA = 1.89; UA protein = 30, WBC = 2, RBC = 1  7/3/19 UA protein = 30, RBC < 1, WBC = 2; creatinine = 1.20, voiding dysfunction category; nocturia 4x. Today, patient complains of nocturia 4x. He was previously taking tamsulosin but stopped this medication because he felt that it was not helping his urinary symptoms and he had side effects of muscle aches.  I full record reflects my personal performance and is accurate and complete.   Dariana Ryan MD, 6/19/2020, 12:30 PM

## 2020-06-19 NOTE — PATIENT INSTRUCTIONS
Kathie Tompkins M.D.    1.    To try to decrease the number of times you wake up at night to urinate, drink very little liquids for 3 hours before bedtime; if you are thirsty, you need just sips of water also gargl

## 2020-07-01 ENCOUNTER — MED REC SCAN ONLY (OUTPATIENT)
Dept: INTERNAL MEDICINE CLINIC | Facility: CLINIC | Age: 70
End: 2020-07-01

## 2020-07-06 ENCOUNTER — LAB ENCOUNTER (OUTPATIENT)
Dept: LAB | Facility: REFERENCE LAB | Age: 70
End: 2020-07-06
Attending: UROLOGY
Payer: MEDICARE

## 2020-07-06 DIAGNOSIS — E78.2 MIXED HYPERLIPIDEMIA: ICD-10-CM

## 2020-07-06 DIAGNOSIS — I10 ESSENTIAL HYPERTENSION WITH GOAL BLOOD PRESSURE LESS THAN 140/90: ICD-10-CM

## 2020-07-06 LAB
ALBUMIN SERPL-MCNC: 3.2 G/DL (ref 3.4–5)
ALBUMIN/GLOB SERPL: 0.8 {RATIO} (ref 1–2)
ALP LIVER SERPL-CCNC: 104 U/L (ref 45–117)
ALT SERPL-CCNC: 25 U/L (ref 16–61)
ANION GAP SERPL CALC-SCNC: 3 MMOL/L (ref 0–18)
AST SERPL-CCNC: 20 U/L (ref 15–37)
BASOPHILS # BLD AUTO: 0.07 X10(3) UL (ref 0–0.2)
BASOPHILS NFR BLD AUTO: 0.7 %
BILIRUB SERPL-MCNC: 0.7 MG/DL (ref 0.1–2)
BILIRUB UR QL: NEGATIVE
BUN BLD-MCNC: 25 MG/DL (ref 7–18)
BUN/CREAT SERPL: 19.4 (ref 10–20)
CALCIUM BLD-MCNC: 8.8 MG/DL (ref 8.5–10.1)
CHLORIDE SERPL-SCNC: 107 MMOL/L (ref 98–112)
CHOLEST SMN-MCNC: 174 MG/DL (ref ?–200)
CLARITY UR: CLEAR
CO2 SERPL-SCNC: 29 MMOL/L (ref 21–32)
COLOR UR: YELLOW
CREAT BLD-MCNC: 1.29 MG/DL (ref 0.7–1.3)
DEPRECATED RDW RBC AUTO: 54.4 FL (ref 35.1–46.3)
EOSINOPHIL # BLD AUTO: 0.18 X10(3) UL (ref 0–0.7)
EOSINOPHIL NFR BLD AUTO: 1.8 %
ERYTHROCYTE [DISTWIDTH] IN BLOOD BY AUTOMATED COUNT: 19 % (ref 11–15)
GLOBULIN PLAS-MCNC: 4.2 G/DL (ref 2.8–4.4)
GLUCOSE BLD-MCNC: 92 MG/DL (ref 70–99)
GLUCOSE UR-MCNC: NEGATIVE MG/DL
HCT VFR BLD AUTO: 45.2 % (ref 39–53)
HDLC SERPL-MCNC: 54 MG/DL (ref 40–59)
HGB BLD-MCNC: 14.1 G/DL (ref 13–17.5)
HGB UR QL STRIP.AUTO: NEGATIVE
IMM GRANULOCYTES # BLD AUTO: 0.09 X10(3) UL (ref 0–1)
IMM GRANULOCYTES NFR BLD: 0.9 %
KETONES UR-MCNC: NEGATIVE MG/DL
LDLC SERPL CALC-MCNC: 103 MG/DL (ref ?–100)
LEUKOCYTE ESTERASE UR QL STRIP.AUTO: NEGATIVE
LYMPHOCYTES # BLD AUTO: 1.43 X10(3) UL (ref 1–4)
LYMPHOCYTES NFR BLD AUTO: 13.9 %
M PROTEIN MFR SERPL ELPH: 7.4 G/DL (ref 6.4–8.2)
MCH RBC QN AUTO: 25.9 PG (ref 26–34)
MCHC RBC AUTO-ENTMCNC: 31.2 G/DL (ref 31–37)
MCV RBC AUTO: 82.9 FL (ref 80–100)
MONOCYTES # BLD AUTO: 0.83 X10(3) UL (ref 0.1–1)
MONOCYTES NFR BLD AUTO: 8.1 %
NEUTROPHILS # BLD AUTO: 7.68 X10 (3) UL (ref 1.5–7.7)
NEUTROPHILS # BLD AUTO: 7.68 X10(3) UL (ref 1.5–7.7)
NEUTROPHILS NFR BLD AUTO: 74.6 %
NITRITE UR QL STRIP.AUTO: NEGATIVE
NONHDLC SERPL-MCNC: 120 MG/DL (ref ?–130)
OSMOLALITY SERPL CALC.SUM OF ELEC: 292 MOSM/KG (ref 275–295)
PATIENT FASTING Y/N/NP: YES
PATIENT FASTING Y/N/NP: YES
PH UR: 5 [PH] (ref 5–8)
PLATELET # BLD AUTO: 315 10(3)UL (ref 150–450)
POTASSIUM SERPL-SCNC: 3.8 MMOL/L (ref 3.5–5.1)
PROT UR-MCNC: NEGATIVE MG/DL
RBC # BLD AUTO: 5.45 X10(6)UL (ref 3.8–5.8)
SODIUM SERPL-SCNC: 139 MMOL/L (ref 136–145)
SP GR UR STRIP: 1.02 (ref 1–1.03)
TRIGL SERPL-MCNC: 87 MG/DL (ref 30–149)
TSI SER-ACNC: 3.28 MIU/ML (ref 0.36–3.74)
UROBILINOGEN UR STRIP-ACNC: <2
VLDLC SERPL CALC-MCNC: 17 MG/DL (ref 0–30)
WBC # BLD AUTO: 10.3 X10(3) UL (ref 4–11)

## 2020-07-06 PROCEDURE — 36415 COLL VENOUS BLD VENIPUNCTURE: CPT

## 2020-07-06 PROCEDURE — 84443 ASSAY THYROID STIM HORMONE: CPT

## 2020-07-06 PROCEDURE — 81003 URINALYSIS AUTO W/O SCOPE: CPT

## 2020-07-06 PROCEDURE — 80053 COMPREHEN METABOLIC PANEL: CPT

## 2020-07-06 PROCEDURE — 80061 LIPID PANEL: CPT

## 2020-07-06 PROCEDURE — 85025 COMPLETE CBC W/AUTO DIFF WBC: CPT

## 2020-07-06 NOTE — TELEPHONE ENCOUNTER
Please see patient's message below and advise    Patient does have Medicare Annual Visit already scheduled 7/09/2020    Medication re-pended for 90 day supply

## 2020-07-06 NOTE — TELEPHONE ENCOUNTER
Patient is requesting a refill. He stated the pharmacy will not send his refill request. Patient is upset that he has to call in for a refill and is requesting a 90 day supply of medication.      metoprolol Tartrate 25 MG Oral Tab

## 2020-07-09 ENCOUNTER — OFFICE VISIT (OUTPATIENT)
Dept: INTERNAL MEDICINE CLINIC | Facility: CLINIC | Age: 70
End: 2020-07-09
Payer: MEDICARE

## 2020-07-09 VITALS
HEART RATE: 52 BPM | WEIGHT: 220 LBS | BODY MASS INDEX: 31.5 KG/M2 | DIASTOLIC BLOOD PRESSURE: 74 MMHG | HEIGHT: 70 IN | SYSTOLIC BLOOD PRESSURE: 134 MMHG | RESPIRATION RATE: 16 BRPM

## 2020-07-09 DIAGNOSIS — E66.09 CLASS 1 OBESITY DUE TO EXCESS CALORIES WITH SERIOUS COMORBIDITY IN ADULT, UNSPECIFIED BMI: ICD-10-CM

## 2020-07-09 DIAGNOSIS — C61 PROSTATE CANCER (HCC): ICD-10-CM

## 2020-07-09 DIAGNOSIS — N52.01 ERECTILE DYSFUNCTION DUE TO ARTERIAL INSUFFICIENCY: ICD-10-CM

## 2020-07-09 DIAGNOSIS — H54.7 DECREASED VISUAL ACUITY: ICD-10-CM

## 2020-07-09 DIAGNOSIS — Z28.21 IMMUNIZATION NOT CARRIED OUT BECAUSE OF PATIENT REFUSAL: ICD-10-CM

## 2020-07-09 DIAGNOSIS — E78.2 MIXED HYPERLIPIDEMIA: ICD-10-CM

## 2020-07-09 DIAGNOSIS — I10 ESSENTIAL HYPERTENSION WITH GOAL BLOOD PRESSURE LESS THAN 140/90: ICD-10-CM

## 2020-07-09 DIAGNOSIS — M54.12 CERVICAL RADICULAR PAIN: ICD-10-CM

## 2020-07-09 DIAGNOSIS — Z00.00 PHYSICAL EXAM, ANNUAL: Primary | ICD-10-CM

## 2020-07-09 PROBLEM — M25.512 CHRONIC LEFT SHOULDER PAIN: Status: RESOLVED | Noted: 2017-10-03 | Resolved: 2020-07-09

## 2020-07-09 PROBLEM — G89.29 CHRONIC LEFT SHOULDER PAIN: Status: RESOLVED | Noted: 2017-10-03 | Resolved: 2020-07-09

## 2020-07-09 PROBLEM — M79.89 LEFT ARM SWELLING: Status: RESOLVED | Noted: 2018-05-14 | Resolved: 2020-07-09

## 2020-07-09 PROBLEM — R05.9 COUGHING: Status: RESOLVED | Noted: 2017-01-25 | Resolved: 2020-07-09

## 2020-07-09 PROBLEM — L98.9 SKIN LESION: Status: RESOLVED | Noted: 2017-01-25 | Resolved: 2020-07-09

## 2020-07-09 PROBLEM — I83.003 VENOUS STASIS ULCER OF ANKLE LIMITED TO BREAKDOWN OF SKIN WITH VARICOSE VEINS (HCC): Status: RESOLVED | Noted: 2018-09-18 | Resolved: 2020-07-09

## 2020-07-09 PROBLEM — L97.301 VENOUS STASIS ULCER OF ANKLE LIMITED TO BREAKDOWN OF SKIN WITH VARICOSE VEINS (HCC): Status: RESOLVED | Noted: 2018-09-18 | Resolved: 2020-07-09

## 2020-07-09 PROBLEM — M54.2 CERVICALGIA: Status: RESOLVED | Noted: 2017-01-25 | Resolved: 2020-07-09

## 2020-07-09 PROCEDURE — G0439 PPPS, SUBSEQ VISIT: HCPCS | Performed by: INTERNAL MEDICINE

## 2020-07-09 PROCEDURE — 90670 PCV13 VACCINE IM: CPT | Performed by: INTERNAL MEDICINE

## 2020-07-09 PROCEDURE — G0009 ADMIN PNEUMOCOCCAL VACCINE: HCPCS | Performed by: INTERNAL MEDICINE

## 2020-07-09 RX ORDER — SIMVASTATIN 20 MG
20 TABLET ORAL EVERY EVENING
Qty: 90 TABLET | Refills: 1 | Status: SHIPPED | OUTPATIENT
Start: 2020-07-09 | End: 2021-01-19

## 2020-07-09 RX ORDER — HYDROCHLOROTHIAZIDE 25 MG/1
25 TABLET ORAL DAILY
Qty: 90 TABLET | Refills: 3 | Status: SHIPPED | OUTPATIENT
Start: 2020-07-09 | End: 2020-07-21

## 2020-07-09 NOTE — PROGRESS NOTES
REASON FOR VISIT:    Mariam Kaminski is a 71year old male who presents for a Medicare Annual Wellness visit.      Patient Care Team: Patient Care Team:  Genna So MD as PCP - General (Internal Medicine)  Leonardo Moran MD as PCP - [facility]  Cristina Duke 7/28/2013   BUN 7 - 18 mg/dL 25(H) 21(H) 20 25(H) 20 22(H) 20   Creatinine 0.70 - 1.30 mg/dL 1.29 1.20 1.17 1.30 1.24 1.29 1.40   Some recent data might be hidden     AST and ALT Latest Ref Rng & Units 7/6/2020 7/3/2019 3/29/2018 2/15/2016 3/31/2015 1/15/2 No  Problems with daily activities? : No  Memory Problems?: No      Fall/Risk Assessment     Have you fallen in the last 12 months?: 0-No  Fall/Risk Scorin        Depression Screening (PHQ-2/PHQ-9): Over the LAST 2 WEEKS   Little interest or pleasure i results found for: DIGOXIN          ALLERGIES:   No Known Allergies  MEDICAL INFORMATION:   Past Medical History:   Diagnosis Date   • Appendicitis 1963   • Back problem    • Benign prostatic hypertrophy    • Carpal tunnel syndrome    • Dysplastic nevus 20 anemia  ENDOCRINE: denies thyroid history  ALL/ASTHMA: denies hx of allergy or asthma    EXAM:   /74   Pulse 52   Resp 16   Ht 5' 10\" (1.778 m)   Wt 220 lb (99.8 kg)   BMI 31.57 kg/m²    > BP Readings from Last 3 Encounters:  07/09/20 : 134/74  06/1 to review. 3. Prostate cancer ([facility] [address]    Was treated with radiation seeds in 2014. Currently stable condition. Follow up with specialist Dr Pamla Brine monitoring condition as needed. 4. Obesity (BMI 30.0-34. 9)    The patient is obese with BMI near 58 [facility] of motion if not too painful. 8. Decreased visual acuity    Has been having some vision problems lately. Cannot see as clearly lately. Will send for vision evaluation.      9. Immunization not carried out because of patient refusal    REFUSES ALL VACCINE

## 2020-07-21 RX ORDER — HYDROCHLOROTHIAZIDE 25 MG/1
TABLET ORAL
Qty: 90 TABLET | Refills: 0 | Status: SHIPPED | OUTPATIENT
Start: 2020-07-21 | End: 2020-10-19

## 2020-07-21 NOTE — TELEPHONE ENCOUNTER
Patient stated [facility] did not received our order on 7/9. Please resend  hydrochlorothiazide 25 MG Oral Tab- He is completely out of this.  simvastatin 20 MG Oral Tab  metoprolol Tartrate 25 MG Oral Tab    Thank you.

## 2020-07-21 NOTE — TELEPHONE ENCOUNTER
I called the pharmacy and both are on hold  Due to are not ready to be released. I called the patient and let him know. Also stated has 1 refill.

## 2020-07-30 ENCOUNTER — OFFICE VISIT (OUTPATIENT)
Dept: PAIN CLINIC | Facility: HOSPITAL | Age: 70
End: 2020-07-30
Attending: ANESTHESIOLOGY
Payer: MEDICARE

## 2020-07-30 DIAGNOSIS — Z51.81 ENCOUNTER FOR MONITORING OPIOID MAINTENANCE THERAPY: Primary | ICD-10-CM

## 2020-07-30 DIAGNOSIS — M25.562 BILATERAL CHRONIC KNEE PAIN: ICD-10-CM

## 2020-07-30 DIAGNOSIS — Z79.891 ENCOUNTER FOR MONITORING OPIOID MAINTENANCE THERAPY: Primary | ICD-10-CM

## 2020-07-30 DIAGNOSIS — G89.29 BILATERAL CHRONIC KNEE PAIN: ICD-10-CM

## 2020-07-30 DIAGNOSIS — M48.061 SPINAL STENOSIS OF LUMBAR REGION WITHOUT NEUROGENIC CLAUDICATION: ICD-10-CM

## 2020-07-30 DIAGNOSIS — M25.561 BILATERAL CHRONIC KNEE PAIN: ICD-10-CM

## 2020-07-30 LAB
AMPHET UR QL SCN: NEGATIVE
BARBITURATES UR QL SCN: NEGATIVE
BENZODIAZ UR QL SCN: NEGATIVE
CANNABINOIDS UR QL SCN: NEGATIVE
COCAINE UR QL: NEGATIVE
CREAT UR-SCNC: 168 MG/DL
MDMA UR QL SCN: NEGATIVE
METHADONE UR QL SCN: NEGATIVE
OXYCODONE UR QL SCN: NEGATIVE
PCP UR QL SCN: NEGATIVE

## 2020-07-30 PROCEDURE — 99212 OFFICE O/P EST SF 10 MIN: CPT

## 2020-07-30 PROCEDURE — 80307 DRUG TEST PRSMV CHEM ANLYZR: CPT | Performed by: ANESTHESIOLOGY

## 2020-07-30 PROCEDURE — 80361 OPIATES 1 OR MORE: CPT | Performed by: ANESTHESIOLOGY

## 2020-07-30 RX ORDER — HYDROCODONE BITARTRATE AND ACETAMINOPHEN 10; 325 MG/1; MG/1
1 TABLET ORAL EVERY 8 HOURS PRN
Qty: 90 TABLET | Refills: 0 | Status: SHIPPED | OUTPATIENT
Start: 2020-09-09 | End: 2020-10-01

## 2020-07-30 RX ORDER — HYDROCODONE BITARTRATE AND ACETAMINOPHEN 10; 325 MG/1; MG/1
1 TABLET ORAL EVERY 8 HOURS PRN
Qty: 90 TABLET | Refills: 0 | Status: SHIPPED | OUTPATIENT
Start: 2020-08-10 | End: 2020-09-09

## 2020-07-30 NOTE — CHRONIC PAIN
Initial Consultation Note      HISTORY OF PRESENT ILLNESS:  Linnette Gutierrez is a 71year old old male referred to the pain clinic  for evaluation treatment of his low back and cervical neck pain .    he continues to take 3 Norco a day for pain control over tablet 1   • simvastatin 20 MG Oral Tab Take 1 tablet (20 mg total) by mouth every evening.  90 tablet 1   • ibuprofen 600 MG Oral Tab TAKE 1 TABLET EVERY 12 HOURS AS NEEDED FOR PAIN (SUBSTITUTED FOR MOTRIN) 180 tablet 0   • Tadalafil 20 MG Oral Tab Take 1 children: 2      Years of education: Not on file      Highest education level: Not on file    Occupational History      Occupation: ordering        Comment: retired    Social Needs      Financial resource strain: Not on file      Food insecurity:         Wo No        Reaction to local anesthetic: No    Social History Narrative      Not on file      ADVANCE CARE PLANNING:  Advance Care Plan NOT discussed. PHYSICAL EXAMINATION:  There were no vitals filed for this visit.   General: Alert and oriented x3  Affe Results   Component Value Date     07/06/2020    K 3.8 07/06/2020     07/06/2020    CO2 29.0 07/06/2020    BUN 25 (H) 07/06/2020    GLU 92 07/06/2020    CA 8.8 07/06/2020     No results found for: PT    [place] PH[facility] MONITORING PROGRAM REV

## 2020-07-30 NOTE — PROGRESS NOTES
7/30/2020   Patient presents to [facility] ambulatory for med eval.  He has chronic neck and back pain. Rates his pain -3;  Medications are helpful;  Reports the bilat. Knee injections he received from his praful md has really helped his knees;     Seen by Dr. Zane Flores

## 2020-08-03 LAB
OPIATES, UR, 6-ACETYLMORPHINE: <10 NG/ML
OPIATES, URINE, CODEINE: <20 NG/ML
OPIATES, URINE, HYDROCODONE: 1643 NG/ML
OPIATES, URINE, HYDROMORPHONE: 27 NG/ML
OPIATES, URINE, MORPHINE: <20 NG/ML
OPIATES, URINE, NORHYDROCODONE: 1501 NG/ML
OPIATES, URINE, NOROXYCODONE: <20 NG/ML
OPIATES, URINE, NOROXYMORPHONE: <20 NG/ML
OPIATES, URINE, OXYCODONE: <20 NG/ML
OPIATES, URINE, OXYMORPHONE: <20 NG/ML

## 2020-10-07 ENCOUNTER — OFFICE VISIT (OUTPATIENT)
Dept: PAIN CLINIC | Facility: HOSPITAL | Age: 70
End: 2020-10-07
Attending: ANESTHESIOLOGY
Payer: MEDICARE

## 2020-10-07 DIAGNOSIS — Z51.81 ENCOUNTER FOR MONITORING OPIOID MAINTENANCE THERAPY: Primary | ICD-10-CM

## 2020-10-07 DIAGNOSIS — M48.061 SPINAL STENOSIS OF LUMBAR REGION WITHOUT NEUROGENIC CLAUDICATION: ICD-10-CM

## 2020-10-07 DIAGNOSIS — Z79.891 ENCOUNTER FOR MONITORING OPIOID MAINTENANCE THERAPY: Primary | ICD-10-CM

## 2020-10-07 DIAGNOSIS — M48.02 SPINAL STENOSIS IN CERVICAL REGION: ICD-10-CM

## 2020-10-07 PROCEDURE — 99211 OFF/OP EST MAY X REQ PHY/QHP: CPT

## 2020-10-07 RX ORDER — HYDROCODONE BITARTRATE AND ACETAMINOPHEN 10; 325 MG/1; MG/1
1 TABLET ORAL EVERY 8 HOURS PRN
Qty: 90 TABLET | Refills: 0 | Status: SHIPPED | OUTPATIENT
Start: 2020-10-09 | End: 2020-11-08

## 2020-10-07 RX ORDER — HYDROCODONE BITARTRATE AND ACETAMINOPHEN 10; 325 MG/1; MG/1
1 TABLET ORAL EVERY 8 HOURS PRN
Qty: 90 TABLET | Refills: 0 | Status: SHIPPED | OUTPATIENT
Start: 2020-11-08 | End: 2020-12-08

## 2020-10-07 NOTE — PROGRESS NOTES
Pt presents to [facility] ambulatory for medication fill. Pt states has noticed increased pain that starts at the right buttock radiating down to the right thigh, leg, knee, and occasionally to the toes. Pt describes the pain as being sore.  Pt unable to say what

## 2020-10-07 NOTE — CHRONIC PAIN
Initial Consultation Note      HISTORY OF PRESENT ILLNESS:  Nic Laevitt is a 79year old old male referred to the pain clinic  for evaluation treatment of his low back and cervical neck pain .    he continues to take 3 Norco a day for pain control over 180 tablet 1   • simvastatin 20 MG Oral Tab Take 1 tablet (20 mg total) by mouth every evening.  90 tablet 1   • ibuprofen 600 MG Oral Tab TAKE 1 TABLET EVERY 12 HOURS AS NEEDED FOR PAIN (SUBSTITUTED FOR MOTRIN) 180 tablet 0   • Tadalafil 20 MG Oral Tab Doris Combs children: 2      Years of education: Not on file      Highest education level: Not on file    Occupational History      Occupation: ordering        Comment: retired    Social Needs      Financial resource strain: Not on [employer] insecurity        Wor Reaction to local anesthetic: No    Social History Narrative      Not on file      ADVANCE CARE PLANNING:  Advance Care Plan NOT discussed. PHYSICAL EXAMINATION:  There were no vitals filed for this visit.   General: Alert and oriented x3  Affect: Results   Component Value Date     07/06/2020    K 3.8 07/06/2020     07/06/2020    CO2 29.0 07/06/2020    BUN 25 (H) 07/06/2020    GLU 92 07/06/2020    CA 8.8 07/06/2020     No results found for: PT    [place] PH[facility] MONITORING PROGRAM REV

## 2020-10-15 ENCOUNTER — LAB ENCOUNTER (OUTPATIENT)
Dept: LAB | Facility: HOSPITAL | Age: 70
End: 2020-10-15
Attending: UROLOGY
Payer: MEDICARE

## 2020-10-15 DIAGNOSIS — C61 PROSTATE CANCER (HCC): ICD-10-CM

## 2020-10-15 PROCEDURE — 84153 ASSAY OF PSA TOTAL: CPT

## 2020-10-15 PROCEDURE — 36415 COLL VENOUS BLD VENIPUNCTURE: CPT

## 2020-10-19 ENCOUNTER — OFFICE VISIT (OUTPATIENT)
Dept: SURGERY | Facility: CLINIC | Age: 70
End: 2020-10-19
Payer: MEDICARE

## 2020-10-19 VITALS
SYSTOLIC BLOOD PRESSURE: 113 MMHG | WEIGHT: 220 LBS | DIASTOLIC BLOOD PRESSURE: 71 MMHG | HEART RATE: 52 BPM | BODY MASS INDEX: 32 KG/M2

## 2020-10-19 DIAGNOSIS — R97.21 RISING PSA FOLLOWING TREATMENT FOR MALIGNANT NEOPLASM OF PROSTATE: ICD-10-CM

## 2020-10-19 DIAGNOSIS — R35.1 NOCTURIA: ICD-10-CM

## 2020-10-19 DIAGNOSIS — R80.1 PERSISTENT PROTEINURIA: ICD-10-CM

## 2020-10-19 DIAGNOSIS — C61 PROSTATE CANCER (HCC): Primary | ICD-10-CM

## 2020-10-19 DIAGNOSIS — N19 RENAL FAILURE, UNSPECIFIED CHRONICITY: ICD-10-CM

## 2020-10-19 DIAGNOSIS — N52.01 ERECTILE DYSFUNCTION DUE TO ARTERIAL INSUFFICIENCY: ICD-10-CM

## 2020-10-19 PROCEDURE — G0463 HOSPITAL OUTPT CLINIC VISIT: HCPCS | Performed by: UROLOGY

## 2020-10-19 PROCEDURE — 99214 OFFICE O/P EST MOD 30 MIN: CPT | Performed by: UROLOGY

## 2020-10-19 RX ORDER — HYDROCHLOROTHIAZIDE 25 MG/1
TABLET ORAL
Qty: 90 TABLET | Refills: 0 | Status: SHIPPED | OUTPATIENT
Start: 2020-10-19 | End: 2021-01-19

## 2020-10-19 RX ORDER — TAMSULOSIN HYDROCHLORIDE 0.4 MG/1
0.4 CAPSULE ORAL DAILY
Qty: 90 CAPSULE | Refills: 3 | Status: SHIPPED | OUTPATIENT
Start: 2020-10-19 | End: 2020-11-18

## 2020-10-19 NOTE — PATIENT INSTRUCTIONS
Jeet Tovar M.D.      1.     Please start tamsulosin 0.4 mg daily, every morning  for urinating problems. 2.  Return visit in 4 months. Please get blood draw for PSA 1--10 days before visit.   No sexual stim

## 2020-10-19 NOTE — PROGRESS NOTES
HPI:    Patient ID: Ellen Gaytan is a 79year old male. HPI     Prostate Cancer / Rising PSA level   Chronic. S/p 02/2014 brachytherapy. Most recent 10/15/2020 PSA = 2.48. He is not currently undergoing any chemotherapy or hormone ablation therapy. prostate volume 38.3 g; relatively large amounts of echogenic corpora amylacea; 14 biopsies and pathology Negative. // 12-23-11, because of elevated PSA, SECOND prostate biopsy; prostate volume 39.3 g; 14 biopsy; pathology showed chronic and acute inflamma the medication, next time you can take 2 tablets 1 hour before planned sexual activity as needed; PSA and urinalysis ordered  09/04/2019 Office visit with me;  On [place], prostate very small and flat, no palpable nodules or indurations; start tadalafil (atiyai  MG Oral Tab Take 1 tablet by mouth every 8 (eight) hours as needed for Pain (max 3/day). 90 tablet 0   • HYDROcodone-acetaminophen  MG Oral Tab Take 1 tablet by mouth every 8 (eight) hours as needed for Pain (MAX 3/DAY).  90 tablet 0   • metopr emptying your bladder completely after you finish urinating?: Half the time  Over the past month, how often have you had to urinate again less than 2 hours after you finished urinating?: Greater than half the time  Over the past month, how often have you f indicated   06/16/2020 PSA = 2.18; UA blood = small; WBC = 1; RBC = 1; protein = negative  02/05/2020 PSA = 2.15   08/29/19 PSA = 1.89; UA protein = 30, WBC = 2, RBC = 1  7/3/19 UA protein = 30, RBC < 1, WBC = 2; creatinine = 1.20, GFR = 62   3/1/19 PSA = 4 months with PSA before visit.      (R97.21) Rising PSA following treatment for malignant neoplasm of prostate  Please see Prostate Cancer above.      (N52.01) Erectile dysfunction due to arterial insufficiency  Chronic.  Patient has Tadalafil (generic Karoline morning  for urinating problems. 2.   Return visit in 4 months. Please get blood draw for PSA 1--10 days before visit. No sexual stimulation for 5 days before the actual PSA blood draw.     3.    If you wish to take Cialis–tadalafil, do not take it wit

## 2020-10-22 NOTE — PROGRESS NOTES
10/17/17  PRESENTS AMBULATORY TO [facility];  F/U REVIEW MRI'S OF CERVICAL & LUMBAR SPINE;  SEEN BY DR. DOWELL;  REFER TO ENCOUNTER FOR THE CONTINUED POC. No

## 2020-11-20 RX ORDER — HYDROCODONE BITARTRATE AND ACETAMINOPHEN 10; 325 MG/1; MG/1
1 TABLET ORAL EVERY 8 HOURS PRN
Qty: 90 TABLET | Refills: 0 | Status: CANCELLED | OUTPATIENT
Start: 2021-01-07 | End: 2021-02-06

## 2020-11-24 ENCOUNTER — VIRTUAL PHONE E/M (OUTPATIENT)
Dept: PAIN CLINIC | Facility: HOSPITAL | Age: 70
End: 2020-11-24
Attending: ANESTHESIOLOGY
Payer: MEDICARE

## 2020-11-24 DIAGNOSIS — M48.02 SPINAL STENOSIS IN CERVICAL REGION: ICD-10-CM

## 2020-11-24 DIAGNOSIS — G89.29 BILATERAL CHRONIC KNEE PAIN: ICD-10-CM

## 2020-11-24 DIAGNOSIS — M25.561 BILATERAL CHRONIC KNEE PAIN: ICD-10-CM

## 2020-11-24 DIAGNOSIS — Z79.891 ENCOUNTER FOR MONITORING OPIOID MAINTENANCE THERAPY: Primary | ICD-10-CM

## 2020-11-24 DIAGNOSIS — M48.061 SPINAL STENOSIS OF LUMBAR REGION WITHOUT NEUROGENIC CLAUDICATION: ICD-10-CM

## 2020-11-24 DIAGNOSIS — M25.562 BILATERAL CHRONIC KNEE PAIN: ICD-10-CM

## 2020-11-24 DIAGNOSIS — Z51.81 ENCOUNTER FOR MONITORING OPIOID MAINTENANCE THERAPY: Primary | ICD-10-CM

## 2020-11-24 RX ORDER — HYDROCODONE BITARTRATE AND ACETAMINOPHEN 10; 325 MG/1; MG/1
1 TABLET ORAL EVERY 8 HOURS PRN
Qty: 90 TABLET | Refills: 0 | Status: SHIPPED | OUTPATIENT
Start: 2020-12-08 | End: 2020-12-18

## 2020-11-24 NOTE — CHRONIC PAIN
Virtual Telephone Check-In    Martha Irwin verbally consents to a Virtual/Telephone Check-In visit on 11/24/20. Patient has been referred to the [facility] website at [email or web address] to review the yearly Consent to Treat document.     Patient unders

## 2020-12-15 ENCOUNTER — TELEPHONE (OUTPATIENT)
Dept: SURGERY | Facility: CLINIC | Age: 70
End: 2020-12-15

## 2020-12-15 RX ORDER — TADALAFIL 20 MG/1
20 TABLET ORAL
Qty: 10 TABLET | Refills: 3 | Status: SHIPPED | OUTPATIENT
Start: 2020-12-15 | End: 2020-12-16

## 2020-12-15 NOTE — TELEPHONE ENCOUNTER
Pt states medication   Requesting refill please advise         Tadalafil 20 MG Oral Tab 10 tablet 11 2019    Sig:   Take 1 tablet (20 mg total) by mouth daily as needed for Erectile Dysfunction.      Route:   Oral     PRN Reason(s):   Erectile Dy

## 2020-12-15 NOTE — TELEPHONE ENCOUNTER
Erectile dysfunction medications    Protocol Criteria:  • Appointment scheduled in the past 12 months or in the next 2 months  • Patient is not on nitrates (Isosorbide, nitroglycerin)    Recent Outpatient Visits            3 weeks ago Encounter for monitor

## 2020-12-16 RX ORDER — TADALAFIL 20 MG/1
20 TABLET ORAL
Qty: 10 TABLET | Refills: 3 | Status: SHIPPED | OUTPATIENT
Start: 2020-12-16

## 2020-12-16 NOTE — TELEPHONE ENCOUNTER
Per pt rx should go to [address] [place] [address] in [address]. Per pt he told [facility] to cancel rx.  Please advise

## 2020-12-31 ENCOUNTER — OFFICE VISIT (OUTPATIENT)
Dept: PAIN CLINIC | Facility: HOSPITAL | Age: 70
End: 2020-12-31
Attending: ANESTHESIOLOGY
Payer: MEDICARE

## 2020-12-31 VITALS
RESPIRATION RATE: 18 BRPM | SYSTOLIC BLOOD PRESSURE: 147 MMHG | WEIGHT: 220 LBS | BODY MASS INDEX: 31.5 KG/M2 | DIASTOLIC BLOOD PRESSURE: 76 MMHG | HEIGHT: 70 IN | HEART RATE: 92 BPM

## 2020-12-31 DIAGNOSIS — M48.061 SPINAL STENOSIS OF LUMBAR REGION WITHOUT NEUROGENIC CLAUDICATION: ICD-10-CM

## 2020-12-31 DIAGNOSIS — G89.29 BILATERAL CHRONIC KNEE PAIN: ICD-10-CM

## 2020-12-31 DIAGNOSIS — M48.02 SPINAL STENOSIS IN CERVICAL REGION: ICD-10-CM

## 2020-12-31 DIAGNOSIS — M25.561 BILATERAL CHRONIC KNEE PAIN: ICD-10-CM

## 2020-12-31 DIAGNOSIS — Z51.81 ENCOUNTER FOR MONITORING OPIOID MAINTENANCE THERAPY: Primary | ICD-10-CM

## 2020-12-31 DIAGNOSIS — M25.562 BILATERAL CHRONIC KNEE PAIN: ICD-10-CM

## 2020-12-31 DIAGNOSIS — Z79.891 ENCOUNTER FOR MONITORING OPIOID MAINTENANCE THERAPY: Primary | ICD-10-CM

## 2020-12-31 PROCEDURE — 99211 OFF/OP EST MAY X REQ PHY/QHP: CPT

## 2020-12-31 RX ORDER — HYDROCODONE BITARTRATE AND ACETAMINOPHEN 10; 325 MG/1; MG/1
1 TABLET ORAL EVERY 8 HOURS PRN
Qty: 90 TABLET | Refills: 0 | Status: SHIPPED | OUTPATIENT
Start: 2021-01-07 | End: 2021-02-06

## 2020-12-31 RX ORDER — HYDROCODONE BITARTRATE AND ACETAMINOPHEN 10; 325 MG/1; MG/1
1 TABLET ORAL EVERY 8 HOURS PRN
Qty: 90 TABLET | Refills: 0 | Status: SHIPPED | OUTPATIENT
Start: 2021-02-06 | End: 2021-02-23

## 2020-12-31 NOTE — PROGRESS NOTES
12/31/2020-  Patient presents to [facility] ambulatory for med eval.  He has chronic neck and back pain. Rates his pain -3;  Medications are helpful; [address] 10/325 Q8H #90/MO; Reports the bilat.  Knee injections he received from his praful titus 10/30/2020-has really

## 2020-12-31 NOTE — CHRONIC PAIN
Initial Consultation Note      HISTORY OF PRESENT ILLNESS:  Gina Barrera is a 79year old old male referred to the pain clinic  for evaluation treatment of his low back and cervical neck pain .    he continues to take 3 Norco a day for pain control over 180 tablet 1   • simvastatin 20 MG Oral Tab Take 1 tablet (20 mg total) by mouth every evening.  90 tablet 1   • ibuprofen 600 MG Oral Tab TAKE 1 TABLET EVERY 12 HOURS AS NEEDED FOR PAIN (SUBSTITUTED FOR MOTRIN) 180 tablet 0   • Tadalafil 20 MG Oral Tab [facility] children: 2      Years of education: Not on file      Highest education level: Not on file    Occupational History      Occupation: ordering        Comment: retired    Social Needs      Financial resource strain: Not on [employer] insecurity        Wor Reaction to local anesthetic: No    Social History Narrative      Not on file      ADVANCE CARE PLANNING:  Advance Care Plan NOT discussed. PHYSICAL EXAMINATION:  There were no vitals filed for this visit.   General: Alert and oriented x3  Affect: Results   Component Value Date     07/06/2020    K 3.8 07/06/2020     07/06/2020    CO2 29.0 07/06/2020    BUN 25 (H) 07/06/2020    GLU 92 07/06/2020    CA 8.8 07/06/2020     No results found for: PT    [place] PH[facility] MONITORING PROGRAM REV

## 2021-01-19 ENCOUNTER — TELEPHONE (OUTPATIENT)
Dept: PODIATRY CLINIC | Facility: CLINIC | Age: 71
End: 2021-01-19

## 2021-01-19 RX ORDER — HYDROCHLOROTHIAZIDE 25 MG/1
TABLET ORAL
Qty: 90 TABLET | Refills: 0 | Status: SHIPPED | OUTPATIENT
Start: 2021-01-19 | End: 2021-04-22

## 2021-01-19 RX ORDER — SIMVASTATIN 20 MG
TABLET ORAL
Qty: 90 TABLET | Refills: 0 | Status: SHIPPED | OUTPATIENT
Start: 2021-01-19 | End: 2021-04-22

## 2021-01-19 NOTE — TELEPHONE ENCOUNTER
Pt states his toe nail has split and wants to know if he should come in or will the toe nail grow out please advise

## 2021-01-20 ENCOUNTER — DOCUMENTATION ONLY (OUTPATIENT)
Dept: PAIN CLINIC | Facility: HOSPITAL | Age: 71
End: 2021-01-20

## 2021-01-20 NOTE — PROGRESS NOTES
Procedure code 70642--7501/27/2021 APPROVED    Medicare-- NO PA needed per pt's insurance   Order form faxed to the surgery center, confirmation rcv'd

## 2021-01-22 ENCOUNTER — OFFICE VISIT (OUTPATIENT)
Dept: PODIATRY CLINIC | Facility: CLINIC | Age: 71
End: 2021-01-22
Payer: MEDICARE

## 2021-01-22 DIAGNOSIS — L60.0 INGROWN TOENAIL OF RIGHT FOOT: Primary | ICD-10-CM

## 2021-01-22 PROCEDURE — 99213 OFFICE O/P EST LOW 20 MIN: CPT | Performed by: PODIATRIST

## 2021-01-22 NOTE — PROGRESS NOTES
HPI:    Patient ID: Elayne Covarrubias is a 79year old male. 80-year-old male presents as a new patient to me today and he states he has been referred by his wife. The reason for this visit is a painful second toe of the right foot.   Is been a problem for ASSESSMENT/PLAN:   Ingrown toenail of right foot  (primary encounter diagnosis)    No orders of the defined types were placed in this encounter.       Meds This Visit:  Requested Prescriptions      No prescriptions requested or ordered in this encounter

## 2021-02-06 DIAGNOSIS — Z23 NEED FOR VACCINATION: ICD-10-CM

## 2021-02-17 RX ORDER — HYDROCODONE BITARTRATE AND ACETAMINOPHEN 10; 325 MG/1; MG/1
1 TABLET ORAL EVERY 8 HOURS PRN
Qty: 90 TABLET | Refills: 0 | Status: CANCELLED | OUTPATIENT
Start: 2021-03-08 | End: 2021-04-07

## 2021-02-17 RX ORDER — HYDROCODONE BITARTRATE AND ACETAMINOPHEN 10; 325 MG/1; MG/1
1 TABLET ORAL EVERY 8 HOURS PRN
Qty: 90 TABLET | Refills: 0 | Status: CANCELLED | OUTPATIENT
Start: 2021-04-07 | End: 2021-05-07

## 2021-02-19 ENCOUNTER — LAB ENCOUNTER (OUTPATIENT)
Dept: LAB | Facility: HOSPITAL | Age: 71
End: 2021-02-19
Attending: UROLOGY
Payer: MEDICARE

## 2021-02-19 DIAGNOSIS — C61 PROSTATE CANCER (HCC): ICD-10-CM

## 2021-02-19 LAB — PSA SERPL-MCNC: 2.75 NG/ML (ref ?–4)

## 2021-02-19 PROCEDURE — 36415 COLL VENOUS BLD VENIPUNCTURE: CPT

## 2021-02-19 PROCEDURE — 84153 ASSAY OF PSA TOTAL: CPT

## 2021-02-22 ENCOUNTER — OFFICE VISIT (OUTPATIENT)
Dept: SURGERY | Facility: CLINIC | Age: 71
End: 2021-02-22
Payer: MEDICARE

## 2021-02-22 VITALS
DIASTOLIC BLOOD PRESSURE: 79 MMHG | HEART RATE: 50 BPM | SYSTOLIC BLOOD PRESSURE: 133 MMHG | WEIGHT: 220 LBS | BODY MASS INDEX: 31.5 KG/M2 | HEIGHT: 70 IN

## 2021-02-22 DIAGNOSIS — R35.1 NOCTURIA: ICD-10-CM

## 2021-02-22 DIAGNOSIS — R80.1 PERSISTENT PROTEINURIA: ICD-10-CM

## 2021-02-22 DIAGNOSIS — R97.21 RISING PSA FOLLOWING TREATMENT FOR MALIGNANT NEOPLASM OF PROSTATE: ICD-10-CM

## 2021-02-22 DIAGNOSIS — C61 PROSTATE CANCER (HCC): Primary | ICD-10-CM

## 2021-02-22 DIAGNOSIS — N52.01 ERECTILE DYSFUNCTION DUE TO ARTERIAL INSUFFICIENCY: ICD-10-CM

## 2021-02-22 PROCEDURE — 99214 OFFICE O/P EST MOD 30 MIN: CPT | Performed by: UROLOGY

## 2021-02-22 NOTE — PATIENT INSTRUCTIONS
Ivonne Aragon M.D.    1.   Visit in  4 months. Please get blood draw for PSA 1--10 days before visit.

## 2021-02-22 NOTE — PROGRESS NOTES
HPI:    Patient ID: Ellie Chavez is a 79year old male. HPI     Prostate Cancer / Rising PSA level   Chronic. S/p 02/2014 brachytherapy. Most recent 02/19/2021 PSA = 2.75. He is not currently undergoing any chemotherapy or hormone ablation therapy. volume 35.7 mL; a total of 40 biopsies performed. pathology showed 3+4 and [place] 3+3.   patient had previous negative prostate biopsies in the past --11-27-09 office prostate biopsy because of PSA 4.9 (September 2009); prostate volume 38.3 g; relatively l indurations; for erectile dysfunction, tadalafil (generic Cialis) 10 mg tablet at least 1 hour before planned sexual activity, if 10 mg dose is not adequate to produce functional rigid erection, and if no side effects from the medication, next time you can 1 TABLET BY MOUTH ONCE DAILY IN THE EVENING 90 tablet 0   • METOPROLOL TARTRATE 25 MG Oral Tab Take 1 tablet by mouth twice daily 180 tablet 0   • HYDROcodone-acetaminophen  MG Oral Tab Take 1 tablet by mouth every 8 (eight) hours as needed for Pain Neck: Normal range of motion. Pulmonary/Chest: Effort normal. No respiratory distress.    Genitourinary:    Genitourinary Comments: Patient politely declines prostate exam today, however, 10/19/2020 PAR[place], prostate very small, no palpable nodules or indur currently undergoing any chemotherapy or hormone ablation therapy. 10/19/2020 [place], prostate very small, no palpable nodules or indurations. Most recent 02/19/2021 PSA = 2.75 slightly worse compared to 10/15/2020 PSA = 2.48.  I explain to patient that his PS took tamsulosin 0.4 mg daily, but stopped on his own as he didn't feel that it helped his urination. The patient is also currently taking HCTZ daily which he knows can worsen urination.  He feels this is stable and chooses to continue medication as prescrib

## 2021-02-23 ENCOUNTER — OFFICE VISIT (OUTPATIENT)
Dept: PAIN CLINIC | Facility: HOSPITAL | Age: 71
End: 2021-02-23
Attending: ANESTHESIOLOGY
Payer: MEDICARE

## 2021-02-23 VITALS — DIASTOLIC BLOOD PRESSURE: 73 MMHG | SYSTOLIC BLOOD PRESSURE: 116 MMHG | HEART RATE: 45 BPM | OXYGEN SATURATION: 98 %

## 2021-02-23 DIAGNOSIS — M25.561 BILATERAL CHRONIC KNEE PAIN: ICD-10-CM

## 2021-02-23 DIAGNOSIS — M25.562 BILATERAL CHRONIC KNEE PAIN: ICD-10-CM

## 2021-02-23 DIAGNOSIS — M48.061 SPINAL STENOSIS OF LUMBAR REGION WITHOUT NEUROGENIC CLAUDICATION: ICD-10-CM

## 2021-02-23 DIAGNOSIS — M48.02 SPINAL STENOSIS IN CERVICAL REGION: ICD-10-CM

## 2021-02-23 DIAGNOSIS — Z51.81 ENCOUNTER FOR MONITORING OPIOID MAINTENANCE THERAPY: Primary | ICD-10-CM

## 2021-02-23 DIAGNOSIS — Z79.891 ENCOUNTER FOR MONITORING OPIOID MAINTENANCE THERAPY: Primary | ICD-10-CM

## 2021-02-23 DIAGNOSIS — G89.29 BILATERAL CHRONIC KNEE PAIN: ICD-10-CM

## 2021-02-23 PROCEDURE — 99211 OFF/OP EST MAY X REQ PHY/QHP: CPT

## 2021-02-23 RX ORDER — HYDROCODONE BITARTRATE AND ACETAMINOPHEN 10; 325 MG/1; MG/1
1 TABLET ORAL EVERY 8 HOURS PRN
Qty: 90 TABLET | Refills: 0 | Status: SHIPPED | OUTPATIENT
Start: 2021-04-07 | End: 2021-04-20

## 2021-02-23 RX ORDER — HYDROCODONE BITARTRATE AND ACETAMINOPHEN 10; 325 MG/1; MG/1
1 TABLET ORAL EVERY 8 HOURS PRN
Qty: 90 TABLET | Refills: 0 | Status: SHIPPED | OUTPATIENT
Start: 2021-03-08 | End: 2021-04-07

## 2021-02-23 NOTE — CHRONIC PAIN
Initial Consultation Note      HISTORY OF PRESENT ILLNESS:  Bing Glover is a 79year old old male referred to the pain clinic  for evaluation treatment of his low back and cervical neck pain .    he continues to take 3 Norco a day for pain control over MG Oral Tab TAKE 1 TABLET EVERY 12 HOURS AS NEEDED FOR PAIN (SUBSTITUTED FOR MOTRIN) 180 tablet 0   • Tadalafil 20 MG Oral Tab Take 1 tablet (20 mg total) by mouth daily as needed for Erectile Dysfunction.  10 tablet 11        ALLERGIES:  No Known Allergies Occupation: ordering        Comment: retired    Social Needs      Financial resource strain: Not on [employer] insecurity        Worry: Not on file        Inability: Not on file      Transportation needs        Medical: Not on file        Non-medical: Care Plan NOT discussed. PHYSICAL EXAMINATION:  There were no vitals filed for this visit.   General: Alert and oriented x3  Affect:  NAD  Head: normocephalic, atraumatic  Eyes: anicteric; no injection  Chest: S1, S2, RRR  Respiratory: CTAB  Gait: Normal (H) 07/06/2020    GLU 92 07/06/2020    CA 8.8 07/06/2020     No results found for: PT    [place] PH[facility] MONITORING PROGRAM REVIEWED  Yes      ASSESSMENT:   79year old old male with cervical and lumbar spinal stenosis   Low back pain bilateral radicula

## 2021-02-23 NOTE — PROGRESS NOTES
PT presents ambulatory to the [facility]. PT states  25% improvement. DR Glenn Ennis saw PT for LESI L5-S1 F/U. See notes for POC.

## 2021-03-09 RX ORDER — IBUPROFEN 600 MG/1
TABLET ORAL
Qty: 180 TABLET | Refills: 0 | Status: SHIPPED | OUTPATIENT
Start: 2021-03-09 | End: 2022-02-07

## 2021-04-21 NOTE — TELEPHONE ENCOUNTER
•  HYDROCHLOROTHIAZIDE 25 MG Oral Tab, Take 1 tablet by mouth once daily, Disp: 90 tablet, Rfl: 0  •  SIMVASTATIN 20 MG Oral Tab, TAKE 1 TABLET BY MOUTH ONCE DAILY IN THE EVENING, Disp: 90 tablet, Rfl: 0  •  METOPROLOL TARTRATE 25 MG Oral Tab, Take 1 tab

## 2021-04-22 RX ORDER — HYDROCHLOROTHIAZIDE 25 MG/1
25 TABLET ORAL DAILY
Qty: 90 TABLET | Refills: 0 | Status: SHIPPED | OUTPATIENT
Start: 2021-04-22 | End: 2021-07-07

## 2021-04-22 RX ORDER — SIMVASTATIN 20 MG
20 TABLET ORAL EVERY EVENING
Qty: 90 TABLET | Refills: 0 | Status: SHIPPED | OUTPATIENT
Start: 2021-04-22 | End: 2021-07-07

## 2021-04-26 ENCOUNTER — OFFICE VISIT (OUTPATIENT)
Dept: PAIN CLINIC | Facility: HOSPITAL | Age: 71
End: 2021-04-26
Attending: ANESTHESIOLOGY
Payer: MEDICARE

## 2021-04-26 VITALS
HEART RATE: 47 BPM | HEIGHT: 70 IN | WEIGHT: 220 LBS | BODY MASS INDEX: 31.5 KG/M2 | RESPIRATION RATE: 18 BRPM | DIASTOLIC BLOOD PRESSURE: 75 MMHG | SYSTOLIC BLOOD PRESSURE: 130 MMHG

## 2021-04-26 DIAGNOSIS — G89.29 BILATERAL CHRONIC KNEE PAIN: ICD-10-CM

## 2021-04-26 DIAGNOSIS — Z51.81 ENCOUNTER FOR MONITORING OPIOID MAINTENANCE THERAPY: Primary | ICD-10-CM

## 2021-04-26 DIAGNOSIS — M48.02 SPINAL STENOSIS IN CERVICAL REGION: ICD-10-CM

## 2021-04-26 DIAGNOSIS — M25.562 BILATERAL CHRONIC KNEE PAIN: ICD-10-CM

## 2021-04-26 DIAGNOSIS — Z79.891 ENCOUNTER FOR MONITORING OPIOID MAINTENANCE THERAPY: Primary | ICD-10-CM

## 2021-04-26 DIAGNOSIS — M48.061 SPINAL STENOSIS OF LUMBAR REGION WITHOUT NEUROGENIC CLAUDICATION: ICD-10-CM

## 2021-04-26 DIAGNOSIS — M25.561 BILATERAL CHRONIC KNEE PAIN: ICD-10-CM

## 2021-04-26 PROCEDURE — 99211 OFF/OP EST MAY X REQ PHY/QHP: CPT

## 2021-04-26 RX ORDER — HYDROCODONE BITARTRATE AND ACETAMINOPHEN 10; 325 MG/1; MG/1
1 TABLET ORAL EVERY 8 HOURS PRN
Qty: 90 TABLET | Refills: 0 | Status: SHIPPED | OUTPATIENT
Start: 2021-05-07 | End: 2021-06-04

## 2021-04-26 RX ORDER — HYDROCODONE BITARTRATE AND ACETAMINOPHEN 10; 325 MG/1; MG/1
1 TABLET ORAL EVERY 8 HOURS PRN
Qty: 90 TABLET | Refills: 0 | Status: SHIPPED | OUTPATIENT
Start: 2021-06-06 | End: 2021-06-04

## 2021-04-26 NOTE — CHRONIC PAIN
Initial Consultation Note      HISTORY OF PRESENT ILLNESS:  Cinthya Reis is a 79year old old male referred to the pain clinic  for evaluation treatment of his low back and cervical neck pain .    he continues to take 3 Norco a day for pain control over MG Oral Tab TAKE 1 TABLET EVERY 12 HOURS AS NEEDED FOR PAIN (SUBSTITUTED FOR MOTRIN) 180 tablet 0   • Tadalafil 20 MG Oral Tab Take 1 tablet (20 mg total) by mouth daily as needed for Erectile Dysfunction.  10 tablet 11        ALLERGIES:  No Known Allergies Occupation: ordering        Comment: retired    Social Needs      Financial resource strain: Not on [employer] insecurity        Worry: Not on file        Inability: Not on file      Transportation needs        Medical: Not on file        Non-medical: Care Plan NOT discussed. PHYSICAL EXAMINATION:  There were no vitals filed for this visit.   General: Alert and oriented x3  Affect:  NAD  Head: normocephalic, atraumatic  Eyes: anicteric; no injection  Chest: S1, S2, RRR  Respiratory: CTAB  Gait: Normal (H) 07/06/2020    GLU 92 07/06/2020    CA 8.8 07/06/2020     No results found for: PT    [place] PH[facility] MONITORING PROGRAM REVIEWED  Yes      ASSESSMENT:   79year old old male with cervical and lumbar spinal stenosis   Low back pain bilateral radicula

## 2021-06-04 ENCOUNTER — TELEPHONE (OUTPATIENT)
Dept: INTERNAL MEDICINE CLINIC | Facility: CLINIC | Age: 71
End: 2021-06-04

## 2021-06-07 ENCOUNTER — NURSE TRIAGE (OUTPATIENT)
Dept: INTERNAL MEDICINE CLINIC | Facility: CLINIC | Age: 71
End: 2021-06-07

## 2021-06-07 NOTE — TELEPHONE ENCOUNTER
Action Requested: Summary for Provider     []  Critical Lab, Recommendations Needed  [] Need Additional Advice  []   FYI    []   Need Orders  [] Need Medications Sent to Pharmacy  []  Other     SUMMARY: L knee pain, onset 3 or more days, pain is 7/10 with

## 2021-06-07 NOTE — TELEPHONE ENCOUNTER
The patient is undoubtedly going to have to see orthopedics for this discomfort in the knee. It is not responding to nonsteroidals. I would have him see Dr. Scott Rodriguez in our orthopedic group for evaluation of the knee pain and treatment.   My schedule is a

## 2021-06-08 NOTE — TELEPHONE ENCOUNTER
Advised patient of Dr. Gabbie Ha's note. Patient verbalized understanding  Patient states he has seen an orthopedic doctor in  the past outside of [facility]. Patient will make appointment with orthopedic doctor.    Patient did not want to give RN na

## 2021-06-10 NOTE — TELEPHONE ENCOUNTER
Per CSS, patient is calling and wants to make an appointment to see Dr Kristopher Vu only for his foot pain, was about to transfer the call but disconnected.   Called patient back, states that he is not worried about his foot ,he said he is more worried about w
See acute telephone encounter 6/7/21, already addressed.
fair plus

## 2021-06-16 ENCOUNTER — LAB ENCOUNTER (OUTPATIENT)
Dept: LAB | Facility: HOSPITAL | Age: 71
End: 2021-06-16
Attending: UROLOGY
Payer: MEDICARE

## 2021-06-16 DIAGNOSIS — C61 PROSTATE CANCER (HCC): ICD-10-CM

## 2021-06-16 PROCEDURE — 84153 ASSAY OF PSA TOTAL: CPT

## 2021-06-16 PROCEDURE — 36415 COLL VENOUS BLD VENIPUNCTURE: CPT

## 2021-06-18 ENCOUNTER — OFFICE VISIT (OUTPATIENT)
Dept: PAIN CLINIC | Facility: HOSPITAL | Age: 71
End: 2021-06-18
Attending: ANESTHESIOLOGY
Payer: MEDICARE

## 2021-06-18 VITALS
BODY MASS INDEX: 31.5 KG/M2 | WEIGHT: 220 LBS | SYSTOLIC BLOOD PRESSURE: 128 MMHG | HEIGHT: 70 IN | DIASTOLIC BLOOD PRESSURE: 68 MMHG | RESPIRATION RATE: 18 BRPM | HEART RATE: 45 BPM

## 2021-06-18 DIAGNOSIS — G89.29 BILATERAL CHRONIC KNEE PAIN: ICD-10-CM

## 2021-06-18 DIAGNOSIS — Z79.891 ENCOUNTER FOR MONITORING OPIOID MAINTENANCE THERAPY: Primary | ICD-10-CM

## 2021-06-18 DIAGNOSIS — M25.562 BILATERAL CHRONIC KNEE PAIN: ICD-10-CM

## 2021-06-18 DIAGNOSIS — M25.561 BILATERAL CHRONIC KNEE PAIN: ICD-10-CM

## 2021-06-18 DIAGNOSIS — M48.061 SPINAL STENOSIS OF LUMBAR REGION WITHOUT NEUROGENIC CLAUDICATION: ICD-10-CM

## 2021-06-18 DIAGNOSIS — Z51.81 ENCOUNTER FOR MONITORING OPIOID MAINTENANCE THERAPY: Primary | ICD-10-CM

## 2021-06-18 PROCEDURE — 80361 OPIATES 1 OR MORE: CPT | Performed by: ANESTHESIOLOGY

## 2021-06-18 PROCEDURE — 99211 OFF/OP EST MAY X REQ PHY/QHP: CPT

## 2021-06-18 PROCEDURE — 80349 CANNABINOIDS NATURAL: CPT | Performed by: ANESTHESIOLOGY

## 2021-06-18 PROCEDURE — 80307 DRUG TEST PRSMV CHEM ANLYZR: CPT | Performed by: ANESTHESIOLOGY

## 2021-06-18 RX ORDER — HYDROCODONE BITARTRATE AND ACETAMINOPHEN 10; 325 MG/1; MG/1
1 TABLET ORAL EVERY 8 HOURS PRN
Qty: 90 TABLET | Refills: 0 | Status: SHIPPED | OUTPATIENT
Start: 2021-08-05 | End: 2021-08-19

## 2021-06-18 RX ORDER — HYDROCODONE BITARTRATE AND ACETAMINOPHEN 10; 325 MG/1; MG/1
1 TABLET ORAL EVERY 8 HOURS PRN
Qty: 90 TABLET | Refills: 0 | Status: SHIPPED | OUTPATIENT
Start: 2021-07-06 | End: 2021-07-07

## 2021-06-18 NOTE — PROGRESS NOTES
12/31/2020-  Patient presents to [facility] ambulatory for med eval.  He has chronic neck ,back   And bilat knee pain. Rates his pain -3;  Medications are helpful; [address] 10/325 Q8H #90/MO; Seen by Dr. Juani Nunez; Refer to dictation for the continued POC. Simran Baker

## 2021-06-18 NOTE — CHRONIC PAIN
Initial Consultation Note      HISTORY OF PRESENT ILLNESS:  Nae Armstrong is a 79year old old male referred to the pain clinic  for evaluation treatment of his low back and cervical neck pain .    he continues to take 3 Norco a day for pain control over History:   Procedure Laterality Date   • APPENDECTOMY  1963   • KNEE ARTHROSCOPY      per NG: bilat knees 2x each; arthroscopy - bras   • PROSTATE BIOPSIES  2013   • REPAIR ROTATOR CUFF,ACUTE Right 2007   • TONSILLECTOMY         REVIEW OF SYSTEMS:   A com Never Smoker      Smokeless tobacco: Never Used    Substance and Sexual Activity      Alcohol use: No        Alcohol/week: 0.0 standard drinks      Drug use: No      Sexual activity: Not on file    Lifestyle      Physical activity        Days per week: Not Lumbar spine  Flexion  dec  Extension dec  Cervical Spine  Flexion dec  Extensiondec  MOTOR EXAMINATION:  UPPER EXTREMITY      LEFT RIGHT   Deltoid 5/5 5/5   Biceps 5/5 5/5   Triceps 5/5 5/5   Brachioradialis 5/5 5/5   Wrist Flexors 5/5 5/5   Wrist Exten Sangita Sousa a day for pain control     bilateral knee pain the care of Dr. Cmaila Chan for that DJD bilateral knee           PLAN:  RECOMMENDATIONS:  Maintain on 3 Norco a day   lumbar epidural steroid injection   In the near future  Bilateral knee pain he is to see or

## 2021-06-19 ENCOUNTER — HOSPITAL ENCOUNTER (EMERGENCY)
Facility: HOSPITAL | Age: 71
Discharge: HOME | End: 2021-06-19
Attending: EMERGENCY MEDICINE
Payer: MEDICARE

## 2021-06-19 ENCOUNTER — APPOINTMENT (OUTPATIENT)
Dept: GENERAL RADIOLOGY | Facility: HOSPITAL | Age: 71
End: 2021-06-19
Attending: EMERGENCY MEDICINE
Payer: MEDICARE

## 2021-06-19 VITALS
HEART RATE: 64 BPM | HEIGHT: 70 IN | DIASTOLIC BLOOD PRESSURE: 72 MMHG | BODY MASS INDEX: 32.21 KG/M2 | TEMPERATURE: 98 F | OXYGEN SATURATION: 96 % | SYSTOLIC BLOOD PRESSURE: 130 MMHG | WEIGHT: 225 LBS | RESPIRATION RATE: 17 BRPM

## 2021-06-19 DIAGNOSIS — M25.572 ACUTE LEFT ANKLE PAIN: Primary | ICD-10-CM

## 2021-06-19 PROCEDURE — 80048 BASIC METABOLIC PNL TOTAL CA: CPT | Performed by: EMERGENCY MEDICINE

## 2021-06-19 PROCEDURE — 85025 COMPLETE CBC W/AUTO DIFF WBC: CPT | Performed by: EMERGENCY MEDICINE

## 2021-06-19 PROCEDURE — 73610 X-RAY EXAM OF ANKLE: CPT | Performed by: EMERGENCY MEDICINE

## 2021-06-19 PROCEDURE — 84550 ASSAY OF BLOOD/URIC ACID: CPT | Performed by: EMERGENCY MEDICINE

## 2021-06-19 PROCEDURE — 99284 EMERGENCY DEPT VISIT MOD MDM: CPT

## 2021-06-19 PROCEDURE — 96375 TX/PRO/DX INJ NEW DRUG ADDON: CPT

## 2021-06-19 PROCEDURE — 96374 THER/PROPH/DIAG INJ IV PUSH: CPT

## 2021-06-19 PROCEDURE — 96376 TX/PRO/DX INJ SAME DRUG ADON: CPT

## 2021-06-19 RX ORDER — KETOROLAC TROMETHAMINE 15 MG/ML
15 INJECTION, SOLUTION INTRAMUSCULAR; INTRAVENOUS ONCE
Status: COMPLETED | OUTPATIENT
Start: 2021-06-19 | End: 2021-06-19

## 2021-06-19 RX ORDER — METHYLPREDNISOLONE SODIUM SUCCINATE 125 MG/2ML
125 INJECTION, POWDER, LYOPHILIZED, FOR SOLUTION INTRAMUSCULAR; INTRAVENOUS ONCE
Status: COMPLETED | OUTPATIENT
Start: 2021-06-19 | End: 2021-06-19

## 2021-06-19 RX ORDER — PREDNISONE 20 MG/1
60 TABLET ORAL DAILY
Qty: 15 TABLET | Refills: 0 | Status: SHIPPED | OUTPATIENT
Start: 2021-06-19 | End: 2021-06-24

## 2021-06-19 RX ORDER — MORPHINE SULFATE 4 MG/ML
4 INJECTION, SOLUTION INTRAMUSCULAR; INTRAVENOUS ONCE
Status: COMPLETED | OUTPATIENT
Start: 2021-06-19 | End: 2021-06-19

## 2021-06-19 RX ORDER — CEPHALEXIN 500 MG/1
500 CAPSULE ORAL 4 TIMES DAILY
Qty: 28 CAPSULE | Refills: 0 | Status: SHIPPED | OUTPATIENT
Start: 2021-06-19 | End: 2021-06-26

## 2021-06-19 NOTE — ED INITIAL ASSESSMENT (HPI)
Pt reports left ankle / foot swelling with pain that began this am. Pt states he is unable to bare weight.  Pt denies trauma to ext

## 2021-06-19 NOTE — ED PROVIDER NOTES
Patient Seen in: [facility] Emergency Department      History   Patient presents with:  Swelling Edema    Stated Complaint: L foot pain    HPI/Subjective:   HPI    49-year-old male presents for evaluation for left ankle pain.   Pain is been present noted above.     Physical Exam     ED Triage Vitals [06/19/21 1213]   /75   Pulse 53   Resp 18   Temp 98 °F (36.7 °C)   Temp src    SpO2 94 %   O2 Device None (Room air)       Current:/74   Pulse 62   Temp 98 °F (36.7 °C)   Resp 17   Ht 177.8 cm Sodium 135 (*)     Potassium 3.1 (*)     BUN 20 (*)     All other components within normal limits   CBC W/ DIFFERENTIAL - Abnormal; Notable for the following components:    WBC 13.2 (*)     RDW-SD 46.8 (*)     RDW 15.7 (*)     Neutrophil Absolute Prelim midfoot. Mild calcaneal enthesophytes. SOFT TISSUES: Diffuse soft tissue swelling. EFFUSION: None visible. OTHER: Negative. CONCLUSION:   No acute fracture or dislocation. Mild osteoarthritis. Mild calcaneal enthesophytes.   Mild generalized soft times daily for 7 days.   Qty: 28 capsule Refills: 0

## 2021-06-19 NOTE — ED QUICK NOTES
Pt to ED with c/o left lateral ankle pain/swelling that has worsened over the last few days. Pt denies fall or trauma. Pt denies hx of diabetes or gout. Pt denies fever. +left pedal pulse palpated. Pt skin intact.  Circular area of redness noted to left lat

## 2021-06-28 ENCOUNTER — OFFICE VISIT (OUTPATIENT)
Dept: SURGERY | Facility: CLINIC | Age: 71
End: 2021-06-28
Payer: MEDICARE

## 2021-06-28 VITALS
SYSTOLIC BLOOD PRESSURE: 125 MMHG | DIASTOLIC BLOOD PRESSURE: 79 MMHG | HEART RATE: 45 BPM | BODY MASS INDEX: 32 KG/M2 | WEIGHT: 225 LBS

## 2021-06-28 DIAGNOSIS — R97.21 RISING PSA FOLLOWING TREATMENT FOR MALIGNANT NEOPLASM OF PROSTATE: ICD-10-CM

## 2021-06-28 DIAGNOSIS — D72.829 LEUKOCYTOSIS, UNSPECIFIED TYPE: ICD-10-CM

## 2021-06-28 DIAGNOSIS — R35.1 NOCTURIA: ICD-10-CM

## 2021-06-28 DIAGNOSIS — C61 PROSTATE CANCER (HCC): Primary | ICD-10-CM

## 2021-06-28 DIAGNOSIS — R80.1 PERSISTENT PROTEINURIA: ICD-10-CM

## 2021-06-28 DIAGNOSIS — K59.00 CONSTIPATION, UNSPECIFIED CONSTIPATION TYPE: ICD-10-CM

## 2021-06-28 DIAGNOSIS — R33.9 INCOMPLETE EMPTYING OF BLADDER: ICD-10-CM

## 2021-06-28 DIAGNOSIS — N52.01 ERECTILE DYSFUNCTION DUE TO ARTERIAL INSUFFICIENCY: ICD-10-CM

## 2021-06-28 PROCEDURE — 99214 OFFICE O/P EST MOD 30 MIN: CPT | Performed by: UROLOGY

## 2021-06-28 NOTE — PATIENT INSTRUCTIONS
Julien Rubinstein, M.D.      1.  Urine specimen today for complete urinalysis and urine culture--we will notify you the results whether normal abnormal    2. Bladder ultrasound--please schedule [phone]    3.   For

## 2021-06-28 NOTE — PROGRESS NOTES
HPI:    Patient ID: Martha Irwin is a 79year old male. HPI     Prostate Cancer / Rising PSA level   Chronic. S/p 02/2014 brachytherapy. Most recent 06/16/2021 PSA = 2.80. He is not currently undergoing any chemotherapy or hormone ablation therapy. prostate biopsies, saturation biopsy under general anesthesia. [address] 1/9/14 showed prostate volume 35.7 mL; a total of 40 biopsies performed. pathology showed 3+4 and [place] 3+3.   patient had previous negative prostate biopsies in eGFR >60--renal failure resolved  3/4/2019 office visit with me;  On PARISH, prostate very small, no palpable nodules or indurations; for erectile dysfunction, tadalafil (generic Cialis) 10 mg tablet at least 1 hour before planned sexual activity, if 10 mg dos is not nervous/anxious. Current Outpatient Medications   Medication Sig Dispense Refill   • [START ON 8/5/2021] HYDROcodone-acetaminophen  MG Oral Tab Take 1 tablet by mouth every 8 (eight) hours as needed (max 3/day).  90 tablet 0   • [START History    Tobacco Use      Smoking status: Never Smoker      Smokeless tobacco: Never Used    Vaping Use      Vaping Use: Never used    Alcohol use: No      Alcohol/week: 0.0 standard drinks    Drug use: No       Over the past month, how often have you ha normal.        06/28/21  1029   BP: 125/79   Pulse: (!) 45           LABORATORIES   06/19/2021 Serum Uric acid = 6.9; CBC WBC = 13,200 elevated; creatinine = 1.07; GFR = 70  06/16/2021 PSA = 2.80;  Creatinine =1.07; GFR = 70  02/19/2021 PSA = 2.75   10/15/2 bladder      (C61) Prostate cancer ([facility] [address]  (primary encounter diagnosis)   Chronic. S/p 02/2014 brachytherapy. He is not currently undergoing any chemotherapy or hormone ablation therapy.  Most recent 06/16/2021 PSA = 2.80 stable compared to 02/19/2021 PSA = agrees to submit a urine specimen for UA and urine culture today.    (R80.1) Persistent proteinuria  Resolved. On 07/06/2020 UA protein = negative; Microscopic not indicated stable compared to 06/16/2020 when UA protein = negative.  Most recent UA not compl or else your gastroenterologist Dr. Divya Redd. 4.  Until you see one of the above doctors, please start taking MiraLAX or generic equivalent 17 g or 1 capful               in 8 ounces of warm water every morning, soon as you wake up    5.    Visit in 4 month

## 2021-06-29 ENCOUNTER — OFFICE VISIT (OUTPATIENT)
Dept: FAMILY MEDICINE CLINIC | Facility: CLINIC | Age: 71
End: 2021-06-29
Payer: MEDICARE

## 2021-06-29 ENCOUNTER — TELEPHONE (OUTPATIENT)
Dept: INTERNAL MEDICINE CLINIC | Facility: CLINIC | Age: 71
End: 2021-06-29

## 2021-06-29 VITALS
HEART RATE: 51 BPM | DIASTOLIC BLOOD PRESSURE: 72 MMHG | BODY MASS INDEX: 32.21 KG/M2 | WEIGHT: 225 LBS | SYSTOLIC BLOOD PRESSURE: 124 MMHG | HEIGHT: 70 IN

## 2021-06-29 DIAGNOSIS — I10 ESSENTIAL HYPERTENSION: ICD-10-CM

## 2021-06-29 DIAGNOSIS — M10.9 ACUTE GOUT OF LEFT ANKLE, UNSPECIFIED CAUSE: Primary | ICD-10-CM

## 2021-06-29 PROCEDURE — 99213 OFFICE O/P EST LOW 20 MIN: CPT | Performed by: FAMILY MEDICINE

## 2021-06-29 NOTE — PROGRESS NOTES
HPI/Subjective:   Patient ID: Garrison Mosquera is a 79year old male. Pt presents for follow up from the ER for gout. Patient was being treated with medications. Patient states symptoms are better and resolved.  Had unremarkable uric acid and x-ray of an ROTATOR CUFF,ACUTE Right 2007  • TONSILLECTOMY                          Social History    Tobacco Use      Smoking status: Never Smoker      Smokeless tobacco: Never Used    Vaping Use      Vaping Use: Never used    Alcohol use: No      Alcohol/week: 0.0 s range of motion. Left ankle: No swelling, deformity, ecchymosis or lacerations. Tenderness present over the lateral malleolus. Normal range of motion. Anterior drawer test negative. Normal pulse.       Left Achilles Tendon: Normal.      Left foot: Norm the ED and is discharged home with some prednisone. Given his leukocytosis which could be secondary to inflammation from gout will cover for possible cellulitis until he can get into see his primary care provider for reevaluation.   I do not suspect any se treatment plan.  All questions were addressed and answered.                                      Disposition and Plan     Clinical Impression:  Acute left ankle pain  (primary encounter diagnosis)      Disposition:  Discharge  6/19/2021  2:25 pm     Follow- daily as needed for Erectile Dysfunction. 10 tablet 3     Allergies:No Known Allergies    Objective:   Physical Exam  Constitutional:       Appearance: Normal appearance. Musculoskeletal:      Right ankle: Normal. No swelling or deformity. No tenderness.

## 2021-06-29 NOTE — TELEPHONE ENCOUNTER
Pt calling and states seen at [address] [place] [address] and needs a f/u visit for gout. He usually see's Dr Brooks De La Rosa and has not gotten established with a new provider. Pt wants to be seen at [place] [address] or [facility] only and open to providers. Apt made with Dr Lars Marin for today.

## 2021-07-02 ENCOUNTER — TELEPHONE (OUTPATIENT)
Dept: SURGERY | Facility: CLINIC | Age: 71
End: 2021-07-02

## 2021-07-02 NOTE — TELEPHONE ENCOUNTER
Called patient, identified by name and   Aware of results below as outlined by [facility]     Follow up as scheduled with PVK    Patient verbalizes and understands, No further questions or concerns at this time

## 2021-07-02 NOTE — TELEPHONE ENCOUNTER
----- Message from BRITT Gonzalez sent at 7/1/2021  2:03 PM CDT -----  Please let patient know his urinalysis is normal.  Urine culture shows no bacterial growth.   Continue with plans as discussed with ALANIS

## 2021-07-06 ENCOUNTER — TELEPHONE (OUTPATIENT)
Dept: FAMILY MEDICINE CLINIC | Facility: CLINIC | Age: 71
End: 2021-07-06

## 2021-07-06 NOTE — TELEPHONE ENCOUNTER
Spoke with patient ( verified)--saw Dr. Patricio Harding in office for ER f/u gout in left ankle, but now has right elbow swelling and redness as well as left shoulder pain with limited mobility.     In office appt made for tomorrow with Dr. Jill Licona for evaluation

## 2021-07-06 NOTE — TELEPHONE ENCOUNTER
Patient is requesting after office visit summary for 6/29 to be mailed to his home address on file due to him leaving it at the office.

## 2021-07-07 ENCOUNTER — OFFICE VISIT (OUTPATIENT)
Dept: INTERNAL MEDICINE CLINIC | Facility: CLINIC | Age: 71
End: 2021-07-07
Payer: MEDICARE

## 2021-07-07 VITALS
BODY MASS INDEX: 32.93 KG/M2 | DIASTOLIC BLOOD PRESSURE: 87 MMHG | HEART RATE: 56 BPM | RESPIRATION RATE: 16 BRPM | OXYGEN SATURATION: 96 % | SYSTOLIC BLOOD PRESSURE: 159 MMHG | WEIGHT: 230 LBS | HEIGHT: 70 IN

## 2021-07-07 DIAGNOSIS — E78.2 MIXED HYPERLIPIDEMIA: ICD-10-CM

## 2021-07-07 DIAGNOSIS — C61 PROSTATE CANCER (HCC): ICD-10-CM

## 2021-07-07 DIAGNOSIS — I10 ESSENTIAL HYPERTENSION WITH GOAL BLOOD PRESSURE LESS THAN 140/90: Primary | ICD-10-CM

## 2021-07-07 DIAGNOSIS — M70.30 BURSITIS OF ELBOW, UNSPECIFIED BURSA, UNSPECIFIED LATERALITY: ICD-10-CM

## 2021-07-07 PROBLEM — G89.4 CHRONIC PAIN SYNDROME: Status: ACTIVE | Noted: 2021-07-07

## 2021-07-07 PROCEDURE — 99214 OFFICE O/P EST MOD 30 MIN: CPT | Performed by: INTERNAL MEDICINE

## 2021-07-07 RX ORDER — SIMVASTATIN 20 MG
20 TABLET ORAL EVERY EVENING
Qty: 90 TABLET | Refills: 1 | Status: SHIPPED | OUTPATIENT
Start: 2021-07-07 | End: 2021-09-29

## 2021-07-07 RX ORDER — LOSARTAN POTASSIUM 50 MG/1
50 TABLET ORAL DAILY
Qty: 90 TABLET | Refills: 0 | Status: SHIPPED | OUTPATIENT
Start: 2021-07-07 | End: 2021-09-29

## 2021-07-07 NOTE — PROGRESS NOTES
Nic Leavitt is a 79year old male. Patient presents with:  Establish Care  Elbow Pain: Right Elbow Pain and swelling   Gout    HPI:   70-year-old gentleman with a past medical history of prostate cancer, hypertension, dyslipidemia here to establish car bx negative for cancer   • H/O arthroscopy of knee bilateral   • History of brachytherapy    • Neck pain    • Obesity, unspecified    • Prostate cancer [facility])     brachytherapy   • PVD (peripheral vascular disease) ([facility] [address]    • Rotator cuff tear 2007    ri 96%   BMI 33.00 kg/m²     Physical Exam  Constitutional:       Appearance: Normal appearance. HENT:      Head: Normocephalic. Eyes:      Conjunctiva/sclera: Conjunctivae normal.   Cardiovascular:      Rate and Rhythm: Normal rate and regular rhythm. reports that it is significantly better compared to 2 days back. We will continue with NSAIDs, and ice it. As it is getting better, I do not think we need to do any other treatments. Instructed to contact me if there is any worsening symptoms.     Plan:

## 2021-08-05 ENCOUNTER — DOCUMENTATION ONLY (OUTPATIENT)
Dept: PAIN CLINIC | Facility: HOSPITAL | Age: 71
End: 2021-08-05

## 2021-08-05 ENCOUNTER — HOSPITAL ENCOUNTER (OUTPATIENT)
Dept: ULTRASOUND IMAGING | Age: 71
Discharge: HOME | End: 2021-08-05
Attending: UROLOGY
Payer: MEDICARE

## 2021-08-05 DIAGNOSIS — R33.9 INCOMPLETE EMPTYING OF BLADDER: ICD-10-CM

## 2021-08-05 DIAGNOSIS — R35.1 NOCTURIA: ICD-10-CM

## 2021-08-05 PROCEDURE — 76857 US EXAM PELVIC LIMITED: CPT | Performed by: UROLOGY

## 2021-08-05 NOTE — PROGRESS NOTES
Procedure code 54731---16/10/21 APPROVED  Follow up 08/31/2021  At 10 am       Medicare--- No PA needed per pt's insurance

## 2021-08-06 ENCOUNTER — TELEPHONE (OUTPATIENT)
Dept: SURGERY | Facility: CLINIC | Age: 71
End: 2021-08-06

## 2021-08-06 NOTE — TELEPHONE ENCOUNTER
----- Message from BRITT Cuadra sent at 8/6/2021  9:38 AM CDT -----  Please let patient know his bladder ultrasound shows a very mild residual of urine in his bladder at 56 ml.   He should try to treat and prevent constipation as this may help

## 2021-08-06 NOTE — TELEPHONE ENCOUNTER
Spoke to patient and patient's spouse, Betti Homans. Relayed [facility]'s recommendations about patient's bladder US results to the patient. Patient is aware of his test results, recommendations and verbalized understanding.  Patient asked that I inform his wife, Maliha Rob

## 2021-08-31 ENCOUNTER — OFFICE VISIT (OUTPATIENT)
Dept: PAIN CLINIC | Facility: HOSPITAL | Age: 71
End: 2021-08-31
Attending: UROLOGY
Payer: MEDICARE

## 2021-08-31 VITALS — DIASTOLIC BLOOD PRESSURE: 79 MMHG | OXYGEN SATURATION: 99 % | HEART RATE: 55 BPM | SYSTOLIC BLOOD PRESSURE: 125 MMHG

## 2021-08-31 DIAGNOSIS — Z79.891 ENCOUNTER FOR MONITORING OPIOID MAINTENANCE THERAPY: Primary | ICD-10-CM

## 2021-08-31 DIAGNOSIS — M48.02 SPINAL STENOSIS IN CERVICAL REGION: ICD-10-CM

## 2021-08-31 DIAGNOSIS — Z51.81 ENCOUNTER FOR MONITORING OPIOID MAINTENANCE THERAPY: Primary | ICD-10-CM

## 2021-08-31 DIAGNOSIS — M25.562 BILATERAL CHRONIC KNEE PAIN: ICD-10-CM

## 2021-08-31 DIAGNOSIS — G89.29 BILATERAL CHRONIC KNEE PAIN: ICD-10-CM

## 2021-08-31 DIAGNOSIS — M48.061 SPINAL STENOSIS OF LUMBAR REGION WITHOUT NEUROGENIC CLAUDICATION: ICD-10-CM

## 2021-08-31 DIAGNOSIS — M25.561 BILATERAL CHRONIC KNEE PAIN: ICD-10-CM

## 2021-08-31 PROCEDURE — 99211 OFF/OP EST MAY X REQ PHY/QHP: CPT

## 2021-08-31 RX ORDER — HYDROCODONE BITARTRATE AND ACETAMINOPHEN 10; 325 MG/1; MG/1
1 TABLET ORAL EVERY 8 HOURS PRN
Qty: 90 TABLET | Refills: 0 | Status: SHIPPED | OUTPATIENT
Start: 2021-09-04 | End: 2021-09-29

## 2021-08-31 RX ORDER — HYDROCODONE BITARTRATE AND ACETAMINOPHEN 10; 325 MG/1; MG/1
1 TABLET ORAL EVERY 8 HOURS PRN
Qty: 90 TABLET | Refills: 0 | Status: SHIPPED | OUTPATIENT
Start: 2021-10-04 | End: 2021-10-06

## 2021-08-31 NOTE — PROGRESS NOTES
Pt presents to [facility] ambulatory for follow up after having a LESI on 8/10/2021 with Dr. Ja Pagan. Pt states 30%improvement after procedure. Pt is taking Norco 10/325mg with good relief. Dr. Ja Pagan to see pt see notes for POC.

## 2021-08-31 NOTE — CHRONIC PAIN
Initial Consultation Note      HISTORY OF PRESENT ILLNESS:  Josefina Madden is a 79year old old male referred to the pain clinic  for evaluation treatment of his low back and cervical neck pain .    he continues to take 3 Norco a day for pain control over HISTORY:  Past Surgical History:   Procedure Laterality Date   • APPENDECTOMY  1963   • KNEE ARTHROSCOPY      per NG: bilat knees 2x each; arthroscopy - bras   • PROSTATE BIOPSIES  2013   • REPAIR ROTATOR CUFF,ACUTE Right 2007   • TONSILLECTOMY         RE Use      Smoking status: Never Smoker      Smokeless tobacco: Never Used    Substance and Sexual Activity      Alcohol use: No        Alcohol/week: 0.0 standard drinks      Drug use: No      Sexual activity: Not on file    Lifestyle      Physical activity No  Spine: Normal    ROM:   Lumbar spine  Flexion  dec  Extension dec  Cervical Spine  Flexion dec  Extensiondec  MOTOR EXAMINATION:  UPPER EXTREMITY      LEFT RIGHT   Deltoid 5/5 5/5   Biceps 5/5 5/5   Triceps 5/5 5/5   Brachioradialis 5/5 5/5   Wrist Fle pain  Chronic use of small amount of Norco a day for pain control     bilateral knee pain the care of Dr. Eloisa Goodwin for that DJD bilateral knee           PLAN:  RECOMMENDATIONS:  Maintain on 3 Norco a day   lumbar epidural steroid injection as needed  Bilateral

## 2021-09-23 ENCOUNTER — LAB ENCOUNTER (OUTPATIENT)
Dept: LAB | Facility: HOSPITAL | Age: 71
End: 2021-09-23
Attending: INTERNAL MEDICINE
Payer: MEDICARE

## 2021-09-23 DIAGNOSIS — E78.2 MIXED HYPERLIPIDEMIA: ICD-10-CM

## 2021-09-23 DIAGNOSIS — I10 ESSENTIAL HYPERTENSION WITH GOAL BLOOD PRESSURE LESS THAN 140/90: ICD-10-CM

## 2021-09-23 LAB
ALBUMIN SERPL-MCNC: 3.1 G/DL (ref 3.4–5)
ALBUMIN/GLOB SERPL: 0.7 {RATIO} (ref 1–2)
ALP LIVER SERPL-CCNC: 124 U/L
ALT SERPL-CCNC: 25 U/L
ANION GAP SERPL CALC-SCNC: 10 MMOL/L (ref 0–18)
AST SERPL-CCNC: 22 U/L (ref 15–37)
BILIRUB SERPL-MCNC: 0.6 MG/DL (ref 0.1–2)
BUN BLD-MCNC: 16 MG/DL (ref 7–18)
BUN/CREAT SERPL: 15.5 (ref 10–20)
CALCIUM BLD-MCNC: 9.2 MG/DL (ref 8.5–10.1)
CHLORIDE SERPL-SCNC: 107 MMOL/L (ref 98–112)
CHOLEST SERPL-MCNC: 124 MG/DL (ref ?–200)
CO2 SERPL-SCNC: 21 MMOL/L (ref 21–32)
CREAT BLD-MCNC: 1.03 MG/DL
DEPRECATED RDW RBC AUTO: 49.1 FL (ref 35.1–46.3)
ERYTHROCYTE [DISTWIDTH] IN BLOOD BY AUTOMATED COUNT: 16.5 % (ref 11–15)
GLOBULIN PLAS-MCNC: 4.4 G/DL (ref 2.8–4.4)
GLUCOSE BLD-MCNC: 88 MG/DL (ref 70–99)
HCT VFR BLD AUTO: 40.9 %
HDLC SERPL-MCNC: 46 MG/DL (ref 40–59)
HGB BLD-MCNC: 12.5 G/DL
LDLC SERPL CALC-MCNC: 65 MG/DL (ref ?–100)
MCH RBC QN AUTO: 25.1 PG (ref 26–34)
MCHC RBC AUTO-ENTMCNC: 30.6 G/DL (ref 31–37)
MCV RBC AUTO: 82 FL
NONHDLC SERPL-MCNC: 78 MG/DL (ref ?–130)
OSMOLALITY SERPL CALC.SUM OF ELEC: 287 MOSM/KG (ref 275–295)
PATIENT FASTING Y/N/NP: YES
PATIENT FASTING Y/N/NP: YES
PLATELET # BLD AUTO: 412 10(3)UL (ref 150–450)
POTASSIUM SERPL-SCNC: 3.9 MMOL/L (ref 3.5–5.1)
PROT SERPL-MCNC: 7.5 G/DL (ref 6.4–8.2)
RBC # BLD AUTO: 4.99 X10(6)UL
SODIUM SERPL-SCNC: 138 MMOL/L (ref 136–145)
TRIGL SERPL-MCNC: 58 MG/DL (ref 30–149)
TSI SER-ACNC: 1.88 MIU/ML (ref 0.36–3.74)
VLDLC SERPL CALC-MCNC: 9 MG/DL (ref 0–30)
WBC # BLD AUTO: 10 X10(3) UL (ref 4–11)

## 2021-09-23 PROCEDURE — 80053 COMPREHEN METABOLIC PANEL: CPT

## 2021-09-23 PROCEDURE — 84443 ASSAY THYROID STIM HORMONE: CPT

## 2021-09-23 PROCEDURE — 85027 COMPLETE CBC AUTOMATED: CPT

## 2021-09-23 PROCEDURE — 36415 COLL VENOUS BLD VENIPUNCTURE: CPT

## 2021-09-23 PROCEDURE — 80061 LIPID PANEL: CPT

## 2021-09-29 ENCOUNTER — OFFICE VISIT (OUTPATIENT)
Dept: INTERNAL MEDICINE CLINIC | Facility: CLINIC | Age: 71
End: 2021-09-29
Payer: MEDICARE

## 2021-09-29 VITALS
DIASTOLIC BLOOD PRESSURE: 80 MMHG | HEART RATE: 66 BPM | SYSTOLIC BLOOD PRESSURE: 132 MMHG | OXYGEN SATURATION: 96 % | BODY MASS INDEX: 32.93 KG/M2 | HEIGHT: 70 IN | WEIGHT: 230 LBS | RESPIRATION RATE: 14 BRPM

## 2021-09-29 DIAGNOSIS — E78.2 MIXED HYPERLIPIDEMIA: ICD-10-CM

## 2021-09-29 DIAGNOSIS — N52.9 VASCULOGENIC ERECTILE DYSFUNCTION, UNSPECIFIED VASCULOGENIC ERECTILE DYSFUNCTION TYPE: ICD-10-CM

## 2021-09-29 DIAGNOSIS — I10 ESSENTIAL HYPERTENSION WITH GOAL BLOOD PRESSURE LESS THAN 140/90: Primary | ICD-10-CM

## 2021-09-29 DIAGNOSIS — G89.4 CHRONIC PAIN SYNDROME: ICD-10-CM

## 2021-09-29 DIAGNOSIS — M54.12 CERVICAL RADICULAR PAIN: ICD-10-CM

## 2021-09-29 DIAGNOSIS — C61 PROSTATE CANCER (HCC): ICD-10-CM

## 2021-09-29 DIAGNOSIS — E66.09 CLASS 1 OBESITY DUE TO EXCESS CALORIES WITH SERIOUS COMORBIDITY IN ADULT, UNSPECIFIED BMI: ICD-10-CM

## 2021-09-29 DIAGNOSIS — H54.7 DECREASED VISUAL ACUITY: ICD-10-CM

## 2021-09-29 PROCEDURE — G0439 PPPS, SUBSEQ VISIT: HCPCS | Performed by: INTERNAL MEDICINE

## 2021-09-29 RX ORDER — SIMVASTATIN 20 MG
20 TABLET ORAL EVERY EVENING
Qty: 90 TABLET | Refills: 1 | Status: SHIPPED | OUTPATIENT
Start: 2021-09-29 | End: 2022-02-07

## 2021-09-29 RX ORDER — LOSARTAN POTASSIUM 50 MG/1
50 TABLET ORAL DAILY
Qty: 90 TABLET | Refills: 1 | Status: SHIPPED | OUTPATIENT
Start: 2021-09-29 | End: 2022-01-11

## 2021-09-29 NOTE — PROGRESS NOTES
HPI:   Leon Mandel is a 79year old male who presents for a Medicare Subsequent Annual Wellness visit (Pt already had Initial Annual Wellness).     72-year-old gentleman with a past medical history of hypertension, dyslipidemia, prostate cancer here fo Team: Patient Care Team:  Ginger Corey MD as PCP - General (Internal Medicine)  Roya Rader MD (Anesthesiology)    Patient Active Problem List:     Prostate cancer [facility])     Erectile dysfunction     Cervical radicular pain     Mixed hyperlipidemia (2008), H/O arthroscopy of knee (bilateral), History of brachytherapy, Neck pain, Obesity, unspecified, Prostate cancer ([facility] [address] PVD (peripheral vascular disease) ([facility] [address] Rotator cuff tear (2007), Rotator cuff tear, and Tonsillitis (1960).     He  has a past s encounter: 230 lb (104.3 kg).     Medicare Hearing Assessment  (Required for AWV/SWV)    Hearing Screening    Time taken: 9/29/2021 12:16 PM  Screening Method: Questionnaire  I have a problem hearing over the telephone: Sometimes I have trouble following th General: No scleral icterus. Conjunctiva/sclera: Conjunctivae normal.   Neck:      Vascular: No carotid bruit. Cardiovascular:      Rate and Rhythm: Normal rate and regular rhythm. Pulses: Normal pulses. Heart sounds: Normal heart sounds.  [place] Model erectile dysfunction, unspecified vasculogenic erectile dysfunction type    Decreased visual acuity    Other orders  -     metoprolol tartrate 25 MG Oral Tab; Take 1 tablet (25 mg total) by mouth 2 (two) times daily. -     simvastatin 20 MG Oral Tab;  Take frequency, by your insurer. Please check with your insurance carrier before scheduling to verify coverage.    PREVENTATIVE SERVICES FREQUENCY &  COVERAGE DETAILS LAST COMPLETION DATE   Diabetes Screening    Fasting Blood Sugar / Glucose    One screening e of 4 - PPSV23) due on 09/03/2020    Hepatitis B One screening covered for patients with certain risk factors   -  No recommendations at this time    Tetanus Toxoid Not covered by Medicare Part B unless medically necessary (cut with metal); may be covered w

## 2021-10-06 ENCOUNTER — TELEPHONE (OUTPATIENT)
Dept: PAIN CLINIC | Facility: HOSPITAL | Age: 71
End: 2021-10-06

## 2021-10-06 RX ORDER — HYDROCODONE BITARTRATE AND ACETAMINOPHEN 10; 325 MG/1; MG/1
1 TABLET ORAL EVERY 8 HOURS PRN
Qty: 90 TABLET | Refills: 0 | Status: SHIPPED | OUTPATIENT
Start: 2021-10-06 | End: 2021-10-26

## 2021-10-25 ENCOUNTER — TELEPHONE (OUTPATIENT)
Dept: SURGERY | Facility: CLINIC | Age: 71
End: 2021-10-25

## 2021-10-25 NOTE — TELEPHONE ENCOUNTER
Per pt would like to know if he needs urine labs for upcoming appt on 10/29/21. He would like to know if he is needing to fast for PSA lab.  Please advise

## 2021-10-26 ENCOUNTER — LAB ENCOUNTER (OUTPATIENT)
Dept: LAB | Facility: HOSPITAL | Age: 71
End: 2021-10-26
Attending: UROLOGY
Payer: MEDICARE

## 2021-10-26 DIAGNOSIS — C61 PROSTATE CANCER (HCC): ICD-10-CM

## 2021-10-26 PROCEDURE — 84153 ASSAY OF PSA TOTAL: CPT

## 2021-10-26 PROCEDURE — 36415 COLL VENOUS BLD VENIPUNCTURE: CPT

## 2021-10-28 ENCOUNTER — OFFICE VISIT (OUTPATIENT)
Dept: PAIN CLINIC | Facility: HOSPITAL | Age: 71
End: 2021-10-28
Attending: ANESTHESIOLOGY
Payer: MEDICARE

## 2021-10-28 DIAGNOSIS — M48.061 SPINAL STENOSIS OF LUMBAR REGION WITHOUT NEUROGENIC CLAUDICATION: ICD-10-CM

## 2021-10-28 DIAGNOSIS — G89.29 BILATERAL CHRONIC KNEE PAIN: ICD-10-CM

## 2021-10-28 DIAGNOSIS — M25.561 BILATERAL CHRONIC KNEE PAIN: ICD-10-CM

## 2021-10-28 DIAGNOSIS — M25.562 BILATERAL CHRONIC KNEE PAIN: ICD-10-CM

## 2021-10-28 DIAGNOSIS — Z79.891 ENCOUNTER FOR MONITORING OPIOID MAINTENANCE THERAPY: Primary | ICD-10-CM

## 2021-10-28 DIAGNOSIS — M48.02 SPINAL STENOSIS IN CERVICAL REGION: ICD-10-CM

## 2021-10-28 DIAGNOSIS — Z51.81 ENCOUNTER FOR MONITORING OPIOID MAINTENANCE THERAPY: Primary | ICD-10-CM

## 2021-10-28 PROCEDURE — 99211 OFF/OP EST MAY X REQ PHY/QHP: CPT

## 2021-10-28 RX ORDER — HYDROCODONE BITARTRATE AND ACETAMINOPHEN 10; 325 MG/1; MG/1
1 TABLET ORAL EVERY 8 HOURS PRN
Qty: 90 TABLET | Refills: 0 | Status: SHIPPED | OUTPATIENT
Start: 2021-11-05 | End: 2021-12-05

## 2021-10-28 RX ORDER — HYDROCODONE BITARTRATE AND ACETAMINOPHEN 10; 325 MG/1; MG/1
1 TABLET ORAL EVERY 8 HOURS PRN
Qty: 90 TABLET | Refills: 0 | Status: SHIPPED | OUTPATIENT
Start: 2021-12-05 | End: 2021-12-14

## 2021-10-28 NOTE — PROGRESS NOTES
10/28/2021-presents ambulatory to [facility]; medical management chronic neck & back pain; rates 4/10 today;  Reports his knees have been bad and he is looking into a new tx that will inject gel-not Synvisc \"this is different;\"     Seen by Dr. Clara Felix; refer t

## 2021-10-28 NOTE — CHRONIC PAIN
Initial Consultation Note      HISTORY OF PRESENT ILLNESS:  Josefina Madden is a 79year old old male referred to the pain clinic  for evaluation treatment of his low back and cervical neck pain .    he continues to take 3 Norco a day for pain control over HISTORY:  Past Surgical History:   Procedure Laterality Date   • APPENDECTOMY  1963   • KNEE ARTHROSCOPY      per NG: bilat knees 2x each; arthroscopy - bras   • PROSTATE BIOPSIES  2013   • REPAIR ROTATOR CUFF,ACUTE Right 2007   • TONSILLECTOMY         RE Use      Smoking status: Never Smoker      Smokeless tobacco: Never Used    Substance and Sexual Activity      Alcohol use: No        Alcohol/week: 0.0 standard drinks      Drug use: No      Sexual activity: Not on file    Lifestyle      Physical activity No  Spine: Normal    ROM:   Lumbar spine  Flexion  dec  Extension dec  Cervical Spine  Flexion dec  Extensiondec  MOTOR EXAMINATION:  UPPER EXTREMITY      LEFT RIGHT   Deltoid 5/5 5/5   Biceps 5/5 5/5   Triceps 5/5 5/5   Brachioradialis 5/5 5/5   Wrist Fle pain  Chronic use of small amount of Norco a day for pain control     bilateral knee pain the care of Dr. Ankita Rodríguez for that DJD bilateral knee           PLAN:  RECOMMENDATIONS:  Maintain on 3 Norco a day   lumbar epidural steroid injection as needed  Bilateral

## 2021-10-29 ENCOUNTER — OFFICE VISIT (OUTPATIENT)
Dept: SURGERY | Facility: CLINIC | Age: 71
End: 2021-10-29
Payer: MEDICARE

## 2021-10-29 VITALS
RESPIRATION RATE: 16 BRPM | HEART RATE: 52 BPM | BODY MASS INDEX: 32.93 KG/M2 | SYSTOLIC BLOOD PRESSURE: 136 MMHG | WEIGHT: 230 LBS | DIASTOLIC BLOOD PRESSURE: 84 MMHG | HEIGHT: 70 IN

## 2021-10-29 DIAGNOSIS — N52.01 ERECTILE DYSFUNCTION DUE TO ARTERIAL INSUFFICIENCY: ICD-10-CM

## 2021-10-29 DIAGNOSIS — K59.00 CONSTIPATION, UNSPECIFIED CONSTIPATION TYPE: ICD-10-CM

## 2021-10-29 DIAGNOSIS — R33.9 INCOMPLETE EMPTYING OF BLADDER: ICD-10-CM

## 2021-10-29 DIAGNOSIS — R97.21 RISING PSA FOLLOWING TREATMENT FOR MALIGNANT NEOPLASM OF PROSTATE: ICD-10-CM

## 2021-10-29 DIAGNOSIS — R35.1 NOCTURIA: ICD-10-CM

## 2021-10-29 DIAGNOSIS — C61 PROSTATE CANCER (HCC): Primary | ICD-10-CM

## 2021-10-29 PROCEDURE — 99214 OFFICE O/P EST MOD 30 MIN: CPT | Performed by: UROLOGY

## 2021-10-29 RX ORDER — CIPROFLOXACIN 500 MG/1
TABLET, FILM COATED ORAL
Qty: 6 TABLET | Refills: 0 | Status: SHIPPED | OUTPATIENT
Start: 2021-10-29 | End: 2021-12-15

## 2021-10-29 RX ORDER — CEFDINIR 300 MG/1
300 CAPSULE ORAL EVERY 12 HOURS
Qty: 6 CAPSULE | Refills: 0 | Status: SHIPPED | OUTPATIENT
Start: 2021-10-29 | End: 2021-11-01

## 2021-10-29 NOTE — PATIENT INSTRUCTIONS
Juhi Berumen M.D.    1.  Please schedule 3T MRI of the prostate  Please call [phone] to schedule. We will notify you of the results.     If the 3T MRI of the prostate shows no suspicious lesion, I recommend t of magnesia or generic equivalent 2 ounces = 60 mL = 4 tablespoons at 6 PM, evening before the biopsy    5   Please start cefdinir 300 mg twice a day for 3 days -- start  IN THE MORNING ON THE DAY BEFORE THE BIOPSY and continue it until you finish it    6.

## 2021-10-29 NOTE — PROGRESS NOTES
HPI:    Patient ID: Kaley Leon is a 70year old male. HPI         Prostate Cancer / Rising PSA level   Chronic. S/p 02/2014 brachytherapy. Most recent 10/26/2021 PSA = 3.03.  He is not currently undergoing any chemotherapy or hormone ablation thera under general anesthesia. [address] [facility] 1/9/14 showed prostate volume 35.7 mL; a total of 40 biopsies performed. pathology showed 3+4 and [place] 3+3.   patient had previous negative prostate biopsies in the past --11-27-09 office prostate resolved  3/4/2019 office visit with me;  On PRAISH, prostate very small, no palpable nodules or indurations; for erectile dysfunction, tadalafil (generic Cialis) 10 mg tablet at least 1 hour before planned sexual activity, if 10 mg dose is not adequate to pro shortness of breath. Cardiovascular: Negative for chest pain. Gastrointestinal: Negative for abdominal pain and constipation. Genitourinary: Negative for dysuria, flank pain and hematuria. Skin: Negative for color change.    Neurological: Negative (peripheral vascular disease) ([facility] [address]    • Rotator cuff tear 2007    right   • Rotator cuff tear     R - 2007   • Tonsillitis 1960      Past Surgical History:   Procedure Laterality Date   • APPENDECTOMY  1963   • KNEE ARTHROSCOPY      per NG: bilat knees 2x Creatinine =1.07; GFR = 70  02/19/2021 PSA = 2.75   10/15/2020 PSA = 2.48   07/06/2020 Potassium = 3.8; Creatinine = 1.29; GFR = 56; UA blood = negative; Microscopic not indicated   06/16/2020 PSA = 2.18; UA blood = small; WBC = 1; RBC = 1; protein = negat hormone ablation therapy. Most recent 10/26/2021 PSA = 3.03 elevated compared to  06/16/2021 PSA = 2.80 . I explain to patient that his PSA doubling time is 4 years.  I also explain to patient that his PSA does not meet definition of biochemical failure (05 stable and chooses to continue medication as prescribed.        (R35.1) Nocturia  Chronic.  08/05/2021 US bladder only = PVR 56.30 mL. Patient has current IPSS score of 16, moderate voiding dysfunction category; nocturia 3x.  He is not currently taking any you know whether he would be in the office or in the hospital; I would perform the biopsy myself in either location.     The prostate biopsy were performed ultrasound department hospital MRI–ultrasound fusion biopsy, THEN I would strongly recommend that you Refills   • cefdinir 300 MG Oral Cap 6 capsule 0     Sig: Take 1 capsule (300 mg total) by mouth every 12 (twelve) hours for 3 days.  Start the day before the procedure   • ciprofloxacin 500 MG Oral Tab 6 tablet 0     Si tablet twice daily for 3 days; s

## 2021-11-30 ENCOUNTER — HOSPITAL ENCOUNTER (OUTPATIENT)
Dept: MRI IMAGING | Facility: HOSPITAL | Age: 71
Discharge: HOME | End: 2021-11-30
Attending: UROLOGY
Payer: MEDICARE

## 2021-11-30 DIAGNOSIS — C61 PROSTATE CANCER (HCC): ICD-10-CM

## 2021-11-30 PROCEDURE — 82565 ASSAY OF CREATININE: CPT

## 2021-11-30 PROCEDURE — 72197 MRI PELVIS W/O & W/DYE: CPT | Performed by: UROLOGY

## 2021-11-30 PROCEDURE — A9575 INJ GADOTERATE MEGLUMI 0.1ML: HCPCS | Performed by: UROLOGY

## 2021-12-01 ENCOUNTER — TELEPHONE (OUTPATIENT)
Dept: SURGERY | Facility: CLINIC | Age: 71
End: 2021-12-01

## 2021-12-01 NOTE — TELEPHONE ENCOUNTER
----- Message from Chani Branham MD sent at 11/30/2021  9:30 PM CST -----  Please call patient. MRI of the prostate did not show any obvious cancer in the prostate; no obvious signs of cancer in the pelvic lymph nodes.  I will discuss findings with ana

## 2021-12-01 NOTE — TELEPHONE ENCOUNTER
Spoke with pt and gave him PVK result note. Went over plan of care from last OV note. Verbalized understanding. All questions answered.

## 2021-12-06 ENCOUNTER — PROCEDURE (OUTPATIENT)
Dept: SURGERY | Facility: CLINIC | Age: 71
End: 2021-12-06
Payer: MEDICARE

## 2021-12-06 VITALS
WEIGHT: 230 LBS | BODY MASS INDEX: 32.93 KG/M2 | RESPIRATION RATE: 16 BRPM | SYSTOLIC BLOOD PRESSURE: 124 MMHG | DIASTOLIC BLOOD PRESSURE: 70 MMHG | HEART RATE: 48 BPM | HEIGHT: 70 IN

## 2021-12-06 DIAGNOSIS — C61 PROSTATE CANCER (HCC): Primary | ICD-10-CM

## 2021-12-06 DIAGNOSIS — R97.21 RISING PSA FOLLOWING TREATMENT FOR MALIGNANT NEOPLASM OF PROSTATE: ICD-10-CM

## 2021-12-06 PROCEDURE — 76942 ECHO GUIDE FOR BIOPSY: CPT | Performed by: UROLOGY

## 2021-12-06 PROCEDURE — 55700 BIOPSY OF PROSTATE,NEEDLE/PUNCH: CPT | Performed by: UROLOGY

## 2021-12-06 NOTE — PROGRESS NOTES
[address] Patient Status:  Specimen    10/4/1950 MRN [id number]   Location [address] [facility] Attending Jatinder Avila MD   [facility] Day # 0 PCP Magi Denny MD       [address] [place] [address] was determined by imaging the prostate in the transverse and sagittal planes and determining maximum height, width and length dimensions. The decision was then made to proceed with prostate biopsy under ultrasound guidance.  Using a 7\"22 gauge spinal need

## 2021-12-06 NOTE — PATIENT INSTRUCTIONS
Corina Garcia M.D.    1. Finish your prescribed 3 day course of the 2 different antibiotics as directed. 2. No heavy lifting or strenuous physical activity for a few days.     3. No aspirin or NSAIDs for 24 zulma

## 2021-12-08 DIAGNOSIS — C61 PROSTATE CANCER (HCC): Primary | ICD-10-CM

## 2021-12-08 DIAGNOSIS — R97.21 RISING PSA FOLLOWING TREATMENT FOR MALIGNANT NEOPLASM OF PROSTATE: ICD-10-CM

## 2021-12-09 ENCOUNTER — TELEPHONE (OUTPATIENT)
Dept: SURGERY | Facility: CLINIC | Age: 71
End: 2021-12-09

## 2021-12-09 NOTE — TELEPHONE ENCOUNTER
----- Message from Tina Muñoz MD sent at 12/8/2021  6:05 PM CST -----  Urology nurses, please call patient, either cell phone [phone] or else landline [phone].   Please notify him that the pathology (microscope analysis) of prostate perform

## 2021-12-09 NOTE — TELEPHONE ENCOUNTER
Called pt, verified name and , relayed msg below. Pt verbalized understanding. Set pt up for 6 mo appt in  and he will get done his PSA prior. Pt appreciative of the follow up and wishes the staff a very [employer] as well.

## 2021-12-10 ENCOUNTER — NURSE TRIAGE (OUTPATIENT)
Dept: INTERNAL MEDICINE CLINIC | Facility: CLINIC | Age: 71
End: 2021-12-10

## 2021-12-10 NOTE — TELEPHONE ENCOUNTER
Action Requested: Summary for Provider     []  Critical Lab, Recommendations Needed  [] Need Additional Advice  []   FYI    []   Need Orders  [] Need Medications Sent to Pharmacy  []  Other     SUMMARY: Per protocol: OV within 2 weeks.  Patient has chronic

## 2021-12-15 ENCOUNTER — OFFICE VISIT (OUTPATIENT)
Dept: INTERNAL MEDICINE CLINIC | Facility: CLINIC | Age: 71
End: 2021-12-15
Payer: MEDICARE

## 2021-12-15 VITALS
WEIGHT: 230 LBS | DIASTOLIC BLOOD PRESSURE: 93 MMHG | HEART RATE: 62 BPM | HEIGHT: 70 IN | SYSTOLIC BLOOD PRESSURE: 166 MMHG | OXYGEN SATURATION: 96 % | RESPIRATION RATE: 16 BRPM | BODY MASS INDEX: 32.93 KG/M2

## 2021-12-15 DIAGNOSIS — E78.2 MIXED HYPERLIPIDEMIA: ICD-10-CM

## 2021-12-15 DIAGNOSIS — I10 ESSENTIAL HYPERTENSION WITH GOAL BLOOD PRESSURE LESS THAN 140/90: Primary | ICD-10-CM

## 2021-12-15 DIAGNOSIS — M17.0 PRIMARY OSTEOARTHRITIS OF BOTH KNEES: ICD-10-CM

## 2021-12-15 DIAGNOSIS — C61 PROSTATE CANCER (HCC): ICD-10-CM

## 2021-12-15 PROCEDURE — 99214 OFFICE O/P EST MOD 30 MIN: CPT | Performed by: INTERNAL MEDICINE

## 2021-12-15 NOTE — PROGRESS NOTES
Evy Dougherty is a 70year old male. Patient presents with: Follow - Up: 3 mos F/u     HPI:   22-year-old gentleman with medical history significant for hypertension, dyslipidemia, chronic pain syndrome, DJD here for follow-up.   He reports that he is do 2x each; arthroscopy - brash   • PROSTATE BIOPSIES  2013   • REPAIR ROTATOR CUFF,ACUTE Right 2007   • TONSILLECTOMY        Social History:  Social History    Tobacco Use      Smoking status: Never Smoker      Smokeless tobacco: Never Used    Vaping Use Palpations: Abdomen is soft. Tenderness: There is no abdominal tenderness. Musculoskeletal:      Cervical back: Neck supple. Right lower leg: No edema. Left lower leg: No edema. Skin:     General: Skin is warm and dry.    Neurological: have been corrected.  While every attempt is made to correct errors during dictation discrepancies may still exist.

## 2021-12-28 ENCOUNTER — OFFICE VISIT (OUTPATIENT)
Dept: PAIN CLINIC | Facility: HOSPITAL | Age: 71
End: 2021-12-28
Attending: ANESTHESIOLOGY
Payer: MEDICARE

## 2021-12-28 VITALS
RESPIRATION RATE: 18 BRPM | SYSTOLIC BLOOD PRESSURE: 166 MMHG | BODY MASS INDEX: 32.93 KG/M2 | DIASTOLIC BLOOD PRESSURE: 79 MMHG | HEART RATE: 50 BPM | HEIGHT: 70 IN | WEIGHT: 230 LBS

## 2021-12-28 DIAGNOSIS — M48.061 SPINAL STENOSIS OF LUMBAR REGION WITHOUT NEUROGENIC CLAUDICATION: ICD-10-CM

## 2021-12-28 DIAGNOSIS — Z51.81 ENCOUNTER FOR MONITORING OPIOID MAINTENANCE THERAPY: Primary | ICD-10-CM

## 2021-12-28 DIAGNOSIS — G89.29 BILATERAL CHRONIC KNEE PAIN: ICD-10-CM

## 2021-12-28 DIAGNOSIS — M25.561 BILATERAL CHRONIC KNEE PAIN: ICD-10-CM

## 2021-12-28 DIAGNOSIS — M25.562 BILATERAL CHRONIC KNEE PAIN: ICD-10-CM

## 2021-12-28 DIAGNOSIS — Z79.891 ENCOUNTER FOR MONITORING OPIOID MAINTENANCE THERAPY: Primary | ICD-10-CM

## 2021-12-28 PROCEDURE — 99211 OFF/OP EST MAY X REQ PHY/QHP: CPT

## 2021-12-28 RX ORDER — HYDROCODONE BITARTRATE AND ACETAMINOPHEN 10; 325 MG/1; MG/1
1 TABLET ORAL EVERY 8 HOURS PRN
Qty: 90 TABLET | Refills: 0 | Status: SHIPPED | OUTPATIENT
Start: 2022-02-03 | End: 2022-03-05

## 2021-12-28 RX ORDER — HYDROCODONE BITARTRATE AND ACETAMINOPHEN 10; 325 MG/1; MG/1
1 TABLET ORAL EVERY 8 HOURS PRN
Qty: 90 TABLET | Refills: 0 | Status: SHIPPED | OUTPATIENT
Start: 2022-01-04 | End: 2022-02-03

## 2021-12-28 NOTE — PROGRESS NOTES
12/28/2021--presents ambulatory to [facility]; medical management chronic neck & back pain; rates 4/10 today;  Reports his knees have been bad and he recently had both injected with TriLuron;   He states they are so much better;      Seen by Dr. Bessy Gamino; refer to

## 2021-12-28 NOTE — CHRONIC PAIN
Initial Consultation Note      HISTORY OF PRESENT ILLNESS:  Leon Mandel is a 79year old old male referred to the pain clinic  for evaluation treatment of his low back and cervical neck pain . he continues to take 3 Norco a day for pain control.   Hi REPAIR ROTATOR CUFF,ACUTE Right 2007   • TONSILLECTOMY         REVIEW OF SYSTEMS:   A comprehensive review of systems was negative except for:   MEDICAL HISTORY:  Patient Active Problem List:     Prostate cancer [facility])     Erectile dysfunction     Cervical Sexual activity: Not on file    Lifestyle      Physical activity        Days per week: Not on file        Minutes per session: Not on file      Stress: Not on file    Relationships      Social connections        Talks on phone: Not on file        Gets toge 5/5   Triceps 5/5 5/5   Brachioradialis 5/5 5/5   Wrist Flexors 5/5 5/5   Wrist Extensors 5/5 5/5   Intrinsic Hand 5/5 5/5    5/5 5/5     LOWER EXTREMITY      LEFT RIGHT   Iliopsoas 5/5 5/5   Quadriceps 5/5 5/5   Foot DF 5/5 5/5   Foot EHL 5/5 5/5   Ga PLAN:  RECOMMENDATIONS:  Maintain on 3 Norco a day   lumbar epidural steroid injection as needed possibly in March  Bilateral knee pain he is to see orthopedic surgeon status post Synvisc injections which is helped also trying hyaluronic acid injection

## 2022-01-11 RX ORDER — OLMESARTAN MEDOXOMIL 20 MG/1
20 TABLET ORAL DAILY
Qty: 90 TABLET | Refills: 1 | Status: SHIPPED | OUTPATIENT
Start: 2022-01-11 | End: 2022-01-11 | Stop reason: CLARIF

## 2022-01-11 RX ORDER — LOSARTAN POTASSIUM 50 MG/1
50 TABLET ORAL DAILY
Qty: 90 TABLET | Refills: 1 | Status: SHIPPED | OUTPATIENT
Start: 2022-01-11 | End: 2022-01-13

## 2022-01-11 NOTE — TELEPHONE ENCOUNTER
Patient states the medication below is on back order and requesting an alternative medication due to him being out of the medication, requesting callback.      Losartan 50 MG Oral Tab

## 2022-01-11 NOTE — TELEPHONE ENCOUNTER
Patient is out of Losartan 50 mg. His pharmacy on file is out of it. Pleasere send this script to [facility] at [address] [facility] [id number]. Call patient when this script is sent.

## 2022-01-11 NOTE — TELEPHONE ENCOUNTER
Advised patient of Dr. Norma Mccormick note. Patient verbalized understanding. Patient stated that he called the [facility] pharmacy and they had the losartan 50mg in stock. Rx pended.     Spoke to [facility] pharmacy and they do have the losartan 50mg have more than a 90 days

## 2022-01-12 ENCOUNTER — TELEPHONE (OUTPATIENT)
Dept: INTERNAL MEDICINE CLINIC | Facility: CLINIC | Age: 72
End: 2022-01-12

## 2022-01-12 NOTE — TELEPHONE ENCOUNTER
Refill passed per [facility] protocol. Patient advised and olmesartan cancelled at [facility].   Requested Prescriptions   Pending Prescriptions Disp Refills    losartan 50 MG Oral Tab 90 tablet 1     Sig: Take 1 tablet (50 mg total) by mouth d

## 2022-01-12 NOTE — TELEPHONE ENCOUNTER
MEDICATION IS ON BACKORDER PT NEEDS THE SCRIPT CHANGED ASAP    Current Outpatient Medications:   •  losartan 50 MG Oral Tab, Take 1 tablet (50 mg total) by mouth daily. , Disp: 90 tablet, Rfl: 1  •  [START ON 2/3/2022]

## 2022-01-13 RX ORDER — OLMESARTAN MEDOXOMIL 20 MG/1
20 TABLET ORAL DAILY
Qty: 90 TABLET | Refills: 1 | Status: SHIPPED | OUTPATIENT
Start: 2022-01-13 | End: 2022-01-13 | Stop reason: CLARIF

## 2022-01-13 RX ORDER — LOSARTAN POTASSIUM 50 MG/1
50 TABLET ORAL DAILY
Qty: 90 TABLET | Refills: 1 | COMMUNITY
Start: 2022-01-13

## 2022-01-13 NOTE — TELEPHONE ENCOUNTER
See 1/11/2022 refill encounter. Cancelled olmesartan at [facility]. Patient did pickup the losartan 50mg #90 at [facility] pharmacy and still has one more refill there.

## 2022-02-04 ENCOUNTER — NURSE TRIAGE (OUTPATIENT)
Dept: INTERNAL MEDICINE CLINIC | Facility: CLINIC | Age: 72
End: 2022-02-04

## 2022-02-04 NOTE — TELEPHONE ENCOUNTER
If he is doing okay, I can see him on Monday. Okay to use res 24.   If he is having recurrence, he needs to be evaluated in the IC

## 2022-02-04 NOTE — TELEPHONE ENCOUNTER
Followed up with patient, reports to be doing ok, still with some nausea but no further vomiting, has been staying hydrated, eating small meals. Patient agrees to go to ED/IC if symptoms worsen. Reports he will try to buy a BP machine over the weekend. Requested to hold off on scheduling appt for Mon, reports he wants to see how he does over the weekend and he will call us with an update on Monday.

## 2022-02-05 ENCOUNTER — APPOINTMENT (OUTPATIENT)
Dept: CT IMAGING | Facility: HOSPITAL | Age: 72
End: 2022-02-05
Attending: EMERGENCY MEDICINE
Payer: MEDICARE

## 2022-02-05 ENCOUNTER — HOSPITAL ENCOUNTER (OUTPATIENT)
Facility: HOSPITAL | Age: 72
Setting detail: OBSERVATION
Discharge: HOME | End: 2022-02-07
Attending: EMERGENCY MEDICINE | Admitting: INTERNAL MEDICINE
Payer: MEDICARE

## 2022-02-05 ENCOUNTER — APPOINTMENT (OUTPATIENT)
Dept: CT IMAGING | Facility: HOSPITAL | Age: 72
End: 2022-02-05
Attending: Other
Payer: MEDICARE

## 2022-02-05 ENCOUNTER — APPOINTMENT (OUTPATIENT)
Dept: MRI IMAGING | Facility: HOSPITAL | Age: 72
End: 2022-02-05
Attending: EMERGENCY MEDICINE
Payer: MEDICARE

## 2022-02-05 DIAGNOSIS — R23.3 PETECHIAL HEMORRHAGE: Primary | ICD-10-CM

## 2022-02-05 DIAGNOSIS — R42 DIZZINESS: ICD-10-CM

## 2022-02-05 PROBLEM — E87.1 HYPONATREMIA: Status: ACTIVE | Noted: 2022-02-05

## 2022-02-05 LAB
ANION GAP SERPL CALC-SCNC: 5 MMOL/L (ref 0–18)
BASOPHILS # BLD AUTO: 0.07 X10(3) UL (ref 0–0.2)
BASOPHILS NFR BLD AUTO: 0.7 %
BILIRUB UR QL: NEGATIVE
BUN BLD-MCNC: 17 MG/DL (ref 7–18)
BUN/CREAT SERPL: 14.9 (ref 10–20)
CALCIUM BLD-MCNC: 10 MG/DL (ref 8.5–10.1)
CHLORIDE SERPL-SCNC: 100 MMOL/L (ref 98–112)
CHOLEST SERPL-MCNC: 153 MG/DL (ref ?–200)
CO2 SERPL-SCNC: 30 MMOL/L (ref 21–32)
CREAT BLD-MCNC: 1.14 MG/DL
DEPRECATED RDW RBC AUTO: 47.2 FL (ref 35.1–46.3)
EOSINOPHIL # BLD AUTO: 0.1 X10(3) UL (ref 0–0.7)
EOSINOPHIL NFR BLD AUTO: 1 %
ERYTHROCYTE [DISTWIDTH] IN BLOOD BY AUTOMATED COUNT: 16.3 % (ref 11–15)
GLUCOSE BLD-MCNC: 99 MG/DL (ref 70–99)
GLUCOSE BLDC GLUCOMTR-MCNC: 95 MG/DL (ref 70–99)
GLUCOSE UR-MCNC: NEGATIVE MG/DL
HBA1C MFR BLD: 6 % (ref ?–5.7)
HCT VFR BLD AUTO: 44 %
HDLC SERPL-MCNC: 50 MG/DL (ref 40–59)
HGB BLD-MCNC: 13.7 G/DL
IMM GRANULOCYTES # BLD AUTO: 0.05 X10(3) UL (ref 0–1)
IMM GRANULOCYTES NFR BLD: 0.5 %
KETONES UR-MCNC: NEGATIVE MG/DL
LDLC SERPL CALC-MCNC: 89 MG/DL (ref ?–100)
LEUKOCYTE ESTERASE UR QL STRIP.AUTO: NEGATIVE
LYMPHOCYTES # BLD AUTO: 1.08 X10(3) UL (ref 1–4)
LYMPHOCYTES NFR BLD AUTO: 10.6 %
MCH RBC QN AUTO: 24.7 PG (ref 26–34)
MCHC RBC AUTO-ENTMCNC: 31.1 G/DL (ref 31–37)
MCV RBC AUTO: 79.4 FL
MONOCYTES # BLD AUTO: 0.85 X10(3) UL (ref 0.1–1)
MONOCYTES NFR BLD AUTO: 8.3 %
NEUTROPHILS # BLD AUTO: 8.06 X10 (3) UL (ref 1.5–7.7)
NEUTROPHILS # BLD AUTO: 8.06 X10(3) UL (ref 1.5–7.7)
NEUTROPHILS NFR BLD AUTO: 78.9 %
NITRITE UR QL STRIP.AUTO: NEGATIVE
NONHDLC SERPL-MCNC: 103 MG/DL (ref ?–130)
OSMOLALITY SERPL CALC.SUM OF ELEC: 282 MOSM/KG (ref 275–295)
PH UR: 6 [PH] (ref 5–8)
PLATELET # BLD AUTO: 402 10(3)UL (ref 150–450)
POTASSIUM SERPL-SCNC: 4.3 MMOL/L (ref 3.5–5.1)
PROT UR-MCNC: 30 MG/DL
RBC # BLD AUTO: 5.54 X10(6)UL
SARS-COV-2 RNA RESP QL NAA+PROBE: NOT DETECTED
SODIUM SERPL-SCNC: 135 MMOL/L (ref 136–145)
SP GR UR STRIP: 1.02 (ref 1–1.03)
TRIGL SERPL-MCNC: 71 MG/DL (ref 30–149)
TROPONIN I HIGH SENSITIVITY: 9 NG/L
UROBILINOGEN UR STRIP-ACNC: <2
VLDLC SERPL CALC-MCNC: 11 MG/DL (ref 0–30)
WBC # BLD AUTO: 10.2 X10(3) UL (ref 4–11)

## 2022-02-05 PROCEDURE — 70551 MRI BRAIN STEM W/O DYE: CPT | Performed by: EMERGENCY MEDICINE

## 2022-02-05 PROCEDURE — 74177 CT ABD & PELVIS W/CONTRAST: CPT | Performed by: EMERGENCY MEDICINE

## 2022-02-05 PROCEDURE — 70450 CT HEAD/BRAIN W/O DYE: CPT | Performed by: EMERGENCY MEDICINE

## 2022-02-05 RX ORDER — LABETALOL HYDROCHLORIDE 5 MG/ML
10 INJECTION, SOLUTION INTRAVENOUS EVERY 10 MIN PRN
Status: DISCONTINUED | OUTPATIENT
Start: 2022-02-05 | End: 2022-02-07

## 2022-02-05 RX ORDER — HYDRALAZINE HYDROCHLORIDE 20 MG/ML
10 INJECTION INTRAMUSCULAR; INTRAVENOUS EVERY 2 HOUR PRN
Status: DISCONTINUED | OUTPATIENT
Start: 2022-02-05 | End: 2022-02-07

## 2022-02-05 RX ORDER — ATORVASTATIN CALCIUM 40 MG/1
40 TABLET, FILM COATED ORAL NIGHTLY
Status: DISCONTINUED | OUTPATIENT
Start: 2022-02-05 | End: 2022-02-07

## 2022-02-05 RX ORDER — ONDANSETRON 2 MG/ML
4 INJECTION INTRAMUSCULAR; INTRAVENOUS EVERY 6 HOURS PRN
Status: DISCONTINUED | OUTPATIENT
Start: 2022-02-05 | End: 2022-02-07

## 2022-02-05 RX ORDER — SODIUM CHLORIDE 9 MG/ML
INJECTION, SOLUTION INTRAVENOUS CONTINUOUS
Status: DISCONTINUED | OUTPATIENT
Start: 2022-02-05 | End: 2022-02-07

## 2022-02-05 RX ORDER — METOCLOPRAMIDE HYDROCHLORIDE 5 MG/ML
10 INJECTION INTRAMUSCULAR; INTRAVENOUS EVERY 8 HOURS PRN
Status: DISCONTINUED | OUTPATIENT
Start: 2022-02-05 | End: 2022-02-07

## 2022-02-05 RX ORDER — LORAZEPAM 2 MG/ML
1 INJECTION INTRAMUSCULAR
Status: DISCONTINUED | OUTPATIENT
Start: 2022-02-05 | End: 2022-02-07

## 2022-02-05 RX ORDER — MORPHINE SULFATE 2 MG/ML
2 INJECTION, SOLUTION INTRAMUSCULAR; INTRAVENOUS ONCE
Status: COMPLETED | OUTPATIENT
Start: 2022-02-06 | End: 2022-02-06

## 2022-02-05 RX ORDER — ACETAMINOPHEN 500 MG
1000 TABLET ORAL ONCE
Status: COMPLETED | OUTPATIENT
Start: 2022-02-05 | End: 2022-02-05

## 2022-02-05 RX ORDER — LOSARTAN POTASSIUM 50 MG/1
50 TABLET ORAL DAILY
Status: DISCONTINUED | OUTPATIENT
Start: 2022-02-06 | End: 2022-02-07

## 2022-02-05 RX ORDER — MECLIZINE HYDROCHLORIDE 25 MG/1
25 TABLET ORAL ONCE
Status: COMPLETED | OUTPATIENT
Start: 2022-02-05 | End: 2022-02-05

## 2022-02-05 RX ORDER — HYDROCODONE BITARTRATE AND ACETAMINOPHEN 10; 325 MG/1; MG/1
1 TABLET ORAL EVERY 8 HOURS PRN
Status: DISCONTINUED | OUTPATIENT
Start: 2022-02-05 | End: 2022-02-07

## 2022-02-05 RX ORDER — ONDANSETRON 2 MG/ML
4 INJECTION INTRAMUSCULAR; INTRAVENOUS ONCE
Status: COMPLETED | OUTPATIENT
Start: 2022-02-05 | End: 2022-02-05

## 2022-02-05 RX ORDER — ACETAMINOPHEN 325 MG/1
650 TABLET ORAL EVERY 4 HOURS PRN
Status: DISCONTINUED | OUTPATIENT
Start: 2022-02-05 | End: 2022-02-07

## 2022-02-05 RX ORDER — ACETAMINOPHEN 650 MG/1
650 SUPPOSITORY RECTAL EVERY 4 HOURS PRN
Status: DISCONTINUED | OUTPATIENT
Start: 2022-02-05 | End: 2022-02-07

## 2022-02-05 NOTE — ED QUICK NOTES
Received pt from triage. Pt here with c/o dizziness and n/v that began yesterday. States he was woken up from a sleep with nausea/vomiting. Denies any dizziness at this time.

## 2022-02-05 NOTE — ED INITIAL ASSESSMENT (HPI)
Pt reports dizziness that began yesterday. Pt states that he gets episodes of nausea and dizziness after the nausea. Pt also reports lower back pain that radiates into both legs.  Pt denies chest pain

## 2022-02-05 NOTE — TELEPHONE ENCOUNTER
ER f/u 2/7/2022    Pt called this morning as he continues to have episodes of dizziness so he will go to [facility] ER today. He will have his wife take him. Pt does not want to take his blood pressure medication at this time he will take the medication with him to the ER and see how his blood pressure is. Pt is not sure if his blood pressure is low and that is why he feels dizzy.

## 2022-02-06 ENCOUNTER — APPOINTMENT (OUTPATIENT)
Dept: CT IMAGING | Facility: HOSPITAL | Age: 72
End: 2022-02-06
Attending: Other
Payer: MEDICARE

## 2022-02-06 ENCOUNTER — APPOINTMENT (OUTPATIENT)
Dept: CV DIAGNOSTICS | Facility: HOSPITAL | Age: 72
End: 2022-02-06
Attending: Other
Payer: MEDICARE

## 2022-02-06 LAB
GLUCOSE BLDC GLUCOMTR-MCNC: 101 MG/DL (ref 70–99)
GLUCOSE BLDC GLUCOMTR-MCNC: 108 MG/DL (ref 70–99)
GLUCOSE BLDC GLUCOMTR-MCNC: 80 MG/DL (ref 70–99)
GLUCOSE BLDC GLUCOMTR-MCNC: 91 MG/DL (ref 70–99)

## 2022-02-06 PROCEDURE — 99203 OFFICE O/P NEW LOW 30 MIN: CPT | Performed by: OTHER

## 2022-02-06 PROCEDURE — 99219 INITIAL OBSERVATION CARE,LEVL II: CPT | Performed by: INTERNAL MEDICINE

## 2022-02-06 PROCEDURE — 70496 CT ANGIOGRAPHY HEAD: CPT | Performed by: OTHER

## 2022-02-06 PROCEDURE — 93306 TTE W/DOPPLER COMPLETE: CPT | Performed by: OTHER

## 2022-02-06 PROCEDURE — 70498 CT ANGIOGRAPHY NECK: CPT | Performed by: OTHER

## 2022-02-06 RX ORDER — ASPIRIN 81 MG/1
81 TABLET, CHEWABLE ORAL DAILY
Status: DISCONTINUED | OUTPATIENT
Start: 2022-02-06 | End: 2022-02-07

## 2022-02-06 NOTE — ED QUICK NOTES
Orders for admission, patient is aware of plan and ready to go upstairs. Any questions, please call ED RN Shyann at [phone].      Patient Covid vaccination status: Fully vaccinated and immunocompromised     COVID Test Ordered in ED: Rapid SARS-CoV-2 by PCR    COVID Suspicion at Admission: Pending/Low    Running Infusions:  None    Mental Status/LOC at time of transport: AAOX3    Other pertinent information:   CIWA score: N/A   NIH score:  N/A

## 2022-02-06 NOTE — PLAN OF CARE
Norco administered for chronic back pain. VS and neuro assessments q4hrs. No additional complaints of dizziness, nausea, or vomiting. No additional neuro deficits noted. IVF infusing. Plan for 2D echo with bubble study and CTA brain/neck. Patient will require to be pre-medicated with Norco and/or IV Morphine prior to CT imaging studies. Problem: Patient Centered Care  Goal: Patient preferences are identified and integrated in the patient's plan of care  Description: Interventions:  - What would you like us to know as we care for you? Dae Rooney lives with his wife Tiesha Alarcon. Dae Rooney fell and hit his head on the ice three weeks ago. He also has chronic back pain in which he goes to the pain clinic for management. - Provide timely, complete, and accurate information to patient/family  - Incorporate patient and family knowledge, values, beliefs, and cultural backgrounds into the planning and delivery of care  - Encourage patient/family to participate in care and decision-making at the level they choose  - Honor patient and family perspectives and choices  Outcome: Progressing     Problem: Patient/Family Goals  Goal: Patient/Family Long Term Goal  Description: Patient's Long Term Goal: \"to be able to maintain my health so I don't have to be hospitalized\"    Interventions:  - take medications as prescribed at discharge  - follow up with PCP   - See additional Care Plan goals for specific interventions  Outcome: Progressing  Goal: Patient/Family Short Term Goal  Description: Patient's Short Term Goal: \"to be able to get through my CT scans to see if there are any changes.  I'm not sure if I can lay flat for that long\"    Interventions:   - Norco  q8PRN  - IV morphine x 1 dose prior to imaging studies   - See additional Care Plan goals for specific interventions  Outcome: Progressing     Problem: CARDIOVASCULAR - ADULT  Goal: Maintains optimal cardiac output and hemodynamic stability  Description: INTERVENTIONS:  - Monitor vital signs, rhythm, and trends  - Monitor for bleeding, hypotension and signs of decreased cardiac output  - Evaluate effectiveness of vasoactive medications to optimize hemodynamic stability  - Monitor arterial and/or venous puncture sites for bleeding and/or hematoma  - Assess quality of pulses, skin color and temperature  - Assess for signs of decreased coronary artery perfusion - ex. Angina  - Evaluate fluid balance, assess for edema, trend weights  Outcome: Progressing  Goal: Absence of cardiac arrhythmias or at baseline  Description: INTERVENTIONS:  - Continuous cardiac monitoring, monitor vital signs, obtain 12 lead EKG if indicated  - Evaluate effectiveness of antiarrhythmic and heart rate control medications as ordered  - Initiate emergency measures for life threatening arrhythmias  - Monitor electrolytes and administer replacement therapy as ordered  Outcome: Progressing     Problem: NEUROLOGICAL - ADULT  Goal: Achieves stable or improved neurological status  Description: INTERVENTIONS  - Assess for and report changes in neurological status  - Initiate measures to prevent increased intracranial pressure  - Maintain blood pressure and fluid volume within ordered parameters to optimize cerebral perfusion and minimize risk of hemorrhage  - Monitor temperature, glucose, and sodium.  Initiate appropriate interventions as ordered  Outcome: Progressing

## 2022-02-06 NOTE — OCCUPATIONAL THERAPY NOTE
OT orders received and chart reviewed. Pt currently off unit for testing. Neuro consult pending. Per ED note MRI of the brain which showed possible hemorrhage.  OT eval deferred pending testing

## 2022-02-06 NOTE — PHYSICAL THERAPY NOTE
PT orders received, chart reviewed. Pt with multiple tests ordered, possible hemorrhage/hemorrhagic contusion. Also pending neuro consult. Will defer PT until above is resulted.     Annetta Miles, [address] [place] [address]

## 2022-02-07 VITALS
HEART RATE: 69 BPM | RESPIRATION RATE: 18 BRPM | HEIGHT: 69 IN | TEMPERATURE: 98 F | SYSTOLIC BLOOD PRESSURE: 107 MMHG | DIASTOLIC BLOOD PRESSURE: 73 MMHG | BODY MASS INDEX: 33.64 KG/M2 | WEIGHT: 227.13 LBS | OXYGEN SATURATION: 96 %

## 2022-02-07 LAB
GLUCOSE BLDC GLUCOMTR-MCNC: 102 MG/DL (ref 70–99)
GLUCOSE BLDC GLUCOMTR-MCNC: 124 MG/DL (ref 70–99)
GLUCOSE BLDC GLUCOMTR-MCNC: 94 MG/DL (ref 70–99)

## 2022-02-07 PROCEDURE — 99217 OBSERVATION CARE DISCHARGE: CPT | Performed by: INTERNAL MEDICINE

## 2022-02-07 PROCEDURE — 99213 OFFICE O/P EST LOW 20 MIN: CPT | Performed by: OTHER

## 2022-02-07 RX ORDER — ASPIRIN 81 MG/1
81 TABLET, CHEWABLE ORAL DAILY
Qty: 90 TABLET | Refills: 0 | Status: SHIPPED | OUTPATIENT
Start: 2022-02-08

## 2022-02-07 RX ORDER — ATORVASTATIN CALCIUM 40 MG/1
40 TABLET, FILM COATED ORAL NIGHTLY
Qty: 90 TABLET | Refills: 0 | Status: SHIPPED | OUTPATIENT
Start: 2022-02-07

## 2022-02-07 NOTE — PLAN OF CARE
Pt is A&Ox4. On room air. No deficits during neuro checks. Neuro checks q4 until 2/8 0400. PRN given for pain per MAR. PT/OT needs to be seen still. Plan is possible discharge  Problem: Patient Centered Care  Goal: Patient preferences are identified and integrated in the patient's plan of care  Description: Interventions:  - What would you like us to know as we care for you? Sully Vieira lives with his wife Mago Mar. Sully Vieira fell and hit his head on the ice three weeks ago. He also has chronic back pain in which he goes to the pain clinic for management. - Provide timely, complete, and accurate information to patient/family  - Incorporate patient and family knowledge, values, beliefs, and cultural backgrounds into the planning and delivery of care  - Encourage patient/family to participate in care and decision-making at the level they choose  - Honor patient and family perspectives and choices  Outcome: Progressing     Problem: Patient/Family Goals  Goal: Patient/Family Long Term Goal  Description: Patient's Long Term Goal: \"to be able to maintain my health so I don't have to be hospitalized\"    Interventions:  - take medications as prescribed at discharge  - follow up with PCP   - See additional Care Plan goals for specific interventions  Outcome: Progressing  Goal: Patient/Family Short Term Goal  Description: Patient's Short Term Goal: \"to be able to get through my CT scans to see if there are any changes.  I'm not sure if I can lay flat for that long\"    Interventions:   - Norco  q8PRN  - IV morphine x 1 dose prior to imaging studies   - See additional Care Plan goals for specific interventions  Outcome: Progressing     Problem: CARDIOVASCULAR - ADULT  Goal: Maintains optimal cardiac output and hemodynamic stability  Description: INTERVENTIONS:  - Monitor vital signs, rhythm, and trends  - Monitor for bleeding, hypotension and signs of decreased cardiac output  - Evaluate effectiveness of vasoactive medications to optimize hemodynamic stability  - Monitor arterial and/or venous puncture sites for bleeding and/or hematoma  - Assess quality of pulses, skin color and temperature  - Assess for signs of decreased coronary artery perfusion - ex. Angina  - Evaluate fluid balance, assess for edema, trend weights  Outcome: Progressing  Goal: Absence of cardiac arrhythmias or at baseline  Description: INTERVENTIONS:  - Continuous cardiac monitoring, monitor vital signs, obtain 12 lead EKG if indicated  - Evaluate effectiveness of antiarrhythmic and heart rate control medications as ordered  - Initiate emergency measures for life threatening arrhythmias  - Monitor electrolytes and administer replacement therapy as ordered  Outcome: Progressing     Problem: NEUROLOGICAL - ADULT  Goal: Achieves stable or improved neurological status  Description: INTERVENTIONS  - Assess for and report changes in neurological status  - Initiate measures to prevent increased intracranial pressure  - Maintain blood pressure and fluid volume within ordered parameters to optimize cerebral perfusion and minimize risk of hemorrhage  - Monitor temperature, glucose, and sodium.  Initiate appropriate interventions as ordered  Outcome: Progressing

## 2022-02-07 NOTE — CM/SW NOTE
CM CONTACTED [place]. PER PT/OT recs are home health PT/OT. HH orders with face to face order uploaded in aidin [facility] referral.    CM/SW to f/u on available [facility] providers. CM/SW to remain available for support and/or discharge planning.     Amparo Collado RN, CCM    Ext. [phone]

## 2022-02-07 NOTE — CM/SW NOTE
Home/spouse   RA   Self care   Pt/ot to eval.        Plan; TBD    CM/SW to remain available for support and/or discharge planning.     Dewayne Mohs RN, CCM    Ext. [phone]

## 2022-02-08 ENCOUNTER — TELEPHONE (OUTPATIENT)
Dept: INTERNAL MEDICINE CLINIC | Facility: CLINIC | Age: 72
End: 2022-02-08

## 2022-02-08 ENCOUNTER — PATIENT OUTREACH (OUTPATIENT)
Dept: CASE MANAGEMENT | Age: 72
End: 2022-02-08

## 2022-02-08 PROCEDURE — 1111F DSCHRG MED/CURRENT MED MERGE: CPT

## 2022-02-08 NOTE — PLAN OF CARE
Patient alert and oriented x4, saturating well on room air. No complaints of discomfort. No deficits noted with neuro checks. Medically cleared for discharge. Pt discharging to private residence. Discharge instructions including medications and follow-up appointments provided at the bedside with patient and his wife Greman Benitez, both patient and wife verbalized understanding. Tele, ID band, IVs removed. Brought patient down to main entrance and assisted into private vehicle with all belongings. Problem: Patient Centered Care  Goal: Patient preferences are identified and integrated in the patient's plan of care  Description: Interventions:  - What would you like us to know as we care for you? Rina Alcantar lives with his wife German Benitez. Rina Alcantar fell and hit his head on the ice three weeks ago. He also has chronic back pain in which he goes to the pain clinic for management. - Provide timely, complete, and accurate information to patient/family  - Incorporate patient and family knowledge, values, beliefs, and cultural backgrounds into the planning and delivery of care  - Encourage patient/family to participate in care and decision-making at the level they choose  - Honor patient and family perspectives and choices  Outcome: Completed     Problem: Patient/Family Goals  Goal: Patient/Family Long Term Goal  Description: Patient's Long Term Goal: \"to be able to maintain my health so I don't have to be hospitalized\"    Interventions:  - take medications as prescribed at discharge  - follow up with PCP   - See additional Care Plan goals for specific interventions  Outcome: Completed  Goal: Patient/Family Short Term Goal  Description: Patient's Short Term Goal: \"to be able to get through my CT scans to see if there are any changes.  I'm not sure if I can lay flat for that long\"    Interventions:   - Norco  q8PRN  - IV morphine x 1 dose prior to imaging studies   - See additional Care Plan goals for specific interventions  Outcome: Completed     Problem: CARDIOVASCULAR - ADULT  Goal: Maintains optimal cardiac output and hemodynamic stability  Description: INTERVENTIONS:  - Monitor vital signs, rhythm, and trends  - Monitor for bleeding, hypotension and signs of decreased cardiac output  - Evaluate effectiveness of vasoactive medications to optimize hemodynamic stability  - Monitor arterial and/or venous puncture sites for bleeding and/or hematoma  - Assess quality of pulses, skin color and temperature  - Assess for signs of decreased coronary artery perfusion - ex. Angina  - Evaluate fluid balance, assess for edema, trend weights  Outcome: Completed  Goal: Absence of cardiac arrhythmias or at baseline  Description: INTERVENTIONS:  - Continuous cardiac monitoring, monitor vital signs, obtain 12 lead EKG if indicated  - Evaluate effectiveness of antiarrhythmic and heart rate control medications as ordered  - Initiate emergency measures for life threatening arrhythmias  - Monitor electrolytes and administer replacement therapy as ordered  Outcome: Completed     Problem: NEUROLOGICAL - ADULT  Goal: Achieves stable or improved neurological status  Description: INTERVENTIONS  - Assess for and report changes in neurological status  - Initiate measures to prevent increased intracranial pressure  - Maintain blood pressure and fluid volume within ordered parameters to optimize cerebral perfusion and minimize risk of hemorrhage  - Monitor temperature, glucose, and sodium.  Initiate appropriate interventions as ordered  Outcome: Completed

## 2022-02-08 NOTE — TELEPHONE ENCOUNTER
Patient scheduled an ER follow up with Dr. Mckay Thrasher for Thursday. He asked writer what medication he should be on. Writer began going through list and he stopped writer and said something was changed. Writer relayed that his appointment would be the best place to sort out what he should/should not be taking and patient hung up on writer.      Left message to call back if he wished to continue going through list.

## 2022-02-08 NOTE — CM/SW NOTE
SW spoke with the pt's wife who stated the pt. Would prefer to have outpt. PT. The pt. Has a follow up appointment with his PCP on Thursday and will ask for an outpt. PT prescription. The pt or his wife will contact the [facility] near their home once the prescription is received. The pt's wife expressed understanding. [facility] referral has been canceled.       Harika Scott [facility] ext [phone]

## 2022-02-08 NOTE — TELEPHONE ENCOUNTER
Patient called back. Discharge medication list reviewed with patient. Patient had no further questions.

## 2022-02-10 ENCOUNTER — OFFICE VISIT (OUTPATIENT)
Dept: INTERNAL MEDICINE CLINIC | Facility: CLINIC | Age: 72
End: 2022-02-10
Payer: MEDICARE

## 2022-02-10 VITALS
SYSTOLIC BLOOD PRESSURE: 114 MMHG | HEIGHT: 69 IN | BODY MASS INDEX: 33.62 KG/M2 | DIASTOLIC BLOOD PRESSURE: 80 MMHG | OXYGEN SATURATION: 96 % | HEART RATE: 72 BPM | WEIGHT: 227 LBS | RESPIRATION RATE: 14 BRPM

## 2022-02-10 DIAGNOSIS — G45.9 TIA (TRANSIENT ISCHEMIC ATTACK): Primary | ICD-10-CM

## 2022-02-10 DIAGNOSIS — R26.81 GAIT INSTABILITY: ICD-10-CM

## 2022-02-10 PROCEDURE — 99213 OFFICE O/P EST LOW 20 MIN: CPT | Performed by: INTERNAL MEDICINE

## 2022-03-01 ENCOUNTER — OFFICE VISIT (OUTPATIENT)
Dept: PAIN CLINIC | Facility: HOSPITAL | Age: 72
End: 2022-03-01
Attending: ANESTHESIOLOGY
Payer: MEDICARE

## 2022-03-01 DIAGNOSIS — M25.562 BILATERAL CHRONIC KNEE PAIN: ICD-10-CM

## 2022-03-01 DIAGNOSIS — M48.061 SPINAL STENOSIS OF LUMBAR REGION WITHOUT NEUROGENIC CLAUDICATION: ICD-10-CM

## 2022-03-01 DIAGNOSIS — G89.29 BILATERAL CHRONIC KNEE PAIN: ICD-10-CM

## 2022-03-01 DIAGNOSIS — Z51.81 ENCOUNTER FOR MONITORING OPIOID MAINTENANCE THERAPY: Primary | ICD-10-CM

## 2022-03-01 DIAGNOSIS — M25.561 BILATERAL CHRONIC KNEE PAIN: ICD-10-CM

## 2022-03-01 DIAGNOSIS — Z79.891 ENCOUNTER FOR MONITORING OPIOID MAINTENANCE THERAPY: Primary | ICD-10-CM

## 2022-03-01 PROCEDURE — 99211 OFF/OP EST MAY X REQ PHY/QHP: CPT

## 2022-03-01 RX ORDER — HYDROCODONE BITARTRATE AND ACETAMINOPHEN 10; 325 MG/1; MG/1
1 TABLET ORAL EVERY 8 HOURS PRN
Qty: 90 TABLET | Refills: 0 | Status: SHIPPED | OUTPATIENT
Start: 2022-03-05 | End: 2022-04-04

## 2022-03-01 RX ORDER — HYDROCODONE BITARTRATE AND ACETAMINOPHEN 10; 325 MG/1; MG/1
1 TABLET ORAL EVERY 8 HOURS PRN
Qty: 90 TABLET | Refills: 0 | Status: SHIPPED | OUTPATIENT
Start: 2022-04-04 | End: 2022-05-04

## 2022-03-01 NOTE — PROGRESS NOTES
3/1/2021-  Patient presents ambulatory  to [facility];  Medical management for chronic neck ,back   And bilat knee pain. Pt reports he fell on the icy mid January; 3wks later developed Dizziness & nausea requiring admission to [facility];; found sm bleed in the brain; multiple hear test done; everything good; But now has noticed difficulty walking upright; \"I'm like hunched over,  Difficulty looking up\"; no test or films done on his back while in house; Today rates his pain -3;   Seen by Dr. Salena Rueda; Refer to dictation for the continued POC. Juanjo Patrick        NA -6/18/2021  UDS-6/18/2021    MEDICATIONS   NORCO 10/325 Q8 #90/MO     Refills today

## 2022-03-04 ENCOUNTER — MED REC SCAN ONLY (OUTPATIENT)
Dept: INTERNAL MEDICINE CLINIC | Facility: CLINIC | Age: 72
End: 2022-03-04

## 2022-03-08 ENCOUNTER — TELEPHONE (OUTPATIENT)
Dept: INTERNAL MEDICINE CLINIC | Facility: CLINIC | Age: 72
End: 2022-03-08

## 2022-03-08 RX ORDER — NAPROXEN 500 MG/1
500 TABLET ORAL 2 TIMES DAILY PRN
Qty: 60 TABLET | Refills: 0 | Status: SHIPPED | OUTPATIENT
Start: 2022-03-08 | End: 2022-04-07

## 2022-03-08 NOTE — TELEPHONE ENCOUNTER
Patient states he has arthritis and he wants a medication for inflammation. Per patient Aleve helps with joint pain. . patient states to leave a message if he does not answer. Please advise.

## 2022-03-24 ENCOUNTER — OFFICE VISIT (OUTPATIENT)
Dept: NEUROLOGY | Facility: CLINIC | Age: 72
End: 2022-03-24
Payer: MEDICARE

## 2022-03-24 ENCOUNTER — TELEPHONE (OUTPATIENT)
Dept: NEUROLOGY | Facility: CLINIC | Age: 72
End: 2022-03-24

## 2022-03-24 VITALS
WEIGHT: 235 LBS | SYSTOLIC BLOOD PRESSURE: 152 MMHG | OXYGEN SATURATION: 98 % | DIASTOLIC BLOOD PRESSURE: 84 MMHG | RESPIRATION RATE: 16 BRPM | HEART RATE: 67 BPM | HEIGHT: 69 IN | BODY MASS INDEX: 34.8 KG/M2

## 2022-03-24 DIAGNOSIS — S06.350D: ICD-10-CM

## 2022-03-24 DIAGNOSIS — G24.3 CERVICAL DYSTONIA: Primary | ICD-10-CM

## 2022-03-24 PROCEDURE — 99214 OFFICE O/P EST MOD 30 MIN: CPT | Performed by: OTHER

## 2022-03-24 NOTE — TELEPHONE ENCOUNTER
Per Medicare guidelines authorization is not required for Botox , [address] up to 400 units. L/m advising of above. Await return call for scheduling Botox appt.

## 2022-03-25 NOTE — TELEPHONE ENCOUNTER
Patient requesting refill of medication, patient is out of medication. Patient requesting additional refills. metoprolol tartrate 25 MG Oral Tab 180 tablet 1 9/29/2021     Sig - Route:  Take 1 tablet (25 mg total) by mouth 2 (two) times daily. - Oral    Sent to pharmacy as: Metoprolol Tartrate 25 MG Oral Tablet (LOPRESSOR)    E-Prescribing Status: Receipt confirmed by pharmacy (9/29/2021 12:35 PM CDT)

## 2022-03-25 NOTE — TELEPHONE ENCOUNTER
Refill passed per [facility] protocol. Requested Prescriptions   Pending Prescriptions Disp Refills    metoprolol tartrate 25 MG Oral Tab 180 tablet 1     Sig: Take 1 tablet (25 mg total) by mouth 2 (two) times daily.         Hypertensive Medications Protocol Passed - 3/25/2022  3:45 PM        Passed - CMP or BMP in past 12 months        Passed - Appointment in past 6 or next 3 months        Passed - GFR Non- > 50     Lab Results   Component Value Date    GFRNAA 64 02/05/2022                      Recent Outpatient Visits              Yesterday Cervical dystonia    [facility] Oma Kiran MD    Office Visit    3 weeks ago Encounter for monitoring opioid maintenance therapy    [facility] Eulalio Kaminski MD    Office Visit    1 month ago TIA (transient ischemic attack)    [facility], Marco Barker MD    Office Visit    2 months ago Encounter for monitoring opioid maintenance therapy    [facility] Eulalio Kaminski MD    Office Visit    3 months ago Essential hypertension with goal blood pressure less than 140/90    [facility], [address], Marco Barker MD    Office Visit           Future Appointments         Provider Department Appt Notes    In 6 days Keyon Em MD [address] up to 400 units  Charge for Botox    In 1 month Eulalio Kaminski, [address] for Pain Management 2 Month F/U  11/15/18    In 1 month Tianna Larson MD [facility], [facility] 3 mo f/u    In 2 months Sean Osei MD [facility], [address], [place] 6 mo f/up, PSA

## 2022-03-29 ENCOUNTER — TELEPHONE (OUTPATIENT)
Dept: NEUROLOGY | Facility: CLINIC | Age: 72
End: 2022-03-29

## 2022-03-29 NOTE — TELEPHONE ENCOUNTER
Received fax from [facility]. Paperwork placed on Dr. Tima barnes for review. Called patient and LMTCB.

## 2022-03-29 NOTE — TELEPHONE ENCOUNTER
Called [facility] requesting 14 day cardiac monitor results. They will be faxing the results to us. Awaiting for results to call patient.

## 2022-03-30 NOTE — TELEPHONE ENCOUNTER
Discussed with patient results of cardiac monitor as stated below by Dr. Deborah Jackson. Patient verbalized understanding.

## 2022-03-30 NOTE — TELEPHONE ENCOUNTER
[facility] ambulatory telemetry monitor report  Patient was monitored for 13 days, 8 hours, and 54 minutes.   Process date 02/23/2022  Recording started 2/9/2022    Sinus 39-88, average 52 bpm  PACs and PVCs less than 0.5%  Brief atrial tachycardia 10 seconds, 110 bpm  AI VR, 6 beats at 85 bpm and 11 beats irregular at 73 bpm  No atrial fibrillation, no V. tach

## 2022-03-31 ENCOUNTER — OFFICE VISIT (OUTPATIENT)
Dept: NEUROLOGY | Facility: CLINIC | Age: 72
End: 2022-03-31
Payer: MEDICARE

## 2022-03-31 VITALS — BODY MASS INDEX: 19.99 KG/M2 | WEIGHT: 135 LBS | HEIGHT: 69 IN

## 2022-03-31 DIAGNOSIS — G24.3 CERVICAL DYSTONIA: Primary | ICD-10-CM

## 2022-03-31 PROCEDURE — 64616 CHEMODENERV MUSC NECK DYSTON: CPT | Performed by: OTHER

## 2022-03-31 NOTE — PROCEDURES
We did Botox injections for dystonia today. PROCEDURE: Botox Injections (Cervical Dystonia PROTOCOL)   PRE-OPERATIVE DIAGNOSIS: Cervical dystonia, anterocollis. POST-OPERATIVE DIAGNOSIS: same as above   BLOOD LOSS: minimal COMPLICATIONS: none     DESCRIPTION OF PROCEDURE: After a discussion of the risks and benefits, the patient consented to the procedure. The patient was taken to the procedure room. The upper back, neck area was prepped with alcohol swabs. The 2 Botox vials containing 100 units each, were reconstituted with 20 cc saline. Procedure was performed under EMG guidance. Following muscles and units were injected:    Scalenus anterior/medius 50 units on each side (total of 100 units). Sternocleidomastoid muscle bialterally 35 units each (total of 70 units)     Patient tolerated the procedure well. Total of 170 Units of botulinum toxin were used and 30 Units were wasted.

## 2022-04-25 ENCOUNTER — MED REC SCAN ONLY (OUTPATIENT)
Dept: INTERNAL MEDICINE CLINIC | Facility: CLINIC | Age: 72
End: 2022-04-25

## 2022-05-02 ENCOUNTER — OFFICE VISIT (OUTPATIENT)
Dept: PAIN CLINIC | Facility: HOSPITAL | Age: 72
End: 2022-05-02
Attending: ANESTHESIOLOGY
Payer: MEDICARE

## 2022-05-02 DIAGNOSIS — M48.061 SPINAL STENOSIS OF LUMBAR REGION WITHOUT NEUROGENIC CLAUDICATION: ICD-10-CM

## 2022-05-02 DIAGNOSIS — M54.50 LOW BACK PAIN AT MULTIPLE SITES: Primary | ICD-10-CM

## 2022-05-02 DIAGNOSIS — M48.02 SPINAL STENOSIS IN CERVICAL REGION: ICD-10-CM

## 2022-05-02 DIAGNOSIS — Z51.81 ENCOUNTER FOR MONITORING OPIOID MAINTENANCE THERAPY: ICD-10-CM

## 2022-05-02 DIAGNOSIS — Z79.891 ENCOUNTER FOR MONITORING OPIOID MAINTENANCE THERAPY: ICD-10-CM

## 2022-05-02 PROCEDURE — 99211 OFF/OP EST MAY X REQ PHY/QHP: CPT

## 2022-05-02 RX ORDER — HYDROCODONE BITARTRATE AND ACETAMINOPHEN 10; 325 MG/1; MG/1
1 TABLET ORAL EVERY 8 HOURS PRN
Qty: 90 TABLET | Refills: 0 | Status: SHIPPED | OUTPATIENT
Start: 2022-07-03 | End: 2022-08-02

## 2022-05-02 RX ORDER — HYDROCODONE BITARTRATE AND ACETAMINOPHEN 10; 325 MG/1; MG/1
1 TABLET ORAL EVERY 8 HOURS PRN
Qty: 90 TABLET | Refills: 0 | Status: SHIPPED | OUTPATIENT
Start: 2022-06-03 | End: 2022-07-03

## 2022-05-02 RX ORDER — HYDROCODONE BITARTRATE AND ACETAMINOPHEN 10; 325 MG/1; MG/1
1 TABLET ORAL EVERY 8 HOURS PRN
Qty: 90 TABLET | Refills: 0 | Status: SHIPPED | OUTPATIENT
Start: 2022-05-04 | End: 2022-06-03

## 2022-05-02 NOTE — PROGRESS NOTES
5/2/2022-presents ambulatory to [facility]; medical management chronic lbp radiating down bilat. Thighs; 4/10; would liketo discuss lbinjection with the md;  Seen by Dr. Shady Meza; refer to dictation for the plan of care. NA --6/18/2021   UDS-6/18/2021       MEDICATIONS   NORCO 10/325 Q8 #90/MO     Refills given 3 mos.

## 2022-05-03 ENCOUNTER — TELEPHONE (OUTPATIENT)
Dept: PAIN CLINIC | Facility: HOSPITAL | Age: 72
End: 2022-05-03

## 2022-05-03 RX ORDER — ATORVASTATIN CALCIUM 40 MG/1
TABLET, FILM COATED ORAL
Qty: 90 TABLET | Refills: 0 | Status: CANCELLED | OUTPATIENT
Start: 2022-05-03

## 2022-05-03 RX ORDER — ASPIRIN 81 MG/1
TABLET, CHEWABLE ORAL
Qty: 90 TABLET | Refills: 0 | Status: CANCELLED | OUTPATIENT
Start: 2022-05-03

## 2022-05-03 RX ORDER — ATORVASTATIN CALCIUM 40 MG/1
TABLET, FILM COATED ORAL
Qty: 90 TABLET | Refills: 1 | Status: SHIPPED | OUTPATIENT
Start: 2022-05-03

## 2022-05-03 RX ORDER — ASPIRIN 81 MG/1
TABLET, CHEWABLE ORAL
Qty: 90 TABLET | Refills: 1 | Status: SHIPPED | OUTPATIENT
Start: 2022-05-03

## 2022-05-03 NOTE — TELEPHONE ENCOUNTER
Patient called to follow up on status of refill request. See message below. Please contact patient once it is sent to the pharmacy. Patient would like it to go to [facility].

## 2022-05-04 ENCOUNTER — TELEPHONE (OUTPATIENT)
Dept: NEUROLOGY | Facility: CLINIC | Age: 72
End: 2022-05-04

## 2022-05-04 NOTE — TELEPHONE ENCOUNTER
told Patient to call after a month and say how the shots worked. Patient said he is feeling really good and the shots helped a lot. He does feel perhaps a little higher dosage would be even better. He would like to proceed with additional shots.

## 2022-05-04 NOTE — TELEPHONE ENCOUNTER
Per Medicare guidelines authorization is not required for Botox , [address] 200 units. Due 06/28/22.  Scheduled for next series of Botox injections on 06/28/22 at 11:30 am.

## 2022-05-10 ENCOUNTER — OFFICE VISIT (OUTPATIENT)
Dept: INTERNAL MEDICINE CLINIC | Facility: CLINIC | Age: 72
End: 2022-05-10
Payer: MEDICARE

## 2022-05-10 VITALS
SYSTOLIC BLOOD PRESSURE: 136 MMHG | OXYGEN SATURATION: 96 % | RESPIRATION RATE: 14 BRPM | BODY MASS INDEX: 34.07 KG/M2 | HEART RATE: 74 BPM | DIASTOLIC BLOOD PRESSURE: 84 MMHG | WEIGHT: 230 LBS | HEIGHT: 69 IN

## 2022-05-10 DIAGNOSIS — C61 PROSTATE CANCER (HCC): ICD-10-CM

## 2022-05-10 DIAGNOSIS — I10 ESSENTIAL HYPERTENSION WITH GOAL BLOOD PRESSURE LESS THAN 140/90: Primary | ICD-10-CM

## 2022-05-10 DIAGNOSIS — Z23 NEED FOR PNEUMOCOCCAL VACCINATION: ICD-10-CM

## 2022-05-10 DIAGNOSIS — E78.2 MIXED HYPERLIPIDEMIA: ICD-10-CM

## 2022-05-10 PROBLEM — R42 DIZZINESS: Status: RESOLVED | Noted: 2022-02-05 | Resolved: 2022-05-10

## 2022-05-10 PROCEDURE — G0009 ADMIN PNEUMOCOCCAL VACCINE: HCPCS | Performed by: INTERNAL MEDICINE

## 2022-05-10 PROCEDURE — 99214 OFFICE O/P EST MOD 30 MIN: CPT | Performed by: INTERNAL MEDICINE

## 2022-05-10 PROCEDURE — 90732 PPSV23 VACC 2 YRS+ SUBQ/IM: CPT | Performed by: INTERNAL MEDICINE

## 2022-06-03 ENCOUNTER — HOSPITAL ENCOUNTER (OUTPATIENT)
Dept: MRI IMAGING | Facility: HOSPITAL | Age: 72
Discharge: HOME | End: 2022-06-03
Attending: ANESTHESIOLOGY
Payer: MEDICARE

## 2022-06-03 DIAGNOSIS — M54.50 LOW BACK PAIN AT MULTIPLE SITES: ICD-10-CM

## 2022-06-03 PROCEDURE — 72148 MRI LUMBAR SPINE W/O DYE: CPT | Performed by: ANESTHESIOLOGY

## 2022-06-07 ENCOUNTER — LAB ENCOUNTER (OUTPATIENT)
Dept: LAB | Age: 72
End: 2022-06-07
Attending: UROLOGY
Payer: MEDICARE

## 2022-06-07 DIAGNOSIS — R97.21 RISING PSA FOLLOWING TREATMENT FOR MALIGNANT NEOPLASM OF PROSTATE: ICD-10-CM

## 2022-06-07 DIAGNOSIS — C61 PROSTATE CANCER (HCC): ICD-10-CM

## 2022-06-07 LAB — PSA SERPL-MCNC: 4.07 NG/ML (ref ?–4)

## 2022-06-07 PROCEDURE — 84153 ASSAY OF PSA TOTAL: CPT

## 2022-06-08 ENCOUNTER — OFFICE VISIT (OUTPATIENT)
Dept: INTERNAL MEDICINE CLINIC | Facility: CLINIC | Age: 72
End: 2022-06-08
Payer: MEDICARE

## 2022-06-08 ENCOUNTER — HOSPITAL ENCOUNTER (EMERGENCY)
Facility: HOSPITAL | Age: 72
Discharge: HOME | End: 2022-06-08
Attending: EMERGENCY MEDICINE
Payer: MEDICARE

## 2022-06-08 ENCOUNTER — OFFICE VISIT (OUTPATIENT)
Dept: SURGERY | Facility: CLINIC | Age: 72
End: 2022-06-08
Payer: MEDICARE

## 2022-06-08 ENCOUNTER — TELEPHONE (OUTPATIENT)
Dept: INTERNAL MEDICINE CLINIC | Facility: CLINIC | Age: 72
End: 2022-06-08

## 2022-06-08 VITALS
TEMPERATURE: 98 F | WEIGHT: 230 LBS | HEART RATE: 47 BPM | SYSTOLIC BLOOD PRESSURE: 176 MMHG | OXYGEN SATURATION: 98 % | BODY MASS INDEX: 34 KG/M2 | RESPIRATION RATE: 15 BRPM | DIASTOLIC BLOOD PRESSURE: 88 MMHG

## 2022-06-08 VITALS
HEART RATE: 45 BPM | SYSTOLIC BLOOD PRESSURE: 179 MMHG | HEIGHT: 69 IN | WEIGHT: 230 LBS | DIASTOLIC BLOOD PRESSURE: 80 MMHG | BODY MASS INDEX: 34.07 KG/M2

## 2022-06-08 VITALS — HEART RATE: 42 BPM | SYSTOLIC BLOOD PRESSURE: 158 MMHG | DIASTOLIC BLOOD PRESSURE: 90 MMHG

## 2022-06-08 DIAGNOSIS — N52.01 ERECTILE DYSFUNCTION DUE TO ARTERIAL INSUFFICIENCY: ICD-10-CM

## 2022-06-08 DIAGNOSIS — Z92.3 HISTORY OF BRACHYTHERAPY: ICD-10-CM

## 2022-06-08 DIAGNOSIS — I10 HYPERTENSION, UNSPECIFIED TYPE: Primary | ICD-10-CM

## 2022-06-08 DIAGNOSIS — R00.1 BRADYCARDIA: ICD-10-CM

## 2022-06-08 DIAGNOSIS — K59.00 CONSTIPATION, UNSPECIFIED CONSTIPATION TYPE: ICD-10-CM

## 2022-06-08 DIAGNOSIS — R97.21 RISING PSA FOLLOWING TREATMENT FOR MALIGNANT NEOPLASM OF PROSTATE: ICD-10-CM

## 2022-06-08 DIAGNOSIS — C61 PROSTATE CANCER (HCC): Primary | ICD-10-CM

## 2022-06-08 DIAGNOSIS — R35.1 NOCTURIA: ICD-10-CM

## 2022-06-08 DIAGNOSIS — R33.9 INCOMPLETE EMPTYING OF BLADDER: ICD-10-CM

## 2022-06-08 DIAGNOSIS — R00.1 BRADYCARDIA: Primary | ICD-10-CM

## 2022-06-08 DIAGNOSIS — I10 HYPERTENSION, UNSPECIFIED TYPE: ICD-10-CM

## 2022-06-08 LAB
ANION GAP SERPL CALC-SCNC: 5 MMOL/L (ref 0–18)
BASOPHILS # BLD AUTO: 0.1 X10(3) UL (ref 0–0.2)
BASOPHILS NFR BLD AUTO: 1 %
BUN BLD-MCNC: 17 MG/DL (ref 7–18)
BUN/CREAT SERPL: 15.2 (ref 10–20)
CALCIUM BLD-MCNC: 9.8 MG/DL (ref 8.5–10.1)
CHLORIDE SERPL-SCNC: 102 MMOL/L (ref 98–112)
CO2 SERPL-SCNC: 29 MMOL/L (ref 21–32)
CREAT BLD-MCNC: 1.12 MG/DL
DEPRECATED RDW RBC AUTO: 49.1 FL (ref 35.1–46.3)
EOSINOPHIL # BLD AUTO: 0.3 X10(3) UL (ref 0–0.7)
EOSINOPHIL NFR BLD AUTO: 3 %
ERYTHROCYTE [DISTWIDTH] IN BLOOD BY AUTOMATED COUNT: 16.7 % (ref 11–15)
GLUCOSE BLD-MCNC: 104 MG/DL (ref 70–99)
HCT VFR BLD AUTO: 44.3 %
HGB BLD-MCNC: 13.4 G/DL
IMM GRANULOCYTES # BLD AUTO: 0.04 X10(3) UL (ref 0–1)
IMM GRANULOCYTES NFR BLD: 0.4 %
LYMPHOCYTES # BLD AUTO: 1.2 X10(3) UL (ref 1–4)
LYMPHOCYTES NFR BLD AUTO: 12 %
MAGNESIUM SERPL-MCNC: 2.6 MG/DL (ref 1.6–2.6)
MCH RBC QN AUTO: 24.8 PG (ref 26–34)
MCHC RBC AUTO-ENTMCNC: 30.2 G/DL (ref 31–37)
MCV RBC AUTO: 81.9 FL
MONOCYTES # BLD AUTO: 0.75 X10(3) UL (ref 0.1–1)
MONOCYTES NFR BLD AUTO: 7.5 %
NEUTROPHILS # BLD AUTO: 7.65 X10 (3) UL (ref 1.5–7.7)
NEUTROPHILS # BLD AUTO: 7.65 X10(3) UL (ref 1.5–7.7)
NEUTROPHILS NFR BLD AUTO: 76.1 %
OSMOLALITY SERPL CALC.SUM OF ELEC: 284 MOSM/KG (ref 275–295)
PLATELET # BLD AUTO: 344 10(3)UL (ref 150–450)
POTASSIUM SERPL-SCNC: 3.9 MMOL/L (ref 3.5–5.1)
RBC # BLD AUTO: 5.41 X10(6)UL
SODIUM SERPL-SCNC: 136 MMOL/L (ref 136–145)
TSI SER-ACNC: 2.08 MIU/ML (ref 0.36–3.74)
WBC # BLD AUTO: 10 X10(3) UL (ref 4–11)

## 2022-06-08 PROCEDURE — 36415 COLL VENOUS BLD VENIPUNCTURE: CPT

## 2022-06-08 PROCEDURE — 99214 OFFICE O/P EST MOD 30 MIN: CPT | Performed by: UROLOGY

## 2022-06-08 PROCEDURE — 84443 ASSAY THYROID STIM HORMONE: CPT | Performed by: EMERGENCY MEDICINE

## 2022-06-08 PROCEDURE — 80048 BASIC METABOLIC PNL TOTAL CA: CPT | Performed by: EMERGENCY MEDICINE

## 2022-06-08 PROCEDURE — 1126F AMNT PAIN NOTED NONE PRSNT: CPT | Performed by: UROLOGY

## 2022-06-08 PROCEDURE — 99213 OFFICE O/P EST LOW 20 MIN: CPT | Performed by: INTERNAL MEDICINE

## 2022-06-08 PROCEDURE — 85025 COMPLETE CBC W/AUTO DIFF WBC: CPT | Performed by: EMERGENCY MEDICINE

## 2022-06-08 PROCEDURE — 93010 ELECTROCARDIOGRAM REPORT: CPT | Performed by: EMERGENCY MEDICINE

## 2022-06-08 PROCEDURE — 93005 ELECTROCARDIOGRAM TRACING: CPT

## 2022-06-08 PROCEDURE — 99285 EMERGENCY DEPT VISIT HI MDM: CPT

## 2022-06-08 PROCEDURE — 83735 ASSAY OF MAGNESIUM: CPT | Performed by: EMERGENCY MEDICINE

## 2022-06-08 NOTE — ED INITIAL ASSESSMENT (HPI)
Sent by PCP for HR of 45. No prior history of bradycardia. Patient denies dizziness or lightheadedness or any other symptoms at this time.

## 2022-06-08 NOTE — PATIENT INSTRUCTIONS
Joselin Henry M.D.      1.   Please schedule and undergo PET CT for prostate cancer  To schedule, please call [phone]. We will notify you of the results. 2.  Please schedule appointment to see medical oncologist Dr. Nuzhat Rizo. Please call [phone] to schedule. 3.   Please make appointments to see my colleague Dr. Hernando Bowers  October 2022. Please blood draw for PSA 1--7 days before visit with him.

## 2022-06-09 ENCOUNTER — TELEPHONE (OUTPATIENT)
Dept: INTERNAL MEDICINE CLINIC | Facility: CLINIC | Age: 72
End: 2022-06-09

## 2022-06-09 NOTE — TELEPHONE ENCOUNTER
Spoke with patient ( verified) and relayed Dr. Andrea Larios message below--patient verbalizes understanding and agreement-appt scheduled. No further questions/concerns at this time.     Future Appointments   Date Time Provider Song Ritchie   2022 10:15 AM Monik Jacob MD [facility] OF THE [facility]   2022 10:45 AM [facility]   2022 12:00 PM Arita 66 PET [address] PET SCAN [facility] OF [facility] hospital   2022 11:30 AM MD Aravind Gu [facility]   2022 11:30 AM Мария Vaughn MD [facility] PAIN [facility] OF [facility] access hospital   10/19/2022 11:00 AM Prabhakar Bianchi MD [facility]

## 2022-06-14 ENCOUNTER — OFFICE VISIT (OUTPATIENT)
Dept: INTERNAL MEDICINE CLINIC | Facility: CLINIC | Age: 72
End: 2022-06-14
Payer: MEDICARE

## 2022-06-14 VITALS
WEIGHT: 230 LBS | RESPIRATION RATE: 14 BRPM | BODY MASS INDEX: 34.07 KG/M2 | SYSTOLIC BLOOD PRESSURE: 134 MMHG | DIASTOLIC BLOOD PRESSURE: 80 MMHG | HEIGHT: 69 IN | OXYGEN SATURATION: 97 % | HEART RATE: 60 BPM

## 2022-06-14 DIAGNOSIS — R00.1 BRADYCARDIA: ICD-10-CM

## 2022-06-14 DIAGNOSIS — I10 ESSENTIAL HYPERTENSION WITH GOAL BLOOD PRESSURE LESS THAN 140/90: Primary | ICD-10-CM

## 2022-06-14 DIAGNOSIS — C61 PROSTATE CANCER (HCC): ICD-10-CM

## 2022-06-14 PROCEDURE — 99214 OFFICE O/P EST MOD 30 MIN: CPT | Performed by: INTERNAL MEDICINE

## 2022-06-14 PROCEDURE — 1126F AMNT PAIN NOTED NONE PRSNT: CPT | Performed by: INTERNAL MEDICINE

## 2022-06-14 RX ORDER — NAPROXEN 500 MG/1
500 TABLET ORAL AS NEEDED
COMMUNITY

## 2022-06-23 ENCOUNTER — HOSPITAL ENCOUNTER (OUTPATIENT)
Dept: NUCLEAR MEDICINE | Facility: HOSPITAL | Age: 72
Discharge: HOME | End: 2022-06-23
Attending: UROLOGY
Payer: MEDICARE

## 2022-06-23 DIAGNOSIS — Z92.3 HISTORY OF BRACHYTHERAPY: ICD-10-CM

## 2022-06-23 DIAGNOSIS — R97.21 RISING PSA FOLLOWING TREATMENT FOR MALIGNANT NEOPLASM OF PROSTATE: ICD-10-CM

## 2022-06-23 DIAGNOSIS — C61 PROSTATE CANCER (HCC): ICD-10-CM

## 2022-06-23 PROCEDURE — 78815 PET IMAGE W/CT SKULL-THIGH: CPT | Performed by: UROLOGY

## 2022-06-24 ENCOUNTER — TELEPHONE (OUTPATIENT)
Dept: SURGERY | Facility: CLINIC | Age: 72
End: 2022-06-24

## 2022-06-27 NOTE — TELEPHONE ENCOUNTER
I called pt verified name/ gave results of PET scan results from Dr. Florentino Arita below, pt has suzanne wed to see Dr. Marisol Felix this week and suzanne already scheduled with Dr. Bri Johnson for 2022.

## 2022-06-28 ENCOUNTER — OFFICE VISIT (OUTPATIENT)
Dept: NEUROLOGY | Facility: CLINIC | Age: 72
End: 2022-06-28
Payer: MEDICARE

## 2022-06-28 DIAGNOSIS — G24.3 CERVICAL DYSTONIA: Primary | ICD-10-CM

## 2022-06-28 PROCEDURE — 95874 GUIDE NERV DESTR NEEDLE EMG: CPT | Performed by: OTHER

## 2022-06-28 PROCEDURE — 64616 CHEMODENERV MUSC NECK DYSTON: CPT | Performed by: OTHER

## 2022-06-29 ENCOUNTER — OFFICE VISIT (OUTPATIENT)
Dept: HEMATOLOGY/ONCOLOGY | Facility: HOSPITAL | Age: 72
End: 2022-06-29
Attending: STUDENT IN AN ORGANIZED HEALTH CARE EDUCATION/TRAINING PROGRAM
Payer: MEDICARE

## 2022-06-29 VITALS
HEIGHT: 69 IN | WEIGHT: 232 LBS | RESPIRATION RATE: 16 BRPM | BODY MASS INDEX: 34.36 KG/M2 | DIASTOLIC BLOOD PRESSURE: 75 MMHG | HEART RATE: 46 BPM | TEMPERATURE: 99 F | OXYGEN SATURATION: 96 % | SYSTOLIC BLOOD PRESSURE: 152 MMHG

## 2022-06-29 DIAGNOSIS — G89.4 CHRONIC PAIN SYNDROME: ICD-10-CM

## 2022-06-29 DIAGNOSIS — I10 ESSENTIAL HYPERTENSION WITH GOAL BLOOD PRESSURE LESS THAN 140/90: ICD-10-CM

## 2022-06-29 DIAGNOSIS — C61 PROSTATE CANCER (HCC): Primary | ICD-10-CM

## 2022-06-29 DIAGNOSIS — M54.12 CERVICAL RADICULAR PAIN: ICD-10-CM

## 2022-06-29 PROCEDURE — 99211 OFF/OP EST MAY X REQ PHY/QHP: CPT

## 2022-07-11 ENCOUNTER — TELEPHONE (OUTPATIENT)
Dept: HEMATOLOGY/ONCOLOGY | Facility: HOSPITAL | Age: 72
End: 2022-07-11

## 2022-07-11 NOTE — TELEPHONE ENCOUNTER
Discussed tumor board consensus for continued observation, which is also the patient's preference. He is scheduled to see Dr. John Radford in October for PSA check, so no need for medical oncology followup at this time, but he is welcome back as questions or issues arise.      Maggie Castro, [address] [place] [address]

## 2022-07-12 RX ORDER — LOSARTAN POTASSIUM 50 MG/1
50 TABLET ORAL DAILY
Qty: 90 TABLET | Refills: 1 | Status: SHIPPED | OUTPATIENT
Start: 2022-07-12

## 2022-07-12 NOTE — TELEPHONE ENCOUNTER
Pt is calling for status of his medication refill request. Pt states that he is out of medication and can be reached at [phone].

## 2022-07-28 ENCOUNTER — OFFICE VISIT (OUTPATIENT)
Dept: PAIN CLINIC | Facility: HOSPITAL | Age: 72
End: 2022-07-28
Attending: ANESTHESIOLOGY
Payer: MEDICARE

## 2022-07-28 VITALS — SYSTOLIC BLOOD PRESSURE: 149 MMHG | OXYGEN SATURATION: 94 % | HEART RATE: 45 BPM | DIASTOLIC BLOOD PRESSURE: 84 MMHG

## 2022-07-28 DIAGNOSIS — M48.061 SPINAL STENOSIS OF LUMBAR REGION WITHOUT NEUROGENIC CLAUDICATION: ICD-10-CM

## 2022-07-28 DIAGNOSIS — M54.50 LOW BACK PAIN AT MULTIPLE SITES: ICD-10-CM

## 2022-07-28 DIAGNOSIS — Z51.81 ENCOUNTER FOR MONITORING OPIOID MAINTENANCE THERAPY: Primary | ICD-10-CM

## 2022-07-28 DIAGNOSIS — Z79.891 ENCOUNTER FOR MONITORING OPIOID MAINTENANCE THERAPY: Primary | ICD-10-CM

## 2022-07-28 DIAGNOSIS — M48.02 SPINAL STENOSIS IN CERVICAL REGION: ICD-10-CM

## 2022-07-28 LAB
AMPHET UR QL SCN: NEGATIVE
BARBITURATES UR QL SCN: NEGATIVE
BENZODIAZ UR QL SCN: NEGATIVE
CANNABINOIDS UR QL SCN: NEGATIVE
COCAINE UR QL: NEGATIVE
CREAT UR-SCNC: 135 MG/DL
MDMA UR QL SCN: NEGATIVE
METHADONE UR QL SCN: NEGATIVE
OXYCODONE UR QL SCN: NEGATIVE
PCP UR QL SCN: NEGATIVE

## 2022-07-28 PROCEDURE — 80361 OPIATES 1 OR MORE: CPT | Performed by: ANESTHESIOLOGY

## 2022-07-28 PROCEDURE — 99211 OFF/OP EST MAY X REQ PHY/QHP: CPT

## 2022-07-28 PROCEDURE — 80307 DRUG TEST PRSMV CHEM ANLYZR: CPT | Performed by: ANESTHESIOLOGY

## 2022-07-28 RX ORDER — HYDROCODONE BITARTRATE AND ACETAMINOPHEN 10; 325 MG/1; MG/1
1 TABLET ORAL EVERY 8 HOURS PRN
Qty: 90 TABLET | Refills: 0 | Status: SHIPPED | OUTPATIENT
Start: 2022-09-01 | End: 2022-10-01

## 2022-07-28 RX ORDER — HYDROCODONE BITARTRATE AND ACETAMINOPHEN 10; 325 MG/1; MG/1
1 TABLET ORAL EVERY 8 HOURS PRN
Qty: 90 TABLET | Refills: 0 | Status: SHIPPED | OUTPATIENT
Start: 2022-08-02 | End: 2022-09-01

## 2022-07-28 RX ORDER — HYDROCODONE BITARTRATE AND ACETAMINOPHEN 10; 325 MG/1; MG/1
1 TABLET ORAL EVERY 8 HOURS PRN
Qty: 90 TABLET | Refills: 0 | Status: SHIPPED | OUTPATIENT
Start: 2022-09-30 | End: 2022-10-30

## 2022-08-01 LAB
OPIATES, UR, 6-ACETYLMORPHINE: <10 NG/ML
OPIATES, URINE, CODEINE: <20 NG/ML
OPIATES, URINE, HYDROCODONE: 2527 NG/ML
OPIATES, URINE, HYDROMORPHONE: 29 NG/ML
OPIATES, URINE, MORPHINE: <20 NG/ML
OPIATES, URINE, NORHYDROCODONE: 1489 NG/ML
OPIATES, URINE, NOROXYCODONE: <20 NG/ML
OPIATES, URINE, NOROXYMORPHONE: <20 NG/ML
OPIATES, URINE, OXYCODONE: <20 NG/ML
OPIATES, URINE, OXYMORPHONE: <20 NG/ML

## 2022-08-12 RX ORDER — LOSARTAN POTASSIUM 50 MG/1
TABLET ORAL
Qty: 90 TABLET | Refills: 1 | Status: SHIPPED | OUTPATIENT
Start: 2022-08-12

## 2022-08-15 ENCOUNTER — TELEPHONE (OUTPATIENT)
Dept: INTERNAL MEDICINE CLINIC | Facility: CLINIC | Age: 72
End: 2022-08-15

## 2022-08-15 DIAGNOSIS — I10 ESSENTIAL HYPERTENSION WITH GOAL BLOOD PRESSURE LESS THAN 140/90: Primary | ICD-10-CM

## 2022-08-15 NOTE — TELEPHONE ENCOUNTER
Patient requesting order for labs for his physical in September. Please call to update at [phone],ACTXOV.

## 2022-08-18 NOTE — TELEPHONE ENCOUNTER
Patient contacted (name and  of patient verified). Dr. Norma Mccormick message reviewed. Patient verbalizes understanding. Requested review of upcoming appointment information, information reviewed:   Future Appointments   Date Time Provider Song Ritchie   10/4/2022 12:30 PM Raman Abreu MD [facility]   10/19/2022 11:00 AM [facility]ene [facility]   10/21/2022 11:00 AM BRITT Martin [facility] OF [facility]   11/10/2022 11:00 AM Crystal Winters MD [facility] [place]

## 2022-08-18 NOTE — TELEPHONE ENCOUNTER
I have reviewed his blood testing. He does not need any labs at this time. Please inform him. Thank you I will discuss it further during the office visit.

## 2022-09-12 ENCOUNTER — TELEPHONE (OUTPATIENT)
Dept: NEUROLOGY | Facility: CLINIC | Age: 72
End: 2022-09-12

## 2022-09-12 NOTE — TELEPHONE ENCOUNTER
Per Medicare guidelines authorization is not required for Botox , [id number]  Due 09/25/22 Buy & Bill 200 units. L/m requesting a return call for scheduling Botox appt.

## 2022-09-28 LAB — AMB EXT COVID-19 RESULT: DETECTED

## 2022-09-29 ENCOUNTER — NURSE TRIAGE (OUTPATIENT)
Dept: INTERNAL MEDICINE CLINIC | Facility: CLINIC | Age: 72
End: 2022-09-29

## 2022-09-29 NOTE — TELEPHONE ENCOUNTER
Patient called and advised of telephone visit today at 4:30pm. Went over copay and that provider will be calling from a restricted/unknown number to make sure able to accept/pickup those calls. Patient agreed.      Future Appointments   Date Time Provider Song Ritchie   9/29/2022  4:30 PM Ana Gomez MD [facility]

## 2022-09-30 NOTE — TELEPHONE ENCOUNTER
Please review. Protocol failed or has no protocol.     Requested Prescriptions   Pending Prescriptions Disp Refills    ATORVASTATIN 40 MG Oral Tab [Pharmacy Med Name: ATORVASTATIN 40MG TABLETS] 90 tablet 1     Sig: TAKE 1 TABLET(40 MG) BY MOUTH EVERY NIGHT        Cholesterol Medication Protocol Failed - 9/30/2022  4:04 PM        Failed - ALT in past 12 months        Failed - Last ALT < 80       Lab Results   Component Value Date    ALT 25 09/23/2021             Passed - LDL in past 12 months        Passed - Last LDL < 130     Lab Results   Component Value Date    LDL 89 02/05/2022               Passed - In person appointment or virtual visit in the past 12 mos or appointment in next 3 mos       Recent Outpatient Visits              Yesterday COVID-19    Shalini Maldonado MD    Virtual Phone E/M    2 months ago Encounter for monitoring opioid maintenance therapy    [facility] Mariza May MD    Office Visit    3 months ago Prostate cancer [facility] Hematology Oncology Galileo Meléndez DO    Office Visit    3 months ago Cervical dystonia    [facility] Breanna Molina MD    Office Visit    3 months ago Essential hypertension with goal blood pressure less than 140/90    Cayden Guzmán, Lila Cruz MD    Office Visit     Future Appointments         Provider Department Appt Notes    In 2 weeks [facility] 3 Month F/U  11/15/18    In 3 weeks Zuly Fleming MD [facility], [place], [address] [place] [address]    In 1 month Janes Amaral MD [facility] Dermatology new** full skin check     In 1 month Nina Pedraza MD [address] (policy informed, address given)  lst px 9/29/21                     Recent Outpatient Visits              Yesterday COVID-19 Jasmin Smith MD    Virtual Phone E/M    2 months ago Encounter for monitoring opioid maintenance therapy    THE [facility] Amilcar Pino MD    Office Visit    3 months ago Prostate cancer [facility] Hematology Oncology Catrachito Malloy DO    Office Visit    3 months ago Cervical dystonia    [facility] Clifton MD    Office Visit    3 months ago Essential hypertension with goal blood pressure less than 140/90    Jesse Toth MD    Office Visit            Future Appointments         Provider Department Appt Notes    In 2 weeks Amilcar Pino MD [facility] 3 Month F/U  11/15/18    In 3 weeks Clarisa Hannah MD [facility], [address], [address] [place] [address]    In 1 month Lucy Kmible MD [facility] Dermatology new** full skin check     In 1 month Nahun Coles MD [facility] (policy informed, address given)  lst px 9/29/21

## 2022-10-01 RX ORDER — ATORVASTATIN CALCIUM 40 MG/1
40 TABLET, FILM COATED ORAL NIGHTLY
Qty: 90 TABLET | Refills: 3 | Status: SHIPPED | OUTPATIENT
Start: 2022-10-01

## 2022-10-19 ENCOUNTER — LAB ENCOUNTER (OUTPATIENT)
Dept: LAB | Facility: HOSPITAL | Age: 72
End: 2022-10-19
Attending: ANESTHESIOLOGY
Payer: MEDICARE

## 2022-10-19 ENCOUNTER — OFFICE VISIT (OUTPATIENT)
Dept: PAIN CLINIC | Facility: HOSPITAL | Age: 72
End: 2022-10-19
Attending: ANESTHESIOLOGY
Payer: MEDICARE

## 2022-10-19 VITALS
HEART RATE: 55 BPM | WEIGHT: 232 LBS | DIASTOLIC BLOOD PRESSURE: 84 MMHG | SYSTOLIC BLOOD PRESSURE: 153 MMHG | OXYGEN SATURATION: 93 % | BODY MASS INDEX: 34.36 KG/M2 | HEIGHT: 69 IN

## 2022-10-19 DIAGNOSIS — Z79.891 ENCOUNTER FOR MONITORING OPIOID MAINTENANCE THERAPY: Primary | ICD-10-CM

## 2022-10-19 DIAGNOSIS — Z51.81 ENCOUNTER FOR MONITORING OPIOID MAINTENANCE THERAPY: Primary | ICD-10-CM

## 2022-10-19 DIAGNOSIS — R97.21 RISING PSA FOLLOWING TREATMENT FOR MALIGNANT NEOPLASM OF PROSTATE: ICD-10-CM

## 2022-10-19 DIAGNOSIS — M48.02 SPINAL STENOSIS IN CERVICAL REGION: ICD-10-CM

## 2022-10-19 DIAGNOSIS — M25.561 BILATERAL CHRONIC KNEE PAIN: ICD-10-CM

## 2022-10-19 DIAGNOSIS — M48.061 SPINAL STENOSIS OF LUMBAR REGION WITHOUT NEUROGENIC CLAUDICATION: ICD-10-CM

## 2022-10-19 DIAGNOSIS — M25.562 BILATERAL CHRONIC KNEE PAIN: ICD-10-CM

## 2022-10-19 DIAGNOSIS — G89.29 BILATERAL CHRONIC KNEE PAIN: ICD-10-CM

## 2022-10-19 DIAGNOSIS — C61 PROSTATE CANCER (HCC): ICD-10-CM

## 2022-10-19 PROBLEM — M25.512 SHOULDER PAIN, LEFT: Status: ACTIVE | Noted: 2017-11-28

## 2022-10-19 PROBLEM — M47.812 CERVICAL SPONDYLOSIS: Status: ACTIVE | Noted: 2017-11-28

## 2022-10-19 LAB — PSA SERPL-MCNC: 3.76 NG/ML (ref ?–4)

## 2022-10-19 PROCEDURE — 84153 ASSAY OF PSA TOTAL: CPT

## 2022-10-19 PROCEDURE — 99211 OFF/OP EST MAY X REQ PHY/QHP: CPT

## 2022-10-19 PROCEDURE — 36415 COLL VENOUS BLD VENIPUNCTURE: CPT

## 2022-10-19 RX ORDER — HYDROCODONE BITARTRATE AND ACETAMINOPHEN 10; 325 MG/1; MG/1
1 TABLET ORAL EVERY 8 HOURS PRN
Qty: 90 TABLET | Refills: 0 | Status: SHIPPED | OUTPATIENT
Start: 2022-11-01 | End: 2022-12-01

## 2022-10-19 RX ORDER — HYDROCODONE BITARTRATE AND ACETAMINOPHEN 10; 325 MG/1; MG/1
1 TABLET ORAL EVERY 6 HOURS PRN
Qty: 90 TABLET | Refills: 0 | Status: SHIPPED | OUTPATIENT
Start: 2022-12-31 | End: 2023-01-30

## 2022-10-19 RX ORDER — HYDROCODONE BITARTRATE AND ACETAMINOPHEN 10; 325 MG/1; MG/1
1 TABLET ORAL EVERY 8 HOURS PRN
Qty: 90 TABLET | Refills: 0 | Status: SHIPPED | OUTPATIENT
Start: 2022-12-01 | End: 2022-12-31

## 2022-10-19 NOTE — PROGRESS NOTES
Pain Navigator    Pt presents ambulatory to the CPM.  Seen by Pato Yoder  Here for med eval / refills

## 2022-10-24 ENCOUNTER — OFFICE VISIT (OUTPATIENT)
Dept: SURGERY | Facility: CLINIC | Age: 72
End: 2022-10-24
Payer: MEDICARE

## 2022-10-24 VITALS — SYSTOLIC BLOOD PRESSURE: 150 MMHG | DIASTOLIC BLOOD PRESSURE: 88 MMHG | HEART RATE: 88 BPM

## 2022-10-24 DIAGNOSIS — R97.21 RISING PSA FOLLOWING TREATMENT FOR MALIGNANT NEOPLASM OF PROSTATE: Primary | ICD-10-CM

## 2022-10-24 DIAGNOSIS — C61 PROSTATE CANCER (HCC): ICD-10-CM

## 2022-10-24 DIAGNOSIS — N40.1 BPH WITH OBSTRUCTION/LOWER URINARY TRACT SYMPTOMS: ICD-10-CM

## 2022-10-24 DIAGNOSIS — N52.01 ERECTILE DYSFUNCTION DUE TO ARTERIAL INSUFFICIENCY: ICD-10-CM

## 2022-10-24 DIAGNOSIS — N13.8 BPH WITH OBSTRUCTION/LOWER URINARY TRACT SYMPTOMS: ICD-10-CM

## 2022-10-24 PROCEDURE — 99214 OFFICE O/P EST MOD 30 MIN: CPT | Performed by: UROLOGY

## 2022-10-24 NOTE — PROGRESS NOTES
[facility] Urology  Follow-Up Visit    HPI: Erica Peña is a 67year old male presents for a follow up visit. Patient was last seen on 6/8/2022 by Dr. Karen Miles. INTERVAL HISTORY: Patient presents to establish continuity of urological care following Dr. Kendra Guillaume residential. He denies fevers or chills, chest pain or shortness of breath. No bone pain or unintentional weight loss. No change in appetite. No gross hematuria or dysuria. PSA 10/19/2022 decreased to 3.76 ng/mL, decreased from 4.07 ng/mL in 6/2022. 1. Prostate Cancer w/ Biochemical Recurrence  History of multiple previous prostate biopsies. Diagnosed with intermediate risk prostate cancer upon saturation prostate biopsy 1/9/2014 with up to Tito 3+4=7 disease in 8/40 cores. Treated with brachytherapy seed implants at the [facility] prostate center 2/2014. PSA junior reportedly around 1.3 ng/mL. PSA levels have been slowly rising since 2020. PSA up to 4.07 ng/mL 6/7/2022. Patient underwent a TRUS-guided prostate biopsy by Dr. Karen Miles 12/2021 which was negative. PSMA PET scan 6/2022 without evidence of recurrent or metastatic disease. Seen by medical oncology 6/2022. Patient electing continued monitoring as opposed to starting ADT. - 10/2022 F/U: PSA decreased to 3.76 ng/mL. Patient elects continued monitoring. 2. ED  Chronic and longstanding. Previously on tadalafil as needed. Patient no longer sexually active. 3. BPH with LUTS  Chronic and longstanding LUTS. Not on any medical therapy. - 10/2022 F/U: IPSS score is 18 (3/2/2/2/3/2/4) with a quality-of-life score of 3. Patient not interested in any treatment at this point. Reviewed past medical, surgical, family, and social history. Reviewed med list and allergies. REVIEW OF SYSTEMS:  Pertinent positives and negatives per HPI. A 10-point ROS was performed and is otherwise negative.        EXAM:  /88 (BP Location: Left arm, Patient Position: Sitting, Cuff Size: adult)   Pulse 88      Physical Exam  Constitutional:       Appearance: He is well-developed. HENT:      Head: Normocephalic. Eyes:      General: No scleral icterus. Cardiovascular:      Rate and Rhythm: Normal rate. Pulmonary:      Effort: Pulmonary effort is normal.   Skin:     General: Skin is warm and dry. Neurological:      Mental Status: He is alert and oriented to person, place, and time. Psychiatric:         Mood and Affect: Mood normal.         Behavior: Behavior normal.       PATHOLOGY:  See HPI for details. LABS:    Component PSA   Latest Ref Rng & Units <=4.00 ng/mL   10/19/2022 3.76   6/7/2022 4.07 (H)   10/26/2021 3.03   6/16/2021 2.80   2/19/2021 2.75   10/15/2020 2.48   6/16/2020 2.18   2/5/2020 2.15   8/29/2019 1.89   3/1/2019 1.73   11/2/2018 2.0   7/3/2018 1.5   2/26/2018 1.6   11/21/2017 1.6   5/16/2017 1.3   11/15/2016 1.4   5/11/2016 1.6   1/12/2016 1.9   9/15/2015 1.8   3/10/2015 2.8   8/13/2014 4.6 (H)   12/4/2013 7.9 (H)   9/1/2013 8.2 (H)   7/28/2013 10.2 (H)   11/1/2012 7.4 (H)   12/11/2011 8.3 (H)       IMAGING:    PSMA PET scan (6/23/2022): Brachytherapy posttreatment changes in the prostate gland. No areas of increased radiotracer activity are noted to suggest residual or recurrent disease. No evidence for distant metastatic disease. Incidental findings as above including distal colonic diverticulosis and coronary artery calcifications. UROLOGY PROCEDURE:  Not performed today. IMPRESSION:  67year old male with history of prostate cancer status post brachytherapy seed implants in 2014. Patient with biochemical evidence of disease recurrence given slowly rising PSA levels. Negative prostate biopsy 12/2021 and negative PSMA PET scan 6/2022 without evidence of local or distant recurrence of prostate cancer. Recent PSA level slightly decreased/relatively stable.     Findings reviewed with patient at length. Management options discussed including continued monitoring or initiating ADT. Rationale, approach, benefits, risks, and alternatives to ADT were discussed. Patient wishes to continue monitoring for the time being. Baseline BPH with LUTS, not on medical therapy. Discussed initiating medical therapy versus minimally invasive surgical treatments. Patient not interested at this time. Longstanding chronic ED, patient no longer sexually active. Not interested in further treatment at this time. PLAN:  1. Continue to monitor PSA levels given biochemically recurrent prostate cancer without radiographic or pathologic evidence of disease recurrence or spread. We will have him return for follow-up in 6 months with a PSA prior to office visit. 2.  Conservative management/watchful waiting of ED and BPH with LUTS. 27 mintues were spent on this visit including face-to-face discussion involving counseling, further management and treatment planning, reviewing testing and interpreting imaging results, ordering new testing, and documenting in patient's medical record.     Joanne Blackburn MD  10/24/2022

## 2022-11-16 ENCOUNTER — OFFICE VISIT (OUTPATIENT)
Dept: INTERNAL MEDICINE CLINIC | Facility: CLINIC | Age: 72
End: 2022-11-16
Payer: MEDICARE

## 2022-11-16 VITALS
WEIGHT: 222 LBS | SYSTOLIC BLOOD PRESSURE: 138 MMHG | DIASTOLIC BLOOD PRESSURE: 88 MMHG | BODY MASS INDEX: 32.88 KG/M2 | HEART RATE: 50 BPM | RESPIRATION RATE: 14 BRPM | HEIGHT: 69 IN | OXYGEN SATURATION: 97 %

## 2022-11-16 DIAGNOSIS — E66.09 CLASS 1 OBESITY DUE TO EXCESS CALORIES WITH SERIOUS COMORBIDITY IN ADULT, UNSPECIFIED BMI: ICD-10-CM

## 2022-11-16 DIAGNOSIS — N48.6 PEYRONIE'S DISEASE: ICD-10-CM

## 2022-11-16 DIAGNOSIS — I10 ESSENTIAL HYPERTENSION WITH GOAL BLOOD PRESSURE LESS THAN 140/90: Primary | ICD-10-CM

## 2022-11-16 DIAGNOSIS — E78.2 MIXED HYPERLIPIDEMIA: ICD-10-CM

## 2022-11-16 DIAGNOSIS — M17.0 PRIMARY OSTEOARTHRITIS OF BOTH KNEES: ICD-10-CM

## 2022-11-16 DIAGNOSIS — N52.9 VASCULOGENIC ERECTILE DYSFUNCTION, UNSPECIFIED VASCULOGENIC ERECTILE DYSFUNCTION TYPE: ICD-10-CM

## 2022-11-16 DIAGNOSIS — H54.7 DECREASED VISUAL ACUITY: ICD-10-CM

## 2022-11-16 DIAGNOSIS — G56.00 CARPAL TUNNEL SYNDROME, UNSPECIFIED LATERALITY: ICD-10-CM

## 2022-11-16 DIAGNOSIS — C61 PROSTATE CANCER (HCC): ICD-10-CM

## 2022-11-16 DIAGNOSIS — H60.60 CHRONIC OTITIS EXTERNA, UNSPECIFIED LATERALITY, UNSPECIFIED TYPE: ICD-10-CM

## 2022-11-16 DIAGNOSIS — G89.4 CHRONIC PAIN SYNDROME: ICD-10-CM

## 2022-11-16 DIAGNOSIS — N40.0 HYPERPLASIA OF PROSTATE WITHOUT LOWER URINARY TRACT SYMPTOMS (LUTS): ICD-10-CM

## 2022-11-16 DIAGNOSIS — R91.1 SOLITARY PULMONARY NODULE: ICD-10-CM

## 2022-11-16 DIAGNOSIS — M47.812 CERVICAL SPONDYLOSIS: ICD-10-CM

## 2022-11-16 PROBLEM — M25.512 SHOULDER PAIN, LEFT: Status: RESOLVED | Noted: 2017-11-28 | Resolved: 2022-11-16

## 2022-11-16 PROBLEM — E87.1 HYPONATREMIA: Status: RESOLVED | Noted: 2022-02-05 | Resolved: 2022-11-16

## 2022-11-16 PROBLEM — R23.3 PETECHIAL HEMORRHAGE: Status: RESOLVED | Noted: 2022-02-05 | Resolved: 2022-11-16

## 2022-12-07 ENCOUNTER — TELEPHONE (OUTPATIENT)
Dept: INTERNAL MEDICINE CLINIC | Facility: CLINIC | Age: 72
End: 2022-12-07

## 2022-12-07 NOTE — TELEPHONE ENCOUNTER
Flower Patrick wants to come in for follow up, but isn't sure when he is supposed to come back and doesn't want to schedule too soon, please call patient to schedule follow up appt.

## 2022-12-30 ENCOUNTER — OFFICE VISIT (OUTPATIENT)
Dept: PAIN CLINIC | Facility: HOSPITAL | Age: 72
End: 2022-12-30
Attending: PHYSICIAN ASSISTANT
Payer: MEDICARE

## 2022-12-30 VITALS — DIASTOLIC BLOOD PRESSURE: 89 MMHG | OXYGEN SATURATION: 93 % | HEART RATE: 54 BPM | SYSTOLIC BLOOD PRESSURE: 150 MMHG

## 2022-12-30 DIAGNOSIS — G89.29 OTHER CHRONIC PAIN: Primary | ICD-10-CM

## 2022-12-30 DIAGNOSIS — Z79.891 CHRONIC PRESCRIPTION OPIATE USE: ICD-10-CM

## 2022-12-30 PROCEDURE — 99211 OFF/OP EST MAY X REQ PHY/QHP: CPT

## 2022-12-30 RX ORDER — HYDROCODONE BITARTRATE AND ACETAMINOPHEN 10; 325 MG/1; MG/1
1 TABLET ORAL EVERY 8 HOURS PRN
Qty: 90 TABLET | Refills: 0 | Status: SHIPPED | OUTPATIENT
Start: 2022-12-31 | End: 2023-01-30

## 2022-12-30 RX ORDER — HYDROCODONE BITARTRATE AND ACETAMINOPHEN 10; 325 MG/1; MG/1
1 TABLET ORAL EVERY 8 HOURS PRN
Qty: 90 TABLET | Refills: 0 | Status: SHIPPED | OUTPATIENT
Start: 2023-01-30 | End: 2023-03-01

## 2022-12-30 NOTE — PROGRESS NOTES
PT presents ambulatory to the CPM.  PT states \"my pain is increasing in my legs and is hard for me to go up stairs. Kerline got chronic pain everywhere\". 1/10 pain that can increase to 4/ 10 going for sit to stand and with activity. SORAYA Marte saw PT for med eval.  See notes for POC.

## 2023-01-09 ENCOUNTER — APPOINTMENT (OUTPATIENT)
Dept: GENERAL RADIOLOGY | Facility: HOSPITAL | Age: 73
End: 2023-01-09
Attending: EMERGENCY MEDICINE
Payer: MEDICARE

## 2023-01-09 ENCOUNTER — NURSE TRIAGE (OUTPATIENT)
Dept: INTERNAL MEDICINE CLINIC | Facility: CLINIC | Age: 73
End: 2023-01-09

## 2023-01-09 ENCOUNTER — HOSPITAL ENCOUNTER (EMERGENCY)
Facility: HOSPITAL | Age: 73
Discharge: HOME | End: 2023-01-09
Attending: EMERGENCY MEDICINE
Payer: MEDICARE

## 2023-01-09 VITALS
TEMPERATURE: 97 F | BODY MASS INDEX: 34.07 KG/M2 | HEART RATE: 48 BPM | SYSTOLIC BLOOD PRESSURE: 161 MMHG | OXYGEN SATURATION: 95 % | WEIGHT: 230 LBS | RESPIRATION RATE: 18 BRPM | DIASTOLIC BLOOD PRESSURE: 86 MMHG | HEIGHT: 69 IN

## 2023-01-09 DIAGNOSIS — R42 LIGHT HEADEDNESS: ICD-10-CM

## 2023-01-09 DIAGNOSIS — I10 HYPERTENSION, UNSPECIFIED TYPE: Primary | ICD-10-CM

## 2023-01-09 LAB
ANION GAP SERPL CALC-SCNC: 6 MMOL/L (ref 0–18)
ATRIAL RATE: 51 BPM
BASOPHILS # BLD AUTO: 0.1 X10(3) UL (ref 0–0.2)
BASOPHILS NFR BLD AUTO: 0.8 %
BUN BLD-MCNC: 27 MG/DL (ref 7–18)
BUN/CREAT SERPL: 21.3 (ref 10–20)
CALCIUM BLD-MCNC: 9.5 MG/DL (ref 8.5–10.1)
CHLORIDE SERPL-SCNC: 103 MMOL/L (ref 98–112)
CO2 SERPL-SCNC: 31 MMOL/L (ref 21–32)
CREAT BLD-MCNC: 1.27 MG/DL
DEPRECATED RDW RBC AUTO: 47.4 FL (ref 35.1–46.3)
EOSINOPHIL # BLD AUTO: 0.39 X10(3) UL (ref 0–0.7)
EOSINOPHIL NFR BLD AUTO: 3.3 %
ERYTHROCYTE [DISTWIDTH] IN BLOOD BY AUTOMATED COUNT: 15.8 % (ref 11–15)
GFR SERPLBLD BASED ON 1.73 SQ M-ARVRAT: 60 ML/MIN/1.73M2 (ref 60–?)
GLUCOSE BLD-MCNC: 95 MG/DL (ref 70–99)
HCT VFR BLD AUTO: 47.6 %
HGB BLD-MCNC: 14.6 G/DL
IMM GRANULOCYTES # BLD AUTO: 0.1 X10(3) UL (ref 0–1)
IMM GRANULOCYTES NFR BLD: 0.8 %
LYMPHOCYTES # BLD AUTO: 1.67 X10(3) UL (ref 1–4)
LYMPHOCYTES NFR BLD AUTO: 14.1 %
MCH RBC QN AUTO: 25.6 PG (ref 26–34)
MCHC RBC AUTO-ENTMCNC: 30.7 G/DL (ref 31–37)
MCV RBC AUTO: 83.4 FL
MONOCYTES # BLD AUTO: 0.86 X10(3) UL (ref 0.1–1)
MONOCYTES NFR BLD AUTO: 7.2 %
NEUTROPHILS # BLD AUTO: 8.75 X10 (3) UL (ref 1.5–7.7)
NEUTROPHILS # BLD AUTO: 8.75 X10(3) UL (ref 1.5–7.7)
NEUTROPHILS NFR BLD AUTO: 73.8 %
OSMOLALITY SERPL CALC.SUM OF ELEC: 295 MOSM/KG (ref 275–295)
P AXIS: 17 DEGREES
P-R INTERVAL: 168 MS
PLATELET # BLD AUTO: 389 10(3)UL (ref 150–450)
POTASSIUM SERPL-SCNC: 4 MMOL/L (ref 3.5–5.1)
Q-T INTERVAL: 462 MS
QRS DURATION: 98 MS
QTC CALCULATION (BEZET): 425 MS
R AXIS: 8 DEGREES
RBC # BLD AUTO: 5.71 X10(6)UL
SODIUM SERPL-SCNC: 140 MMOL/L (ref 136–145)
T AXIS: 1 DEGREES
TROPONIN I HIGH SENSITIVITY: 15 NG/L
VENTRICULAR RATE: 51 BPM
WBC # BLD AUTO: 11.9 X10(3) UL (ref 4–11)

## 2023-01-09 PROCEDURE — 84484 ASSAY OF TROPONIN QUANT: CPT | Performed by: EMERGENCY MEDICINE

## 2023-01-09 PROCEDURE — 71045 X-RAY EXAM CHEST 1 VIEW: CPT | Performed by: EMERGENCY MEDICINE

## 2023-01-09 PROCEDURE — 93005 ELECTROCARDIOGRAM TRACING: CPT

## 2023-01-09 PROCEDURE — 99284 EMERGENCY DEPT VISIT MOD MDM: CPT

## 2023-01-09 PROCEDURE — 85025 COMPLETE CBC W/AUTO DIFF WBC: CPT | Performed by: EMERGENCY MEDICINE

## 2023-01-09 PROCEDURE — 36415 COLL VENOUS BLD VENIPUNCTURE: CPT

## 2023-01-09 PROCEDURE — 93010 ELECTROCARDIOGRAM REPORT: CPT

## 2023-01-09 PROCEDURE — 80048 BASIC METABOLIC PNL TOTAL CA: CPT | Performed by: EMERGENCY MEDICINE

## 2023-01-09 RX ORDER — LOSARTAN POTASSIUM 50 MG/1
50 TABLET ORAL ONCE
Status: COMPLETED | OUTPATIENT
Start: 2023-01-09 | End: 2023-01-09

## 2023-01-09 NOTE — TELEPHONE ENCOUNTER
If the blood pressure is still high, options include emergency room or if he has no symptoms, please provide an appointment to see Nirmal Ruff. Please make sure he brings his blood pressure machine so we can double check.   Thank you

## 2023-01-09 NOTE — ED INITIAL ASSESSMENT (HPI)
Pt c/o hypertension systolic over 690 at home, states he was feeling nauseous. Was sent by PCP after taking losartan, metoprolol meds at home.

## 2023-01-09 NOTE — TELEPHONE ENCOUNTER
Spoke with pt,  verified. Pt was informed of  MD recommendation. Pt stated he took losartan and metoprolol at 10:30 am.   Pt current BP:  /105  HR 50  /104 HR 48   Pt denies chest pain, shortness of breath at the time of call, no other sx. Pt and his wife agree with MD recommendations  and he will go to [facility].        [facility]    Future Appointments   Date Time Provider [facility]   2023 12:00 PM Crystal Winters MD [facility]   2023 10:30 AM Edyta Hampton MD [address] [place] [address] SYSTEM [facility]   3/28/2023 11:00 AM Raman Abreu MD [facility]   2023 10:45 AM Kera Rene MD [facility]

## 2023-01-10 ENCOUNTER — TELEPHONE (OUTPATIENT)
Dept: INTERNAL MEDICINE CLINIC | Facility: CLINIC | Age: 73
End: 2023-01-10

## 2023-01-10 NOTE — DISCHARGE INSTRUCTIONS
Increase your Losartan to 2 tablets (total of 100mg) daily. Recheck with Dr. King Holloway in 2 days for BP recheck.

## 2023-01-10 NOTE — TELEPHONE ENCOUNTER
I have addressed this issue already in another encounter today morning. Please follow-up.   Thank you

## 2023-01-10 NOTE — ED QUICK NOTES
Rounding Completed    Pt states he has been concerned about his BP after buying a BP cuff from [employer]. Also complains of lightheadedness that he cant describe, occasional hand tingling, and some nausea. States he took all of his Rx medications today. Denies vision changes or headache. Is not currently nauseated. Plan of Care reviewed. Waiting for provider. Provider updated. Elimination needs assessed. Provided assessment. Bed is locked and in lowest position. Call light within reach.

## 2023-01-10 NOTE — TELEPHONE ENCOUNTER
OMG  How many messages regarding this same issue     He has an appt tomorrow  If he cant make it , let him see Sera Gaona       Please try to understand.  My apologies     Thanks    Paxton Andrade

## 2023-01-10 NOTE — TELEPHONE ENCOUNTER
Pt states that he was seen in [facility] yesterday and was told that Dr. Mesha Alan wants to see him in two day. There are no available appointments. Does, the doctor want to add the patient to his schedule? If so, which date and time.

## 2023-01-10 NOTE — TELEPHONE ENCOUNTER
Noted.         Future Appointments   Date Time Provider [facility]   1/11/2023 10:45 AM Polina Baker MD [facility]

## 2023-01-10 NOTE — TELEPHONE ENCOUNTER
Spoke, with the patient and informed him of the message below. Pt would like to know when the doctor would like him to schedule an appointment with Calos Ferdinand. Pt states that he cannot come in on Friday of this week.

## 2023-01-11 ENCOUNTER — OFFICE VISIT (OUTPATIENT)
Dept: INTERNAL MEDICINE CLINIC | Facility: CLINIC | Age: 73
End: 2023-01-11

## 2023-01-11 VITALS
BODY MASS INDEX: 34.07 KG/M2 | HEIGHT: 69 IN | SYSTOLIC BLOOD PRESSURE: 148 MMHG | RESPIRATION RATE: 14 BRPM | DIASTOLIC BLOOD PRESSURE: 84 MMHG | HEART RATE: 48 BPM | OXYGEN SATURATION: 97 % | WEIGHT: 230 LBS

## 2023-01-11 DIAGNOSIS — R00.1 SYMPTOMATIC BRADYCARDIA: ICD-10-CM

## 2023-01-11 DIAGNOSIS — I10 ESSENTIAL HYPERTENSION WITH GOAL BLOOD PRESSURE LESS THAN 140/90: Primary | ICD-10-CM

## 2023-01-11 PROCEDURE — 99213 OFFICE O/P EST LOW 20 MIN: CPT | Performed by: INTERNAL MEDICINE

## 2023-01-11 PROCEDURE — 1126F AMNT PAIN NOTED NONE PRSNT: CPT | Performed by: INTERNAL MEDICINE

## 2023-01-11 RX ORDER — LOSARTAN POTASSIUM 100 MG/1
100 TABLET ORAL DAILY
Qty: 90 TABLET | Refills: 1 | Status: SHIPPED | OUTPATIENT
Start: 2023-01-11 | End: 2023-04-11

## 2023-01-24 ENCOUNTER — TELEPHONE (OUTPATIENT)
Dept: INTERNAL MEDICINE CLINIC | Facility: CLINIC | Age: 73
End: 2023-01-24

## 2023-01-24 RX ORDER — HYDROCHLOROTHIAZIDE 25 MG/1
TABLET ORAL
COMMUNITY
Start: 2023-01-23

## 2023-01-24 NOTE — TELEPHONE ENCOUNTER
Patient called back at 3:30 pm.    Reviewed Dr Gareth Corrigan message below. He reiterated what he told Haily. He does not want to take Tumeric, he wants a prescription \"that won't interact with my other meds. \"  Reports all of his joints hurt and left hand is swollen. States Dr Criss Dakin took him off metoprolol and put him on hydrochlorothiazide. So far it hasn't helped. He has tried Tylenol and Aleve without benefit. Already sees Dr Shemar Nugent for pain, who gives him norco.  Dr Mesha Alan, can he get a prescription anti inflammation medication?

## 2023-01-24 NOTE — TELEPHONE ENCOUNTER
Hydrochlorothiazide is a medicine that has been used for taking water out and also to treat blood pressure. It does not help with inflammation. Turmeric has some properties of decreasing inflammation. Okay to take it if needed.   Please inform thank you

## 2023-01-24 NOTE — TELEPHONE ENCOUNTER
Patient is calling because Dr. Inge Cogan took him off of this medication metoprolol tartrate 25 MG Oral Tab  To hydrochlorothyoside (?) and wanted to know if the new meds would help with joints stiffness and swelling because if not he would like something for that and the inflammation in his body. Took Dr. Dipika Aviles medication today for the first time    Would like a call back.

## 2023-01-24 NOTE — TELEPHONE ENCOUNTER
Dr Dane Hogan: Patient questions whether hydrochlorothiazide 25mg can help decrease inflammation. He questions whether tumeric or \"something better\" can be taking for joint pain/inflammation that will not interact with his current medications? 1 and half week ago, he c/o all joints, hands swollen; has pains between hip and knees, back stiff. Was taking aleve but doesn't help. Uses [facility] for pain. Cardiologist discontinued metorprolol and added hydrochlorothiazide yesterday.    Updated medication list.

## 2023-01-24 NOTE — TELEPHONE ENCOUNTER
Spoke to patient and informed him of what Kurtis Macario said below. He said he needs  to prescribe him something for inflammation because his whole body is inflamed. He spoke to [facility] [place] and he told him that there are betting things he can take for inflammation besides turmeric. He asked if I was a nurse and told him no and he asked to speak to a nurse.  I transferred his call to the nurse line

## 2023-01-25 NOTE — TELEPHONE ENCOUNTER
Patient was calling back to follow up on message below. Waiting for response from Dr Juan Jose Velez. Wanted to get something prescribed for inflammation. States has joint/muscle pain all over his body and thinks that is driving up his blood pressure. Left hand has been swollen for 5-6 days. Dr Dylan Hutchison saw the hand. Stopped the metoprolol and put him on hydrochlorothiazide 25 mg daily. Took medication yesterday for the first time and pulse went from the 40-50's to 60-61. Has been taking aleve and tylenol but not helping with the inflammation in his body. Please advise.

## 2023-01-26 NOTE — TELEPHONE ENCOUNTER
Talked to patient for over 10 minutes. He is concerned about his arthritis. He has been taking Norco and MRI showed severe arthritis. He is concerned about medication and its side effect. He has been taking Norco.  We have discussed regarding Tylenol versus NSAIDs versus injections versus steroids versus narcotics. He would like to continue with the same regimen. He also had an episode of emesis today. He reports that after the emesis, he feels better.

## 2023-02-08 ENCOUNTER — NURSE TRIAGE (OUTPATIENT)
Dept: INTERNAL MEDICINE CLINIC | Facility: CLINIC | Age: 73
End: 2023-02-08

## 2023-02-14 ENCOUNTER — OFFICE VISIT (OUTPATIENT)
Dept: PAIN CLINIC | Facility: HOSPITAL | Age: 73
End: 2023-02-14
Attending: ANESTHESIOLOGY
Payer: MEDICARE

## 2023-02-14 VITALS
HEIGHT: 69 IN | BODY MASS INDEX: 34.07 KG/M2 | RESPIRATION RATE: 18 BRPM | WEIGHT: 230 LBS | HEART RATE: 73 BPM | SYSTOLIC BLOOD PRESSURE: 127 MMHG | DIASTOLIC BLOOD PRESSURE: 81 MMHG

## 2023-02-14 DIAGNOSIS — M25.562 BILATERAL CHRONIC KNEE PAIN: ICD-10-CM

## 2023-02-14 DIAGNOSIS — Z51.81 ENCOUNTER FOR MONITORING OPIOID MAINTENANCE THERAPY: ICD-10-CM

## 2023-02-14 DIAGNOSIS — M25.561 BILATERAL CHRONIC KNEE PAIN: ICD-10-CM

## 2023-02-14 DIAGNOSIS — Z79.891 ENCOUNTER FOR MONITORING OPIOID MAINTENANCE THERAPY: ICD-10-CM

## 2023-02-14 DIAGNOSIS — G89.29 BILATERAL CHRONIC KNEE PAIN: ICD-10-CM

## 2023-02-14 DIAGNOSIS — Z79.891 CHRONIC PRESCRIPTION OPIATE USE: Primary | ICD-10-CM

## 2023-02-14 DIAGNOSIS — M48.061 SPINAL STENOSIS OF LUMBAR REGION WITHOUT NEUROGENIC CLAUDICATION: ICD-10-CM

## 2023-02-14 PROCEDURE — 99211 OFF/OP EST MAY X REQ PHY/QHP: CPT

## 2023-02-14 RX ORDER — HYDROCODONE BITARTRATE AND ACETAMINOPHEN 10; 325 MG/1; MG/1
1 TABLET ORAL EVERY 8 HOURS PRN
Qty: 90 TABLET | Refills: 0 | Status: SHIPPED | OUTPATIENT
Start: 2023-03-01 | End: 2023-03-31

## 2023-02-14 RX ORDER — HYDROCODONE BITARTRATE AND ACETAMINOPHEN 10; 325 MG/1; MG/1
1 TABLET ORAL EVERY 8 HOURS PRN
Qty: 90 TABLET | Refills: 0 | Status: SHIPPED | OUTPATIENT
Start: 2023-04-30 | End: 2023-05-30

## 2023-02-14 RX ORDER — HYDROCODONE BITARTRATE AND ACETAMINOPHEN 10; 325 MG/1; MG/1
1 TABLET ORAL EVERY 8 HOURS PRN
Qty: 90 TABLET | Refills: 0 | Status: SHIPPED | OUTPATIENT
Start: 2023-03-31 | End: 2023-04-30

## 2023-02-14 NOTE — PROGRESS NOTES
2/14/2023- Patient presents to [facility] ambulatory for med eval.  He has chronic neck ,back   And bilat knee pain. Rates his pain -3;  Medications are helpful; [address] 10/325 Q8H #90/MO; Pt also mentions he has been having heart issues; will be seeing Dr. Albina Tapia of [facility]; planning on a Stress Test; had an episode of low heart rate, and dizziness; \"I became nauseated and vomited\"; Seen by Dr. Anthony Dugan; Refer to dictation for the continued POC. Marquita Mcneal

## 2023-03-02 ENCOUNTER — MED REC SCAN ONLY (OUTPATIENT)
Facility: CLINIC | Age: 73
End: 2023-03-02

## 2023-03-08 ENCOUNTER — LAB ENCOUNTER (OUTPATIENT)
Dept: LAB | Age: 73
End: 2023-03-08
Attending: INTERNAL MEDICINE
Payer: MEDICARE

## 2023-03-08 DIAGNOSIS — I10 ESSENTIAL HYPERTENSION WITH GOAL BLOOD PRESSURE LESS THAN 140/90: ICD-10-CM

## 2023-03-08 DIAGNOSIS — E78.2 MIXED HYPERLIPIDEMIA: ICD-10-CM

## 2023-03-08 LAB
ALBUMIN SERPL-MCNC: 3.3 G/DL (ref 3.4–5)
ALBUMIN/GLOB SERPL: 0.7 {RATIO} (ref 1–2)
ALP LIVER SERPL-CCNC: 156 U/L
ALT SERPL-CCNC: 28 U/L
ANION GAP SERPL CALC-SCNC: 4 MMOL/L (ref 0–18)
AST SERPL-CCNC: 22 U/L (ref 15–37)
BILIRUB SERPL-MCNC: 0.7 MG/DL (ref 0.1–2)
BUN BLD-MCNC: 24 MG/DL (ref 7–18)
CALCIUM BLD-MCNC: 9.6 MG/DL (ref 8.5–10.1)
CHLORIDE SERPL-SCNC: 104 MMOL/L (ref 98–112)
CHOLEST SERPL-MCNC: 146 MG/DL (ref ?–200)
CO2 SERPL-SCNC: 29 MMOL/L (ref 21–32)
CREAT BLD-MCNC: 1.08 MG/DL
ERYTHROCYTE [DISTWIDTH] IN BLOOD BY AUTOMATED COUNT: 16.3 %
FASTING PATIENT LIPID ANSWER: YES
FASTING STATUS PATIENT QL REPORTED: YES
GFR SERPLBLD BASED ON 1.73 SQ M-ARVRAT: 73 ML/MIN/1.73M2 (ref 60–?)
GLOBULIN PLAS-MCNC: 4.7 G/DL (ref 2.8–4.4)
GLUCOSE BLD-MCNC: 112 MG/DL (ref 70–99)
HCT VFR BLD AUTO: 43.2 %
HDLC SERPL-MCNC: 56 MG/DL (ref 40–59)
HGB BLD-MCNC: 13.1 G/DL
LDLC SERPL CALC-MCNC: 76 MG/DL (ref ?–100)
MCH RBC QN AUTO: 25.6 PG (ref 26–34)
MCHC RBC AUTO-ENTMCNC: 30.3 G/DL (ref 31–37)
MCV RBC AUTO: 84.4 FL
NONHDLC SERPL-MCNC: 90 MG/DL (ref ?–130)
OSMOLALITY SERPL CALC.SUM OF ELEC: 289 MOSM/KG (ref 275–295)
PLATELET # BLD AUTO: 377 10(3)UL (ref 150–450)
POTASSIUM SERPL-SCNC: 3.5 MMOL/L (ref 3.5–5.1)
PROT SERPL-MCNC: 8 G/DL (ref 6.4–8.2)
RBC # BLD AUTO: 5.12 X10(6)UL
SODIUM SERPL-SCNC: 137 MMOL/L (ref 136–145)
TRIGL SERPL-MCNC: 68 MG/DL (ref 30–149)
TSI SER-ACNC: 2.24 MIU/ML (ref 0.36–3.74)
VLDLC SERPL CALC-MCNC: 11 MG/DL (ref 0–30)
WBC # BLD AUTO: 9 X10(3) UL (ref 4–11)

## 2023-03-08 PROCEDURE — 80061 LIPID PANEL: CPT

## 2023-03-08 PROCEDURE — 85027 COMPLETE CBC AUTOMATED: CPT

## 2023-03-08 PROCEDURE — 80053 COMPREHEN METABOLIC PANEL: CPT

## 2023-03-08 PROCEDURE — 84443 ASSAY THYROID STIM HORMONE: CPT

## 2023-03-28 ENCOUNTER — OFFICE VISIT (OUTPATIENT)
Facility: CLINIC | Age: 73
End: 2023-03-28

## 2023-03-28 VITALS
HEIGHT: 69 IN | RESPIRATION RATE: 14 BRPM | SYSTOLIC BLOOD PRESSURE: 110 MMHG | HEART RATE: 95 BPM | BODY MASS INDEX: 32.58 KG/M2 | DIASTOLIC BLOOD PRESSURE: 68 MMHG | WEIGHT: 220 LBS | OXYGEN SATURATION: 62 %

## 2023-03-28 DIAGNOSIS — M17.0 PRIMARY OSTEOARTHRITIS OF BOTH KNEES: ICD-10-CM

## 2023-03-28 DIAGNOSIS — E78.2 MIXED HYPERLIPIDEMIA: ICD-10-CM

## 2023-03-28 DIAGNOSIS — I10 ESSENTIAL HYPERTENSION WITH GOAL BLOOD PRESSURE LESS THAN 140/90: Primary | ICD-10-CM

## 2023-03-28 PROCEDURE — 1126F AMNT PAIN NOTED NONE PRSNT: CPT | Performed by: INTERNAL MEDICINE

## 2023-03-28 PROCEDURE — 99214 OFFICE O/P EST MOD 30 MIN: CPT | Performed by: INTERNAL MEDICINE

## 2023-03-28 RX ORDER — AMLODIPINE BESYLATE 10 MG/1
10 TABLET ORAL DAILY
COMMUNITY

## 2023-03-30 ENCOUNTER — TELEPHONE (OUTPATIENT)
Dept: PHYSICAL MEDICINE AND REHAB | Facility: CLINIC | Age: 73
End: 2023-03-30

## 2023-03-30 ENCOUNTER — OFFICE VISIT (OUTPATIENT)
Dept: NEUROLOGY | Facility: CLINIC | Age: 73
End: 2023-03-30
Payer: MEDICARE

## 2023-03-30 DIAGNOSIS — G24.3 CERVICAL DYSTONIA: Primary | ICD-10-CM

## 2023-03-30 DIAGNOSIS — R42 VERTIGO: ICD-10-CM

## 2023-03-30 PROCEDURE — 95874 GUIDE NERV DESTR NEEDLE EMG: CPT | Performed by: OTHER

## 2023-03-30 PROCEDURE — 64616 CHEMODENERV MUSC NECK DYSTON: CPT | Performed by: OTHER

## 2023-03-30 NOTE — TELEPHONE ENCOUNTER
Per Medicare Guidelines -no authorization is required for Botox CPT , 86229 dx:G24.3    Status: Authorization is not required however may be subject to review once claim is submitted-Covered Benefit (Buy&Bill)    Insurance: Medicare Part B Member ID# [id number]  Medication: Botox 200 units  Number of visits Approved: 1 (will need to confirm active insurance prior to appt as no [employer] is required however insurance is verified on month to month basis)   Dx: cervical dystonia   Last Botox done: 3/30/23  Next Botox due around: 6/30/23  Authorization #n/a  Valid n/a  BUY&BILL

## 2023-04-18 RX ORDER — ASPIRIN 81 MG/1
81 TABLET, CHEWABLE ORAL DAILY
Qty: 90 TABLET | Refills: 3 | Status: SHIPPED | OUTPATIENT
Start: 2023-04-18

## 2023-04-18 NOTE — TELEPHONE ENCOUNTER
Refill passed per [place] [facility] protocol. Requested Prescriptions   Pending Prescriptions Disp Refills    aspirin 81 MG Oral Chew Tab 90 tablet 3     Sig: Chew 1 tablet (81 mg total) by mouth daily.        Aspirin Protocol Passed - 4/17/2023  3:06 PM        Passed - In person appointment or virtual visit in the past 6 mos or appointment in next 3 mos     Recent Outpatient Visits              2 weeks ago Cervical dystonia    Malick Courtney MD    Office Visit    3 weeks ago Essential hypertension with goal blood pressure less than 140/90    [facility], [address] [place] [address], MD Deo    Office Visit    2 months ago Chronic prescription opiate use    THE [facility] Alex Barajas MD    Office Visit    3 months ago Essential hypertension with goal blood pressure less than 140/90    Deo Mathew MD    Office Visit    3 months ago Other chronic pain    THE [facility] Desean Marte [place]    Office Visit          Future Appointments         Provider Department Appt Notes    In 1 week Chris Higuera MD [address] [facility],[address], [address] [facility], [place] 6 mo f/u    In 1 month Alex Barajas MD THE [facility] 2 Month F/U  11/15/18                  Recent Outpatient Visits              2 weeks ago Cervical dystonia    Malick Courtney MD    Office Visit    3 weeks ago Essential hypertension with goal blood pressure less than 140/90    Deo Waggoner MD    Office Visit    2 months ago Chronic prescription opiate use    THE [facility] Alex Barajas MD    Office Visit    3 months ago Essential hypertension with goal blood pressure less than 140/90    Shelly Torres MD    Office Visit    3 months ago Other chronic pain    [facility] Yvonne Cedillo    Office Visit          Future Appointments         Provider Department Appt Notes    In 1 week Kati Romo MD [address] [place] [address] 6 mo f/u    In 1 month Dinah Quintana MD [facility] 2 Month F/U  11/15/18

## 2023-04-19 ENCOUNTER — LAB ENCOUNTER (OUTPATIENT)
Dept: LAB | Age: 73
End: 2023-04-19
Attending: UROLOGY
Payer: MEDICARE

## 2023-04-19 DIAGNOSIS — C61 PROSTATE CANCER (HCC): ICD-10-CM

## 2023-04-19 DIAGNOSIS — R97.21 RISING PSA FOLLOWING TREATMENT FOR MALIGNANT NEOPLASM OF PROSTATE: ICD-10-CM

## 2023-04-19 LAB — PSA SERPL-MCNC: 5.44 NG/ML (ref ?–4)

## 2023-04-19 PROCEDURE — 84153 ASSAY OF PSA TOTAL: CPT

## 2023-04-21 ENCOUNTER — TELEPHONE (OUTPATIENT)
Dept: INTERNAL MEDICINE CLINIC | Facility: CLINIC | Age: 73
End: 2023-04-21

## 2023-04-21 NOTE — TELEPHONE ENCOUNTER
Patient is requesting a call back to go over his PSA results and requesting a call back as soon as possible from his PCP's office even though another provider ordered the test.      Please advise

## 2023-04-21 NOTE — TELEPHONE ENCOUNTER
Patient is calling and extremely upset checking the status on this. He is expecting a phone call ASAP. He states that he is not sure if [facility] will be [facility]. I informed him 2x that Ovidio Sukumar is not in either office today and possibly one of his nurses will call him to discuss these results when they are available.

## 2023-04-26 ENCOUNTER — OFFICE VISIT (OUTPATIENT)
Dept: SURGERY | Facility: CLINIC | Age: 73
End: 2023-04-26

## 2023-04-26 DIAGNOSIS — N40.1 BPH WITH OBSTRUCTION/LOWER URINARY TRACT SYMPTOMS: Primary | ICD-10-CM

## 2023-04-26 DIAGNOSIS — C61 BIOCHEMICALLY RECURRENT MALIGNANT NEOPLASM OF PROSTATE (HCC): ICD-10-CM

## 2023-04-26 DIAGNOSIS — N13.8 BPH WITH OBSTRUCTION/LOWER URINARY TRACT SYMPTOMS: Primary | ICD-10-CM

## 2023-04-26 DIAGNOSIS — R97.21 BIOCHEMICALLY RECURRENT MALIGNANT NEOPLASM OF PROSTATE (HCC): ICD-10-CM

## 2023-04-26 PROCEDURE — 99214 OFFICE O/P EST MOD 30 MIN: CPT | Performed by: UROLOGY

## 2023-04-26 PROCEDURE — 51798 US URINE CAPACITY MEASURE: CPT | Performed by: UROLOGY

## 2023-04-26 RX ORDER — LOSARTAN POTASSIUM 100 MG/1
TABLET ORAL
COMMUNITY
Start: 2023-04-18

## 2023-04-26 NOTE — PROGRESS NOTES
[facility] Urology  Follow-Up Visit    HPI: Pieter Dumont is a 67year old male presents for a follow up visit. Patient was last seen on 10/24/2022. Accompanied by his wife. INTERVAL HISTORY: Former patient of Dr. Blu Moya. Biochemically recurrent prostate cancer, status post brachytherapy seed implants 2014 for definitive local therapy of intermediate risk disease. PSA junior of 1.3 ng/mL. Rising PSA levels since 2020. Repeat prostate biopsy 12/2021 was negative. PSMA PET scan 6/2022 without radiographic evidence of recurrent or metastatic disease. Patient elected continued monitoring. PSA 4/19/2023 increased to 5.44 ng/mL, from 3.76 ng/mL 10/2022. He denies fevers or chills, chest pain or shortness of breath. No bone pain or unintentional weight loss. No change in appetite. No gross hematuria or dysuria. Bladder scan revealing 47 mL (he voided about 30 minutes prior). 1. Prostate Cancer w/ Biochemical Recurrence  History of multiple previous prostate biopsies. Diagnosed with intermediate risk prostate cancer upon saturation prostate biopsy 1/9/2014 with up to [place] 3+4=7 disease in 8/40 cores. Treated with brachytherapy seed implants at the [facility] prostate [facility] 2/2014. PSA junior reportedly around 1.3 ng/mL. PSA levels have been slowly rising since 2020. Prostate MRI 11/2021 without any definite prostatic lesions. No PI-RADS assessment given for posttreatment gland. Patient underwent a TRUS-guided prostate biopsy by Dr. Isak Carrillo 12/2021 which was negative. PSA up to 4.07 ng/mL 6/7/2022. PSMA PET scan 6/2022 without evidence of recurrent or metastatic disease. Seen by medical oncology 6/2022. Patient electing continued monitoring as opposed to starting ADT. - 10/2022 F/U: PSA decreased to 3.76 ng/mL. Patient elects continued monitoring.    - 4/2023 F/U: PSA increased to 5.44 ng/mL.   Patient elects continued monitoring with a repeat in 3 months. 2. ED  Chronic and longstanding. Previously on tadalafil as needed. Patient no longer sexually active. 3. BPH with LUTS  Chronic and longstanding LUTS. Not on any medical therapy. - 10/2022 F/U: IPSS score is 18 (3/2/2/2/3/2/4) with a quality-of-life score of 3. Patient not interested in any treatment at this point. Reviewed past medical, surgical, family, and social history. Reviewed med list and allergies. REVIEW OF SYSTEMS:  Pertinent positives and negatives per HPI. A 10-point ROS was performed and is otherwise negative. EXAM:  There were no vitals taken for this visit. Physical Exam  Constitutional:       Appearance: He is well-developed. HENT:      Head: Normocephalic. Eyes:      General: No scleral icterus. Cardiovascular:      Rate and Rhythm: Normal rate. Pulmonary:      Effort: Pulmonary effort is normal.   Skin:     General: Skin is warm and dry. Neurological:      Mental Status: He is alert and oriented to person, place, and time. Psychiatric:         Mood and Affect: Mood normal.         Behavior: Behavior normal.       PATHOLOGY:  See HPI for details. LABS:    Component PSA   Latest Ref Rng & Units <=4.00 ng/mL   4/19/2023 5.44 (H)   10/19/2022 3.76   6/7/2022 4.07 (H)   10/26/2021 3.03   6/16/2021 2.80   2/19/2021 2.75   10/15/2020 2.48   6/16/2020 2.18   2/5/2020 2.15   8/29/2019 1.89   3/1/2019 1.73   11/2/2018 2.0   7/3/2018 1.5   2/26/2018 1.6   11/21/2017 1.6   5/16/2017 1.3   11/15/2016 1.4   5/11/2016 1.6   1/12/2016 1.9   9/15/2015 1.8   3/10/2015 2.8   8/13/2014 4.6 (H)   12/4/2013 7.9 (H)   9/1/2013 8.2 (H)   7/28/2013 10.2 (H)   11/1/2012 7.4 (H)   12/11/2011 8.3 (H)       IMAGING:    PSMA PET scan (6/23/2022): Brachytherapy posttreatment changes in the prostate gland. No areas of increased radiotracer activity are noted to suggest residual or recurrent disease. No evidence for distant metastatic disease. Incidental findings as above including distal colonic diverticulosis and coronary artery calcifications. UROLOGY PROCEDURE:  Not performed today. IMPRESSION:  67year old male with history of prostate cancer status post brachytherapy seed implants in 2014. Patient with biochemical evidence of disease recurrence given slowly rising PSA levels, since 2020. Negative prostate biopsy 12/2021 and negative PSMA PET scan 6/2022 without evidence of local or distant recurrence of prostate cancer. Recent PSA level increased by about 1.7 points over the past 6 months, now 5.44 ng/mL    Findings reviewed with patient and wife at length. Management options discussed including continued monitoring or initiating ADT. We also discussed repeat prostate biopsy and repeat PET scan for further evaluation/updated staging. Rationale, approach, benefits, risks, and alternatives of these treatment options were discussed. Patient wishes to continue monitoring for the time being. He agreed to repeat PSA level in 3 months with follow-up thereafter. If PSA levels continue to rise, then we will discuss updated imaging and possible repeat biopsy or upfront initiation of ADT. Baseline BPH with LUTS, not on medical therapy. Discussed initiating medical therapy versus minimally invasive surgical treatments. Patient not interested at this time. Longstanding chronic ED, patient no longer sexually active. Not interested in further treatment at this time. PLAN:  1. Continue to monitor PSA levels given biochemically recurrent prostate cancer without radiographic or pathologic evidence of disease recurrence or spread. We will have him return for follow-up in 3 months with a PSA prior to office visit. If PSA levels continue to rise, plan would be to obtain updated PET scan and potential repeat prostate biopsy if needed. 2.  Conservative management/watchful waiting of ED and BPH with LUTS.     MDM complexity: Moderate. Discussing management of biochemically recurrent prostate cancer following initial definitive local therapy.     Danielle Perez MD  4/26/2023

## 2023-05-05 ENCOUNTER — OFFICE VISIT (OUTPATIENT)
Facility: CLINIC | Age: 73
End: 2023-05-05

## 2023-05-05 ENCOUNTER — TELEPHONE (OUTPATIENT)
Dept: INTERNAL MEDICINE CLINIC | Facility: CLINIC | Age: 73
End: 2023-05-05

## 2023-05-05 VITALS
WEIGHT: 225 LBS | SYSTOLIC BLOOD PRESSURE: 105 MMHG | HEIGHT: 69 IN | RESPIRATION RATE: 14 BRPM | HEART RATE: 76 BPM | BODY MASS INDEX: 33.33 KG/M2 | OXYGEN SATURATION: 97 % | DIASTOLIC BLOOD PRESSURE: 61 MMHG

## 2023-05-05 DIAGNOSIS — I10 ESSENTIAL HYPERTENSION WITH GOAL BLOOD PRESSURE LESS THAN 140/90: Primary | ICD-10-CM

## 2023-05-05 PROCEDURE — 99213 OFFICE O/P EST LOW 20 MIN: CPT | Performed by: INTERNAL MEDICINE

## 2023-05-05 PROCEDURE — 1126F AMNT PAIN NOTED NONE PRSNT: CPT | Performed by: INTERNAL MEDICINE

## 2023-05-05 RX ORDER — AMLODIPINE BESYLATE 5 MG/1
5 TABLET ORAL 2 TIMES DAILY
Qty: 180 TABLET | Refills: 1 | Status: SHIPPED | OUTPATIENT
Start: 2023-05-05 | End: 2023-08-03

## 2023-05-05 NOTE — TELEPHONE ENCOUNTER
Pt calling back and states his B/P is 119/65 and 121/67 and asking if he should cancel OV. Pt has not taken his b/p meds yet today. Pt asking if he should take or hold his medication. Advised to keep his OV today and Dr. Diane Luis can review with him if any change is necessary with medication. Pt agrees to plan and will keep OV.

## 2023-05-05 NOTE — PATIENT INSTRUCTIONS
Continue to take losartan and hydrochlorothiazide. I have sent a new prescription of amlodipine 5 mg tablets to the pharmacy. (Previously you had 10 mg tablets)  You can take 1 tablet daily. If your blood pressure is more than 130 or bottom number more than 90, you can take an additional 5 mg tablet. If your top blood pressure number is less than 100, you  can hold amlodipine.

## 2023-05-05 NOTE — TELEPHONE ENCOUNTER
Patient reported blood pressure readings;  5/3/75==SM=975/54   5/4/23 ==CJ=622/53 HR=66  5/5/2345===CA=F=603/50  L= 103/49   ASYMPTOMATIC except with chronic leg,back ,neck and shoulder pain. States that he takes 3 BP medications =amlodipine 10 mg daily, losartan 100 mg daily and hydrochlorothiazide 25 mg daily, all medications were started 2 months ago. Patient instructed NOT TO TAKE the medications, RN will clarify with PCP for re evaluation. Patient took the 3 medications yesterday . RN contacted DR LG Shah and discussed the blood pressure issue, PCP can see patient today at 12 noon in [facility]. RN called patient, states he re checked his BP before he picked up my call ,BP now ==L=121/67 HR=62    And R=119/65. Informed to keep the appointment for this noon time, instructed to bring his blood pressure machine and blood pressure readings for medication adjustment.            Future Appointments   Date Time Provider Song Ritchie   5/5/2023 12:00 PM Willard Dean MD [facility]   6/1/2023 10:30 AM Noelle Song MD [address] [place] [address]   7/31/2023 10:45 AM Matilde Fields MD [facility]

## 2023-06-01 ENCOUNTER — OFFICE VISIT (OUTPATIENT)
Dept: PAIN CLINIC | Facility: HOSPITAL | Age: 73
End: 2023-06-01
Attending: ANESTHESIOLOGY
Payer: MEDICARE

## 2023-06-01 VITALS
SYSTOLIC BLOOD PRESSURE: 99 MMHG | HEART RATE: 56 BPM | OXYGEN SATURATION: 95 % | HEIGHT: 69 IN | DIASTOLIC BLOOD PRESSURE: 62 MMHG | BODY MASS INDEX: 33.33 KG/M2 | WEIGHT: 225 LBS

## 2023-06-01 DIAGNOSIS — Z79.891 ENCOUNTER FOR MONITORING OPIOID MAINTENANCE THERAPY: ICD-10-CM

## 2023-06-01 DIAGNOSIS — M48.02 SPINAL STENOSIS IN CERVICAL REGION: ICD-10-CM

## 2023-06-01 DIAGNOSIS — Z51.81 ENCOUNTER FOR MONITORING OPIOID MAINTENANCE THERAPY: ICD-10-CM

## 2023-06-01 DIAGNOSIS — M48.061 SPINAL STENOSIS OF LUMBAR REGION WITHOUT NEUROGENIC CLAUDICATION: ICD-10-CM

## 2023-06-01 DIAGNOSIS — Z79.891 CHRONIC PRESCRIPTION OPIATE USE: Primary | ICD-10-CM

## 2023-06-01 PROCEDURE — 99211 OFF/OP EST MAY X REQ PHY/QHP: CPT

## 2023-06-01 RX ORDER — HYDROCODONE BITARTRATE AND ACETAMINOPHEN 10; 325 MG/1; MG/1
1 TABLET ORAL EVERY 8 HOURS PRN
Qty: 90 TABLET | Refills: 0 | Status: SHIPPED | OUTPATIENT
Start: 2023-07-31 | End: 2023-08-30

## 2023-06-01 RX ORDER — HYDROCODONE BITARTRATE AND ACETAMINOPHEN 10; 325 MG/1; MG/1
1 TABLET ORAL EVERY 8 HOURS PRN
Qty: 90 TABLET | Refills: 0 | Status: SHIPPED | OUTPATIENT
Start: 2023-06-01 | End: 2023-07-01

## 2023-06-01 RX ORDER — HYDROCODONE BITARTRATE AND ACETAMINOPHEN 10; 325 MG/1; MG/1
1 TABLET ORAL EVERY 8 HOURS PRN
Qty: 90 TABLET | Refills: 0 | Status: SHIPPED | OUTPATIENT
Start: 2023-06-30 | End: 2023-07-30

## 2023-06-01 NOTE — PROGRESS NOTES
Pain Navigator    Pt presents ambulatory to the [facility].  Seen by Yamilex Leos  Here for a med eval / refills - no new issues or concerns    Rates pain a 4/10 today.

## 2023-06-02 ENCOUNTER — TELEPHONE (OUTPATIENT)
Dept: PAIN CLINIC | Facility: HOSPITAL | Age: 73
End: 2023-06-02

## 2023-06-02 NOTE — TELEPHONE ENCOUNTER
Cervical GERARDO C6/7 - Cpt 24378 - Dx M48.061 - NO PA REQUIRED    Status: Pre-certification is not require. Per Medicare Guidelines: may be subject to review once claim is submitted. All Medicare coverage is based on Medical Necessity.

## 2023-06-05 NOTE — TELEPHONE ENCOUNTER
Scheduled procedure for: 6/9/23    Procedure follow-up for: 7/26/23 at 10 am.    Surgery Scheduling Form faxed to [facility] at [phone]

## 2023-07-26 ENCOUNTER — OFFICE VISIT (OUTPATIENT)
Dept: PAIN CLINIC | Facility: HOSPITAL | Age: 73
End: 2023-07-26
Attending: ANESTHESIOLOGY
Payer: MEDICARE

## 2023-07-26 ENCOUNTER — LAB ENCOUNTER (OUTPATIENT)
Dept: LAB | Facility: HOSPITAL | Age: 73
End: 2023-07-26
Attending: ANESTHESIOLOGY
Payer: MEDICARE

## 2023-07-26 VITALS — DIASTOLIC BLOOD PRESSURE: 79 MMHG | SYSTOLIC BLOOD PRESSURE: 121 MMHG

## 2023-07-26 DIAGNOSIS — R97.21 BIOCHEMICALLY RECURRENT MALIGNANT NEOPLASM OF PROSTATE: ICD-10-CM

## 2023-07-26 DIAGNOSIS — M48.061 SPINAL STENOSIS OF LUMBAR REGION WITHOUT NEUROGENIC CLAUDICATION: ICD-10-CM

## 2023-07-26 DIAGNOSIS — M48.02 SPINAL STENOSIS IN CERVICAL REGION: ICD-10-CM

## 2023-07-26 DIAGNOSIS — Z51.81 ENCOUNTER FOR MONITORING OPIOID MAINTENANCE THERAPY: Primary | ICD-10-CM

## 2023-07-26 DIAGNOSIS — Z79.891 ENCOUNTER FOR MONITORING OPIOID MAINTENANCE THERAPY: Primary | ICD-10-CM

## 2023-07-26 DIAGNOSIS — C61 BIOCHEMICALLY RECURRENT MALIGNANT NEOPLASM OF PROSTATE: ICD-10-CM

## 2023-07-26 LAB
AMPHET UR QL SCN: NEGATIVE
BARBITURATES UR QL SCN: NEGATIVE
BENZODIAZ UR QL SCN: NEGATIVE
CANNABINOIDS UR QL SCN: NEGATIVE
COCAINE UR QL: NEGATIVE
CREAT UR-SCNC: 119 MG/DL
MDMA UR QL SCN: NEGATIVE
METHADONE UR QL SCN: NEGATIVE
OXYCODONE UR QL SCN: NEGATIVE
PCP UR QL SCN: NEGATIVE
PSA SERPL-MCNC: 6.68 NG/ML (ref ?–4)

## 2023-07-26 PROCEDURE — 80361 OPIATES 1 OR MORE: CPT | Performed by: ANESTHESIOLOGY

## 2023-07-26 PROCEDURE — 80307 DRUG TEST PRSMV CHEM ANLYZR: CPT | Performed by: ANESTHESIOLOGY

## 2023-07-26 PROCEDURE — 36415 COLL VENOUS BLD VENIPUNCTURE: CPT

## 2023-07-26 PROCEDURE — 84153 ASSAY OF PSA TOTAL: CPT

## 2023-07-26 RX ORDER — HYDROCODONE BITARTRATE AND ACETAMINOPHEN 10; 325 MG/1; MG/1
1 TABLET ORAL EVERY 8 HOURS PRN
Qty: 90 TABLET | Refills: 0 | Status: SHIPPED | OUTPATIENT
Start: 2023-08-30 | End: 2023-09-29

## 2023-07-26 RX ORDER — METHYLPREDNISOLONE 4 MG/1
TABLET ORAL
Qty: 21 TABLET | Refills: 0 | Status: SHIPPED | OUTPATIENT
Start: 2023-07-26

## 2023-07-26 RX ORDER — HYDROCODONE BITARTRATE AND ACETAMINOPHEN 10; 325 MG/1; MG/1
1 TABLET ORAL EVERY 8 HOURS PRN
Qty: 90 TABLET | Refills: 0 | Status: SHIPPED | OUTPATIENT
Start: 2023-09-29 | End: 2023-10-29

## 2023-07-26 NOTE — CHRONIC PAIN
Initial Consultation Note      HISTORY OF PRESENT ILLNESS:  Jun Darling is a 79year old old male referred to the pain clinic  for evaluation treatment of his low back and cervical neck pain . he continues to take 3 Norco a day for pain control Sergey Armstrong also has bilateral knee arthritis in need of replacement but is hold off on as long as he can he takes 3 Norco a day for pain control no side effects still decreases pain by 50 to 60% throughout the day he is walking a little bit slower these days and little more kyphotic in nature. Sergey Armstrong had a cervical epidural steroid injection with good results greater than 60% relief for the past month continues to do well with that still has problems with his knees anterior thighs and and right trapezial area but overall he has been pretty stable he has been trying to stretch and exercise    PAIN COURSE AND PREVIOUS INTERVENTIONS:    Interventions: Epidural injection  Adjuvants: Small amounts Norco every day    BLOOD THINNING MEDICATIONS:  None    CURRENT MEDICATIONS:  Current Outpatient Medications   Medication Sig Dispense Refill    [START ON 11/8/2020] HYDROcodone-acetaminophen  MG Oral Tab Take 1 tablet by mouth every 8 (eight) hours as needed for Pain (max 3/day). 90 tablet 0    [START ON 10/9/2020] HYDROcodone-acetaminophen  MG Oral Tab Take 1 tablet by mouth every 8 (eight) hours as needed for Pain (MAX 3/DAY). 90 tablet 0    HYDROCHLOROTHIAZIDE 25 MG Oral Tab Take 1 tablet by mouth once daily 90 tablet 0    metoprolol Tartrate 25 MG Oral Tab Take 1 tablet (25 mg total) by mouth 2 (two) times daily. 180 tablet 1    simvastatin 20 MG Oral Tab Take 1 tablet (20 mg total) by mouth every evening. 90 tablet 1    ibuprofen 600 MG Oral Tab TAKE 1 TABLET EVERY 12 HOURS AS NEEDED FOR PAIN (SUBSTITUTED FOR MOTRIN) 180 tablet 0    Tadalafil 20 MG Oral Tab Take 1 tablet (20 mg total) by mouth daily as needed for Erectile Dysfunction.  10 tablet 11 ALLERGIES:  No Known Allergies    SURGICAL HISTORY:  Past Surgical History:   Procedure Laterality Date    APPENDECTOMY  1963    KNEE ARTHROSCOPY      per NG: bilat knees 2x each; arthroscopy - brash    PROSTATE BIOPSIES  2013    REPAIR ROTATOR CUFF,ACUTE Right 2007    TONSILLECTOMY         REVIEW OF SYSTEMS:   A comprehensive review of systems was negative except for:   MEDICAL HISTORY:  Patient Active Problem List:     Prostate cancer [facility])     Erectile dysfunction     Cervical radicular pain     Mixed hyperlipidemia     Essential hypertension with goal blood pressure less than 140/90     Class 1 obesity due to excess calories with serious comorbidity in adult     Physical exam, annual     Decreased visual acuity    Past Medical History:   Diagnosis Date    Appendicitis 1963    Back problem     Benign prostatic hypertrophy     Carpal tunnel syndrome     Dysplastic nevus 2017    left flank    Elevated PSA 2008    bx negative for cancer    H/O arthroscopy of knee bilateral    History of brachytherapy     Neck pain     Obesity, unspecified     Prostate cancer ([address]     brachytherapy    PVD (peripheral vascular disease) [address] [address]     Rotator cuff tear 2007    right    Rotator cuff tear     R - 2007    Tonsillitis 1960       FAMILY HISTORY:  Family History   Problem Relation Age of Onset    Diabetes Mother     Diabetes Sister     Obesity Sister     Lipids Other         family h/o hyperlipidemia and vascular disease       SOCIAL HISTORY:  Social History    Socioeconomic History      Marital status:       Spouse name: Not on file      Number of children: 2      Years of education: Not on file      Highest education level: Not on file    Occupational History      Occupation: ordering        Comment: retired    Social Needs      Financial resource strain: Not on file      Food insecurity        Worry: Not on file        Inability: Not on file      Transportation needs        Medical: Not on file        Non-medical: Not on file    Tobacco Use      Smoking status: Never Smoker      Smokeless tobacco: Never Used    Substance and Sexual Activity      Alcohol use: No        Alcohol/week: 0.0 standard drinks      Drug use: No      Sexual activity: Not on file    Lifestyle      Physical activity        Days per week: Not on file        Minutes per session: Not on file      Stress: Not on file    Relationships      Social connections        Talks on phone: Not on file        Gets together: Not on file        Attends Yarsani service: Not on file        Active member of club or organization: Not on file        Attends meetings of clubs or organizations: Not on file        Relationship status: Not on file      Intimate partner violence        Fear of current or ex partner: Not on file        Emotionally abused: Not on file        Physically abused: Not on file        Forced sexual activity: Not on file    Other Topics      Concerns:         Service: Not Asked        Blood Transfusions: Not Asked        Caffeine Concern: Yes          soda, 1 liter/day        Occupational Exposure: Not Asked        Hobby Hazards: Not Asked        Sleep Concern: Not Asked        Stress Concern: Not Asked        Weight Concern: Not Asked        Special Diet: Not Asked        Back Care: Not Asked        Exercise: Not Asked        Bike Helmet: Not Asked        Seat Belt: Not Asked        Self-Exams: Not Asked        Grew up on a farm: Not Asked        History of tanning: Not Asked        Outdoor occupation: Not Asked        Pt has a pacemaker: No        Pt has a defibrillator: No        Reaction to local anesthetic: No    Social History Narrative      Not on file      ADVANCE CARE PLANNING:  Advance Care Plan NOT discussed. PHYSICAL EXAMINATION:  There were no vitals filed for this visit.   General: Alert and oriented x3  Affect:  NAD  Head: normocephalic, atraumatic  Eyes: anicteric; no injection  Chest: S1, S2, RRR  Respiratory: CTAB  Gait: Normal; cane user - No  Spine: Normal    ROM:   Lumbar spine  Flexion  dec  Extension dec  Cervical Spine  Flexion dec  Extensiondec  MOTOR EXAMINATION:  UPPER EXTREMITY      LEFT RIGHT   Deltoid 5/5 5/5   Biceps 5/5 5/5   Triceps 5/5 5/5   Brachioradialis 5/5 5/5   Wrist Flexors 5/5 5/5   Wrist Extensors 5/5 5/5   Intrinsic Hand 5/5 5/5    5/5 5/5     LOWER EXTREMITY      LEFT RIGHT   Iliopsoas 5/5 5/5   Quadriceps 5/5 5/5   Foot DF 5/5 5/5   Foot EHL 5/5 5/5   Gastrocnemius 5/5 5/5     PULSES      LEFT RIGHT   Radial 2/4 2/4   Dorsalis Pedis 2/4 2/4   Posterior Tibial 2/4 2/4   Brachial 2/4 2/4     SLR: negative  SIJ tenderness negative  Joint Exam: Normal  TPs:  Present within lumbo-sacral paraspinal muscles  Left occipital region is very tender to palpation can replicates the pain that he gets in the back of his head  Right Deep tendon reflexes: Normal   Left Deep tendon reflexes: Normal   Babinski Reflex: absent bilaterally   Temperature:  normal to touch bilateral upper and lower extremities  Edema - Absent  Stasis ulcer on the left leg is healed    Sensation (light touch/pinprick/temperature):     Right Lower Extremity:  Normal  Left Lower Extremity: Normal  Right Upper Extremity:  Normal  Left Upper Extremity:  Normal    IMAGING:  No results found.     LABS:  Lab Results   Component Value Date    WBC 10.3 07/06/2020    RBC 5.45 07/06/2020    HGB 14.1 07/06/2020    HCT 45.2 07/06/2020    MCV 82.9 07/06/2020    MCH 25.9 (L) 07/06/2020    MCHC 31.2 07/06/2020    RDW 19.0 (H) 07/06/2020    .0 07/06/2020    MPV 8.1 06/28/2018     Lab Results   Component Value Date     07/06/2020    K 3.8 07/06/2020     07/06/2020    CO2 29.0 07/06/2020    BUN 25 (H) 07/06/2020    GLU 92 07/06/2020    CA 8.8 07/06/2020     No results found for: PT    [place] [facility] MONITORING PROGRAM REVIEWED  Yes      ASSESSMENT:   79year old old male with cervical and lumbar spinal stenosis status post lumbar epidural steroid injection 2 weeks ago some good results  Cervical epidural injection was pretty good results greater than 60% relief of his pain  Low back pain bilateral radicular pain  Chronic use of small amount of Norco a day for pain control   increasing neck pain radiating down both arms   bilateral knee pain the care of Dr. Deepak Leyva for that DJD bilateral knee  Recent injections for the knees        PLAN:  RECOMMENDATIONS:  Maintain on 3 Norco a day  To  start PT     Encourage movement and exercise we will give him a Medrol Dosepak to use as needed  Epidurals for cervical is still benefiting him no need for repeat as and at this time  F/u with neuro as needed   Bilateral knee pain he is to see orthopedic surgeon status post Synvisc injections which is helped also trying hyaluronic acid injections currently         relatively stable on current medications no side effect    Cardiac eval in process

## 2023-07-26 NOTE — PROGRESS NOTES
PT presents ambulatory to the [facility].  NA & UDS renewed. PT reports increased overall pain. I've got pain everywhere in the body\" 3-4/10 pain today. Dr. Jaimie Bailey saw PT for KAM C6/7 F/U.   Refer to dictation for POC

## 2023-07-28 ENCOUNTER — TELEPHONE (OUTPATIENT)
Dept: PAIN CLINIC | Facility: HOSPITAL | Age: 73
End: 2023-07-28

## 2023-07-28 RX ORDER — DICLOFENAC EPOLAMINE 0.01 G/1
1 SYSTEM TOPICAL EVERY 12 HOURS
COMMUNITY
End: 2023-07-28

## 2023-07-28 RX ORDER — DICLOFENAC EPOLAMINE 0.01 G/1
1 SYSTEM TOPICAL EVERY 12 HOURS
Qty: 60 PATCH | Refills: 2 | Status: SHIPPED | OUTPATIENT
Start: 2023-07-28 | End: 2023-10-26

## 2023-07-31 ENCOUNTER — OFFICE VISIT (OUTPATIENT)
Dept: SURGERY | Facility: CLINIC | Age: 73
End: 2023-07-31

## 2023-07-31 DIAGNOSIS — N40.1 BPH WITH OBSTRUCTION/LOWER URINARY TRACT SYMPTOMS: ICD-10-CM

## 2023-07-31 DIAGNOSIS — R97.21 RISING PSA FOLLOWING TREATMENT FOR MALIGNANT NEOPLASM OF PROSTATE: Primary | ICD-10-CM

## 2023-07-31 DIAGNOSIS — N13.8 BPH WITH OBSTRUCTION/LOWER URINARY TRACT SYMPTOMS: ICD-10-CM

## 2023-07-31 DIAGNOSIS — C61 PROSTATE CANCER (HCC): ICD-10-CM

## 2023-07-31 DIAGNOSIS — C61 BIOCHEMICALLY RECURRENT MALIGNANT NEOPLASM OF PROSTATE: ICD-10-CM

## 2023-07-31 DIAGNOSIS — R97.21 BIOCHEMICALLY RECURRENT MALIGNANT NEOPLASM OF PROSTATE: ICD-10-CM

## 2023-07-31 DIAGNOSIS — N52.01 ERECTILE DYSFUNCTION DUE TO ARTERIAL INSUFFICIENCY: ICD-10-CM

## 2023-07-31 DIAGNOSIS — Z92.3 HISTORY OF BRACHYTHERAPY: ICD-10-CM

## 2023-07-31 PROCEDURE — 99214 OFFICE O/P EST MOD 30 MIN: CPT | Performed by: UROLOGY

## 2023-07-31 NOTE — PROGRESS NOTES
[facility]  Follow-Up Visit    HPI: Ivette Mathias is a 67year old male presents for a follow up visit. Patient was last seen on 4/26/2023. Accompanied by his wife. INTERVAL HISTORY: Former patient of Dr. Constantin Patton. Biochemically recurrent prostate cancer, status post brachytherapy seed implants 2014 for definitive local therapy of intermediate risk disease. PSA junior of 1.3 ng/mL. Rising PSA levels since 2020. Repeat prostate biopsy 12/2021 was negative. PSMA PET scan 6/2022 without radiographic evidence of recurrent or metastatic disease. Patient elected continued monitoring. PSA levels continue to rise, up to 6.68 ng/mL from 7/26/2023. See trend below. He denies fevers or chills, chest pain or shortness of breath. No bone pain or unintentional weight loss. No change in appetite. No gross hematuria or dysuria. Bladder scan revealing 35 mL (he voided about 30 minutes prior). 1. Prostate Cancer w/ Biochemical Recurrence  History of multiple previous prostate biopsies. Diagnosed with intermediate risk prostate cancer upon saturation prostate biopsy 1/9/2014 with up to [place] 3+4=7 disease in 8/40 cores. Treated with brachytherapy seed implants at the [facility] 2/2014. PSA junior reportedly around 1.3 ng/mL. PSA levels have been slowly rising since 2020. Prostate MRI 11/2021 without any definite prostatic lesions. No PI-RADS assessment given for posttreatment gland. Patient underwent a TRUS-guided prostate biopsy by Dr. Sandra De Jesus 12/2021 which was negative. PSA up to 4.07 ng/mL 6/7/2022. PSMA PET scan 6/2022 without evidence of recurrent or metastatic disease. Seen by medical oncology 6/2022. Patient electing continued monitoring as opposed to starting ADT. - 10/2022 F/U: PSA decreased to 3.76 ng/mL. Patient elects continued monitoring.    - 4/2023 F/U: PSA increased to 5.44 ng/mL.   Patient elects continued monitoring with a repeat in 3 months. - 7/2023 F/U: PSA increased to 6.68 ng/mL. Obtain updated PSMA PET scan. 2. ED  Chronic and longstanding. Previously on tadalafil as needed. Patient no longer sexually active. 3. BPH with LUTS  Chronic and longstanding LUTS. Not on any medical therapy. - 10/2022 F/U: IPSS score is 18 (3/2/2/2/3/2/4) with a quality-of-life score of 3. Patient not interested in any treatment at this point.    - 7/2023 F/U: IPSS 23 (4/2/5/2/3/3/4) with a quality-of-life score of 3. Patient drinks water deliberately in the evening as well as when he wakes up at night to urinate. Elects behavioral changes and fluid management. Reviewed past medical, surgical, family, and social history. Reviewed med list and allergies. REVIEW OF SYSTEMS:  Pertinent positives and negatives per HPI. A 10-point ROS was performed and is otherwise negative. EXAM:  There were no vitals taken for this visit. Physical Exam  Constitutional:       Appearance: He is well-developed. HENT:      Head: Normocephalic. Eyes:      General: No scleral icterus. Cardiovascular:      Rate and Rhythm: Normal rate. Pulmonary:      Effort: Pulmonary effort is normal.   Genitourinary:     Comments: PARISH revealing a firm, small prostate without any palpable nodules or masses. Skin:     General: Skin is warm and dry. Neurological:      Mental Status: He is alert and oriented to person, place, and time. Psychiatric:         Mood and Affect: Mood normal.         Behavior: Behavior normal.       PATHOLOGY:  See HPI for details.       LABS:    Component PSA   Latest Ref Rng & Units <=4.00 ng/mL   7/26/2023 6.68 (H)   4/19/2023 5.44 (H)   10/19/2022 3.76   6/7/2022 4.07 (H)   10/26/2021 3.03   6/16/2021 2.80   2/19/2021 2.75   10/15/2020 2.48   6/16/2020 2.18   2/5/2020 2.15   8/29/2019 1.89   3/1/2019 1.73   11/2/2018 2.0   7/3/2018 1.5   2/26/2018 1.6   11/21/2017 1.6   5/16/2017 1.3 11/15/2016 1.4   5/11/2016 1.6   1/12/2016 1.9   9/15/2015 1.8   3/10/2015 2.8   8/13/2014 4.6 (H)   12/4/2013 7.9 (H)   9/1/2013 8.2 (H)   7/28/2013 10.2 (H)   11/1/2012 7.4 (H)   12/11/2011 8.3 (H)       IMAGING:    PSMA PET scan (6/23/2022): Brachytherapy posttreatment changes in the prostate gland. No areas of increased radiotracer activity are noted to suggest residual or recurrent disease. No evidence for distant metastatic disease. Incidental findings as above including distal colonic diverticulosis and coronary artery calcifications. UROLOGY PROCEDURE:  Not performed today. IMPRESSION:  67year old male with history of prostate cancer status post brachytherapy seed implants in 2014. Patient with biochemical evidence of disease recurrence given slowly rising PSA levels, since 2020. Negative prostate biopsy 12/2021 and negative PSMA PET scan 6/2022 without evidence of local or distant recurrence of prostate cancer. Recent PSA level increasing. Findings reviewed with patient and wife at length. Discussed obtaining updated imaging in the form of a PSMA PET scan for further evaluation. If negative, may consider prostate MRI with another prostate biopsy. Otherwise, management options discussed including continued monitoring or initiating ADT. Patient agrees to obtaining an updated PSMA PET scan. Baseline BPH with LUTS, not on medical therapy. Discussed initiating medical therapy versus minimally invasive surgical treatments. Discussed behavioral changes and fluid management. Patient agreeable to that but does not want to start medical therapy yet. Longstanding chronic ED, patient no longer sexually active. Not interested in further treatment at this time. PLAN:  1. Obtain updated PSMA PET scan for evaluation of rising PSA levels following brachytherapy seed implants for prostate cancer. Patient will be notified of the results.       If study is positive for metastatic disease, then he will be referred to medical oncology for further management. If PET scan negative, we will obtain a prostate MRI for further evaluation and consider possible repeat prostate biopsy. 2.  Conservative management/watchful waiting of ED and BPH with LUTS. MDM complexity: Moderate. Discussing management of biochemically recurrent prostate cancer following initial definitive local therapy.     Delfin Bernal MD  7/31/2023

## 2023-08-01 LAB
CODEINE UR: NEGATIVE
HYDROCODONE CONF UR: 1927 NG/ML
HYDROCODONE UR: POSITIVE
HYDROMORPH CONF UR: 369 NG/ML
HYDROMORPH UR: POSITIVE
MORPHINE UR: NEGATIVE
OPIATES CLASS UR: POSITIVE NG/ML

## 2023-08-15 ENCOUNTER — HOSPITAL ENCOUNTER (OUTPATIENT)
Dept: MRI IMAGING | Age: 73
Discharge: HOME | End: 2023-08-15
Attending: Other
Payer: MEDICARE

## 2023-08-15 DIAGNOSIS — R42 VERTIGO: ICD-10-CM

## 2023-08-16 ENCOUNTER — HOSPITAL ENCOUNTER (OUTPATIENT)
Dept: NUCLEAR MEDICINE | Facility: HOSPITAL | Age: 73
Discharge: HOME | End: 2023-08-16
Attending: UROLOGY
Payer: MEDICARE

## 2023-08-16 DIAGNOSIS — R97.21 RISING PSA FOLLOWING TREATMENT FOR MALIGNANT NEOPLASM OF PROSTATE: ICD-10-CM

## 2023-08-16 DIAGNOSIS — R97.21 BIOCHEMICALLY RECURRENT MALIGNANT NEOPLASM OF PROSTATE: ICD-10-CM

## 2023-08-16 DIAGNOSIS — Z92.3 HISTORY OF BRACHYTHERAPY: ICD-10-CM

## 2023-08-16 DIAGNOSIS — C61 BIOCHEMICALLY RECURRENT MALIGNANT NEOPLASM OF PROSTATE: ICD-10-CM

## 2023-08-16 PROCEDURE — 78815 PET IMAGE W/CT SKULL-THIGH: CPT | Performed by: UROLOGY

## 2023-08-22 ENCOUNTER — TELEPHONE (OUTPATIENT)
Dept: SURGERY | Facility: CLINIC | Age: 73
End: 2023-08-22

## 2023-08-22 DIAGNOSIS — C61 PROSTATE CANCER (HCC): Primary | ICD-10-CM

## 2023-08-22 DIAGNOSIS — R97.21 RISING PSA FOLLOWING TREATMENT FOR MALIGNANT NEOPLASM OF PROSTATE: ICD-10-CM

## 2023-08-22 NOTE — TELEPHONE ENCOUNTER
- s/w pt; identity verified with name and   - Read message to pt and provided pt with number for CS ([phone]) to schedule MRI. Explained to pt that we would get back to them with the results and next steps after completion of MRI. Pt acknowledged understanding and denied any further questions. - encounter complete    ----- Message from BRITT Gan sent at 2023 10:37 AM CDT -----  Attempted to call patient regarding PET scan results, LMTCB. Please let patient know his PET scan is negative for any evidence of metastatic disease. Given this we recommend a MRI prostate with possible biopsy thereafter. We will let him know once we receive MRI results.

## 2023-08-28 ENCOUNTER — TELEPHONE (OUTPATIENT)
Dept: INTERNAL MEDICINE CLINIC | Facility: CLINIC | Age: 73
End: 2023-08-28

## 2023-08-28 NOTE — TELEPHONE ENCOUNTER
Patient called (identified name and ),   He is asking Dr Phil Pelayo if he could take low dose steroid for his body pains and stiffness? Asking if it would interact with his other meds. Reluctant to schedule appointment to discuss. Advised of multiple side effects of long term steroid therapy but he still wants to ask Dr Hernandez Miguel opinion. Reports when he took medrol dose pack from Dr Graciela Billingsley, he felt much better, but since finishing it he has much more stiffness and pain. States NSAIDs do not help. Just had PET scan negative for mets from prostate cancer.

## 2023-08-28 NOTE — TELEPHONE ENCOUNTER
I do not think it is a good idea to use steroids long-term. Long-term steroids can cause diabetes, rising blood pressure, weakening bones etc.  It  really depends on how bad his symptoms are.   Please inform patient thank you

## 2023-09-05 ENCOUNTER — TELEPHONE (OUTPATIENT)
Dept: SURGERY | Facility: CLINIC | Age: 73
End: 2023-09-05

## 2023-09-21 ENCOUNTER — TELEPHONE (OUTPATIENT)
Dept: PAIN CLINIC | Facility: HOSPITAL | Age: 73
End: 2023-09-21

## 2023-09-21 RX ORDER — METHYLPREDNISOLONE 4 MG/1
TABLET ORAL
Qty: 21 TABLET | Refills: 0 | Status: SHIPPED | OUTPATIENT
Start: 2023-09-21

## 2023-09-26 ENCOUNTER — OFFICE VISIT (OUTPATIENT)
Dept: NEUROLOGY | Facility: CLINIC | Age: 73
End: 2023-09-26
Payer: MEDICARE

## 2023-09-26 DIAGNOSIS — M19.90 ARTHRITIS: ICD-10-CM

## 2023-09-26 DIAGNOSIS — G24.3 CERVICAL DYSTONIA: Primary | ICD-10-CM

## 2023-09-26 PROCEDURE — 64616 CHEMODENERV MUSC NECK DYSTON: CPT | Performed by: OTHER

## 2023-09-26 PROCEDURE — 95874 GUIDE NERV DESTR NEEDLE EMG: CPT | Performed by: OTHER

## 2023-09-26 NOTE — PROGRESS NOTES
Paperwork noting that patient may bear financial responsibility for procedure(s) performed in clinic today signed prior to proceeding with procedure(s). Furthermore, patient notified that they should contact their insurer to verify that your procedure/test has been approved and is a COVERED benefit. Although the [facility] staff does its due diligence, the insurance carrier gives the disclaimer that \"Although the procedure is authorized, this does not guarantee payment. \"    Ultimately the patient is responsible for payment.     Botox is:  [x] Buy and Bill  [] Patient Supplied      Cervical dystonia

## 2023-09-26 NOTE — PROCEDURES
We did Botox injections for dystonia today. PROCEDURE: Botox Injections (Cervical Dystonia PROTOCOL)   PRE-OPERATIVE DIAGNOSIS: Cervical dystonia, anterocollis. POST-OPERATIVE DIAGNOSIS: same as above   BLOOD LOSS: minimal COMPLICATIONS: none     DESCRIPTION OF PROCEDURE: After a discussion of the risks and benefits, the patient consented to the procedure. The patient was taken to the procedure room. The upper back, neck area was prepped with alcohol swabs. The 2 Botox vials containing 100 units each, were reconstituted with 20 cc saline. Procedure was performed under EMG guidance. Following muscles and units were injected:    Scalenus anterior/medius 50 units on each side (total of 100 units). Sternocleidomastoid muscle bialterally 40 units each (total of 80 units)     Patient tolerated the procedure well. Total of 180 Units of botulinum toxin were used and 20 Units were wasted.     Rheumatology referral will be placed to consider possibility of ankylosing spondylitis

## 2023-09-28 ENCOUNTER — HOSPITAL ENCOUNTER (OUTPATIENT)
Dept: MRI IMAGING | Facility: HOSPITAL | Age: 73
Discharge: HOME | End: 2023-09-28
Attending: NURSE PRACTITIONER
Payer: MEDICARE

## 2023-09-28 DIAGNOSIS — R97.21 RISING PSA FOLLOWING TREATMENT FOR MALIGNANT NEOPLASM OF PROSTATE: ICD-10-CM

## 2023-09-28 DIAGNOSIS — C61 PROSTATE CANCER (HCC): ICD-10-CM

## 2023-09-28 PROCEDURE — A9575 INJ GADOTERATE MEGLUMI 0.1ML: HCPCS | Performed by: NURSE PRACTITIONER

## 2023-09-28 PROCEDURE — 72197 MRI PELVIS W/O & W/DYE: CPT | Performed by: NURSE PRACTITIONER

## 2023-09-28 RX ORDER — GADOTERATE MEGLUMINE 376.9 MG/ML
20 INJECTION INTRAVENOUS
Status: COMPLETED | OUTPATIENT
Start: 2023-09-28 | End: 2023-09-28

## 2023-09-28 RX ADMIN — GADOTERATE MEGLUMINE 20 ML: 376.9 INJECTION INTRAVENOUS at 18:14:00

## 2023-10-05 ENCOUNTER — TELEPHONE (OUTPATIENT)
Dept: SURGERY | Facility: CLINIC | Age: 73
End: 2023-10-05

## 2023-10-11 ENCOUNTER — TELEPHONE (OUTPATIENT)
Dept: PAIN CLINIC | Facility: HOSPITAL | Age: 73
End: 2023-10-11

## 2023-10-11 RX ORDER — HYDROCODONE BITARTRATE AND ACETAMINOPHEN 10; 325 MG/1; MG/1
1 TABLET ORAL EVERY 8 HOURS PRN
Qty: 90 TABLET | Refills: 0 | Status: CANCELLED | OUTPATIENT
Start: 2023-10-11 | End: 2023-11-10

## 2023-10-12 RX ORDER — HYDROCODONE BITARTRATE AND ACETAMINOPHEN 10; 325 MG/1; MG/1
1 TABLET ORAL EVERY 8 HOURS PRN
Qty: 90 TABLET | Refills: 0 | Status: SHIPPED | OUTPATIENT
Start: 2023-10-12 | End: 2023-11-11

## 2023-10-12 RX ORDER — HYDROCODONE BITARTRATE AND ACETAMINOPHEN 10; 325 MG/1; MG/1
1 TABLET ORAL EVERY 8 HOURS PRN
COMMUNITY

## 2023-10-12 RX ORDER — HYDROCODONE BITARTRATE AND ACETAMINOPHEN 10; 325 MG/1; MG/1
1 TABLET ORAL EVERY 8 HOURS PRN
COMMUNITY
End: 2023-10-12

## 2023-10-23 ENCOUNTER — OFFICE VISIT (OUTPATIENT)
Dept: PAIN CLINIC | Facility: HOSPITAL | Age: 73
End: 2023-10-23
Attending: ANESTHESIOLOGY
Payer: MEDICARE

## 2023-10-23 VITALS — HEART RATE: 57 BPM | DIASTOLIC BLOOD PRESSURE: 78 MMHG | SYSTOLIC BLOOD PRESSURE: 123 MMHG | OXYGEN SATURATION: 96 %

## 2023-10-23 DIAGNOSIS — Z79.891 ENCOUNTER FOR MONITORING OPIOID MAINTENANCE THERAPY: Primary | ICD-10-CM

## 2023-10-23 DIAGNOSIS — Z51.81 ENCOUNTER FOR MONITORING OPIOID MAINTENANCE THERAPY: Primary | ICD-10-CM

## 2023-10-23 DIAGNOSIS — M48.02 SPINAL STENOSIS IN CERVICAL REGION: ICD-10-CM

## 2023-10-23 DIAGNOSIS — M48.061 SPINAL STENOSIS OF LUMBAR REGION WITHOUT NEUROGENIC CLAUDICATION: ICD-10-CM

## 2023-10-23 PROCEDURE — 99211 OFF/OP EST MAY X REQ PHY/QHP: CPT

## 2023-10-23 RX ORDER — HYDROCODONE BITARTRATE AND ACETAMINOPHEN 10; 325 MG/1; MG/1
1 TABLET ORAL EVERY 6 HOURS PRN
Qty: 120 TABLET | Refills: 0 | Status: SHIPPED | OUTPATIENT
Start: 2023-12-11 | End: 2024-01-10

## 2023-10-23 RX ORDER — HYDROCODONE BITARTRATE AND ACETAMINOPHEN 10; 325 MG/1; MG/1
1 TABLET ORAL EVERY 6 HOURS PRN
Qty: 120 TABLET | Refills: 0 | Status: SHIPPED | OUTPATIENT
Start: 2023-11-11 | End: 2023-12-11

## 2023-10-23 NOTE — CHRONIC PAIN
Initial Consultation Note      HISTORY OF PRESENT ILLNESS:  Grace Christine is a 79year old old male referred to the pain clinic  for evaluation treatment of his low back and cervical neck pain . he continues to take 3 Norco a day for pain control Landy Waller also has bilateral knee arthritis in need of replacement but is hold off on as long as he can he takes 3 Norco a day for pain control no side effects still decreases pain by 50 to 60% throughout the day he is walking a little bit slower these days and little more kyphotic in nature. Landy Waller had a cervical epidural steroid injection as well as lumbar injections in the past was pretty good results. The medication as well as intermittent and injections have helped decrease his pain allows him to be more functionable. PAIN COURSE AND PREVIOUS INTERVENTIONS:    Interventions: Epidural injection  Adjuvants: Small amounts Norco every day    BLOOD THINNING MEDICATIONS:  None    CURRENT MEDICATIONS:  Current Outpatient Medications   Medication Sig Dispense Refill    [START ON 11/8/2020] HYDROcodone-acetaminophen  MG Oral Tab Take 1 tablet by mouth every 8 (eight) hours as needed for Pain (max 3/day). 90 tablet 0    [START ON 10/9/2020] HYDROcodone-acetaminophen  MG Oral Tab Take 1 tablet by mouth every 8 (eight) hours as needed for Pain (MAX 3/DAY). 90 tablet 0    HYDROCHLOROTHIAZIDE 25 MG Oral Tab Take 1 tablet by mouth once daily 90 tablet 0    metoprolol Tartrate 25 MG Oral Tab Take 1 tablet (25 mg total) by mouth 2 (two) times daily. 180 tablet 1    simvastatin 20 MG Oral Tab Take 1 tablet (20 mg total) by mouth every evening. 90 tablet 1    ibuprofen 600 MG Oral Tab TAKE 1 TABLET EVERY 12 HOURS AS NEEDED FOR PAIN (SUBSTITUTED FOR MOTRIN) 180 tablet 0    Tadalafil 20 MG Oral Tab Take 1 tablet (20 mg total) by mouth daily as needed for Erectile Dysfunction.  10 tablet 11        ALLERGIES:  No Known Allergies    SURGICAL HISTORY:  Past Surgical History:   Procedure Laterality Date    APPENDECTOMY  1963    KNEE ARTHROSCOPY      per NG: bilat knees 2x each; arthroscopy - brash    PROSTATE BIOPSIES  2013    REPAIR ROTATOR CUFF,ACUTE Right 2007    TONSILLECTOMY         REVIEW OF SYSTEMS:   A comprehensive review of systems was negative except for:   MEDICAL HISTORY:  Patient Active Problem List:     Prostate cancer [facility])     Erectile dysfunction     Cervical radicular pain     Mixed hyperlipidemia     Essential hypertension with goal blood pressure less than 140/90     Class 1 obesity due to excess calories with serious comorbidity in adult     Physical exam, annual     Decreased visual acuity    Past Medical History:   Diagnosis Date    Appendicitis 1963    Back problem     Benign prostatic hypertrophy     Carpal tunnel syndrome     Dysplastic nevus 2017    left flank    Elevated PSA 2008    bx negative for cancer    H/O arthroscopy of knee bilateral    History of brachytherapy     Neck pain     Obesity, unspecified     Prostate cancer ([facility] [address]     brachytherapy    PVD (peripheral vascular disease) ([facility] [address]     Rotator cuff tear 2007    right    Rotator cuff tear     R - 2007    Tonsillitis 1960       FAMILY HISTORY:  Family History   Problem Relation Age of Onset    Diabetes Mother     Diabetes Sister     Obesity Sister     Lipids Other         family h/o hyperlipidemia and vascular disease       SOCIAL HISTORY:  Social History    Socioeconomic History      Marital status:       Spouse name: Not on file      Number of children: 2      Years of education: Not on file      Highest education level: Not on file    Occupational History      Occupation: ordering        Comment: retired    Social Needs      Financial resource strain: Not on file      Food insecurity        Worry: Not on file        Inability: Not on file      Transportation needs        Medical: Not on file        Non-medical: Not on file    Tobacco Use      Smoking status: Never Smoker Smokeless tobacco: Never Used    Substance and Sexual Activity      Alcohol use: No        Alcohol/week: 0.0 standard drinks      Drug use: No      Sexual activity: Not on file    Lifestyle      Physical activity        Days per week: Not on file        Minutes per session: Not on file      Stress: Not on file    Relationships      Social connections        Talks on phone: Not on file        Gets together: Not on file        Attends Buddhist service: Not on file        Active member of club or organization: Not on file        Attends meetings of clubs or organizations: Not on file        Relationship status: Not on file      Intimate partner violence        Fear of current or ex partner: Not on file        Emotionally abused: Not on file        Physically abused: Not on file        Forced sexual activity: Not on file    Other Topics      Concerns:         Service: Not Asked        Blood Transfusions: Not Asked        Caffeine Concern: Yes          soda, 1 liter/day        Occupational Exposure: Not Asked        Hobby Hazards: Not Asked        Sleep Concern: Not Asked        Stress Concern: Not Asked        Weight Concern: Not Asked        Special Diet: Not Asked        Back Care: Not Asked        Exercise: Not Asked        Bike Helmet: Not Asked        Seat Belt: Not Asked        Self-Exams: Not Asked        Grew up on a farm: Not Asked        History of tanning: Not Asked        Outdoor occupation: Not Asked        Pt has a pacemaker: No        Pt has a defibrillator: No        Reaction to local anesthetic: No    Social History Narrative      Not on file      ADVANCE CARE PLANNING:  Advance Care Plan NOT discussed. PHYSICAL EXAMINATION:  There were no vitals filed for this visit.   General: Alert and oriented x3  Affect:  NAD  Head: normocephalic, atraumatic  Eyes: anicteric; no injection  Chest: S1, S2, RRR  Respiratory: CTAB  Gait: Normal; cane user - No  Spine: Normal    ROM:   Lumbar spine  Flexion dec  Extension dec  Cervical Spine  Flexion dec  Extensiondec  MOTOR EXAMINATION:  UPPER EXTREMITY      LEFT RIGHT   Deltoid 5/5 5/5   Biceps 5/5 5/5   Triceps 5/5 5/5   Brachioradialis 5/5 5/5   Wrist Flexors 5/5 5/5   Wrist Extensors 5/5 5/5   Intrinsic Hand 5/5 5/5    5/5 5/5     LOWER EXTREMITY      LEFT RIGHT   Iliopsoas 5/5 5/5   Quadriceps 5/5 5/5   Foot DF 5/5 5/5   Foot EHL 5/5 5/5   Gastrocnemius 5/5 5/5     PULSES      LEFT RIGHT   Radial 2/4 2/4   Dorsalis Pedis 2/4 2/4   Posterior Tibial 2/4 2/4   Brachial 2/4 2/4     SLR: negative  SIJ tenderness negative  Joint Exam: Normal  TPs:  Present within lumbo-sacral paraspinal muscles  Left occipital region is very tender to palpation can replicates the pain that he gets in the back of his head  Right Deep tendon reflexes: Normal   Left Deep tendon reflexes: Normal   Babinski Reflex: absent bilaterally   Temperature:  normal to touch bilateral upper and lower extremities  Edema - Absent  Stasis ulcer on the left leg is healed    Sensation (light touch/pinprick/temperature):     Right Lower Extremity:  Normal  Left Lower Extremity: Normal  Right Upper Extremity:  Normal  Left Upper Extremity:  Normal    IMAGING:  No results found.     LABS:  Lab Results   Component Value Date    WBC 10.3 07/06/2020    RBC 5.45 07/06/2020    HGB 14.1 07/06/2020    HCT 45.2 07/06/2020    MCV 82.9 07/06/2020    MCH 25.9 (L) 07/06/2020    MCHC 31.2 07/06/2020    RDW 19.0 (H) 07/06/2020    .0 07/06/2020    MPV 8.1 06/28/2018     Lab Results   Component Value Date     07/06/2020    K 3.8 07/06/2020     07/06/2020    CO2 29.0 07/06/2020    BUN 25 (H) 07/06/2020    GLU 92 07/06/2020    CA 8.8 07/06/2020     No results found for: PT    [place] [facility] MONITORING PROGRAM REVIEWED  Yes      ASSESSMENT:   79year old old male with cervical and lumbar spinal stenosis   Has responded in the past epidural injections cervical and lumbar  Cervical epidural injection was pretty good results greater than 60% relief of his pain  Low back pain bilateral radicular pain  Chronic use  of Norco a day for pain control   increasing neck pain radiating down both arms   bilateral knee pain the care of Dr. Lynnie Bernheim for that DJD bilateral knee  Recent injections for the knees        PLAN:  RECOMMENDATIONS:  Increase to 4 Norco a day        Epidurals for cervical is still benefiting him no need for repeat as and at this time  Recently had Botox of the neck  F/u with neuro as needed   Bilateral knee pain he is to see orthopedic surgeon status post Synvisc injections which is helped also trying hyaluronic acid injections currently         r    Cardiac eval in process

## 2023-10-23 NOTE — PROGRESS NOTES
PT presents ambulatory to the [facility]. PT reports 3-4/10 pain that increases when going from sit to stand; Increased neck pain.    Dr. Salena Rueda saw PT for med eval.  Refer to dictation for POC

## 2023-10-27 ENCOUNTER — LAB ENCOUNTER (OUTPATIENT)
Dept: LAB | Age: 73
End: 2023-10-27
Attending: UROLOGY

## 2023-10-27 DIAGNOSIS — R97.21 RISING PSA FOLLOWING TREATMENT FOR MALIGNANT NEOPLASM OF PROSTATE: ICD-10-CM

## 2023-10-27 LAB — PSA SERPL-MCNC: 6.48 NG/ML (ref ?–4)

## 2023-10-27 PROCEDURE — 84153 ASSAY OF PSA TOTAL: CPT

## 2023-11-01 ENCOUNTER — OFFICE VISIT (OUTPATIENT)
Dept: SURGERY | Facility: CLINIC | Age: 73
End: 2023-11-01

## 2023-11-01 DIAGNOSIS — Z92.3 HISTORY OF BRACHYTHERAPY: ICD-10-CM

## 2023-11-01 DIAGNOSIS — C61 PROSTATE CANCER (HCC): ICD-10-CM

## 2023-11-01 DIAGNOSIS — C61 BIOCHEMICALLY RECURRENT MALIGNANT NEOPLASM OF PROSTATE: Primary | ICD-10-CM

## 2023-11-01 DIAGNOSIS — R97.21 BIOCHEMICALLY RECURRENT MALIGNANT NEOPLASM OF PROSTATE: Primary | ICD-10-CM

## 2023-11-01 PROCEDURE — 99214 OFFICE O/P EST MOD 30 MIN: CPT | Performed by: UROLOGY

## 2023-11-01 PROCEDURE — 1126F AMNT PAIN NOTED NONE PRSNT: CPT | Performed by: UROLOGY

## 2023-11-01 NOTE — PROGRESS NOTES
[facility]  [facility]Up Visit    HPI: Nicolás Galaviz is a 68year old male presents for a follow up visit. Patient was last seen on 7/31/2023. Accompanied by his wife. INTERVAL HISTORY: Former patient of Dr. Brianna Donovan. Biochemically recurrent prostate cancer, status post brachytherapy seed implants 2014 for definitive local therapy of intermediate risk disease. PSA junior of 1.3 ng/mL. Rising PSA levels since 2020. Repeat prostate biopsy 12/2021 was negative. PSMA PET scan 6/2022 without radiographic evidence of recurrent or metastatic disease. Patient elected continued monitoring. PSA levels continue to rise, up to 6.68 ng/mL from 7/26/2023. See trend below. Repeat PSMA PET scan 8/16/2023 without evidence of local or metastatic disease. Multiparametric MRI of the prostate completed 9/28/2023 with artifactual degradation secondary to brachytherapy seeds however within these parameters no focal suspicious lesions identified. Borderline sized pelvic lymph nodes that are grossly unchanged. PSA 10/27/2023 relatively stable at 6.48 ng/mL. He denies fevers or chills, chest pain or shortness of breath. No bone pain or unintentional weight loss. No change in appetite. No gross hematuria or dysuria. 1. Prostate Cancer w/ Biochemical Recurrence  History of multiple previous prostate biopsies. Diagnosed with intermediate risk prostate cancer upon saturation prostate biopsy 1/9/2014 with up to Tito 3+4=7 disease in 8/40 cores. Treated with brachytherapy seed implants at the [facility] prostate center 2/2014. PSA junior reportedly around 1.3 ng/mL. PSA levels have been slowly rising since 2020. Prostate MRI 11/2021 without any definite prostatic lesions. No PI-RADS assessment given for posttreatment gland. Patient underwent a TRUS-guided prostate biopsy by Dr. Colleen Hunt 12/2021 which was negative. PSA up to 4.07 ng/mL 6/7/2022.     PSMA PET scan 6/2022 without evidence of recurrent or metastatic disease. Seen by medical oncology 6/2022. Patient electing continued monitoring as opposed to starting ADT. - 10/2022 F/U: PSA decreased to 3.76 ng/mL. Patient elects continued monitoring.    - 4/2023 F/U: PSA increased to 5.44 ng/mL. Patient elects continued monitoring with a repeat in 3 months. - 7/2023 F/U: PSA increased to 6.68 ng/mL. Obtain updated PSMA PET scan.    - 10/2023 F/U: PSMA PET scan negative. Prostate MRI without suspicious lesions. PSA 6.48 ng/mL. Patient elects continued monitoring. 2. ED  Chronic and longstanding. Previously on tadalafil as needed. Patient no longer sexually active. 3. BPH with LUTS  Chronic and longstanding LUTS. Not on any medical therapy. - 10/2022 F/U: IPSS score is 18 (3/2/2/2/3/2/4) with a quality-of-life score of 3. Patient not interested in any treatment at this point.    - 7/2023 F/U: IPSS 23 (4/2/5/2/3/3/4) with a quality-of-life score of 3. Patient drinks water deliberately in the evening as well as when he wakes up at night to urinate. Elects behavioral changes and fluid management. Reviewed past medical, surgical, family, and social history. Reviewed med list and allergies. REVIEW OF SYSTEMS:  Pertinent positives and negatives per HPI. A 10-point ROS was performed and is otherwise negative. EXAM:  There were no vitals taken for this visit. Physical Exam  Constitutional:       Appearance: He is well-developed. HENT:      Head: Normocephalic. Eyes:      General: No scleral icterus. Cardiovascular:      Rate and Rhythm: Normal rate. Pulmonary:      Effort: Pulmonary effort is normal.   Genitourinary:     Comments: PARISH revealing a firm, small prostate without any palpable nodules or masses. Skin:     General: Skin is warm and dry. Neurological:      Mental Status: He is alert and oriented to person, place, and time.    Psychiatric:         Mood and Affect: Mood normal.         Behavior: Behavior normal.       PATHOLOGY:  See HPI for details. LABS:    Component PSA   Latest Ref Rng & Units <=4.00 ng/mL   10/27/2023 6.48 (H)   7/26/2023 6.68 (H)   4/19/2023 5.44 (H)   10/19/2022 3.76   6/7/2022 4.07 (H)   10/26/2021 3.03   6/16/2021 2.80   2/19/2021 2.75   10/15/2020 2.48   6/16/2020 2.18   2/5/2020 2.15   8/29/2019 1.89   3/1/2019 1.73   11/2/2018 2.0   7/3/2018 1.5   2/26/2018 1.6   11/21/2017 1.6   5/16/2017 1.3   11/15/2016 1.4   5/11/2016 1.6   1/12/2016 1.9   9/15/2015 1.8   3/10/2015 2.8   8/13/2014 4.6 (H)   12/4/2013 7.9 (H)   9/1/2013 8.2 (H)   7/28/2013 10.2 (H)   11/1/2012 7.4 (H)   12/11/2011 8.3 (H)       IMAGING:    MRI PROSTATE (9/29/23): 1. No PI-RADS score assessment is provided for a posttreatment gland. 2. There is artifactual degradation throughout the prostate gland secondary to the presence of brachytherapy seeds. Within these parameters, no focal suspicious lesion is identified. 3. Substantially elevated PSA density, compatible with biochemical recurrence. 4. Borderline-sized pelvic lymph nodes are grossly unchanged. 5. No suspicious osseous lesions are perceived in the included region. 6. Distal colonic diverticulosis without MR evidence of acute complication in the imaged volume. 7. Lesser incidental findings as above. PET FOR PROSTATE CARCINOMA (8/16/2023): There is history of prostate cancer. Prostate has normal size with brachytherapy seeds. No evidence for local or metastatic disease. UROLOGY PROCEDURE:  Not performed today. IMPRESSION:  68year old male with history of prostate cancer status post brachytherapy seed implants in 2014. Patient with biochemical evidence of disease recurrence given rising PSA levels, since 2020. Negative prostate biopsy 12/2021. Negative PSMA PET scan May 2023 and negative prostate MRI 9/2023. Recent PSA level remains elevated yet largely stable.     Findings reviewed with patient and wife at length. Discussed management options including continued monitoring or initiating ADT. We also discussed salvage treatments for prostate cancer including prostatectomy, cryotherapy, or HIFU. For that he would need to have evidence of recurrent prostate cancer in the prostate upon biopsy. Patient not interested at this time and would like to continue monitoring. Baseline BPH with LUTS, not on medical therapy. Stable symptoms. Longstanding chronic ED, patient no longer sexually active. Not interested in further treatment at this time. PLAN:  1. Continue to monitor biochemically recurrent prostate cancer without radiographic evidence of local or distant disease. 2.  Conservative management/watchful waiting of ED and BPH with LUTS. Follow-up in 3 months with a PSA. MDM complexity: Moderate. Discussing management of biochemically recurrent prostate cancer following initial definitive local therapy.     Nasim Burns MD  11/1/2023

## 2023-11-01 NOTE — TELEPHONE ENCOUNTER
Current Outpatient Medications:     atorvastatin 40 MG Oral Tab, Take 1 tablet (40 mg total) by mouth nightly., Disp: 90 tablet, Rfl: 3

## 2023-11-02 RX ORDER — ATORVASTATIN CALCIUM 40 MG/1
40 TABLET, FILM COATED ORAL NIGHTLY
Qty: 90 TABLET | Refills: 1 | Status: SHIPPED | OUTPATIENT
Start: 2023-11-02

## 2023-11-02 NOTE — TELEPHONE ENCOUNTER
Refill passed per [place] [employer], [facility] protocol. Requested Prescriptions   Pending Prescriptions Disp Refills    atorvastatin 40 MG Oral Tab 90 tablet 3     Sig: Take 1 tablet (40 mg total) by mouth nightly.        Cholesterol Medication Protocol Passed - 11/2/2023  9:43 AM        Passed - ALT in past 12 months        Passed - LDL in past 12 months        Passed - Last ALT < 80     Lab Results   Component Value Date    ALT 28 03/08/2023             Passed - Last LDL < 130     Lab Results   Component Value Date    LDL 76 03/08/2023             Passed - In person appointment or virtual visit in the past 12 mos or appointment in next 3 mos     Recent Outpatient Visits              Yesterday Biochemically recurrent malignant neoplasm of prostate     Sandeep Gutiérrez MD    Office Visit    1 week ago Encounter for monitoring opioid maintenance therapy    [facility] Ade Swartz MD    Office Visit    1 month ago Cervical dystonia    [address] [facility], [place] MD    Office Visit    3 months ago Rising PSA following treatment for malignant neoplasm of prostate    [address] [facility],[address], [address], Cade Ryan MD    Office Visit    3 months ago Encounter for monitoring opioid maintenance therapy    [facility] Ade Swartz MD    Office Visit          Future Appointments         Provider Department Appt Notes    In 1 month Alcira Paredes MD [facility], [address] [facility], [place] botox Insurance: Medicare Part B Member ID# [id number]  Medication: Botox 200 units  Number of visits Approved: 1 (will need to confirm active insurance prior to appt as no Katleen Lidia is required however insurance is verified on month to month basis)   Dx: cervical dystonia   Last Botox done: 3/30/23  Next Botox due around: 6/30/23  Authorization #n/a  Valid n/a  BUY[employer]BILL    In 1 month Adrián Nguyen MD [facility], [address], [place] chronic pain    In 2 months Michael Cordero MD [facility] med refill    In 3 months John Modi MD [address], [address], [facility][1] 3 MONTH                            Future Appointments         Provider Department Appt Notes    In 1 month Monik Moctezuma MD [facility], [address], [place] botox Insurance: Medicare Part B Member ID# [id number]  Medication: Botox 200 units  Number of visits Approved: 1 (will need to confirm active insurance prior to appt as no [place] Xavi is required however insurance is verified on month to month basis)   Dx: cervical dystonia   Last Botox done: 3/30/23  Next Botox due around: 6/30/23  Authorization #n/a  Valid n/a  BUY&BILL    In 1 month Adrián Nguyen MD [address], [address], [place] chronic pain    In 2 months Michael Cordero MD [facility] med refill    In 3 months John Modi MD [address], [address], [place] 3 MONTH            Recent Outpatient Visits              Yesterday Biochemically recurrent malignant neoplasm of prostate     [address], [address], Yoli Martini MD    Office Visit    1 week ago Encounter for monitoring opioid maintenance therapy    [facility] Michael Cordero MD    Office Visit    1 month ago Cervical dystonia    Emily Elizabeth MD    Office Visit    3 months ago Rising PSA following treatment for malignant neoplasm of prostate    Yoli Eilzabeth MD    Office Visit    3 months ago Encounter for monitoring opioid maintenance therapy    [facility] Yosi Cintron MD    Office Visit

## 2023-11-14 ENCOUNTER — OFFICE VISIT (OUTPATIENT)
Dept: RHEUMATOLOGY | Facility: CLINIC | Age: 73
End: 2023-11-14

## 2023-11-14 ENCOUNTER — HOSPITAL ENCOUNTER (OUTPATIENT)
Dept: GENERAL RADIOLOGY | Facility: HOSPITAL | Age: 73
Discharge: HOME | End: 2023-11-14
Attending: INTERNAL MEDICINE
Payer: MEDICARE

## 2023-11-14 ENCOUNTER — LAB ENCOUNTER (OUTPATIENT)
Dept: LAB | Facility: HOSPITAL | Age: 73
End: 2023-11-14
Attending: INTERNAL MEDICINE
Payer: MEDICARE

## 2023-11-14 VITALS
BODY MASS INDEX: 33 KG/M2 | SYSTOLIC BLOOD PRESSURE: 131 MMHG | DIASTOLIC BLOOD PRESSURE: 81 MMHG | HEIGHT: 69 IN | HEART RATE: 54 BPM

## 2023-11-14 DIAGNOSIS — G89.29 CHRONIC PAIN OF BOTH SHOULDERS: ICD-10-CM

## 2023-11-14 DIAGNOSIS — M25.511 CHRONIC PAIN OF BOTH SHOULDERS: ICD-10-CM

## 2023-11-14 DIAGNOSIS — M25.512 CHRONIC PAIN OF BOTH SHOULDERS: ICD-10-CM

## 2023-11-14 DIAGNOSIS — M25.50 POLYARTHRALGIA: ICD-10-CM

## 2023-11-14 DIAGNOSIS — M25.50 POLYARTHRALGIA: Primary | ICD-10-CM

## 2023-11-14 LAB
CRP SERPL-MCNC: 5.5 MG/DL (ref ?–1)
ERYTHROCYTE [SEDIMENTATION RATE] IN BLOOD: 59 MM/HR
RHEUMATOID FACT SERPL-ACNC: <10 IU/ML (ref ?–14)

## 2023-11-14 PROCEDURE — 36415 COLL VENOUS BLD VENIPUNCTURE: CPT | Performed by: INTERNAL MEDICINE

## 2023-11-14 PROCEDURE — 85652 RBC SED RATE AUTOMATED: CPT | Performed by: INTERNAL MEDICINE

## 2023-11-14 PROCEDURE — 72050 X-RAY EXAM NECK SPINE 4/5VWS: CPT | Performed by: INTERNAL MEDICINE

## 2023-11-14 PROCEDURE — 73030 X-RAY EXAM OF SHOULDER: CPT | Performed by: INTERNAL MEDICINE

## 2023-11-14 PROCEDURE — 86200 CCP ANTIBODY: CPT | Performed by: INTERNAL MEDICINE

## 2023-11-14 PROCEDURE — 99204 OFFICE O/P NEW MOD 45 MIN: CPT | Performed by: INTERNAL MEDICINE

## 2023-11-14 PROCEDURE — 1125F AMNT PAIN NOTED PAIN PRSNT: CPT | Performed by: INTERNAL MEDICINE

## 2023-11-14 PROCEDURE — 86140 C-REACTIVE PROTEIN: CPT | Performed by: INTERNAL MEDICINE

## 2023-11-14 PROCEDURE — 86431 RHEUMATOID FACTOR QUANT: CPT | Performed by: INTERNAL MEDICINE

## 2023-11-14 PROCEDURE — 72202 X-RAY EXAM SI JOINTS 3/> VWS: CPT | Performed by: INTERNAL MEDICINE

## 2023-11-14 PROCEDURE — 81374 HLA I TYPING 1 ANTIGEN LR: CPT

## 2023-11-14 RX ORDER — AMLODIPINE BESYLATE 5 MG/1
5 TABLET ORAL DAILY
COMMUNITY

## 2023-11-14 NOTE — PATIENT INSTRUCTIONS
You were seen today for chronic joint pain involving your shoulders, neck, lower back and knees  Symptoms are likely from osteoarthritis but will make sure nothing else is going on like rheumatoid arthritis  Plan to get blood work and x-rays today

## 2023-11-15 LAB — CCP IGG SERPL-ACNC: 1.3 U/ML (ref 0–6.9)

## 2023-11-19 ENCOUNTER — APPOINTMENT (OUTPATIENT)
Dept: CT IMAGING | Facility: HOSPITAL | Age: 73
End: 2023-11-19
Attending: EMERGENCY MEDICINE
Payer: MEDICARE

## 2023-11-19 ENCOUNTER — HOSPITAL ENCOUNTER (EMERGENCY)
Facility: HOSPITAL | Age: 73
Discharge: HOME | End: 2023-11-19
Attending: EMERGENCY MEDICINE
Payer: MEDICARE

## 2023-11-19 VITALS
HEART RATE: 52 BPM | DIASTOLIC BLOOD PRESSURE: 75 MMHG | RESPIRATION RATE: 18 BRPM | SYSTOLIC BLOOD PRESSURE: 112 MMHG | TEMPERATURE: 99 F | OXYGEN SATURATION: 96 %

## 2023-11-19 DIAGNOSIS — S16.1XXA STRAIN OF NECK MUSCLE, INITIAL ENCOUNTER: ICD-10-CM

## 2023-11-19 DIAGNOSIS — H81.10 BENIGN PAROXYSMAL POSITIONAL VERTIGO, UNSPECIFIED LATERALITY: Primary | ICD-10-CM

## 2023-11-19 LAB
ANION GAP SERPL CALC-SCNC: 7 MMOL/L (ref 0–18)
BASOPHILS # BLD AUTO: 0.06 X10(3) UL (ref 0–0.2)
BASOPHILS NFR BLD AUTO: 0.5 %
BUN BLD-MCNC: 25 MG/DL (ref 9–23)
BUN/CREAT SERPL: 20 (ref 10–20)
CALCIUM BLD-MCNC: 9.9 MG/DL (ref 8.7–10.4)
CHLORIDE SERPL-SCNC: 103 MMOL/L (ref 98–112)
CO2 SERPL-SCNC: 27 MMOL/L (ref 21–32)
CREAT BLD-MCNC: 1.25 MG/DL
DEPRECATED RDW RBC AUTO: 50.5 FL (ref 35.1–46.3)
EGFRCR SERPLBLD CKD-EPI 2021: 61 ML/MIN/1.73M2 (ref 60–?)
EOSINOPHIL # BLD AUTO: 0.22 X10(3) UL (ref 0–0.7)
EOSINOPHIL NFR BLD AUTO: 1.7 %
ERYTHROCYTE [DISTWIDTH] IN BLOOD BY AUTOMATED COUNT: 17.1 % (ref 11–15)
GLUCOSE BLD-MCNC: 108 MG/DL (ref 70–99)
HCT VFR BLD AUTO: 41.5 %
HGB BLD-MCNC: 13.1 G/DL
IMM GRANULOCYTES # BLD AUTO: 0.1 X10(3) UL (ref 0–1)
IMM GRANULOCYTES NFR BLD: 0.8 %
LYMPHOCYTES # BLD AUTO: 1.53 X10(3) UL (ref 1–4)
LYMPHOCYTES NFR BLD AUTO: 11.8 %
MCH RBC QN AUTO: 26 PG (ref 26–34)
MCHC RBC AUTO-ENTMCNC: 31.6 G/DL (ref 31–37)
MCV RBC AUTO: 82.3 FL
MONOCYTES # BLD AUTO: 0.87 X10(3) UL (ref 0.1–1)
MONOCYTES NFR BLD AUTO: 6.7 %
NEUTROPHILS # BLD AUTO: 10.18 X10 (3) UL (ref 1.5–7.7)
NEUTROPHILS # BLD AUTO: 10.18 X10(3) UL (ref 1.5–7.7)
NEUTROPHILS NFR BLD AUTO: 78.5 %
OSMOLALITY SERPL CALC.SUM OF ELEC: 289 MOSM/KG (ref 275–295)
PLATELET # BLD AUTO: 344 10(3)UL (ref 150–450)
POTASSIUM SERPL-SCNC: 3.5 MMOL/L (ref 3.5–5.1)
RBC # BLD AUTO: 5.04 X10(6)UL
SODIUM SERPL-SCNC: 137 MMOL/L (ref 136–145)
WBC # BLD AUTO: 13 X10(3) UL (ref 4–11)

## 2023-11-19 PROCEDURE — 72125 CT NECK SPINE W/O DYE: CPT | Performed by: EMERGENCY MEDICINE

## 2023-11-19 PROCEDURE — 93005 ELECTROCARDIOGRAM TRACING: CPT

## 2023-11-19 PROCEDURE — 93010 ELECTROCARDIOGRAM REPORT: CPT

## 2023-11-19 PROCEDURE — 70450 CT HEAD/BRAIN W/O DYE: CPT | Performed by: EMERGENCY MEDICINE

## 2023-11-19 PROCEDURE — 85025 COMPLETE CBC W/AUTO DIFF WBC: CPT | Performed by: EMERGENCY MEDICINE

## 2023-11-19 PROCEDURE — 96374 THER/PROPH/DIAG INJ IV PUSH: CPT

## 2023-11-19 PROCEDURE — 80048 BASIC METABOLIC PNL TOTAL CA: CPT | Performed by: EMERGENCY MEDICINE

## 2023-11-19 PROCEDURE — 99285 EMERGENCY DEPT VISIT HI MDM: CPT

## 2023-11-19 PROCEDURE — 99284 EMERGENCY DEPT VISIT MOD MDM: CPT

## 2023-11-19 RX ORDER — ONDANSETRON 2 MG/ML
4 INJECTION INTRAMUSCULAR; INTRAVENOUS ONCE
Status: COMPLETED | OUTPATIENT
Start: 2023-11-19 | End: 2023-11-19

## 2023-11-19 RX ORDER — MECLIZINE HYDROCHLORIDE 25 MG/1
25 TABLET ORAL 4 TIMES DAILY
Qty: 20 TABLET | Refills: 0 | Status: SHIPPED | OUTPATIENT
Start: 2023-11-19 | End: 2023-12-09

## 2023-11-20 ENCOUNTER — TELEPHONE (OUTPATIENT)
Dept: RHEUMATOLOGY | Facility: CLINIC | Age: 73
End: 2023-11-20

## 2023-11-20 DIAGNOSIS — M25.50 POLYARTHRALGIA: Primary | ICD-10-CM

## 2023-11-20 LAB
ATRIAL RATE: 62 BPM
HLA-B27: NEGATIVE
P AXIS: 25 DEGREES
P-R INTERVAL: 170 MS
Q-T INTERVAL: 412 MS
QRS DURATION: 94 MS
QTC CALCULATION (BEZET): 418 MS
R AXIS: 13 DEGREES
T AXIS: -19 DEGREES
VENTRICULAR RATE: 62 BPM

## 2023-11-20 RX ORDER — PREDNISONE 10 MG/1
TABLET ORAL
Qty: 20 TABLET | Refills: 0 | Status: SHIPPED | OUTPATIENT
Start: 2023-11-20

## 2023-11-20 NOTE — ED QUICK NOTES
Pt discharged to home with family. Instructed to take medication as prescribed, follow-up with PMD and return sooner with any worsening of symptoms. All questions answered prior to disposition.

## 2023-11-20 NOTE — ED INITIAL ASSESSMENT (HPI)
Pt had episode of dizziness and vomiting at 4:30a, no more vomiting but does c/o continued dizziness.  C/o pain to R side of neck \"for a while\"   Denies CP JANA

## 2023-11-21 NOTE — TELEPHONE ENCOUNTER
Appointment scheduled as requested.       Future Appointments   Date Time Provider Song Valentini   12/6/2023  4:00 PM Vianey Lovelace MD 2014 [facility] SYSTEM OF [facility]   12/27/2023 11:00 AM Heriberto Clifton MD [facility]   1/8/2024 10:30 AM Cynthia Sims MD [address] [place] [address] SYSTEM OF [facility]   2/1/2024 10:30 AM Clarisa Hannah MD [facility]

## 2023-11-27 ENCOUNTER — PATIENT OUTREACH (OUTPATIENT)
Dept: CASE MANAGEMENT | Age: 73
End: 2023-11-27

## 2023-11-27 NOTE — PROGRESS NOTES
1st attempt ER f/up apt request    [place] [facility]   PCP  [address]   [phone]  Apt: Nov 30 @11am    Confirmed w/ pt  Closing encounter

## 2023-11-30 ENCOUNTER — OFFICE VISIT (OUTPATIENT)
Dept: INTERNAL MEDICINE CLINIC | Facility: CLINIC | Age: 73
End: 2023-11-30

## 2023-11-30 VITALS
SYSTOLIC BLOOD PRESSURE: 100 MMHG | HEART RATE: 59 BPM | DIASTOLIC BLOOD PRESSURE: 63 MMHG | OXYGEN SATURATION: 98 % | HEIGHT: 69 IN | WEIGHT: 220 LBS | BODY MASS INDEX: 32.58 KG/M2

## 2023-11-30 DIAGNOSIS — S16.1XXA STRAIN OF NECK MUSCLE, INITIAL ENCOUNTER: Primary | ICD-10-CM

## 2023-11-30 DIAGNOSIS — G47.9 SLEEP DISTURBANCE: ICD-10-CM

## 2023-11-30 PROCEDURE — 99214 OFFICE O/P EST MOD 30 MIN: CPT | Performed by: NURSE PRACTITIONER

## 2023-11-30 NOTE — PATIENT INSTRUCTIONS
Sleep with a rolled towel under your neck, no pillow, on your back on a firm mattress    Patient is following with chiropractor                 Drink water with lemon

## 2023-12-01 ENCOUNTER — TELEPHONE (OUTPATIENT)
Dept: RHEUMATOLOGY | Facility: CLINIC | Age: 73
End: 2023-12-01

## 2023-12-01 PROBLEM — S16.1XXA NECK STRAIN: Status: ACTIVE | Noted: 2023-12-01

## 2023-12-01 NOTE — TELEPHONE ENCOUNTER
Returned pt call; verified name and . Pt seeking clarification regarding need for 2 additional lab tests to be drawn before next OV on 23. Consulted with Dr Jennie Bakre who confirmed both labs recommended to check inflammation. Called pt back. Verified Dr Jennie Baker does want 2 blood tests before next visit. Explained they are to evaluate inflammation.

## 2023-12-01 NOTE — ASSESSMENT & PLAN NOTE
Right sided neck strain with decreased ROM    PlanSleep with a rolled towel under your neck, no pillow, on your back on a firm mattress    Patient is following with chiropractor

## 2023-12-01 NOTE — ASSESSMENT & PLAN NOTE
Patient up to urinate and pain and stiffness with decreased ROM of joints.     Plan  Follows with rheumatologist  Recent Prednisone  Sleep with a rolled towel under your neck, no pillow, on your back on a firm mattress

## 2023-12-04 ENCOUNTER — LAB ENCOUNTER (OUTPATIENT)
Dept: LAB | Age: 73
End: 2023-12-04
Attending: INTERNAL MEDICINE
Payer: MEDICARE

## 2023-12-04 LAB
CRP SERPL-MCNC: 2.92 MG/DL (ref ?–0.3)
ERYTHROCYTE [SEDIMENTATION RATE] IN BLOOD: 34 MM/HR

## 2023-12-04 PROCEDURE — 85652 RBC SED RATE AUTOMATED: CPT | Performed by: INTERNAL MEDICINE

## 2023-12-04 PROCEDURE — 86140 C-REACTIVE PROTEIN: CPT | Performed by: INTERNAL MEDICINE

## 2023-12-06 ENCOUNTER — OFFICE VISIT (OUTPATIENT)
Dept: RHEUMATOLOGY | Facility: CLINIC | Age: 73
End: 2023-12-06

## 2023-12-06 VITALS
HEIGHT: 69 IN | WEIGHT: 220 LBS | BODY MASS INDEX: 32.58 KG/M2 | RESPIRATION RATE: 16 BRPM | HEART RATE: 66 BPM | DIASTOLIC BLOOD PRESSURE: 80 MMHG | SYSTOLIC BLOOD PRESSURE: 123 MMHG

## 2023-12-06 DIAGNOSIS — M25.511 CHRONIC PAIN OF BOTH SHOULDERS: ICD-10-CM

## 2023-12-06 DIAGNOSIS — G89.29 CHRONIC PAIN OF BOTH SHOULDERS: ICD-10-CM

## 2023-12-06 DIAGNOSIS — M25.50 POLYARTHRALGIA: ICD-10-CM

## 2023-12-06 DIAGNOSIS — M25.512 CHRONIC PAIN OF BOTH SHOULDERS: ICD-10-CM

## 2023-12-06 DIAGNOSIS — M54.2 NECK PAIN: ICD-10-CM

## 2023-12-06 DIAGNOSIS — M45.3 ANKYLOSING SPONDYLITIS OF CERVICOTHORACIC REGION (HCC): Primary | ICD-10-CM

## 2023-12-06 PROCEDURE — 99214 OFFICE O/P EST MOD 30 MIN: CPT | Performed by: INTERNAL MEDICINE

## 2023-12-06 PROCEDURE — 1125F AMNT PAIN NOTED PAIN PRSNT: CPT | Performed by: INTERNAL MEDICINE

## 2023-12-06 NOTE — PATIENT INSTRUCTIONS
You were seen today for neck pain, shoulder and thigh pain  Your CT of the neck did show fusion consistent with ankylosing spondylitis  You also had high inflammation markers  Some of your symptoms improved with prednisone  Typically we treat patients with immunosuppressants that helps decrease the pain and stiffness  We will get you approved for Jeannette Degree or Rinvoq which are medications used to treat ankylosing spondylitis  Get blood work done  Follow-up in the next 2 or 3 months

## 2023-12-06 NOTE — PROGRESS NOTES
Grace Christine is a 68year old male. HPI:     Chief Complaint   Patient presents with    Medication Follow-Up    Lab Results     And Xray, CT    Neck Pain       I had the pleasure of seeing Grace Christine on 12/6/2023 for follow up chronic neck, lower back and shoulder pain. Current medications:  Norco 10/325 1 pill 3-4 times a day- pain specialist prescribes it   Blood work:  Neg RF, CCP, HLA-B27  ESR 59--> 34, CRP 5.5 mg/dL--> 2.92 mg/dL    Interval History: This is a 69 yo M with hx of HTN, HLD, Recurrent Prostate cancer s/p brachytherapy seed implants 2014 (no recurrence) presents with chronic joint pain. He reports having chronic pain in his cervical neck and lower spine. He follows with pain specialist Dr. Ashu Verma and is on Norco 3 times a day. It does keep some of his pain controlled. He also has chronic bilateral knee pain and follows with the joint [place]. He was told that his knees are bone-on-bone. He has had 3 hyaluronic acid injections in both knees and has 2 more left. It is helped. Continues to have chronic neck and lower back pain. He also gets Botox injections in his neck region by neurology. Reports pain in his hips, thighs and quadriceps. At times it feels very stiff. He has limited range of motion of his neck. He also has chronic shoulder pain, hard to lift both shoulders. He was given a Medrol Dosepak in the past which helped a lot of his pain. Denies any history of psoriasis. 12/6/2023:  Presents for follow-up of chronic neck, lower back and shoulder pain  During his last visit blood work showed elevated formation markers. He was started on a prednisone taper over 9 days which should help his symptoms significantly. He had pain around his shoulders, thighs and neck which did help  Repeat inflammation markers did improve, still slightly elevated  He also went to the ED recently for dizziness and neck pain and had a CT of the neck done.   CT cervical spine showed cervical thoracic spine ankylosis, scattered endplate osteophytes and syndesmophytes within the spine.   X-ray of the SI joint was also done which were normal  Blood work did show a negative HLA-B27          HISTORY:  Past Medical History:   Diagnosis Date    Abnormal blood chemistry 12/09/2013    Acute dermatitis due to solar radiation 11/26/2012    Acute upper respiratory infection 01/22/2009    Appendicitis 1963    Back problem     Backache 07/05/2005    Benign neoplasm of skin 07/10/2008    Benign prostatic hypertrophy     Calcaneal spur 08/04/2011    Carpal tunnel syndrome     Cervical radicular pain 06/23/2015    Cough variant asthma 03/02/2011    Disorder of kidney and ureter 09/08/2013    Disorder of rotator cuff syndrome of shoulder and allied disorder 06/13/2007    Dizziness 02/05/2022    Dysfunction of eustachian tube 01/22/2009    Dysplastic nevus 2017    left flank    Dyspnea and respiratory abnormality 04/01/2011    Elevated prostate specific antigen (PSA) 06/09/2008    Elevated PSA 2008    bx negative for cancer    Essential hypertension     Generalized pain 04/01/2011    H/O arthroscopy of knee bilateral    History of brachytherapy     Hyponatremia 02/05/2022    Intestinal infection 11/22/2006    Malaise and fatigue 06/03/2008    Neck pain     Nocturia 07/09/2008    Obesity, unspecified     Petechial hemorrhage 02/05/2022    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    Pressure ulcer, stage 1 12/28/2012    Prostate cancer ([facility] [address]     brachytherapy    Proteinuria 12/25/2011    PVD (peripheral vascular disease) ([facility] [address]     Rotator cuff tear 2007    right    Rotator cuff tear     R - 2007    Sensorineural hearing loss 06/01/2009    Shoulder pain, left 11/28/2017    Sleep disturbance 06/03/2008    Tonsillitis 1960      Social Hx Reviewed   Family Hx Reviewed     Medications (Active prior to today's visit):  Current Outpatient Medications   Medication Sig Dispense Refill    predniSONE 10 MG Oral Tab 30 mg daily x3 days then 20 mg daily x3 days then 10 mg daily x3 days then stop 20 tablet 0    amLODIPine 5 MG Oral Tab Take 1 tablet (5 mg total) by mouth daily. atorvastatin 40 MG Oral Tab Take 1 tablet (40 mg total) by mouth nightly. 90 tablet 1    HYDROcodone-acetaminophen  MG Oral Tab Take 1 tablet by mouth every 6 (six) hours as needed for Pain. 120 tablet 0    [START ON 12/11/2023] HYDROcodone-acetaminophen  MG Oral Tab Take 1 tablet by mouth every 6 (six) hours as needed for Pain. 120 tablet 0    HYDROcodone-acetaminophen  MG Oral Tab Take 1 tablet by mouth every 8 (eight) hours as needed for Pain (MAX 3/DAY). losartan 100 MG Oral Tab Take 1 tablet (100 mg total) by mouth daily. 90 tablet 3    aspirin 81 MG Oral Chew Tab Chew 1 tablet (81 mg total) by mouth daily. 90 tablet 3    hydroCHLOROthiazide 25 MG Oral Tab       meclizine 25 MG Oral Tab Take 1 tablet (25 mg total) by mouth 4 (four) times daily for 20 days. (Patient not taking: Reported on 11/30/2023) 20 tablet 0    Tadalafil 20 MG Oral Tab Take 1 tablet (20 mg total) by mouth daily as needed for Erectile Dysfunction. (Patient not taking: Reported on 11/14/2023) 10 tablet 3     .cmed  Allergies:  No Known Allergies      ROS:   All other ROS are negative. PHYSICAL EXAM:   GEN: AAOx3, NAD  HEENT: EOMI, PERRLA, no injection or icterus, oral mucosa moist, no oral lesions. No lymphadenopathy. No facial rash  CVS: RRR, no murmurs rubs or gallops. Equal 2+ distal pulses. LUNGS: CTAB, no increased work of breathing  ABDOMEN:  soft NT/ND, +BS, no HSM  SKIN: No rashes or skin lesions. No nail findings  MSK:  B/L shoulder abduction limited to 120 degrees  Occiput to wall distance greater than 5 cm  NEURO: Cranial nerves II-XII intact grossly. 5/5 strength throughout in both upper and lower extremities, sensation intact.   PSYCH: normal mood       LABS:     Component      Latest Ref Rng 11/14/2023 11/19/2023   C-REACTIVE PROTEIN <0.30 mg/dL 5.50 (H)     C-Citrullinated Peptide IgG AB      0.0 - 6.9 U/mL 1.3     SED RATE      0 - 20 mm/Hr 59 (H)     RHEUMATOID FACTOR      <14 IU/mL <10     HLA-B27 Negative       Component      Latest Ref Rng 12/4/2023   C-REACTIVE PROTEIN      <0.30 mg/dL 2.92 (H)    C-Citrullinated Peptide IgG AB      0.0 - 6.9 U/mL    SED RATE      0 - 20 mm/Hr 34 (H)    RHEUMATOID FACTOR      <14 IU/mL    HLA-B27      Imaging:     CT cervical spine:  CONCLUSION: No acute osseous abnormality of the cervical spine. Cervical-thoracic spine ankylosis is present. ASSESSMENT/PLAN:     Ankylosing spondylitis  - Found to have negative HLA-B27 but elevated formation markers, ESR 59 and CRP 5.5 mg/dL. X-ray of the SI joint was normal but CT of the cervical spine showed evidence of cervical thoracic ankylosis and syndesmophytes  - He was placed on a 9-day taper of prednisone which helped his symptoms.  - He also received epidural injections in his neck and lower spine which have helped  - Discussed with patient starting on a biologic. He is hesitant to go on an injection. He would consider going on Rinvoq or an in office injection like Cimzia. Risk/benefits d/w patient   - He is also on Norco for chronic pain.   - Plan to get further blood work    Bilateral knee pain due to osteoarthritis  - Following with joint [place] and gets hyaluronic acid injections    Pt will f/u in 2-3 mos     Rishabh Armas MD  12/6/2023   4:15 PM

## 2023-12-10 ENCOUNTER — TELEPHONE (OUTPATIENT)
Dept: RHEUMATOLOGY | Facility: CLINIC | Age: 73
End: 2023-12-10

## 2023-12-11 ENCOUNTER — LAB ENCOUNTER (OUTPATIENT)
Dept: LAB | Age: 73
End: 2023-12-11
Attending: INTERNAL MEDICINE
Payer: MEDICARE

## 2023-12-11 DIAGNOSIS — M45.3 ANKYLOSING SPONDYLITIS OF CERVICOTHORACIC REGION (HCC): ICD-10-CM

## 2023-12-11 PROCEDURE — 86704 HEP B CORE ANTIBODY TOTAL: CPT | Performed by: INTERNAL MEDICINE

## 2023-12-11 PROCEDURE — 86480 TB TEST CELL IMMUN MEASURE: CPT

## 2023-12-11 PROCEDURE — 86803 HEPATITIS C AB TEST: CPT | Performed by: INTERNAL MEDICINE

## 2023-12-11 PROCEDURE — 87340 HEPATITIS B SURFACE AG IA: CPT | Performed by: INTERNAL MEDICINE

## 2023-12-11 PROCEDURE — 86706 HEP B SURFACE ANTIBODY: CPT | Performed by: INTERNAL MEDICINE

## 2023-12-11 NOTE — TELEPHONE ENCOUNTER
Patient has Medicare Part B with a BCBS supplement plan G- in office Cimiza injection would be covered by insurance under medical benefit. Do you still want to try for Rinvoq PA under pharmacy benefit?

## 2023-12-11 NOTE — TELEPHONE ENCOUNTER
If we can get patient approved for Rinvoq for ankylosing spondylitis but he does have Medicare so not sure if he will be approved. If not then Cimzia injections monthly in house.   He does not want to inject himself

## 2023-12-12 LAB
HBV CORE AB SERPL QL IA: NONREACTIVE
HBV SURFACE AB SER QL: NONREACTIVE
HBV SURFACE AB SERPL IA-ACNC: 7.18 MIU/ML
HBV SURFACE AG SER-ACNC: <0.1 [IU]/L
HBV SURFACE AG SERPL QL IA: NONREACTIVE
HCV AB SERPL QL IA: NONREACTIVE

## 2023-12-12 NOTE — TELEPHONE ENCOUNTER
Phoned pt; verified name and . Informed pt that BV was submitted for Cimzia and his Medicare Plan G will cover the remaining Medicare Part B coinsurance; therefore, patient can proceed with starting Cimzia. Pt had a number of questions regarding Cimzia and Rinvoq; all answered. Explained we will provide additional education when he comes in for first injection. Scheduled first dose for 2023.

## 2023-12-14 ENCOUNTER — NURSE ONLY (OUTPATIENT)
Dept: RHEUMATOLOGY | Facility: CLINIC | Age: 73
End: 2023-12-14

## 2023-12-14 DIAGNOSIS — M45.3 ANKYLOSING SPONDYLITIS OF CERVICOTHORACIC REGION (HCC): Primary | ICD-10-CM

## 2023-12-14 LAB
M TB IFN-G CD4+ T-CELLS BLD-ACNC: 0.04 IU/ML
M TB TUBERC IFN-G BLD QL: NEGATIVE
M TB TUBERC IGNF/MITOGEN IGNF CONTROL: 3.7 IU/ML
QFT TB1 AG MINUS NIL: 0 IU/ML
QFT TB2 AG MINUS NIL: 0.01 IU/ML

## 2023-12-14 PROCEDURE — 96372 THER/PROPH/DIAG INJ SC/IM: CPT | Performed by: INTERNAL MEDICINE

## 2023-12-14 NOTE — PROGRESS NOTES
Name and  verified. Pt aware he was to receive first injections of Cimza. Injections given in bilateral thighs and pt tolerated well; observed for 10 minutes post injection with no adverse effects. Possible local side effects discussed with pt as well as other side effects of Cimzia. Educational materials provided. Pt aware to follow up in 2 weeks for next injection. Future injection dates plotted out on a calendar for patient and scheduling done for all loading doses. Total of 35 minutes spent with patient.

## 2023-12-15 ENCOUNTER — TELEPHONE (OUTPATIENT)
Dept: RHEUMATOLOGY | Facility: CLINIC | Age: 73
End: 2023-12-15

## 2023-12-15 NOTE — TELEPHONE ENCOUNTER
Spoke to patient. States he just got a call from the [address] [place] [address] Nick Macario about the shot he received yesterday. Patient states he did not engage in conversation with the caller because he wasn't certain who called. I informed patient it was not the Rheumatology Department and likely was the [facility]. I explained they have a nurse support line and call to provide education and assistance as needed.

## 2023-12-27 ENCOUNTER — TELEPHONE (OUTPATIENT)
Dept: RHEUMATOLOGY | Facility: CLINIC | Age: 73
End: 2023-12-27

## 2023-12-27 NOTE — TELEPHONE ENCOUNTER
Returned pt call. Confirmed for him that he is due for Cimzia injection tomorrow and is scheduled for 11am. Pt expressed concern that MyChart only lists appointment as nurse and provides no additional details; states it does not specify Rheumatology Nurse. Pt also seeking confirmation that Cimzia coverage was confirmed prior to initiation of therapy. Pt reports he has received a bill for over $4000 for 12/14/23 injection. States he talked to billing department already this morning and was told Medicare is billing it as a work injury. Pt states he will be contacting Medicare today.

## 2023-12-27 NOTE — TELEPHONE ENCOUNTER
Pt states that he has a question about the appointment he has for tomorrow with the nurse. Patient, would like a call back asap.

## 2023-12-28 ENCOUNTER — NURSE ONLY (OUTPATIENT)
Dept: RHEUMATOLOGY | Facility: CLINIC | Age: 73
End: 2023-12-28

## 2023-12-28 DIAGNOSIS — M45.3 ANKYLOSING SPONDYLITIS OF CERVICOTHORACIC REGION (HCC): Primary | ICD-10-CM

## 2023-12-28 PROCEDURE — 96372 THER/PROPH/DIAG INJ SC/IM: CPT | Performed by: INTERNAL MEDICINE

## 2023-12-28 NOTE — PROGRESS NOTES
Name and  verified. Pt aware he was to receive injection of Cimza. Injections given in bilateral thighs and pt tolerated well. Possible local side effects discussed with pt. Pt aware to follow up in 2 weeks for next injection.

## 2024-01-09 DIAGNOSIS — M25.562 BILATERAL CHRONIC KNEE PAIN: Primary | ICD-10-CM

## 2024-01-09 DIAGNOSIS — M54.50 LOW BACK PAIN AT MULTIPLE SITES: ICD-10-CM

## 2024-01-09 DIAGNOSIS — G89.29 OTHER CHRONIC PAIN: ICD-10-CM

## 2024-01-09 DIAGNOSIS — M25.561 BILATERAL CHRONIC KNEE PAIN: Primary | ICD-10-CM

## 2024-01-09 DIAGNOSIS — G89.29 BILATERAL CHRONIC KNEE PAIN: Primary | ICD-10-CM

## 2024-01-09 RX ORDER — HYDROCODONE BITARTRATE AND ACETAMINOPHEN 10; 325 MG/1; MG/1
1 TABLET ORAL EVERY 6 HOURS PRN
Qty: 120 TABLET | Refills: 0 | Status: CANCELLED | OUTPATIENT
Start: 2024-01-12 | End: 2024-02-11

## 2024-01-10 DIAGNOSIS — M25.561 BILATERAL CHRONIC KNEE PAIN: ICD-10-CM

## 2024-01-10 DIAGNOSIS — G89.29 BILATERAL CHRONIC KNEE PAIN: Primary | ICD-10-CM

## 2024-01-10 DIAGNOSIS — M54.50 LOW BACK PAIN AT MULTIPLE SITES: ICD-10-CM

## 2024-01-10 DIAGNOSIS — G89.29 OTHER CHRONIC PAIN: Primary | ICD-10-CM

## 2024-01-10 DIAGNOSIS — M25.561 BILATERAL CHRONIC KNEE PAIN: Primary | ICD-10-CM

## 2024-01-10 DIAGNOSIS — M48.061 SPINAL STENOSIS OF LUMBAR REGION WITHOUT NEUROGENIC CLAUDICATION: ICD-10-CM

## 2024-01-10 DIAGNOSIS — G89.29 BILATERAL CHRONIC KNEE PAIN: ICD-10-CM

## 2024-01-10 DIAGNOSIS — M25.562 BILATERAL CHRONIC KNEE PAIN: Primary | ICD-10-CM

## 2024-01-10 DIAGNOSIS — M25.562 BILATERAL CHRONIC KNEE PAIN: ICD-10-CM

## 2024-01-10 RX ORDER — HYDROCODONE BITARTRATE AND ACETAMINOPHEN 10; 325 MG/1; MG/1
1 TABLET ORAL EVERY 6 HOURS PRN
Qty: 120 TABLET | Refills: 0 | Status: SHIPPED | OUTPATIENT
Start: 2024-01-10 | End: 2024-02-09

## 2024-01-10 RX ORDER — HYDROCODONE BITARTRATE AND ACETAMINOPHEN 10; 325 MG/1; MG/1
1 TABLET ORAL EVERY 6 HOURS PRN
Qty: 120 TABLET | Refills: 0 | Status: CANCELLED | OUTPATIENT
Start: 2024-01-12 | End: 2024-02-11

## 2024-01-11 ENCOUNTER — TELEPHONE (OUTPATIENT)
Dept: RHEUMATOLOGY | Facility: CLINIC | Age: 74
End: 2024-01-11

## 2024-01-11 NOTE — TELEPHONE ENCOUNTER
Patient called and said he had to cancel his appointment for today, it was for cimizia shot, he does not know when he can reschedule and is asking how important is the shot. Please advise.

## 2024-01-11 NOTE — TELEPHONE ENCOUNTER
Returned pt call; verified name and . Pt states he tested positive for COVID about 5-6 days ago so cancelled his 3rd loading dose of Cimzia scheduled for today. Still has a cough and feels weak. Pt did not receive any treatment for COVID. Pt asking when he should schedule next dose of Cimzia. Consulted with Dr Mora and she states pt should wait one week.

## 2024-01-12 NOTE — TELEPHONE ENCOUNTER
Phoned pt regarding rescheduling Cimzia injections. Offered 1/18 or 1/19 at 11am in [place]. Pt has to check his wife's schedule and will call back on 1/15/24.

## 2024-01-15 NOTE — TELEPHONE ENCOUNTER
Pt contacted and appointment scheduled.      Future Appointments   Date Time Provider Department Center   1/18/2024 11:00 AM [facility]   1/22/2024 11:00 AM Moses Mclaughlin MD [facility] PAIN EM [facility]   2/1/2024 10:30 AM [facility]   4/5/2024 12:00 PM Sheron Dalton MD [facility]

## 2024-01-18 ENCOUNTER — NURSE ONLY (OUTPATIENT)
Dept: RHEUMATOLOGY | Facility: CLINIC | Age: 74
End: 2024-01-18

## 2024-01-18 DIAGNOSIS — M45.3 ANKYLOSING SPONDYLITIS OF CERVICOTHORACIC REGION (HCC): Primary | ICD-10-CM

## 2024-01-18 PROCEDURE — 96372 THER/PROPH/DIAG INJ SC/IM: CPT | Performed by: INTERNAL MEDICINE

## 2024-01-18 NOTE — PROGRESS NOTES
Name and  verified. Pt aware he was to receive injection of Cimza. Injections given in Left and Right Anterior Thighs and pt tolerated well. Possible local side effects discussed with pt. Pt aware to follow up in 4 weeks for next injection.

## 2024-01-29 ENCOUNTER — LAB ENCOUNTER (OUTPATIENT)
Dept: LAB | Age: 74
End: 2024-01-29
Attending: UROLOGY
Payer: MEDICARE

## 2024-01-29 DIAGNOSIS — C61 BIOCHEMICALLY RECURRENT MALIGNANT NEOPLASM OF PROSTATE  (HCC): ICD-10-CM

## 2024-01-29 DIAGNOSIS — C61 PROSTATE CANCER (HCC): ICD-10-CM

## 2024-01-29 DIAGNOSIS — R97.21 BIOCHEMICALLY RECURRENT MALIGNANT NEOPLASM OF PROSTATE  (HCC): ICD-10-CM

## 2024-01-29 LAB — PSA SERPL-MCNC: 4.16 NG/ML (ref ?–4)

## 2024-01-29 PROCEDURE — 84153 ASSAY OF PSA TOTAL: CPT

## 2024-02-01 ENCOUNTER — OFFICE VISIT (OUTPATIENT)
Dept: SURGERY | Facility: CLINIC | Age: 74
End: 2024-02-01

## 2024-02-01 VITALS — DIASTOLIC BLOOD PRESSURE: 82 MMHG | SYSTOLIC BLOOD PRESSURE: 157 MMHG | HEART RATE: 56 BPM

## 2024-02-01 DIAGNOSIS — Z92.3 HISTORY OF BRACHYTHERAPY: ICD-10-CM

## 2024-02-01 DIAGNOSIS — N52.01 ERECTILE DYSFUNCTION DUE TO ARTERIAL INSUFFICIENCY: ICD-10-CM

## 2024-02-01 DIAGNOSIS — R97.21 BIOCHEMICALLY RECURRENT MALIGNANT NEOPLASM OF PROSTATE  (HCC): Primary | ICD-10-CM

## 2024-02-01 DIAGNOSIS — C61 BIOCHEMICALLY RECURRENT MALIGNANT NEOPLASM OF PROSTATE  (HCC): Primary | ICD-10-CM

## 2024-02-01 DIAGNOSIS — C61 PROSTATE CANCER (HCC): ICD-10-CM

## 2024-02-01 PROCEDURE — 99214 OFFICE O/P EST MOD 30 MIN: CPT | Performed by: UROLOGY

## 2024-02-01 NOTE — PROGRESS NOTES
[facility]  Follow-Up Visit    HPI: Moreno Khalil is a 73 year old male presents for a follow up visit. Patient was last seen on 11/1/2023.  Accompanied by his wife.    INTERVAL HISTORY: Former patient of Dr. THAPA.    Biochemically recurrent prostate cancer, status post brachytherapy seed implants 2014 for definitive local therapy of intermediate risk disease.    PSA junior of 1.3 ng/mL.  Rising PSA levels since 2020.    Repeat prostate biopsy 12/2021 was negative.    PSMA PET scan 6/2022 without radiographic evidence of recurrent or metastatic disease.    Patient elected continued monitoring.    Repeat PSMA PET scan 8/16/2023 without evidence of local or metastatic disease.    Multiparametric MRI of the prostate completed 9/28/2023 with artifactual degradation secondary to brachytherapy seeds however within these parameters no focal suspicious lesions identified.  Borderline sized pelvic lymph nodes that are grossly unchanged.    PSA 1/2024 decreased to 4.16 ng/mL.    He denies fevers or chills, chest pain or shortness of breath.  No bone pain or unintentional weight loss.  No change in appetite.    No gross hematuria or dysuria.      1. Prostate Cancer w/ Biochemical Recurrence  History of multiple previous prostate biopsies.    Diagnosed with intermediate risk prostate cancer upon saturation prostate biopsy 1/9/2014 with up to [place] 3+4=7 disease in 8/40 cores.    Treated with brachytherapy seed implants at the [facility] 2/2014.    PSA junior reportedly around 1.3 ng/mL.    PSA levels have been slowly rising since 2020.    Prostate MRI 11/2021 without any definite prostatic lesions.  No PI-RADS assessment given for posttreatment gland.    Patient underwent a TRUS-guided prostate biopsy by Dr. Oreilly 12/2021 which was negative.    PSA up to 4.07 ng/mL 6/7/2022.    PSMA PET scan 6/2022 without evidence of recurrent or metastatic disease.    Seen by medical oncology  6/2022.  Patient electing continued monitoring as opposed to starting ADT.    - 10/2022 F/U: PSA decreased to 3.76 ng/mL.  Patient elects continued monitoring.    - 4/2023 F/U: PSA increased to 5.44 ng/mL.  Patient elects continued monitoring with a repeat in 3 months.    - 7/2023 F/U: PSA increased to 6.68 ng/mL.  Obtain updated PSMA PET scan.    - 10/2023 F/U: PSMA PET scan negative.  Prostate MRI without suspicious lesions.  PSA 6.48 ng/mL.  Patient elects continued monitoring.    - 1/2024 F/U: PSA decreased to 4.16 ng/mL.  Patient elects continued monitoring.      2. ED  Chronic and longstanding.  Previously on tadalafil as needed.  Patient no longer sexually active.      3. BPH with LUTS  Chronic and longstanding LUTS.  Not on any medical therapy.    - 10/2022 F/U: IPSS score is 18 (3/2/2/2/3/2/4) with a quality-of-life score of 3.  Patient not interested in any treatment at this point.    - 7/2023 F/U: IPSS 23 (4/2/5/2/3/3/4) with a quality-of-life score of 3.  Patient drinks water deliberately in the evening as well as when he wakes up at night to urinate.  Elects behavioral changes and fluid management.      Reviewed past medical, surgical, family, and social history.  Reviewed med list and allergies.      REVIEW OF SYSTEMS:  Pertinent positives and negatives per HPI. A 10-point ROS was performed and is otherwise negative.       EXAM:  /82 (BP Location: Left arm, Patient Position: Sitting, Cuff Size: adult)   Pulse 56      Physical Exam  Constitutional:       Appearance: He is well-developed.   HENT:      Head: Normocephalic.   Eyes:      General: No scleral icterus.  Cardiovascular:      Rate and Rhythm: Normal rate.   Pulmonary:      Effort: Pulmonary effort is normal.   Genitourinary:     Comments: PARISH 11/2023 revealing a firm, small prostate without any palpable nodules or masses.  Skin:     General: Skin is warm and dry.   Neurological:      Mental Status: He is alert and oriented to person,  place, and time.   Psychiatric:         Mood and Affect: Mood normal.         Behavior: Behavior normal.       PATHOLOGY:  See HPI for details.      LABS:    Component PSA   Latest Ref Rng & Units <=4.00 ng/mL   1/29/2024 4.16 (H)   10/27/2023 6.48 (H)   7/26/2023 6.68 (H)   4/19/2023 5.44 (H)   10/19/2022 3.76   6/7/2022 4.07 (H)   10/26/2021 3.03   6/16/2021 2.80   2/19/2021 2.75   10/15/2020 2.48   6/16/2020 2.18   2/5/2020 2.15   8/29/2019 1.89   3/1/2019 1.73   11/2/2018 2.0   7/3/2018 1.5   2/26/2018 1.6   11/21/2017 1.6   5/16/2017 1.3   11/15/2016 1.4   5/11/2016 1.6   1/12/2016 1.9   9/15/2015 1.8   3/10/2015 2.8   8/13/2014 4.6 (H)   12/4/2013 7.9 (H)   9/1/2013 8.2 (H)   7/28/2013 10.2 (H)   11/1/2012 7.4 (H)   12/11/2011 8.3 (H)       IMAGING:    MRI PROSTATE (9/29/23): 1. No PI-RADS score assessment is provided for a posttreatment gland.  2. There is artifactual degradation throughout the prostate gland secondary to the presence of brachytherapy seeds. Within these parameters, no focal suspicious lesion is identified.  3. Substantially elevated PSA density, compatible with biochemical recurrence.  4. Borderline-sized pelvic lymph nodes are grossly unchanged.  5. No suspicious osseous lesions are perceived in the included region.  6. Distal colonic diverticulosis without MR evidence of acute complication in the imaged volume.  7. Lesser incidental findings as above.        PET FOR PROSTATE CARCINOMA (8/16/2023): There is history of prostate cancer.  Prostate has normal size with brachytherapy seeds.  No evidence for local or metastatic disease.         UROLOGY PROCEDURE:  Not performed today.      IMPRESSION:  73 year old male with history of prostate cancer status post brachytherapy seed implants in 2014.    Patient with biochemical evidence of disease recurrence given rising PSA levels, since 2020.    Negative prostate biopsy 12/2021.    Negative PSMA PET scan May 2023 and negative prostate MRI  9/2023.    Recent PSA level remains elevated yet largely stable.    Findings reviewed with patient and wife at length.      Discussed management options including continued monitoring or initiating ADT.      We also discussed salvage treatments for prostate cancer including prostatectomy, cryotherapy, or HIFU.  For that he would need to have evidence of recurrent prostate cancer in the prostate upon biopsy.      Patient not interested at this time and would like to continue monitoring.    Baseline BPH with LUTS, not on medical therapy.  Stable symptoms.    Longstanding chronic ED, patient no longer sexually active.  Not interested in further treatment at this time.      PLAN:  1.  Continue to monitor biochemically recurrent prostate cancer without radiographic evidence of local or distant disease.    2.  Conservative management/watchful waiting of ED and BPH with LUTS.    Follow-up in 6 months with a PSA.    MDM complexity: Moderate.  Discussing management of biochemically recurrent prostate cancer following initial definitive local therapy.    Devante Bianchi MD  2/1/2024

## 2024-02-07 ENCOUNTER — TELEPHONE (OUTPATIENT)
Dept: PHYSICAL MEDICINE AND REHAB | Facility: CLINIC | Age: 74
End: 2024-02-07

## 2024-02-07 ENCOUNTER — OFFICE VISIT (OUTPATIENT)
Dept: NEUROLOGY | Facility: CLINIC | Age: 74
End: 2024-02-07
Payer: MEDICARE

## 2024-02-07 ENCOUNTER — MED REC SCAN ONLY (OUTPATIENT)
Dept: NEUROLOGY | Facility: CLINIC | Age: 74
End: 2024-02-07

## 2024-02-07 DIAGNOSIS — G24.3 CERVICAL DYSTONIA: Primary | ICD-10-CM

## 2024-02-07 PROCEDURE — 64616 CHEMODENERV MUSC NECK DYSTON: CPT | Performed by: OTHER

## 2024-02-07 PROCEDURE — 95874 GUIDE NERV DESTR NEEDLE EMG: CPT | Performed by: OTHER

## 2024-02-07 NOTE — PROCEDURES
We did Botox injections for dystonia today.  PROCEDURE: Botox Injections (Cervical Dystonia PROTOCOL)   PRE-OPERATIVE DIAGNOSIS: Cervical dystonia, anterocollis.  POST-OPERATIVE DIAGNOSIS: same as above   BLOOD LOSS: minimal COMPLICATIONS: none     DESCRIPTION OF PROCEDURE: After a discussion of the risks and benefits, the patient consented to the procedure. The patient was taken to the procedure room. The upper back, neck area was prepped with alcohol swabs. The 2 Botox vials containing 100 units each, were reconstituted with 20 cc saline.     Procedure was performed under EMG guidance.    Following muscles and units were injected:    Scalenus anterior/medius 50 units on each side (total of 100 units).    Splenius Capita muscle bialterally 50 units each (total of 100 units)     Patient tolerated the procedure well.    Total of 200 Units of botulinum toxin were used and 0 Units were wasted.

## 2024-02-07 NOTE — PROGRESS NOTES
Paperwork noting that patient may bear financial responsibility for procedure(s) performed in clinic today signed prior to proceeding with procedure(s).    Furthermore, patient notified that they should contact their insurer to verify that your procedure/test has been approved and is a COVERED benefit.  Although the [facility] staff does its due diligence, the insurance carrier gives the disclaimer that \"Although the procedure is authorized, this does not guarantee payment.\"    Ultimately the patient is responsible for payment.    Botox is:  [x] Buy and Bill  [] Patient Supplied      Botox Reauthorization Questions:  Has the patient experienced a reduction in frequency of symptoms since starting Botox? yes  If yes, by what percentage? 30%

## 2024-02-08 NOTE — TELEPHONE ENCOUNTER
Per CMS Guidelines -no authorization is required for CPT , [address]    Status: Authorization is not required when being performed in office based on medical necessity however may be subject to review once claim is submitted-Covered Benefit (Buy&Bill)    Insurance: Active Medicare Part B Member ID# [id number]  Medication: Botox 200 units  Number of visits Approved: 1 (will need to confirm active insurance prior to appt as no auth is required when being performed in office based on medical necessity however insurance is verified on month to month basis)   Dx: cervical dystonia   Last Botox done: 2/7/24  Next Botox due around: 5/7/24  Authorization #n/a  Valid n/a  BUY&BILL  .

## 2024-02-09 ENCOUNTER — TELEPHONE (OUTPATIENT)
Dept: PAIN CLINIC | Facility: HOSPITAL | Age: 74
End: 2024-02-09

## 2024-02-09 DIAGNOSIS — M25.562 BILATERAL CHRONIC KNEE PAIN: ICD-10-CM

## 2024-02-09 DIAGNOSIS — M54.50 LOW BACK PAIN AT MULTIPLE SITES: ICD-10-CM

## 2024-02-09 DIAGNOSIS — G89.29 OTHER CHRONIC PAIN: ICD-10-CM

## 2024-02-09 DIAGNOSIS — G89.29 BILATERAL CHRONIC KNEE PAIN: ICD-10-CM

## 2024-02-09 DIAGNOSIS — M25.561 BILATERAL CHRONIC KNEE PAIN: ICD-10-CM

## 2024-02-09 RX ORDER — HYDROCODONE BITARTRATE AND ACETAMINOPHEN 10; 325 MG/1; MG/1
1 TABLET ORAL EVERY 6 HOURS PRN
Qty: 120 TABLET | Refills: 0 | Status: CANCELLED | OUTPATIENT
Start: 2024-02-09 | End: 2024-03-10

## 2024-02-14 DIAGNOSIS — G89.29 BILATERAL CHRONIC KNEE PAIN: ICD-10-CM

## 2024-02-14 DIAGNOSIS — G89.29 OTHER CHRONIC PAIN: ICD-10-CM

## 2024-02-14 DIAGNOSIS — M25.562 BILATERAL CHRONIC KNEE PAIN: ICD-10-CM

## 2024-02-14 DIAGNOSIS — M25.561 BILATERAL CHRONIC KNEE PAIN: ICD-10-CM

## 2024-02-14 DIAGNOSIS — M54.50 LOW BACK PAIN AT MULTIPLE SITES: ICD-10-CM

## 2024-02-14 RX ORDER — HYDROCODONE BITARTRATE AND ACETAMINOPHEN 10; 325 MG/1; MG/1
1 TABLET ORAL EVERY 6 HOURS PRN
Qty: 120 TABLET | Refills: 0 | Status: SHIPPED | OUTPATIENT
Start: 2024-02-14 | End: 2024-03-15

## 2024-02-16 ENCOUNTER — NURSE ONLY (OUTPATIENT)
Dept: RHEUMATOLOGY | Facility: CLINIC | Age: 74
End: 2024-02-16

## 2024-02-16 DIAGNOSIS — Z51.81 ENCOUNTER FOR THERAPEUTIC DRUG MONITORING: ICD-10-CM

## 2024-02-16 DIAGNOSIS — M45.3 ANKYLOSING SPONDYLITIS OF CERVICOTHORACIC REGION (HCC): Primary | ICD-10-CM

## 2024-02-16 PROCEDURE — 96372 THER/PROPH/DIAG INJ SC/IM: CPT | Performed by: INTERNAL MEDICINE

## 2024-02-16 NOTE — PROGRESS NOTES
Name and  verified. Pt aware he was to receive injection of Cimza. Injections given and pt tolerated well. Possible local side effects discussed with pt. Pt aware to follow up in 4 weeks for next injection. Patient aware to have labs completed prior to follow up appointment with provider in April.     ASSESSMENT/PLAN:      Ankylosing spondylitis  - Found to have negative HLA-B27 but elevated formation markers, ESR 59 and CRP 5.5 mg/dL.  X-ray of the SI joint was normal but CT of the cervical spine showed evidence of cervical thoracic ankylosis and syndesmophytes  - He was placed on a 9-day taper of prednisone which helped his symptoms.  - He also received epidural injections in his neck and lower spine which have helped  - Discussed with patient starting on a biologic.  He is hesitant to go on an injection.  He would consider going on Rinvoq or an in office injection like Cimzia. Risk/benefits d/w patient   - He is also on Norco for chronic pain.  - Plan to get further blood work     Bilateral knee pain due to osteoarthritis  - Following with joint [place] and gets hyaluronic acid injections     Pt will f/u in 2-3 mos      Sheron Dalton MD  2023   4:15 PM

## 2024-02-23 ENCOUNTER — TELEPHONE (OUTPATIENT)
Dept: INTERNAL MEDICINE CLINIC | Facility: CLINIC | Age: 74
End: 2024-02-23

## 2024-02-23 ENCOUNTER — NURSE TRIAGE (OUTPATIENT)
Dept: INTERNAL MEDICINE CLINIC | Facility: CLINIC | Age: 74
End: 2024-02-23

## 2024-02-23 NOTE — TELEPHONE ENCOUNTER
Patient contacted and made aware of Dr. Hull's recommendation. Patient states he is not able to be seen Thursday, he can be seen anytime Monday - Wednesday. Patient instructed any new or worsening symptoms [reviewed] seek immediate medical attention--> IC I made him aware I will convey the above to Dr. Hull. Patient verbalized understanding. No further questions or concerns at this time.    Dr. Hull to advise visit next week Monday- Wednesday, anytime

## 2024-02-23 NOTE — TELEPHONE ENCOUNTER
Patient advised 's note and verbalized he can not see the nurse practitioner because last time it messed up his insurance.     Patient was offered appointment with other providers and declined.

## 2024-02-23 NOTE — TELEPHONE ENCOUNTER
Patient states he had contacted billing about office visit on 11/30/24 with BRITT Hubbard and they told him visit was not covered by insurance related to the coding from visit, he would like to know if the coding had been adjusted as he has not heard back since. He was offered to be transferred to billing and declined at this time and states he will await a call from billing. I made him aware I will convey the above to BRITT Hubbard. Patient verbalized understanding. No further questions or concerns at this time.    BRITT Hubbard - review and advise, if any [adjust coding if appropriate]

## 2024-02-23 NOTE — TELEPHONE ENCOUNTER
Action Requested: Summary for Provider     []  Critical Lab, Recommendations Needed  [x] Need Additional Advice  []   FYI    []   Need Orders  [] Need Medications Sent to Pharmacy  []  Other     SUMMARY: Per protocol, patient should be seen in the office within 2 weeks. Offered availability with other providers but prefers to see PCP only. Willing to wait for PCP's response and recommendation.    Dr. Alex Hull please advise.     Reason for call: Abdominal Pain  Onset: 1 Week Ago    Patient reports of intermittent left lower quadrant abdominal pain for almost a week now. Pain randomly comes and goes, more noticeable when trying to get up or when sleeping. Patient thinks it is a pulled muscle. Being active/mobile or rubbing the area makes the pain go away. Denies any other gastrointestinal symptoms. Requesting to see PCP only. Care advice given per protocol. Patient verbalized understanding and agreed with plan of care.     Reason for Disposition   MILD pain (e.g., does not interfere with normal activities) and pain comes and goes (cramps) lasts > 48 hours  (Exception: This same abdominal pain is a chronic symptom recurrent or ongoing AND present > 4 weeks.)    Protocols used: Abdominal Pain - Male-A-[place]

## 2024-02-24 NOTE — TELEPHONE ENCOUNTER
Am sorry.  I cannot do it.  Please let patient follow-up with one of the other providers who have availability.

## 2024-02-24 NOTE — TELEPHONE ENCOUNTER
Dr. Casey ELLINGTON, spoke with patient and offered other providers. Patient refused multiple times and stated only wanted to see you. Informed of your below note and apologies to patient and patient said \"that's fine, I just won't be seen then.\"

## 2024-02-26 NOTE — TELEPHONE ENCOUNTER
Dr. Hull --- FYI, feeling better, declines appt for now.   Will just see you for Annual Physical 4/9/24  Will monitor symptoms and call back for evaluation if symptoms return.         RN called patient. Patient's date of birth and full name both confirmed.   RN informed of provider's message as detailed .   But he says he feels better - No abdominal pain.   Patient believes it was a muscle strain    Denies any symptoms. Maybe a very mild \"twinge\" sometimes throughout the day, but not pain  Advised to monitor symptoms.  If symptoms return or if new symptoms, should be evaluated sooner.   RN advised if symptoms get severely worse, patient should seek care at Emergency Room or Immediate Care.  RN also informed patient to seek immediate medical attention at ER if patient experiences severe/worsening symptoms, shortness of breath, chest pain, or severe pain.  Patient verbalizes understanding and is agreeable to instructions.     Future Appointments   Date Time Provider Department Center   3/8/2024 10:30 AM Moses Mclaughlin MD [facility] PAIN [facility]   3/15/2024 10:00 AM [facility]   4/5/2024 12:00 PM [facility]   4/9/2024 11:00 AM Alex Hull MD [facility]   4/12/2024 11:00 AM [facility]   5/9/2024 11:00 AM Chad Manuel MD [facility]   8/1/2024 10:30 AM Devante Bianchi MD [facility]       Reason for Disposition   Mild abdominal pain    Protocols used: Abdominal Pain - Male-A-[place]

## 2024-03-04 ENCOUNTER — NURSE TRIAGE (OUTPATIENT)
Dept: INTERNAL MEDICINE CLINIC | Facility: CLINIC | Age: 74
End: 2024-03-04

## 2024-03-04 NOTE — TELEPHONE ENCOUNTER
Patient called.   C/o left side discomfort, on/off; below the belly button.   Denied vomiting, no diarrhea. No fever.   No urinary symptoms.    Patient needs evaluation.  3/6/24 with PCP.    Reason for Disposition   MILD pain (e.g., does not interfere with normal activities) and pain comes and goes (cramps) lasts > 48 hours  (Exception: This same abdominal pain is a chronic symptom recurrent or ongoing AND present > 4 weeks.)    Protocols used: Abdominal Pain - Male-A-[place]

## 2024-03-06 ENCOUNTER — OFFICE VISIT (OUTPATIENT)
Dept: INTERNAL MEDICINE CLINIC | Facility: CLINIC | Age: 74
End: 2024-03-06

## 2024-03-06 VITALS
SYSTOLIC BLOOD PRESSURE: 118 MMHG | HEART RATE: 72 BPM | BODY MASS INDEX: 34.96 KG/M2 | OXYGEN SATURATION: 97 % | HEIGHT: 69 IN | DIASTOLIC BLOOD PRESSURE: 70 MMHG | WEIGHT: 236 LBS

## 2024-03-06 DIAGNOSIS — E78.2 MIXED HYPERLIPIDEMIA: ICD-10-CM

## 2024-03-06 DIAGNOSIS — I10 ESSENTIAL HYPERTENSION WITH GOAL BLOOD PRESSURE LESS THAN 140/90: Primary | ICD-10-CM

## 2024-03-06 DIAGNOSIS — R10.32 LLQ ABDOMINAL PAIN: ICD-10-CM

## 2024-03-06 PROBLEM — M45.0 ANKYLOSING SPONDYLITIS OF MULTIPLE SITES IN SPINE (HCC): Status: ACTIVE | Noted: 2024-03-06

## 2024-03-06 PROCEDURE — 99214 OFFICE O/P EST MOD 30 MIN: CPT | Performed by: INTERNAL MEDICINE

## 2024-03-06 NOTE — PROGRESS NOTES
Moreno Khalil is a 73 year old male.  Chief Complaint   Patient presents with    Abdominal Pain     On and off left sided lower abdominal pain     HPI:   73-year-old gentleman here for follow-up and for evaluation of left lower abdominal pain.  He informed me that his pain gets on and off.  Denies any blood in the stool blood in the urine, nausea hematochezia or melanotic stools.  He has been taking his medicine regularly.  Denies any constipation.  He takes Norco for chronic pain.      Current Outpatient Medications   Medication Sig Dispense Refill    HYDROcodone-acetaminophen  MG Oral Tab Take 1 tablet by mouth every 6 (six) hours as needed for Pain (MAX 4/DAY). 120 tablet 0    amLODIPine 5 MG Oral Tab Take 1 tablet (5 mg total) by mouth daily.      atorvastatin 40 MG Oral Tab Take 1 tablet (40 mg total) by mouth nightly. 90 tablet 1    losartan 100 MG Oral Tab Take 1 tablet (100 mg total) by mouth daily. 90 tablet 3    aspirin 81 MG Oral Chew Tab Chew 1 tablet (81 mg total) by mouth daily. 90 tablet 3    hydroCHLOROthiazide 25 MG Oral Tab         Past Medical History:   Diagnosis Date    Abnormal blood chemistry 12/09/2013    Acute dermatitis due to solar radiation 11/26/2012    Acute upper respiratory infection 01/22/2009    Appendicitis 1963    Back problem     Backache 07/05/2005    Benign neoplasm of skin 07/10/2008    Benign prostatic hypertrophy     Calcaneal spur 08/04/2011    Carpal tunnel syndrome     Cervical radicular pain 06/23/2015    Cough variant asthma (HCC) 03/02/2011    Disorder of kidney and ureter 09/08/2013    Disorder of rotator cuff syndrome of shoulder and allied disorder 06/13/2007    Dizziness 02/05/2022    Dysfunction of eustachian tube 01/22/2009    Dysplastic nevus 2017    left flank    Dyspnea and respiratory abnormality 04/01/2011    Elevated prostate specific antigen (PSA) 06/09/2008    Elevated PSA 2008    bx negative for cancer    Essential hypertension     Generalized  pain 04/01/2011    H/O arthroscopy of knee bilateral    History of brachytherapy     Hyponatremia 02/05/2022    Intestinal infection 11/22/2006    Malaise and fatigue 06/03/2008    Neck pain     Nocturia 07/09/2008    Obesity, unspecified     Petechial hemorrhage 02/05/2022    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    Pressure ulcer, stage 1 12/28/2012    Prostate cancer ([facility])     brachytherapy    Proteinuria 12/25/2011    PVD (peripheral vascular disease) ([facility])     Rotator cuff tear 2007    right    Rotator cuff tear     R - 2007    Sensorineural hearing loss 06/01/2009    Shoulder pain, left 11/28/2017    Sleep disturbance 06/03/2008    Tonsillitis 1960      Past Surgical History:   Procedure Laterality Date    APPENDECTOMY  1963    KNEE ARTHROSCOPY      per NG: bilat knees 2x each; arthroscopy - brash    PROSTATE BIOPSIES  2013    REPAIR ROTATOR CUFF,ACUTE Right 2007    TONSILLECTOMY        Social History:  Social History     Socioeconomic History    Marital status:     Number of children: 2   Occupational History    Occupation: ordering     Comment: retired   Tobacco Use    Smoking status: Never     Passive exposure: Never    Smokeless tobacco: Never   Vaping Use    Vaping Use: Never used   Substance and Sexual Activity    Alcohol use: No     Alcohol/week: 0.0 standard drinks of alcohol    Drug use: No    Sexual activity: Yes   Other Topics Concern    Caffeine Concern Yes     Comment: soda, 1 liter/day    Pt has a pacemaker No    Pt has a defibrillator No    Reaction to local anesthetic No      Family History   Problem Relation Age of Onset    Diabetes Mother     Diabetes Sister     Obesity Sister     Lipids Other         family h/o hyperlipidemia and vascular disease      No Known Allergies     REVIEW OF SYSTEMS:   Review of Systems   Review of Systems   Constitutional: Negative for activity change, appetite change and fever.   HENT: Negative for congestion and voice change.    Respiratory: Negative for  cough and shortness of breath.    Cardiovascular: Negative for chest pain.   Gastrointestinal: Negative for abdominal distention, abdominal pain and vomiting.   Genitourinary: Negative for hematuria.   Skin: Negative for wound.   Psychiatric/Behavioral: Negative for behavioral problems.   Wt Readings from Last 5 Encounters:   03/06/24 236 lb (107 kg)   12/06/23 220 lb (99.8 kg)   11/30/23 220 lb (99.8 kg)   06/01/23 225 lb (102.1 kg)   05/05/23 225 lb (102.1 kg)     Body mass index is 34.85 kg/m².      EXAM:   /70   Pulse 72   Ht 5' 9\" (1.753 m)   Wt 236 lb (107 kg)   SpO2 97%   BMI 34.85 kg/m²   Physical Exam   Constitutional:       Appearance: Normal appearance.   HENT:      Head: Normocephalic.   Eyes:      Conjunctiva/sclera: Conjunctivae normal.   Cardiovascular:      Rate and Rhythm: Normal rate and regular rhythm.      Heart sounds: Normal heart sounds. No murmur heard.  Pulmonary:      Effort: Pulmonary effort is normal.      Breath sounds: Normal breath sounds. No rhonchi or rales.   Abdominal:      General: Bowel sounds are normal.      Palpations: Abdomen is soft.      Tenderness: There is no abdominal tenderness.   Musculoskeletal:      Cervical back: Neck supple.      Right lower leg: No edema.      Left lower leg: No edema.   Skin:     General: Skin is warm and dry.   Neurological:      General: No focal deficit present.      Mental Status: He is alert and oriented to person, place, and time. Mental status is at baseline.   Psychiatric:         Mood and Affect: Mood normal.         Behavior: Behavior normal.   No guarding or rigidity.  He had some discomfort in the left lumbar region.  CVA tenderness negative bilaterally.    ASSESSMENT AND PLAN:   1. Essential hypertension with goal blood pressure less than 140/90  Blood pressure is optimally controlled.  We will continue the current medical regimen.  Check kidney function and thyroid functions.  - TSH W Reflex To Free T4; Future  - Lipid  Panel; Future  - Basic Metabolic Panel (8); Future    2. Mixed hyperlipidemia  Continue statins.  Check lipid profile LFT.  - TSH W Reflex To Free T4; Future  - Lipid Panel; Future    3. LLQ abdominal pain  I am not sure about the etiology of the pain.  I have discussed with him regarding the possibility of constipation versus renal colic versus diverticulitis versus diverticulosis.  There is no signs of infection or renal colic as the pain only last for few seconds.  Encouraged high-fiber diet.  I have given him an option of doing an imaging.  As he is pain-free now.  We have decided to hold off for now.  If there is any worsening pain, he will contact me or he will go to the emergency room.    Plan: As above.      The patient indicates understanding of these issues and agrees to the plan.  No follow-ups on file.    This note was prepared using Dragon [employer] voice recognition dictation software. As a result errors may occur. When identified these errors have been corrected. While every attempt is made to correct errors during dictation discrepancies may still exist.

## 2024-03-07 ENCOUNTER — HOSPITAL ENCOUNTER (EMERGENCY)
Facility: HOSPITAL | Age: 74
Discharge: HOME | End: 2024-03-08
Attending: STUDENT IN AN ORGANIZED HEALTH CARE EDUCATION/TRAINING PROGRAM
Payer: MEDICARE

## 2024-03-07 ENCOUNTER — TELEPHONE (OUTPATIENT)
Dept: RHEUMATOLOGY | Facility: CLINIC | Age: 74
End: 2024-03-07

## 2024-03-07 ENCOUNTER — TELEPHONE (OUTPATIENT)
Dept: INTERNAL MEDICINE CLINIC | Facility: CLINIC | Age: 74
End: 2024-03-07

## 2024-03-07 ENCOUNTER — APPOINTMENT (OUTPATIENT)
Dept: CT IMAGING | Facility: HOSPITAL | Age: 74
End: 2024-03-07
Attending: STUDENT IN AN ORGANIZED HEALTH CARE EDUCATION/TRAINING PROGRAM
Payer: MEDICARE

## 2024-03-07 DIAGNOSIS — K57.92 ACUTE DIVERTICULITIS: Primary | ICD-10-CM

## 2024-03-07 LAB
ALBUMIN SERPL-MCNC: 4.4 G/DL (ref 3.2–4.8)
ALBUMIN/GLOB SERPL: 1.6 {RATIO} (ref 1–2)
ALP LIVER SERPL-CCNC: 100 U/L
ALT SERPL-CCNC: 27 U/L
ANION GAP SERPL CALC-SCNC: 5 MMOL/L (ref 0–18)
AST SERPL-CCNC: 27 U/L (ref ?–34)
BASOPHILS # BLD AUTO: 0.09 X10(3) UL (ref 0–0.2)
BASOPHILS NFR BLD AUTO: 0.7 %
BILIRUB SERPL-MCNC: 0.8 MG/DL (ref 0.2–1.1)
BILIRUB UR QL: NEGATIVE
BUN BLD-MCNC: 24 MG/DL (ref 9–23)
BUN/CREAT SERPL: 16.4 (ref 10–20)
CALCIUM BLD-MCNC: 9.4 MG/DL (ref 8.7–10.4)
CHLORIDE SERPL-SCNC: 108 MMOL/L (ref 98–112)
CLARITY UR: CLEAR
CO2 SERPL-SCNC: 28 MMOL/L (ref 21–32)
CREAT BLD-MCNC: 1.46 MG/DL
DEPRECATED RDW RBC AUTO: 45.1 FL (ref 35.1–46.3)
EGFRCR SERPLBLD CKD-EPI 2021: 50 ML/MIN/1.73M2 (ref 60–?)
EOSINOPHIL # BLD AUTO: 0.29 X10(3) UL (ref 0–0.7)
EOSINOPHIL NFR BLD AUTO: 2.4 %
ERYTHROCYTE [DISTWIDTH] IN BLOOD BY AUTOMATED COUNT: 14.9 % (ref 11–15)
GLOBULIN PLAS-MCNC: 2.8 G/DL (ref 2.8–4.4)
GLUCOSE BLD-MCNC: 103 MG/DL (ref 70–99)
GLUCOSE UR-MCNC: NORMAL MG/DL
HCT VFR BLD AUTO: 44.9 %
HGB BLD-MCNC: 14.6 G/DL
HGB UR QL STRIP.AUTO: NEGATIVE
IMM GRANULOCYTES # BLD AUTO: 0.06 X10(3) UL (ref 0–1)
IMM GRANULOCYTES NFR BLD: 0.5 %
KETONES UR-MCNC: NEGATIVE MG/DL
LEUKOCYTE ESTERASE UR QL STRIP.AUTO: NEGATIVE
LYMPHOCYTES # BLD AUTO: 1.23 X10(3) UL (ref 1–4)
LYMPHOCYTES NFR BLD AUTO: 10.1 %
MCH RBC QN AUTO: 27.5 PG (ref 26–34)
MCHC RBC AUTO-ENTMCNC: 32.5 G/DL (ref 31–37)
MCV RBC AUTO: 84.7 FL
MONOCYTES # BLD AUTO: 1.17 X10(3) UL (ref 0.1–1)
MONOCYTES NFR BLD AUTO: 9.6 %
NEUTROPHILS # BLD AUTO: 9.33 X10 (3) UL (ref 1.5–7.7)
NEUTROPHILS # BLD AUTO: 9.33 X10(3) UL (ref 1.5–7.7)
NEUTROPHILS NFR BLD AUTO: 76.7 %
NITRITE UR QL STRIP.AUTO: NEGATIVE
OSMOLALITY SERPL CALC.SUM OF ELEC: 296 MOSM/KG (ref 275–295)
PH UR: 5.5 [PH] (ref 5–8)
PLATELET # BLD AUTO: 242 10(3)UL (ref 150–450)
POTASSIUM SERPL-SCNC: 3.4 MMOL/L (ref 3.5–5.1)
PROT SERPL-MCNC: 7.2 G/DL (ref 5.7–8.2)
PROT UR-MCNC: NEGATIVE MG/DL
RBC # BLD AUTO: 5.3 X10(6)UL
SODIUM SERPL-SCNC: 141 MMOL/L (ref 136–145)
SP GR UR STRIP: 1.01 (ref 1–1.03)
UROBILINOGEN UR STRIP-ACNC: NORMAL
WBC # BLD AUTO: 12.2 X10(3) UL (ref 4–11)

## 2024-03-07 PROCEDURE — 74177 CT ABD & PELVIS W/CONTRAST: CPT | Performed by: STUDENT IN AN ORGANIZED HEALTH CARE EDUCATION/TRAINING PROGRAM

## 2024-03-07 PROCEDURE — 81003 URINALYSIS AUTO W/O SCOPE: CPT | Performed by: STUDENT IN AN ORGANIZED HEALTH CARE EDUCATION/TRAINING PROGRAM

## 2024-03-07 PROCEDURE — 96374 THER/PROPH/DIAG INJ IV PUSH: CPT

## 2024-03-07 PROCEDURE — 99284 EMERGENCY DEPT VISIT MOD MDM: CPT

## 2024-03-07 PROCEDURE — 85025 COMPLETE CBC W/AUTO DIFF WBC: CPT | Performed by: STUDENT IN AN ORGANIZED HEALTH CARE EDUCATION/TRAINING PROGRAM

## 2024-03-07 PROCEDURE — 93005 ELECTROCARDIOGRAM TRACING: CPT

## 2024-03-07 PROCEDURE — 80053 COMPREHEN METABOLIC PANEL: CPT | Performed by: STUDENT IN AN ORGANIZED HEALTH CARE EDUCATION/TRAINING PROGRAM

## 2024-03-07 PROCEDURE — 96361 HYDRATE IV INFUSION ADD-ON: CPT

## 2024-03-07 PROCEDURE — 99285 EMERGENCY DEPT VISIT HI MDM: CPT

## 2024-03-07 PROCEDURE — 93010 ELECTROCARDIOGRAM REPORT: CPT

## 2024-03-07 RX ORDER — MORPHINE SULFATE 4 MG/ML
4 INJECTION, SOLUTION INTRAMUSCULAR; INTRAVENOUS ONCE
Status: COMPLETED | OUTPATIENT
Start: 2024-03-07 | End: 2024-03-07

## 2024-03-07 NOTE — TELEPHONE ENCOUNTER
Patient was seen in office by Dr. Hull yesterday for LLQ abdominal pain.  Patient today reports pain is back, and feels like a sharp pain. States provider discussed the possibility of an antibiotic; however patient is receiving injections, Cimzia, from Dr. Dalton and was advised not to take antibiotics while receiving injections.   This RN advised patient if pain is severe to go to ED; however patient states he is up and walking around and pain comes and goes.   Patient would like further recommendations from provider.           3. LLQ abdominal pain  I am not sure about the etiology of the pain.  I have discussed with him regarding the possibility of constipation versus renal colic versus diverticulitis versus diverticulosis.  There is no signs of infection or renal colic as the pain only last for few seconds.  Encouraged high-fiber diet.  I have given him an option of doing an imaging.  As he is pain-free now.  We have decided to hold off for now.  If there is any worsening pain, he will contact me or he will go to the emergency room.

## 2024-03-07 NOTE — TELEPHONE ENCOUNTER
Patient is calling to advise Dr. Dalton that he has an infection and will need to take antiobiotics and is concerned as patient is taking other medications/shots from provider and has been advised not to take antiobiotics.   Patient is requested a call ASAP    Please advise.

## 2024-03-07 NOTE — TELEPHONE ENCOUNTER
Spoke with patient, patient stated he was seen today by Dr. Hull.     LLQ abdominal pain  I am not sure about the etiology of the pain.  I have discussed with him regarding the possibility of constipation versus renal colic versus diverticulitis versus diverticulosis.  There is no signs of infection or renal colic as the pain only last for few seconds.  Encouraged high-fiber diet.  I have given him an option of doing an imaging.  As he is pain-free now.  We have decided to hold off for now.  If there is any worsening pain, he will contact me or he will go to the emergency room.    Plan: As above.        The patient indicates understanding of these issues and agrees to the plan.  No fol low-ups on file.  Encounter Status    Electronically signed by Alex Hull MD on 3/6/24 at 2:59 PM      Inform patient if he does start an antibiotic he will be holding his Cimzia until he finish treatment. Patient will call office to notify us if he does. We will make our doctor aware.  Please advice.

## 2024-03-08 ENCOUNTER — TELEPHONE (OUTPATIENT)
Dept: RHEUMATOLOGY | Facility: CLINIC | Age: 74
End: 2024-03-08

## 2024-03-08 ENCOUNTER — OFFICE VISIT (OUTPATIENT)
Dept: PAIN CLINIC | Facility: HOSPITAL | Age: 74
End: 2024-03-08
Attending: ANESTHESIOLOGY
Payer: MEDICARE

## 2024-03-08 VITALS — OXYGEN SATURATION: 95 % | DIASTOLIC BLOOD PRESSURE: 71 MMHG | SYSTOLIC BLOOD PRESSURE: 114 MMHG | HEART RATE: 55 BPM

## 2024-03-08 VITALS
SYSTOLIC BLOOD PRESSURE: 115 MMHG | HEIGHT: 69 IN | DIASTOLIC BLOOD PRESSURE: 74 MMHG | BODY MASS INDEX: 34.51 KG/M2 | HEART RATE: 56 BPM | OXYGEN SATURATION: 93 % | WEIGHT: 233 LBS | TEMPERATURE: 99 F | RESPIRATION RATE: 19 BRPM

## 2024-03-08 DIAGNOSIS — M25.561 BILATERAL CHRONIC KNEE PAIN: ICD-10-CM

## 2024-03-08 DIAGNOSIS — G89.29 OTHER CHRONIC PAIN: ICD-10-CM

## 2024-03-08 DIAGNOSIS — M54.50 LOW BACK PAIN AT MULTIPLE SITES: ICD-10-CM

## 2024-03-08 DIAGNOSIS — M25.562 BILATERAL CHRONIC KNEE PAIN: ICD-10-CM

## 2024-03-08 DIAGNOSIS — G89.29 BILATERAL CHRONIC KNEE PAIN: ICD-10-CM

## 2024-03-08 LAB
ATRIAL RATE: 67 BPM
P AXIS: 70 DEGREES
P-R INTERVAL: 172 MS
Q-T INTERVAL: 448 MS
QRS DURATION: 108 MS
QTC CALCULATION (BEZET): 473 MS
R AXIS: 17 DEGREES
T AXIS: 230 DEGREES
VENTRICULAR RATE: 67 BPM

## 2024-03-08 PROCEDURE — 99211 OFF/OP EST MAY X REQ PHY/QHP: CPT

## 2024-03-08 RX ORDER — HYDROCODONE BITARTRATE AND ACETAMINOPHEN 10; 325 MG/1; MG/1
1 TABLET ORAL EVERY 6 HOURS PRN
Qty: 120 TABLET | Refills: 0 | Status: SHIPPED | OUTPATIENT
Start: 2024-03-14 | End: 2024-04-13

## 2024-03-08 RX ORDER — HYDROCODONE BITARTRATE AND ACETAMINOPHEN 10; 325 MG/1; MG/1
1 TABLET ORAL EVERY 6 HOURS PRN
Qty: 120 TABLET | Refills: 0 | Status: SHIPPED | OUTPATIENT
Start: 2024-04-13 | End: 2024-05-13

## 2024-03-08 RX ORDER — HYDROCODONE BITARTRATE AND ACETAMINOPHEN 5; 325 MG/1; MG/1
1 TABLET ORAL ONCE
Status: COMPLETED | OUTPATIENT
Start: 2024-03-08 | End: 2024-03-08

## 2024-03-08 RX ORDER — AMOXICILLIN AND CLAVULANATE POTASSIUM 875; 125 MG/1; MG/1
1 TABLET, FILM COATED ORAL 2 TIMES DAILY
Qty: 20 TABLET | Refills: 0 | Status: SHIPPED | OUTPATIENT
Start: 2024-03-08 | End: 2024-03-18

## 2024-03-08 NOTE — TELEPHONE ENCOUNTER
Patient just started a round of antibiotics today and wants to know if he should reschedule his injection appointment next week.

## 2024-03-08 NOTE — CHRONIC PAIN
Initial Consultation Note      HISTORY OF PRESENT ILLNESS:  Moreno Khalil is a 70 year old old male referred to the pain clinic  for evaluation treatment of his low back and cervical neck pain .   he continues to take 4 Norco a day for pain control Moreno also has bilateral knee arthritis in need of replacement but is hold off on as long as he can he takes 4 Norco a day for pain control no side effects still decreases pain by 50 to 60% throughout the day he is walking a little bit slower these days and little more kyphotic in nature.  Moreno had a cervical epidural steroid injection as well as lumbar injections in the past was pretty good results.  The medication as well as intermittent and injections have helped decrease his pain allows him to be more functionable.    Recently diagnosed diverticulitis pain was pretty severe yesterday and went to the emergency room was placed on antibiotic  PAIN COURSE AND PREVIOUS INTERVENTIONS:    Interventions: Epidural injection  Adjuvants: Small amounts Norco every day    BLOOD THINNING MEDICATIONS:  None    CURRENT MEDICATIONS:  Current Outpatient Medications   Medication Sig Dispense Refill    [START ON 11/8/2020] HYDROcodone-acetaminophen  MG Oral Tab Take 1 tablet by mouth every 8 (eight) hours as needed for Pain (max 3/day). 90 tablet 0    [START ON 10/9/2020] HYDROcodone-acetaminophen  MG Oral Tab Take 1 tablet by mouth every 8 (eight) hours as needed for Pain (MAX 3/DAY). 90 tablet 0    HYDROCHLOROTHIAZIDE 25 MG Oral Tab Take 1 tablet by mouth once daily 90 tablet 0    metoprolol Tartrate 25 MG Oral Tab Take 1 tablet (25 mg total) by mouth 2 (two) times daily. 180 tablet 1    simvastatin 20 MG Oral Tab Take 1 tablet (20 mg total) by mouth every evening. 90 tablet 1    ibuprofen 600 MG Oral Tab TAKE 1 TABLET EVERY 12 HOURS AS NEEDED FOR PAIN (SUBSTITUTED FOR MOTRIN) 180 tablet 0    Tadalafil 20 MG Oral Tab Take 1 tablet (20 mg total) by mouth daily as  needed for Erectile Dysfunction. 10 tablet 11        ALLERGIES:  No Known Allergies    SURGICAL HISTORY:  Past Surgical History:   Procedure Laterality Date    APPENDECTOMY  1963    KNEE ARTHROSCOPY      per NG: bilat knees 2x each; arthroscopy - brash    PROSTATE BIOPSIES  2013    REPAIR ROTATOR CUFF,ACUTE Right 2007    TONSILLECTOMY         REVIEW OF SYSTEMS:   A comprehensive review of systems was negative except for:   MEDICAL HISTORY:  Patient Active Problem List:     Prostate cancer (HCC)     Erectile dysfunction     Cervical radicular pain     Mixed hyperlipidemia     Essential hypertension with goal blood pressure less than 140/90     Class 1 obesity due to excess calories with serious comorbidity in adult     Physical exam, annual     Decreased visual acuity    Past Medical History:   Diagnosis Date    Appendicitis 1963    Back problem     Benign prostatic hypertrophy     Carpal tunnel syndrome     Dysplastic nevus 2017    left flank    Elevated PSA 2008    bx negative for cancer    H/O arthroscopy of knee bilateral    History of brachytherapy     Neck pain     Obesity, unspecified     Prostate cancer ([facility])     brachytherapy    PVD (peripheral vascular disease) ([facility])     Rotator cuff tear 2007    right    Rotator cuff tear     R - 2007    Tonsillitis 1960       FAMILY HISTORY:  Family History   Problem Relation Age of Onset    Diabetes Mother     Diabetes Sister     Obesity Sister     Lipids Other         family h/o hyperlipidemia and vascular disease       SOCIAL HISTORY:  Social History    Socioeconomic History      Marital status:       Spouse name: Not on file      Number of children: 2      Years of education: Not on file      Highest education level: Not on file    Occupational History      Occupation: ordering        Comment: retired    Social Needs      Financial resource strain: Not on file      Food insecurity        Worry: Not on file        Inability: Not on file      Transportation  needs        Medical: Not on file        Non-medical: Not on file    Tobacco Use      Smoking status: Never Smoker      Smokeless tobacco: Never Used    Substance and Sexual Activity      Alcohol use: No        Alcohol/week: 0.0 standard drinks      Drug use: No      Sexual activity: Not on file    Lifestyle      Physical activity        Days per week: Not on file        Minutes per session: Not on file      Stress: Not on file    Relationships      Social connections        Talks on phone: Not on file        Gets together: Not on file        Attends Jain service: Not on file        Active member of club or organization: Not on file        Attends meetings of clubs or organizations: Not on file        Relationship status: Not on file      Intimate partner violence        Fear of current or ex partner: Not on file        Emotionally abused: Not on file        Physically abused: Not on file        Forced sexual activity: Not on file    Other Topics      Concerns:         Service: Not Asked        Blood Transfusions: Not Asked        Caffeine Concern: Yes          soda, 1 liter/day        Occupational Exposure: Not Asked        Hobby Hazards: Not Asked        Sleep Concern: Not Asked        Stress Concern: Not Asked        Weight Concern: Not Asked        Special Diet: Not Asked        Back Care: Not Asked        Exercise: Not Asked        Bike Helmet: Not Asked        Seat Belt: Not Asked        Self-Exams: Not Asked        Grew up on a farm: Not Asked        History of tanning: Not Asked        Outdoor occupation: Not Asked        Pt has a pacemaker: No        Pt has a defibrillator: No        Reaction to local anesthetic: No    Social History Narrative      Not on file      ADVANCE CARE PLANNING:  Advance Care Plan NOT discussed.    PHYSICAL EXAMINATION:  There were no vitals filed for this visit.  General: Alert and oriented x3  Affect:  NAD  Head: normocephalic, atraumatic  Eyes: anicteric; no  injection  Chest: S1, S2, RRR  Respiratory: CTAB  Gait: Normal; cane user - No  Spine: Normal    ROM:   Lumbar spine  Flexion  dec  Extension dec  Cervical Spine  Flexion dec  Extensiondec  MOTOR EXAMINATION:  UPPER EXTREMITY      LEFT RIGHT   Deltoid 5/5 5/5   Biceps 5/5 5/5   Triceps 5/5 5/5   Brachioradialis 5/5 5/5   Wrist Flexors 5/5 5/5   Wrist Extensors 5/5 5/5   Intrinsic Hand 5/5 5/5    5/5 5/5     LOWER EXTREMITY      LEFT RIGHT   Iliopsoas 5/5 5/5   Quadriceps 5/5 5/5   Foot DF 5/5 5/5   Foot EHL 5/5 5/5   Gastrocnemius 5/5 5/5     PULSES      LEFT RIGHT   Radial 2/4 2/4   Dorsalis Pedis 2/4 2/4   Posterior Tibial 2/4 2/4   Brachial 2/4 2/4     SLR: negative  SIJ tenderness negative  Joint Exam: Normal  TPs:  Present within lumbo-sacral paraspinal muscles  Left occipital region is very tender to palpation can replicates the pain that he gets in the back of his head  Right Deep tendon reflexes: Normal   Left Deep tendon reflexes: Normal   Babinski Reflex: absent bilaterally   Temperature:  normal to touch bilateral upper and lower extremities  Edema - Absent  Stasis ulcer on the left leg is healed    Sensation (light touch/pinprick/temperature):     Right Lower Extremity:  Normal  Left Lower Extremity: Normal  Right Upper Extremity:  Normal  Left Upper Extremity:  Normal    IMAGING:  No results found.    LABS:  Lab Results   Component Value Date    WBC 10.3 07/06/2020    RBC 5.45 07/06/2020    HGB 14.1 07/06/2020    HCT 45.2 07/06/2020    MCV 82.9 07/06/2020    MCH 25.9 (L) 07/06/2020    MCHC 31.2 07/06/2020    RDW 19.0 (H) 07/06/2020    .0 07/06/2020    MPV 8.1 06/28/2018     Lab Results   Component Value Date     07/06/2020    K 3.8 07/06/2020     07/06/2020    CO2 29.0 07/06/2020    BUN 25 (H) 07/06/2020    GLU 92 07/06/2020    CA 8.8 07/06/2020     No results found for: PT    [facility] [facility] MONITORING PROGRAM REVIEWED  Yes      ASSESSMENT:   70 year old old male with cervical  and lumbar spinal stenosis   Has responded in the past epidural injections cervical and lumbar  Cervical epidural injection was pretty good results greater than 60% relief of his pain  Low back pain bilateral radicular pain  Chronic use  of Norco a day for pain control   increasing neck pain radiating down both arms   bilateral knee pain the care of Dr. Singh for that DJD bilateral knee  Recent injections for the knees  In the emergency room yesterday for diverticulitis was placed on antibiotic      PLAN:  RECOMMENDATIONS:  4 Norco a day    Continue antibiotic for his recently diagnosed diverticulitis    Epidurals for cervical is still benefiting him no need for repeat as and at this time  Bilateral knee pain he is to see orthopedic surgeon status post Synvisc injections which is helped also trying hyaluronic acid injections currently         r    Cardiac eval in process

## 2024-03-08 NOTE — ED QUICK NOTES
This RN received report from Jami UNGER.    Rounding Completed    Plan of Care reviewed. Waiting for lab results>CT.  Elimination needs assessed.  Patient resting in bed, speaking in full sentences. Pt on VS monitor, for frequent VS assessments. No new requests at this time.  Respiratory effort good, even and unlabored. IV flushes and draws with ease    Bed is locked and in lowest position. Call light within reach.

## 2024-03-08 NOTE — TELEPHONE ENCOUNTER
Consulted with Dr Sha morgan pt's condition, then phoned pt and informed him he needs to delay Cimzia injections until completing all 10 days of antibiotics. First dose antibiotics taken 3/8/24. Rescheduled injections for 3/19/24.

## 2024-03-08 NOTE — ED QUICK NOTES
This RN went in to DC pt, pt requesting Norco for his back pain and Augmentin prior to discharge.

## 2024-03-08 NOTE — PATIENT INSTRUCTIONS
Refill policies:    Allow 2-3 business days for refills; controlled substances may take longer.  Contact your pharmacy at least 5 days prior to running out of medication and have them send an electronic request or submit request through the “request refill” option in your [employer] account.  Refills are not addressed on weekends; covering physicians do not authorize routine medications on weekends.  No narcotics or controlled substances are refilled after noon on Fridays or by on call physicians.  By law, narcotics must be electronically prescribed.  A 30 day supply with no refills is the maximum allowed.  If your prescription is due for a refill, you may be due for a follow up appointment.  To best provide you care, patients receiving routine medications need to be seen at least once a year.  Patients receiving narcotic/controlled substance medications need to be seen at least once every 3 months.  In the event that your preferred pharmacy does not have the requested medication in stock (e.g. Backordered), it is your responsibility to find another pharmacy that has the requested medication available.  We will gladly send a new prescription to that pharmacy at your request.    Scheduling Tests:    If your physician has ordered radiology tests such as MRI or CT scans, please contact Central Scheduling at [phone] right away to schedule the test.  Once scheduled, the [facility] Centralized Referral Team will work with your insurance carrier to obtain pre-certification or prior authorization.  Depending on your insurance carrier, approval may take 3-10 days.  It is highly recommended patients assure they have received an authorization before having a test performed.  If test is done without insurance authorization, patient may be responsible for the entire amount billed.      Precertification and Prior Authorizations:  If your physician has recommended that you have a procedure or additional testing performed the [facility]  Centralized Referral Team will contact your insurance carrier to obtain pre-certification or prior authorization.    You are strongly encouraged to contact your insurance carrier to verify that your procedure/test has been approved and is a COVERED benefit.  Although the [facility] Centralized Referral Team does its due diligence, the insurance carrier gives the disclaimer that \"Although the procedure is authorized, this does not guarantee payment.\"    Ultimately the patient is responsible for payment.   Thank you for your understanding in this matter.  Paperwork Completion:  If you require FMLA or disability paperwork for your recovery, please make sure to either drop it off or have it faxed to our office at [phone]. Be sure the form has your name and date of birth on it.  The form will be faxed to our Forms Department and they will complete it for you.  There is a 25$ fee for all forms that need to be filled out.  Please be aware there is a 10-14 day turnaround time.  You will need to sign a release of information (SYL) form if your paperwork does not come with one.  You may call the Forms Department with any questions at [phone].  Their fax number is [phone].

## 2024-03-08 NOTE — PROGRESS NOTES
Patient presents in office today with reported pain in Lower back     Current pain level reported = 8/10    Last reported dose of Hydrocodone

## 2024-03-08 NOTE — ED PROVIDER NOTES
[facility] Emergency Department Note  Patient: Moreno Khalil Age: 73 year old Sex: male      MRN: [id number]  : 10/4/1950    Patient Seen in: [facility] Emergency Department    History   No chief complaint on file.    Stated Complaint: left side abdomen/flank pain    History obtained from: Patient    Patient is a 73-year-old male presenting today for evaluation of left lower quadrant abdominal pain.  He states he has been having pain in the left lower quadrant of his abdomen over the past 2 and half weeks.  He states that he saw his PCP yesterday who stated that if the pain were to get any worse, he should go to the emergency department for evaluation.  He states that pain worsened today prompting evaluation in the emergency department.  Endorses nausea but no vomiting.  No diarrhea.  No fevers.    Review of Systems:  Review of Systems  Positive for stated complaint: left side abdomen/flank pain. Constitutional and vital signs reviewed. All other systems reviewed and negative except as noted above.    Patient History:  Past Medical History:   Diagnosis Date    Abnormal blood chemistry 2013    Acute dermatitis due to solar radiation 2012    Acute upper respiratory infection 2009    Appendicitis 1963    Back problem     Backache 2005    Benign neoplasm of skin 07/10/2008    Benign prostatic hypertrophy     Calcaneal spur 2011    Carpal tunnel syndrome     Cervical radicular pain 2015    Cough variant asthma (HCC) 2011    Disorder of kidney and ureter 2013    Disorder of rotator cuff syndrome of shoulder and allied disorder 2007    Dizziness 2022    Dysfunction of eustachian tube 2009    Dysplastic nevus 2017    left flank    Dyspnea and respiratory abnormality 2011    Elevated prostate specific antigen (PSA) 2008    Elevated PSA 2008    bx negative for cancer    Essential hypertension     Generalized pain 2011    H/O  arthroscopy of knee bilateral    History of brachytherapy     Hyponatremia 02/05/2022    Intestinal infection 11/22/2006    Malaise and fatigue 06/03/2008    Neck pain     Nocturia 07/09/2008    Obesity, unspecified     Petechial hemorrhage 02/05/2022    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    Pressure ulcer, stage 1 12/28/2012    Prostate cancer (HCC)     brachytherapy    Proteinuria 12/25/2011    PVD (peripheral vascular disease) ([facility])     Rotator cuff tear 2007    right    Rotator cuff tear     R - 2007    Sensorineural hearing loss 06/01/2009    Shoulder pain, left 11/28/2017    Sleep disturbance 06/03/2008    Tonsillitis 1960       Past Surgical History:   Procedure Laterality Date    APPENDECTOMY  1963    KNEE ARTHROSCOPY      per NG: bilat knees 2x each; arthroscopy - brash    PROSTATE BIOPSIES  2013    REPAIR ROTATOR CUFF,ACUTE Right 2007    TONSILLECTOMY          Family History   Problem Relation Age of Onset    Diabetes Mother     Diabetes Sister     Obesity Sister     Lipids Other         family h/o hyperlipidemia and vascular disease       Specific Social Determinants of Health:   Social History     Socioeconomic History    Marital status:     Number of children: 2   Occupational History    Occupation: ordering     Comment: retired   Tobacco Use    Smoking status: Never     Passive exposure: Never    Smokeless tobacco: Never   Vaping Use    Vaping Use: Never used   Substance and Sexual Activity    Alcohol use: No     Alcohol/week: 0.0 standard drinks of alcohol    Drug use: No    Sexual activity: Yes   Other Topics Concern    Caffeine Concern Yes     Comment: soda, 1 liter/day    Pt has a pacemaker No    Pt has a defibrillator No    Reaction to local anesthetic No           PSFH elements reviewed from today and agreed except as otherwise stated in HPI.    Physical Exam     ED Triage Vitals [03/07/24 2134]   /75   Pulse (!) 180   Resp 20   Temp 98.6 °F (37 °C)   Temp src Oral   SpO2 96 %    O2 Device None (Room air)       Current:/74   Pulse 56   Temp 98.6 °F (37 °C) (Oral)   Resp 19   Ht 175.3 cm (5' 9\")   Wt 105.7 kg   SpO2 93%   BMI 34.41 kg/m²         Physical Exam  Constitutional:       Appearance: He is well-developed. He is obese.   HENT:      Head: Normocephalic and atraumatic.      Right Ear: External ear normal.      Left Ear: External ear normal.      Nose: Nose normal.   Eyes:      Conjunctiva/sclera: Conjunctivae normal.      Pupils: Pupils are equal, round, and reactive to light.   Cardiovascular:      Rate and Rhythm: Normal rate and regular rhythm.      Heart sounds: Normal heart sounds.   Pulmonary:      Effort: Pulmonary effort is normal.      Breath sounds: Normal breath sounds.   Abdominal:      General: Bowel sounds are normal.      Palpations: Abdomen is soft.      Tenderness: There is abdominal tenderness.      Comments: Left lower quadrant abdominal tenderness with no rebound or guarding.   Musculoskeletal:         General: Normal range of motion.      Cervical back: Normal range of motion and neck supple.   Skin:     General: Skin is warm and dry.      Findings: No rash.   Neurological:      General: No focal deficit present.      Mental Status: He is alert and oriented to person, place, and time.      Deep Tendon Reflexes: Reflexes are normal and symmetric.   Psychiatric:         Mood and Affect: Mood normal.         Behavior: Behavior normal.         ED Course   Labs:   Labs Reviewed   COMP METABOLIC PANEL (14) - Abnormal; Notable for the following components:       Result Value    Glucose 103 (*)     Potassium 3.4 (*)     BUN 24 (*)     Creatinine 1.46 (*)     Calculated Osmolality 296 (*)     eGFR-Cr 50 (*)     All other components within normal limits   CBC W/ DIFFERENTIAL - Abnormal; Notable for the following components:    WBC 12.2 (*)     Neutrophil Absolute Prelim 9.33 (*)     Neutrophil Absolute 9.33 (*)     Monocyte Absolute 1.17 (*)     All other  components within normal limits   CBC WITH DIFFERENTIAL WITH PLATELET    Narrative:     The following orders were created for panel order CBC With Differential With Platelet.  Procedure                               Abnormality         Status                     ---------                               -----------         ------                     CBC W/ DIFFERENTIAL[[id number]]          Abnormal            Final result                 Please view results for these tests on the individual orders.   URINALYSIS WITH CULTURE REFLEX     Radiology findings:  I personally reviewed the images.   CT ABDOMEN+PELVIS(CONTRAST ONLY)(CPT=74177)    Result Date: 3/8/2024  CONCLUSION:  1. Acute diverticulitis at junction between descending and sigmoid colon.  No abscess or other complication.  If not recently performed, follow-up colonoscopy after treatment recommended. 2. T12-L1:  Grade 1 spondylolisthesis related to bilateral T12 pars defects.  Advanced disc degeneration.  Newly developed endplate irregularity may be related to chronic micro trauma and motion/instability.  Coexistent discitis should be excluded on a clinical basis. 3. Brachytherapy seeds in the prostate.  No evidence of metastatic disease. 4. Marked coronary artery calcification. 5. Post appendectomy.     Dictated by (CST): Prabhu Black MD on 3/08/2024 at 7:57 AM     Finalized by (CST): Prabhu Black MD on 3/08/2024 at 8:10 AM           CT ABDOMEN AND PELVIS WITH CONTRAST  No prior  IMPRESSION:  Diverticulitis involving distal descending colon/proximal sigmoid colon with mild surrounding inflammatory changes and trace amount of free fluid.  No rim-enhancing collections.  No pneumatosis or free air.  No signs of bowel obstruction.    No calcified gallstones.  No bile duct dilation.  Subcentimeter hepatic segment 7 hypodensity, too small to characterize.  Small pancreatic uncinate process lipoma.  Nonobstructive urinary calculi.  No hydronephrosis.  Peripelvic  renal cysts.  Brachytherapy seeds at the prostate level.    Mild atherosclerotic vascular changes.  No AAA.  Moderate atherosclerotic changes of the coronary arteries.  Subsegmental atelectasis/scarring lung bases.  Moderate to severe degenerative changes of the visualized spine.  Prilosec with no prior injury at T12-L1 levels with grade 1 anterior listhesis and bilateral pars interarticularis defects.      EKG as interpreted by me: Ventricular rate 67, normal sinus rhythm with PACs, normal axis, no SD interval prolongation, narrow QRS, QTc 473 ms, no ST segment elevations, nonspecific ST segment depression of less than 1 mm in V4 through V6, no abnormal T wave inversions  Cardiac Monitor: Interpreted by me.   Pulse Readings from Last 1 Encounters:   03/08/24 56   , sinus with PAC's,     External non-ED records reviewed independently by me: Echocardiogram from 2/6/2022 reviewed showing ejection fraction of 60 to 65% with no regional wall motion abnormalities.    MDM   73-year-old male with a past medical history as detailed above presenting today for evaluation of 2 and half weeks of left lower quadrant abdominal pain associated nausea.  On exam, he is tenderness in the left lower quadrant but otherwise no rebound or guarding.    Differential diagnoses considered includes, but is not limited to: Diverticulitis, nephrolithiasis, UTI, pyelonephritis, enteritis, colitis    Will obtain the following tests: CBC, CMP, urinalysis, CT abdomen pelvis with IV contrast  Please see ED course for my independent review of these tests/imaging results.    Initial Medications/Therapeutics administered: Morphine, IV fluid bolus    Chronic conditions affecting care: Hypertension, obesity, prostate cancer status post brachytherapy, PVD    Workup and medications considered but not ordered: None    Social Determinants of Health that impacted care: None     ED course: Labs with nonspecific leukocytosis of 12.2.  Urinalysis without  evidence of urinary tract infection.  I independently reviewed the CT abdomen pelvis images that show evidence of acute uncomplicated descending colon/proximal sigmoid colon diverticulitis.  Upon reevaluation, patient resting comfortably.  Is tolerating p.o. intake.  Stable for discharge home at this time with course of Augmentin and continued analgesic medications.  Patient has Norco 10s at home.  Instructed to follow-up closely with PCP and gastroenterology on an outpatient basis.  Return precautions were discussed and all questions answered.  Patient expressed understanding and agreement with this plan.        Disposition and Plan     Clinical Impression:  1. Acute diverticulitis        Disposition:  Discharge    Follow-up:  Alex Hull MD  [address]  [place] [id number] [phone]    Schedule an appointment as soon as possible for a visit in 2 day(s)  As needed, If symptoms worsen    Lonnie Hernández MD  [address]  [place] [id number] [phone]    Schedule an appointment as soon as possible for a visit in 1 week(s)  As needed      Medications Prescribed:  Discharge Medication List as of 3/8/2024  1:15 AM        START taking these medications    Details   amoxicillin clavulanate 875-125 MG Oral Tab Take 1 tablet by mouth 2 (two) times daily for 10 days., Normal, Disp-20 tablet, R-0               This note may have been created using voice dictation technology and may include inadvertent errors.      Sally Trujillo MD  Emergency Medicine

## 2024-03-13 ENCOUNTER — PATIENT OUTREACH (OUTPATIENT)
Dept: CASE MANAGEMENT | Age: 74
End: 2024-03-13

## 2024-03-13 NOTE — PROGRESS NOTES
1st attempt ER f/up apt request  PCP -existing apt (4/9) for annual medicare well visit, pt doesn't want sooner apt  GASTRO -decline, pt doesn't want to schedule at this time  Closing encounter

## 2024-03-19 ENCOUNTER — NURSE ONLY (OUTPATIENT)
Dept: RHEUMATOLOGY | Facility: CLINIC | Age: 74
End: 2024-03-19

## 2024-03-19 DIAGNOSIS — M45.3 ANKYLOSING SPONDYLITIS OF CERVICOTHORACIC REGION (HCC): Primary | ICD-10-CM

## 2024-03-19 PROCEDURE — 96372 THER/PROPH/DIAG INJ SC/IM: CPT | Performed by: INTERNAL MEDICINE

## 2024-03-19 NOTE — PROGRESS NOTES
Name and  verified. Pt aware he was to receive injection of Cimza. States off antibiotics 72 hours and diverticulitis symptoms resolved. Injections given in bilateral thighs and pt tolerated well. Possible local side effects discussed with pt. Pt aware to follow up in 4 weeks for next injection and to follow up with provider on 24 after labs completed.

## 2024-04-02 ENCOUNTER — LAB ENCOUNTER (OUTPATIENT)
Dept: LAB | Age: 74
End: 2024-04-02
Attending: INTERNAL MEDICINE
Payer: MEDICARE

## 2024-04-02 DIAGNOSIS — Z51.81 ENCOUNTER FOR THERAPEUTIC DRUG MONITORING: ICD-10-CM

## 2024-04-02 DIAGNOSIS — M45.3 ANKYLOSING SPONDYLITIS OF CERVICOTHORACIC REGION (HCC): ICD-10-CM

## 2024-04-02 DIAGNOSIS — E78.2 MIXED HYPERLIPIDEMIA: ICD-10-CM

## 2024-04-02 DIAGNOSIS — I10 ESSENTIAL HYPERTENSION WITH GOAL BLOOD PRESSURE LESS THAN 140/90: ICD-10-CM

## 2024-04-02 LAB
ALBUMIN SERPL-MCNC: 3.8 G/DL (ref 3.4–5)
ALT SERPL-CCNC: 34 U/L
ANION GAP SERPL CALC-SCNC: 8 MMOL/L (ref 0–18)
AST SERPL-CCNC: 21 U/L (ref 15–37)
BASOPHILS # BLD AUTO: 0.11 X10(3) UL (ref 0–0.2)
BASOPHILS NFR BLD AUTO: 1.3 %
BUN BLD-MCNC: 24 MG/DL (ref 9–23)
CALCIUM BLD-MCNC: 9.9 MG/DL (ref 8.5–10.1)
CHLORIDE SERPL-SCNC: 104 MMOL/L (ref 98–112)
CHOLEST SERPL-MCNC: 138 MG/DL (ref ?–200)
CO2 SERPL-SCNC: 27 MMOL/L (ref 21–32)
CREAT BLD-MCNC: 1.3 MG/DL
CRP SERPL-MCNC: 0.62 MG/DL (ref ?–0.3)
EGFRCR SERPLBLD CKD-EPI 2021: 58 ML/MIN/1.73M2 (ref 60–?)
EOSINOPHIL # BLD AUTO: 0.43 X10(3) UL (ref 0–0.7)
EOSINOPHIL NFR BLD AUTO: 5.3 %
ERYTHROCYTE [DISTWIDTH] IN BLOOD BY AUTOMATED COUNT: 15.2 %
ERYTHROCYTE [SEDIMENTATION RATE] IN BLOOD: 12 MM/HR
FASTING PATIENT LIPID ANSWER: NO
FASTING STATUS PATIENT QL REPORTED: NO
GLUCOSE BLD-MCNC: 127 MG/DL (ref 70–99)
HCT VFR BLD AUTO: 46.6 %
HDLC SERPL-MCNC: 57 MG/DL (ref 40–59)
HGB BLD-MCNC: 15.1 G/DL
IMM GRANULOCYTES # BLD AUTO: 0.04 X10(3) UL (ref 0–1)
IMM GRANULOCYTES NFR BLD: 0.5 %
LDLC SERPL CALC-MCNC: 61 MG/DL (ref ?–100)
LYMPHOCYTES # BLD AUTO: 1.65 X10(3) UL (ref 1–4)
LYMPHOCYTES NFR BLD AUTO: 20.2 %
MCH RBC QN AUTO: 28 PG (ref 26–34)
MCHC RBC AUTO-ENTMCNC: 32.4 G/DL (ref 31–37)
MCV RBC AUTO: 86.3 FL
MONOCYTES # BLD AUTO: 0.75 X10(3) UL (ref 0.1–1)
MONOCYTES NFR BLD AUTO: 9.2 %
NEUTROPHILS # BLD AUTO: 5.18 X10 (3) UL (ref 1.5–7.7)
NEUTROPHILS # BLD AUTO: 5.18 X10(3) UL (ref 1.5–7.7)
NEUTROPHILS NFR BLD AUTO: 63.5 %
NONHDLC SERPL-MCNC: 81 MG/DL (ref ?–130)
OSMOLALITY SERPL CALC.SUM OF ELEC: 294 MOSM/KG (ref 275–295)
PLATELET # BLD AUTO: 232 10(3)UL (ref 150–450)
POTASSIUM SERPL-SCNC: 3.2 MMOL/L (ref 3.5–5.1)
RBC # BLD AUTO: 5.4 X10(6)UL
SODIUM SERPL-SCNC: 139 MMOL/L (ref 136–145)
TRIGL SERPL-MCNC: 108 MG/DL (ref 30–149)
TSI SER-ACNC: 2.57 MIU/ML (ref 0.36–3.74)
VLDLC SERPL CALC-MCNC: 16 MG/DL (ref 0–30)
WBC # BLD AUTO: 8.2 X10(3) UL (ref 4–11)

## 2024-04-02 PROCEDURE — 85652 RBC SED RATE AUTOMATED: CPT

## 2024-04-02 PROCEDURE — 80061 LIPID PANEL: CPT

## 2024-04-02 PROCEDURE — 86140 C-REACTIVE PROTEIN: CPT

## 2024-04-02 PROCEDURE — 85025 COMPLETE CBC W/AUTO DIFF WBC: CPT

## 2024-04-02 PROCEDURE — 82040 ASSAY OF SERUM ALBUMIN: CPT

## 2024-04-02 PROCEDURE — 80048 BASIC METABOLIC PNL TOTAL CA: CPT

## 2024-04-02 PROCEDURE — 84460 ALANINE AMINO (ALT) (SGPT): CPT

## 2024-04-02 PROCEDURE — 84443 ASSAY THYROID STIM HORMONE: CPT

## 2024-04-02 PROCEDURE — 84450 TRANSFERASE (AST) (SGOT): CPT

## 2024-04-03 ENCOUNTER — TELEPHONE (OUTPATIENT)
Dept: INTERNAL MEDICINE CLINIC | Facility: CLINIC | Age: 74
End: 2024-04-03

## 2024-04-03 NOTE — TELEPHONE ENCOUNTER
Patient was called and instructed that he  will need to be repeated  the potassium.    Patient should get the hemoglobin A1C at the time of the blood draw for the potassium.    Patient will have this done on 4/8/2024. A day before darren Kelly.    Barbara Feldman, DNP

## 2024-04-03 NOTE — TELEPHONE ENCOUNTER
BRITT Hubbard--Patient wanted you to be aware that he was not fasting for blood work and had a pepsi and ate prior. Please advise if you would like him to proceed with the A1C. Thank you    Patient stated received a phone call from his pharmacy that a prescription for potassium was ready  Patient was unaware of this medication    Informed patient of results and instructions per BRITT Hubbard  Patient verbalized understanding   Patient had no further questions at this time      BRITT Hubbard  4/3/2024  8:31 AM CDT Back to Top      Please call patient.     His potassium is low.  I sent a script to your pharmacy to take potassium 20 meq (1) for five days.     High potassium foods like avocado, watermelon, cantaloupe, bananas, oranges, all yellow-red and orange vegetables have usually higher potassium level,     We will need to repeat your potassium on 4/8/2024. I will place an order for this blood draw.     Your sugar level is elevated to 127. It needs to be under 100.  I will add a hemoglobin A1C to your lab next week to see if you are diabetic.     Your lipid panel is perfect  Your thyroid is function well.  Have your blood drawn on 4/8/2024.        Barbara Feldman DNP  Working with

## 2024-04-05 ENCOUNTER — OFFICE VISIT (OUTPATIENT)
Dept: RHEUMATOLOGY | Facility: CLINIC | Age: 74
End: 2024-04-05

## 2024-04-05 VITALS
HEART RATE: 58 BPM | BODY MASS INDEX: 34.07 KG/M2 | HEIGHT: 69 IN | DIASTOLIC BLOOD PRESSURE: 77 MMHG | SYSTOLIC BLOOD PRESSURE: 127 MMHG | WEIGHT: 230 LBS

## 2024-04-05 DIAGNOSIS — M45.3 ANKYLOSING SPONDYLITIS OF CERVICOTHORACIC REGION (HCC): Primary | ICD-10-CM

## 2024-04-05 DIAGNOSIS — Z51.81 ENCOUNTER FOR THERAPEUTIC DRUG MONITORING: ICD-10-CM

## 2024-04-05 DIAGNOSIS — M25.511 CHRONIC PAIN OF BOTH SHOULDERS: ICD-10-CM

## 2024-04-05 DIAGNOSIS — G89.29 CHRONIC PAIN OF BOTH SHOULDERS: ICD-10-CM

## 2024-04-05 DIAGNOSIS — M25.512 CHRONIC PAIN OF BOTH SHOULDERS: ICD-10-CM

## 2024-04-05 PROCEDURE — 99214 OFFICE O/P EST MOD 30 MIN: CPT | Performed by: INTERNAL MEDICINE

## 2024-04-05 NOTE — PROGRESS NOTES
Moreno Khalil is a 73 year old male.    HPI:     Chief Complaint   Patient presents with    Ankylosing Spondylitis    Follow - Up     Lab results discussion        I had the pleasure of seeing Moreno Khalil on 4/5/2024 for follow up chronic neck, lower back and shoulder pain 2/2 Ankylosing spondylitis     Current medications:  Norco 10/325 1 pill 3-4 times a day- pain specialist prescribes it   Cimzia monthly in-office injections- started Dec 14, 2023  Blood work:  Neg RF, CCP, HLA-B27  ESR 59--> 34, CRP 5.5 mg/dL--> 2.92 mg/dL  Inflammation markers now normal     Interval History:  This is a 74 yo M with hx of HTN, HLD, Recurrent Prostate cancer s/p brachytherapy seed implants 2014 (no recurrence) presents with chronic joint pain.  He reports having chronic pain in his cervical neck and lower spine.  He follows with pain specialist Dr. Moses Rick and is on Norco 3 times a day.  It does keep some of his pain controlled.  He also has chronic bilateral knee pain and follows with the joint [place].  He was told that his knees are bone-on-bone.  He has had 3 hyaluronic acid injections in both knees and has 2 more left.  It is helped.  Continues to have chronic neck and lower back pain.  He also gets Botox injections in his neck region by neurology.  Reports pain in his hips, thighs and quadriceps.  At times it feels very stiff.  He has limited range of motion of his neck.  He also has chronic shoulder pain, hard to lift both shoulders.  He was given a Medrol Dosepak in the past which helped a lot of his pain.  Denies any history of psoriasis.    12/6/2023:  Presents for follow-up of chronic neck, lower back and shoulder pain  During his last visit blood work showed elevated formation markers.  He was started on a prednisone taper over 9 days which should help his symptoms significantly.  He had pain around his shoulders, thighs and neck which did help  Repeat inflammation markers did improve, still slightly  elevated  He also went to the ED recently for dizziness and neck pain and had a CT of the neck done.  CT cervical spine showed cervical thoracic spine ankylosis, scattered endplate osteophytes and syndesmophytes within the spine.  X-ray of the SI joint was also done which were normal  Blood work did show a negative HLA-B27    4/5/2024:  Months for follow-up of ankylosing spondylitis  He started Cimzia December 14, 2023, continues monthly Cimzia  Doing really well, less pain in the neck and thighs  Able to lift shoulder better  Inflammation markers also normal now  [facility] and gets gel injections in the right knee, he had a cortisone injection 2 weeks ago             HISTORY:  Past Medical History:   Diagnosis Date    Abnormal blood chemistry 12/09/2013    Acute dermatitis due to solar radiation 11/26/2012    Acute upper respiratory infection 01/22/2009    Appendicitis 1963    Back problem     Backache 07/05/2005    Benign neoplasm of skin 07/10/2008    Benign prostatic hypertrophy     Calcaneal spur 08/04/2011    Carpal tunnel syndrome     Cervical radicular pain 06/23/2015    Cough variant asthma (HCC) 03/02/2011    Disorder of kidney and ureter 09/08/2013    Disorder of rotator cuff syndrome of shoulder and allied disorder 06/13/2007    Dizziness 02/05/2022    Dysfunction of eustachian tube 01/22/2009    Dysplastic nevus 2017    left flank    Dyspnea and respiratory abnormality 04/01/2011    Elevated prostate specific antigen (PSA) 06/09/2008    Elevated PSA 2008    bx negative for cancer    Essential hypertension     Generalized pain 04/01/2011    H/O arthroscopy of knee bilateral    History of brachytherapy     Hyponatremia 02/05/2022    Intestinal infection 11/22/2006    Malaise and fatigue 06/03/2008    Neck pain     Nocturia 07/09/2008    Obesity, unspecified     Petechial hemorrhage 02/05/2022    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    Pressure ulcer, stage 1 12/28/2012    Prostate cancer  ([facility])     brachytherapy    Proteinuria 12/25/2011    PVD (peripheral vascular disease) ([facility])     Rotator cuff tear 2007    right    Rotator cuff tear     R - 2007    Sensorineural hearing loss 06/01/2009    Shoulder pain, left 11/28/2017    Sleep disturbance 06/03/2008    Tonsillitis 1960      Social Hx Reviewed   Family Hx Reviewed     Medications (Active prior to today's visit):  Current Outpatient Medications   Medication Sig Dispense Refill    Potassium Chloride ER 20 MEQ Oral Tab CR Take 20 mEq by mouth daily for 5 days. 5 tablet 0    [START ON 4/13/2024] HYDROcodone-acetaminophen  MG Oral Tab Take 1 tablet by mouth every 6 (six) hours as needed for Pain (MAX 4/DAY). 120 tablet 0    amLODIPine 5 MG Oral Tab Take 1 tablet (5 mg total) by mouth daily.      atorvastatin 40 MG Oral Tab Take 1 tablet (40 mg total) by mouth nightly. 90 tablet 1    losartan 100 MG Oral Tab Take 1 tablet (100 mg total) by mouth daily. 90 tablet 3    aspirin 81 MG Oral Chew Tab Chew 1 tablet (81 mg total) by mouth daily. 90 tablet 3    hydroCHLOROthiazide 25 MG Oral Tab       HYDROcodone-acetaminophen  MG Oral Tab Take 1 tablet by mouth every 6 (six) hours as needed for Pain (max 4/day). (Patient not taking: Reported on 4/5/2024) 120 tablet 0     .cmed  Allergies:  No Known Allergies      ROS:   All other ROS are negative.     PHYSICAL EXAM:   GEN: AAOx3, NAD  HEENT: EOMI, PERRLA, no injection or icterus, oral mucosa moist, no oral lesions. No lymphadenopathy. No facial rash  CVS: RRR, no murmurs rubs or gallops. Equal 2+ distal pulses.   LUNGS: CTAB, no increased work of breathing  ABDOMEN:  soft NT/ND, +BS, no HSM  SKIN: No rashes or skin lesions. No nail findings  MSK:  B/L shoulder abduction limited to 160 degrees  Occiput to wall distance greater than 5 cm  NEURO: Cranial nerves II-XII intact grossly. 5/5 strength throughout in both upper and lower extremities, sensation intact.  PSYCH: normal mood       LABS:      Component      Latest Ref Rng 11/14/2023 11/19/2023   C-REACTIVE PROTEIN      <0.30 mg/dL 5.50 (H)     C-Citrullinated Peptide IgG AB      0.0 - 6.9 U/mL 1.3     SED RATE      0 - 20 mm/Hr 59 (H)     RHEUMATOID FACTOR      <14 IU/mL <10     HLA-B27 Negative       Component      Latest Ref Rng 12/4/2023   C-REACTIVE PROTEIN      <0.30 mg/dL 2.92 (H)    C-Citrullinated Peptide IgG AB      0.0 - 6.9 U/mL    SED RATE      0 - 20 mm/Hr 34 (H)    RHEUMATOID FACTOR      <14 IU/mL    HLA-B27      Imaging:     CT cervical spine:  CONCLUSION: No acute osseous abnormality of the cervical spine.  Cervical-thoracic spine ankylosis is present.     ASSESSMENT/PLAN:     Ankylosing spondylitis- improved   - Found to have negative HLA-B27 but elevated formation markers, ESR 59 and CRP 5.5 mg/dL.  X-ray of the SI joint was normal but CT of the cervical spine showed evidence of cervical thoracic ankylosis and syndesmophytes  - He was placed on a 9-day taper of prednisone which helped his symptoms.  - He also received epidural injections in his neck and lower spine which have helped  - He is now on Cimzia in office injections monthly since December 2023, symptoms have improved.  Less pain in his neck, has more range of motion his shoulders and less pain in his thighs.  Overall feels better on Cimzia  - Also recent blood work shows normal inflammation markers  - He is also on Norco for chronic pain.  - Blood work every 6 mos     Bilateral knee pain due to osteoarthritis  - Following with joint [place] and gets hyaluronic acid injections    Pt will f/u in 4-5 mos     Sheron Dalton MD  4/5/2024  12:00 PM

## 2024-04-05 NOTE — PATIENT INSTRUCTIONS
You were seen today for ankylosing spondylitis  Joints, back and neck are doing better on Cimzia  You will continue Cimzia every month  Get blood work in September  See me in September

## 2024-04-08 ENCOUNTER — LAB ENCOUNTER (OUTPATIENT)
Dept: LAB | Age: 74
End: 2024-04-08
Attending: NURSE PRACTITIONER
Payer: MEDICARE

## 2024-04-08 DIAGNOSIS — R73.9 HYPERGLYCEMIA: ICD-10-CM

## 2024-04-08 DIAGNOSIS — E87.6 HYPOKALEMIA: ICD-10-CM

## 2024-04-08 LAB
EST. AVERAGE GLUCOSE BLD GHB EST-MCNC: 131 MG/DL (ref 68–126)
HBA1C MFR BLD: 6.2 % (ref ?–5.7)
POTASSIUM SERPL-SCNC: 3.8 MMOL/L (ref 3.5–5.1)

## 2024-04-08 PROCEDURE — 84132 ASSAY OF SERUM POTASSIUM: CPT

## 2024-04-08 PROCEDURE — 83036 HEMOGLOBIN GLYCOSYLATED A1C: CPT

## 2024-04-09 ENCOUNTER — OFFICE VISIT (OUTPATIENT)
Facility: CLINIC | Age: 74
End: 2024-04-09

## 2024-04-09 VITALS
DIASTOLIC BLOOD PRESSURE: 80 MMHG | OXYGEN SATURATION: 95 % | HEART RATE: 73 BPM | SYSTOLIC BLOOD PRESSURE: 130 MMHG | BODY MASS INDEX: 34.07 KG/M2 | WEIGHT: 230 LBS | HEIGHT: 69 IN

## 2024-04-09 DIAGNOSIS — G47.9 SLEEP DISTURBANCE: ICD-10-CM

## 2024-04-09 DIAGNOSIS — M47.812 CERVICAL SPONDYLOSIS: ICD-10-CM

## 2024-04-09 DIAGNOSIS — B07.9 VIRAL WARTS, UNSPECIFIED TYPE: ICD-10-CM

## 2024-04-09 DIAGNOSIS — I10 ESSENTIAL HYPERTENSION WITH GOAL BLOOD PRESSURE LESS THAN 140/90: ICD-10-CM

## 2024-04-09 DIAGNOSIS — G89.4 CHRONIC PAIN SYNDROME: ICD-10-CM

## 2024-04-09 DIAGNOSIS — N52.9 VASCULOGENIC ERECTILE DYSFUNCTION, UNSPECIFIED VASCULOGENIC ERECTILE DYSFUNCTION TYPE: ICD-10-CM

## 2024-04-09 DIAGNOSIS — E78.2 MIXED HYPERLIPIDEMIA: ICD-10-CM

## 2024-04-09 DIAGNOSIS — G56.00 CARPAL TUNNEL SYNDROME, UNSPECIFIED LATERALITY: ICD-10-CM

## 2024-04-09 DIAGNOSIS — M17.0 PRIMARY OSTEOARTHRITIS OF BOTH KNEES: ICD-10-CM

## 2024-04-09 DIAGNOSIS — K59.00 CONSTIPATION, UNSPECIFIED CONSTIPATION TYPE: ICD-10-CM

## 2024-04-09 DIAGNOSIS — Z85.46 HISTORY OF PROSTATE CANCER: ICD-10-CM

## 2024-04-09 DIAGNOSIS — E66.09 CLASS 1 OBESITY DUE TO EXCESS CALORIES WITH SERIOUS COMORBIDITY IN ADULT, UNSPECIFIED BMI: ICD-10-CM

## 2024-04-09 DIAGNOSIS — N40.0 HYPERPLASIA OF PROSTATE WITHOUT LOWER URINARY TRACT SYMPTOMS (LUTS): ICD-10-CM

## 2024-04-09 DIAGNOSIS — R91.1 SOLITARY PULMONARY NODULE: ICD-10-CM

## 2024-04-09 DIAGNOSIS — M45.0 ANKYLOSING SPONDYLITIS OF MULTIPLE SITES IN SPINE (HCC): Primary | ICD-10-CM

## 2024-04-09 DIAGNOSIS — H60.60 CHRONIC OTITIS EXTERNA, UNSPECIFIED LATERALITY, UNSPECIFIED TYPE: ICD-10-CM

## 2024-04-09 PROBLEM — R94.31 ABNORMAL ECG: Status: RESOLVED | Noted: 2023-01-23 | Resolved: 2024-04-09

## 2024-04-09 PROBLEM — H54.7 DECREASED VISUAL ACUITY: Status: RESOLVED | Noted: 2019-07-08 | Resolved: 2024-04-09

## 2024-04-09 PROBLEM — R94.31 ABNORMAL ECG: Status: ACTIVE | Noted: 2023-01-23

## 2024-04-09 PROBLEM — S16.1XXA NECK STRAIN: Status: RESOLVED | Noted: 2023-12-01 | Resolved: 2024-04-09

## 2024-04-09 PROCEDURE — G0439 PPPS, SUBSEQ VISIT: HCPCS | Performed by: INTERNAL MEDICINE

## 2024-04-09 PROCEDURE — 99213 OFFICE O/P EST LOW 20 MIN: CPT | Performed by: INTERNAL MEDICINE

## 2024-04-09 NOTE — PROGRESS NOTES
Subjective:   Moreno Khalil is a 73 year old male who presents for a Medicare Subsequent Annual Wellness visit (Pt already had Initial Annual Wellness) and scheduled follow up of multiple significant but stable problems and acute uncomplicated problem.   73-year-old gentleman here for Medicare annual wellness visit and also for concerns of constipation and skin wart.  He reports that his back pain is better.  Still gets neck discomfort with activity.  He follows up with rheumatology for his ankylosing spondylitis.  No depression reported.  No falls reported.  Discussed fall prevention.    History/Other:   Fall Risk Assessment:   He has been screened for Falls and is High Risk. Fall Prevention information provided to patient in After Visit Summary.    Do you worry about falling?: Yes  Have you fallen in the past year?: No     Cognitive Assessment:   He had a completely normal cognitive assessment - see flowsheet entries     Functional Ability/Status:   Moreno Khalil has some abnormal functions as listed below:  He has Hearing problems based on screening of functional status.He has Vision problems based on screening of functional status. He has Walking problems based on screening of functional status.       Depression Screening (PHQ-2/PHQ-9): PHQ-2 SCORE: 0  , done 4/9/2024   If you checked off any problems, how difficult have these problems made it for you to do your work, take care of things at home, or get along with other people?: Not difficult at all         <5 minutes spent screening and counseling for depression    Advanced Directives:   He does NOT have a Living Will. [Do you have a living will?:  (not sure)]  He does NOT have a Power of  for Health Care. [Do you have a healthcare power of ?:  (not sure)]  Discussed Advance Care Planning with patient (and family/surrogate if present). Standard forms made available to patient in After Visit Summary.      Patient Active Problem List    Diagnosis    Erectile dysfunction    Mixed hyperlipidemia    Essential hypertension with goal blood pressure less than 140/90    Class 1 obesity due to excess calories with serious comorbidity in adult    Chronic pain syndrome    Primary osteoarthritis of both knees    Carpal tunnel syndrome    Chronic otitis externa    Hyperplasia of prostate without lower urinary tract symptoms (LUTS)    Solitary pulmonary nodule    Sleep disturbance    Cervical spondylosis    Ankylosing spondylitis of multiple sites in spine (HCC)    History of prostate cancer     Allergies:  He has No Known Allergies.    Current Medications:  Outpatient Medications Marked as Taking for the 4/9/24 encounter (Office Visit) with Alex Hull MD   Medication Sig    [START ON 4/13/2024] HYDROcodone-acetaminophen  MG Oral Tab Take 1 tablet by mouth every 6 (six) hours as needed for Pain (MAX 4/DAY).    amLODIPine 5 MG Oral Tab Take 1 tablet (5 mg total) by mouth daily.    atorvastatin 40 MG Oral Tab Take 1 tablet (40 mg total) by mouth nightly.    losartan 100 MG Oral Tab Take 1 tablet (100 mg total) by mouth daily.    aspirin 81 MG Oral Chew Tab Chew 1 tablet (81 mg total) by mouth daily.    hydroCHLOROthiazide 25 MG Oral Tab      Current Facility-Administered Medications for the 4/9/24 encounter (Office Visit) with Alex Hull MD   Medication    Certolizumab Pegol  mg       Medical History:  He  has a past medical history of Abnormal blood chemistry (12/09/2013), Acute dermatitis due to solar radiation (11/26/2012), Acute upper respiratory infection (01/22/2009), Appendicitis (1963), Back problem, Backache (07/05/2005), Benign neoplasm of skin (07/10/2008), Benign prostatic hypertrophy, Calcaneal spur (08/04/2011), Carpal tunnel syndrome, Cervical radicular pain (06/23/2015), Cough variant asthma (HCC) (03/02/2011), Disorder of kidney and ureter (09/08/2013), Disorder of rotator cuff syndrome of shoulder and allied disorder  (06/13/2007), Dizziness (02/05/2022), Dysfunction of eustachian tube (01/22/2009), Dysplastic nevus (2017), Dyspnea and respiratory abnormality (04/01/2011), Elevated prostate specific antigen (PSA) (06/09/2008), Elevated PSA (2008), Essential hypertension, Generalized pain (04/01/2011), H/O arthroscopy of knee (bilateral), History of brachytherapy, Hyponatremia (02/05/2022), Intestinal infection (11/22/2006), Malaise and fatigue (06/03/2008), Neck pain, Nocturia (07/09/2008), Obesity, unspecified, Petechial hemorrhage (02/05/2022), Plantar fascial fibromatosis (08/04/2011), Pressure ulcer, stage 1 (12/28/2012), Prostate cancer (HCC), Proteinuria (12/25/2011), PVD (peripheral vascular disease) (HCC), Rotator cuff tear (2007), Rotator cuff tear, Sensorineural hearing loss (06/01/2009), Shoulder pain, left (11/28/2017), Sleep disturbance (06/03/2008), and Tonsillitis (1960).  Surgical History:  He  has a past surgical history that includes tonsillectomy; appendectomy (1963); knee arthroscopy; prostate biopsies (2013); and repair rotator cuff,acute (Right, 2007).   Family History:  His family history includes Diabetes in his mother and sister; Lipids in an other family member; Obesity in his sister.  Social History:  He  reports that he has never smoked. He has never been exposed to tobacco smoke. He has never used smokeless tobacco. He reports that he does not drink alcohol and does not use drugs.    Tobacco:  He has never smoked tobacco.    CAGE Alcohol Screen:   CAGE screening score of 0 on 4/9/2024, showing low risk of alcohol abuse.      Patient Care Team:  Alex Hull MD as PCP - General (Internal Medicine)  Moses Mclaughlin MD (Anesthesiology)  Giuseppe Camargo MD (Anesthesiology)    Review of Systems   Constitutional:  Negative for activity change, appetite change and fever.   HENT:  Negative for congestion and voice change.    Respiratory:  Negative for cough and shortness of breath.    Cardiovascular:   Negative for chest pain.   Gastrointestinal:  Positive for constipation. Negative for abdominal distention, abdominal pain and vomiting.   Genitourinary:  Negative for hematuria.   Skin:  Negative for wound.   Psychiatric/Behavioral:  Negative for behavioral problems.          Objective:   Physical Exam  Constitutional:       Appearance: Normal appearance.   HENT:      Head: Normocephalic.   Eyes:      Conjunctiva/sclera: Conjunctivae normal.   Cardiovascular:      Rate and Rhythm: Normal rate and regular rhythm.      Heart sounds: Normal heart sounds. No murmur heard.  Pulmonary:      Effort: Pulmonary effort is normal.      Breath sounds: Normal breath sounds. No rhonchi or rales.   Abdominal:      General: Bowel sounds are normal.      Palpations: Abdomen is soft.      Tenderness: There is no abdominal tenderness.   Musculoskeletal:      Cervical back: Neck supple.      Right lower leg: No edema.      Left lower leg: No edema.   Skin:     General: Skin is warm and dry.   Neurological:      General: No focal deficit present.      Mental Status: He is alert and oriented to person, place, and time. Mental status is at baseline.   Psychiatric:         Mood and Affect: Mood normal.         Behavior: Behavior normal.     Skin wart present in the abdomen.    /80   Pulse 73   Ht 5' 9\" (1.753 m)   Wt 230 lb (104.3 kg)   SpO2 95%   BMI 33.97 kg/m²  Estimated body mass index is 33.97 kg/m² as calculated from the following:    Height as of this encounter: 5' 9\" (1.753 m).    Weight as of this encounter: 230 lb (104.3 kg).    Medicare Hearing Assessment:   Hearing Screening    Screening Method: Questionnaire  I have a problem hearing over the telephone: Sometimes I have trouble following the conversations when two or more people are talking at the same time: Sometimes   I have trouble understanding things on the TV: Sometimes I have to strain to understand conversations: No   I have to worry about missing the  telephone ring or doorbell: No I have trouble hearing conversations in a noisy background such as a crowded room or restaurant: Yes   I get confused about where sounds come from: No I misunderstand some words in a sentence and need to ask people to repeat themselves: Yes   I especially have trouble understanding the speech of women and children: No I have trouble understanding the speaker in a large room such as at a meeting or place of Protestant: Sometimes   Many people I talk to seem to mumble (or don't speak clearly): Yes People get annoyed because I misunderstand what they say: Yes   I misunderstand what others are saying and make inappropriate responses: Sometimes I avoid social activities because I cannot hear well and fear I will reply improperly: No   Family members and friends have told me they think I may have hearing loss: Yes                   Assessment & Plan:   Moreno Khalil is a 73 year old male who presents for a Medicare Assessment.     1. Ankylosing spondylitis of multiple sites in spine (HCC) (Primary)-follows with rheumatology.  Overview:  On clifpangiea from Dr Dalton  2. Mixed hyperlipidemia continue statins  3. Essential hypertension with goal blood pressure less than 140/90 blood pressure is controlled  4. Solitary pulmonary nodule  Overview:  PET -ve 2022  5. Class 1 obesity due to excess calories with serious comorbidity in adult, unspecified BMI encourage weight loss  6. Chronic pain syndrome  Overview:  On norco - follows up in pain clinic  7. Carpal tunnel syndrome, unspecified laterality stable  8. Cervical spondylosis stable with on and off flareups  9. Primary osteoarthritis of both knees stable  10. Vasculogenic erectile dysfunction, unspecified vasculogenic erectile dysfunction type stable  11. Hyperplasia of prostate without lower urinary tract symptoms (LUTS) stable  12. History of prostate cancer  Overview:  Follows up with Dr AMES  13. Chronic otitis externa, unspecified laterality,  unspecified type stable with no recurrence  14. Sleep disturbance stable    Additional evaluation apart from Medicare annual wellness visit      15. Constipation, unspecified constipation type -TSH is normal.  High-fiber diet discussed.  If no improvement, will try stool softeners.  16. Viral warts, unspecified type -2 skin wart present in the abdomen.  He would like removed.  Referral given to dermatology.    The patient indicates understanding of these issues and agrees to the plan.  Reinforced healthy diet, lifestyle, and exercise.      No follow-ups on file.     Alex Hull MD, 4/9/2024     Supplementary Documentation:   General Health:  In the past six months, have you lost more than 10 pounds without trying?: 2 - No  Has your appetite been poor?: No  Type of Diet: Other  How does the patient maintain a good energy level?: Other  How would you describe your daily physical activity?: Light  How would you describe your current health state?:  (not sure)  How do you maintain positive mental well-being?:  (had no answer)  On a scale of 0 to 10, with 0 being no pain and 10 being severe pain, what is your pain level?: 4 - (Moderate)  In the past six months, have you experienced urine leakage?: 0-No  At any time do you feel concerned for the safety/well-being of yourself and/or your children, in your home or elsewhere?:  (not sure)        Moreno Khalil's SCREENING SCHEDULE   Tests on this list are recommended by your physician but may not be covered, or covered at this frequency, by your insurer.   Please check with your insurance carrier before scheduling to verify coverage.   PREVENTATIVE SERVICES FREQUENCY &  COVERAGE DETAILS LAST COMPLETION DATE   Diabetes Screening    Fasting Blood Sugar / Glucose    One screening every 12 months if never tested or if previously tested but not diagnosed with pre-diabetes   One screening every 6 months if diagnosed with pre-diabetes Lab Results   Component Value Date    GLU  127 (H) 04/02/2024        Cardiovascular Disease Screening    Lipid Panel  Cholesterol  Lipoprotein (HDL)  Triglycerides Covered every 5 years for all Medicare beneficiaries without apparent signs or symptoms of cardiovascular disease Lab Results   Component Value Date    CHOLEST 138 04/02/2024    HDL 57 04/02/2024    LDL 61 04/02/2024    TRIG 108 04/02/2024         Electrocardiogram (EKG)   Covered if needed at Welcome to Medicare, and non-screening if indicated for medical reasons 03/08/2024      Ultrasound Screening for Abdominal Aortic Aneurysm (AAA) Covered once in a lifetime for one of the following risk factors    Men who are 65-75 years old and have ever smoked    Anyone with a family history -     Colorectal Cancer Screening  Covered for ages 50-85; only need ONE of the following:    Colonoscopy   Covered every 10 years    Covered every 2 years if patient is at high risk or previous colonoscopy was abnormal 06/02/2016    Health Maintenance   Topic Date Due    Colorectal Cancer Screening  06/02/2026       Flexible Sigmoidoscopy   Covered every 4 years -    Fecal Occult Blood Test Covered annually -   Prostate Cancer Screening    Prostate-Specific Antigen (PSA) Annually Lab Results   Component Value Date    PSA 4.16 (H) 01/29/2024     Health Maintenance   Topic Date Due    PSA  01/29/2025      Immunizations    Influenza Covered once per flu season  Please get every year -  No recommendations at this time    Pneumococcal Each vaccine (Lzaglqe69 & Sxkvxifil67) covered once after 65 Prevnar 13: 07/09/2020    Krftqzwhh30: 05/10/2022     No recommendations at this time    Hepatitis B One screening covered for patients with certain risk factors   -  No recommendations at this time    Tetanus Toxoid Not covered by Medicare Part B unless medically necessary (cut with metal); may be covered with your pharmacy prescription benefits -    Tetanus, Diptheria and Pertusis TD and TDaP Not covered by Medicare Part B -  No  recommendations at this time    Zoster Not covered by Medicare Part B; may be covered with your pharmacy  prescription benefits -  Zoster Vaccines(1 of 2) Never done     Annual Monitoring of Persistent Medications (ACE/ARB, digoxin diuretics, anticonvulsants)    Potassium Annually Lab Results   Component Value Date    K 3.8 04/08/2024         Creatinine   Annually Lab Results   Component Value Date    CREATSERUM 1.30 04/02/2024         BUN Annually Lab Results   Component Value Date    BUN 24 (H) 04/02/2024       Drug Serum Conc Annually No results found for: \"DIGOXIN\", \"DIG\", \"VALP\"

## 2024-04-18 ENCOUNTER — NURSE ONLY (OUTPATIENT)
Dept: RHEUMATOLOGY | Facility: CLINIC | Age: 74
End: 2024-04-18

## 2024-04-18 DIAGNOSIS — M45.3 ANKYLOSING SPONDYLITIS OF CERVICOTHORACIC REGION (HCC): Primary | ICD-10-CM

## 2024-04-18 PROCEDURE — 96372 THER/PROPH/DIAG INJ SC/IM: CPT | Performed by: INTERNAL MEDICINE

## 2024-04-18 NOTE — PROGRESS NOTES
Name and  verified. Pt aware he was to receive injection of Cimza. Denies current illness or antibiotic use. Injections given in bilateral anterior thighs and pt tolerated well. Possible local side effects discussed with pt. Pt aware to follow up in 4 weeks for next injection.

## 2024-05-06 RX ORDER — ASPIRIN 81 MG/1
81 TABLET, CHEWABLE ORAL DAILY
Qty: 90 TABLET | Refills: 3 | Status: SHIPPED | OUTPATIENT
Start: 2024-05-06

## 2024-05-06 RX ORDER — ASPIRIN 81 MG/1
81 TABLET, CHEWABLE ORAL DAILY
Qty: 90 TABLET | Refills: 3 | OUTPATIENT
Start: 2024-05-06

## 2024-05-06 NOTE — TELEPHONE ENCOUNTER
REFILL PASSED PER [facility] PROTOCOLS    Requested Prescriptions   Pending Prescriptions Disp Refills    ASPIRIN 81 MG Oral Chew Tab [Pharmacy Med Name: ASPIRIN 81MG CHEWABLE TABLETS] 90 tablet 3     Sig: CHEW AND SWALLOW 1 TABLET(81 MG) BY MOUTH DAILY       Aspirin Protocol Passed - 5/3/2024  2:08 PM        Passed - In person appointment or virtual visit in the past 6 mos or appointment in next 3 mos     Recent Outpatient Visits              2 weeks ago Ankylosing spondylitis of cervicothoracic region ([facility])    [facility]    Nurse Only    3 weeks ago Ankylosing spondylitis of multiple sites in spine ([facility])    [facility] Alex Hull MD    Office Visit    1 month ago Ankylosing spondylitis of cervicothoracic region ([facility])    [facility] Sheron Dalton MD    Office Visit    1 month ago Ankylosing spondylitis of cervicothoracic region ([facility])    [facility]    Nurse Only    1 month ago Other chronic pain    [facility] Moses Mclaughlin MD    Office Visit          Future Appointments         Provider Department Appt Notes    In 3 days Chad Manuel MD [facility]     In 1 week Moses Mclaughlin MD [facility] med refill  11/15/18    In 1 week [facility] RN RHEUMATOLOGY [facility] Cimzia injections    In 2 months Devante Bianchi MD [facility] 6 month    In 3 months Alex Hull MD [facility]     In 4 months Sheron Dalton MD [facility] f/u                         Future Appointments         Provider Department Appt Notes    In 3 days Chad Manuel MD [place]  [facility] [place]     In 1 week Moses Mclaughlin MD [facility] med refill  11/15/18    In 1 week [facility] RN RHEUMATOLOGY [facility] Cimzia injections    In 2 months Devante Bianchi MD [facility] 6 month    In 3 months Alex Hull MD [facility]     In 4 months Sheron Dalton MD [facility] f/u          Recent Outpatient Visits              2 weeks ago Ankylosing spondylitis of cervicothoracic region ([facility])    [facility]    Nurse Only    3 weeks ago Ankylosing spondylitis of multiple sites in spine ([facility])    [facility] Alex Hull MD    Office Visit    1 month ago Ankylosing spondylitis of cervicothoracic region ([facility])    [facility] Sheron Dalton MD    Office Visit    1 month ago Ankylosing spondylitis of cervicothoracic region ([facility])    [facility]    Nurse Only    1 month ago Other chronic pain    [facility] Moses Mclaughlin MD    Office Visit

## 2024-05-09 ENCOUNTER — OFFICE VISIT (OUTPATIENT)
Dept: NEUROLOGY | Facility: CLINIC | Age: 74
End: 2024-05-09
Payer: MEDICARE

## 2024-05-09 DIAGNOSIS — G24.3 CERVICAL DYSTONIA: Primary | ICD-10-CM

## 2024-05-09 RX ORDER — ATORVASTATIN CALCIUM 40 MG/1
40 TABLET, FILM COATED ORAL NIGHTLY
Qty: 90 TABLET | Refills: 3 | Status: SHIPPED | OUTPATIENT
Start: 2024-05-09

## 2024-05-09 NOTE — PROGRESS NOTES
Paperwork noting that patient may bear financial responsibility for procedure(s) performed in clinic today signed prior to proceeding with procedure(s).    Furthermore, patient notified that they should contact their insurer to verify that your procedure/test has been approved and is a COVERED benefit.  Although the [facility] staff does its due diligence, the insurance carrier gives the disclaimer that \"Although the procedure is authorized, this does not guarantee payment.\"    Ultimately the patient is responsible for payment.    Botox is:  [x] Buy and Bill  [] Patient Supplied        [x] I have discussed with patient that if their insurance changes they must contact the office right away with that information so that a new prior authorization can be completed.  Patient verbalized understanding that Botox cannot be performed without a current prior authorization in place with correct insurance.

## 2024-05-09 NOTE — TELEPHONE ENCOUNTER
Please review.  Protocol failed / No protocol.    Requested Prescriptions   Pending Prescriptions Disp Refills    ATORVASTATIN 40 MG Oral Tab [Pharmacy Med Name: ATORVASTATIN 40MG TABLETS] 90 tablet 1     Sig: TAKE 1 TABLET(40 MG) BY MOUTH EVERY NIGHT       Cholesterol Medication Protocol Passed - 5/8/2024 10:32 AM        Passed - ALT < 80     Lab Results   Component Value Date    ALT 34 04/02/2024             Passed - ALT resulted within past year        Passed - Lipid panel within past 12 months     Lab Results   Component Value Date    CHOLEST 138 04/02/2024    TRIG 108 04/02/2024    HDL 57 04/02/2024    LDL 61 04/02/2024    VLDL 16 04/02/2024    NONHDLC 81 04/02/2024             Passed - In person appointment or virtual visit in the past 12 mos or appointment in next 3 mos     Recent Outpatient Visits              Today Cervical dystonia    [facility] Chad Manuel MD    Office Visit    3 weeks ago Ankylosing spondylitis of cervicothoracic region ([facility])    [facility]    Nurse Only    1 month ago Ankylosing spondylitis of multiple sites in spine ([facility])    [facility] Alex Hull MD    Office Visit    1 month ago Ankylosing spondylitis of cervicothoracic region ([facility])    [facility] Sheron Dalton MD    Office Visit    1 month ago Ankylosing spondylitis of cervicothoracic region ([facility])    [facility]    Nurse Only          Future Appointments         Provider Department Appt Notes    In 4 days Moses Mclaughlin MD [facility] med refill  11/15/18    In 1 week [facility] RN RHEUMATOLOGY [facility] Cimzia injections    In 2 months Devante Bianchi MD [facility] 6 month    In 3 months  Chad Manuel MD [facility] 3 month f/ up    In 3 months Alex Hull MD [facility]     In 4 months Sheron Dalton MD [facility] f/u                         Future Appointments         Provider Department Appt Notes    In 4 days Moses Mclaughlin MD [facility] med refill  11/15/18    In 1 week [facility] RN RHEUMATOLOGY [facility] Cimzia injections    In 2 months Devante Bianchi MD [facility] 6 month    In 3 months Chad Manuel MD [facility] 3 month f/ up    In 3 months Alex Hull MD [facility]     In 4 months Sheron Dalton MD [facility] f/u            Recent Outpatient Visits              Today Cervical dystonia    [facility] Chad Manuel MD    Office Visit    3 weeks ago Ankylosing spondylitis of cervicothoracic region ([facility])    [facility]    Nurse Only    1 month ago Ankylosing spondylitis of multiple sites in spine ([facility])    [facility] Alex Hull MD    Office Visit    1 month ago Ankylosing spondylitis of cervicothoracic region ([facility])    [facility] Sheron Dalton MD    Office Visit    1 month ago Ankylosing spondylitis of cervicothoracic region ([facility])    [facility]    Nurse Only

## 2024-05-10 ENCOUNTER — MED REC SCAN ONLY (OUTPATIENT)
Dept: NEUROLOGY | Facility: CLINIC | Age: 74
End: 2024-05-10

## 2024-05-13 ENCOUNTER — OFFICE VISIT (OUTPATIENT)
Dept: PAIN CLINIC | Facility: HOSPITAL | Age: 74
End: 2024-05-13
Attending: ANESTHESIOLOGY
Payer: MEDICARE

## 2024-05-13 VITALS
SYSTOLIC BLOOD PRESSURE: 115 MMHG | HEART RATE: 56 BPM | BODY MASS INDEX: 32 KG/M2 | DIASTOLIC BLOOD PRESSURE: 71 MMHG | WEIGHT: 220 LBS | OXYGEN SATURATION: 95 %

## 2024-05-13 DIAGNOSIS — M45.2 ANKYLOSING SPONDYLITIS OF CERVICAL REGION (HCC): Primary | ICD-10-CM

## 2024-05-13 DIAGNOSIS — G89.29 BILATERAL CHRONIC KNEE PAIN: ICD-10-CM

## 2024-05-13 DIAGNOSIS — G89.29 OTHER CHRONIC PAIN: ICD-10-CM

## 2024-05-13 DIAGNOSIS — M54.50 LOW BACK PAIN AT MULTIPLE SITES: ICD-10-CM

## 2024-05-13 DIAGNOSIS — M25.562 BILATERAL CHRONIC KNEE PAIN: ICD-10-CM

## 2024-05-13 DIAGNOSIS — M25.561 BILATERAL CHRONIC KNEE PAIN: ICD-10-CM

## 2024-05-13 PROCEDURE — 99211 OFF/OP EST MAY X REQ PHY/QHP: CPT

## 2024-05-13 RX ORDER — HYDROCODONE BITARTRATE AND ACETAMINOPHEN 10; 325 MG/1; MG/1
1 TABLET ORAL EVERY 6 HOURS PRN
Qty: 120 TABLET | Refills: 0 | Status: SHIPPED | OUTPATIENT
Start: 2024-06-14 | End: 2024-07-14

## 2024-05-13 RX ORDER — HYDROCODONE BITARTRATE AND ACETAMINOPHEN 10; 325 MG/1; MG/1
1 TABLET ORAL EVERY 6 HOURS PRN
Qty: 120 TABLET | Refills: 0 | Status: SHIPPED | OUTPATIENT
Start: 2024-05-15 | End: 2024-06-14

## 2024-05-13 NOTE — CHRONIC PAIN
Initial Consultation Note      HISTORY OF PRESENT ILLNESS:  Moreno Khalil is a 70 year old old male referred to the pain clinic  for evaluation treatment of his low back and cervical neck pain .   he continues to take 4 Norco a day for pain control Moreno also has bilateral knee arthritis in need of replacement but is hold off on as long as he can he takes 4 Norco a day for pain control no side effects still decreases pain by 50 to 60% throughout the day he is walking a little bit slower these days and little more kyphotic in nature.  Moreno had a cervical epidural steroid injection as well as lumbar injections in the past was pretty good results.  The medication as well as intermittent and injections have helped decrease his pain allows him to be more functionable.    Since his last visit he was started on an immunologic medications for his ankylosing spondylitis as well as Botox injection by the neurologist which has helped  PAIN COURSE AND PREVIOUS INTERVENTIONS:    Interventions: Epidural injection  Adjuvants: Small amounts Norco every day    BLOOD THINNING MEDICATIONS:  None    CURRENT MEDICATIONS:  Current Outpatient Medications   Medication Sig Dispense Refill    [START ON 11/8/2020] HYDROcodone-acetaminophen  MG Oral Tab Take 1 tablet by mouth every 8 (eight) hours as needed for Pain (max 3/day). 90 tablet 0    [START ON 10/9/2020] HYDROcodone-acetaminophen  MG Oral Tab Take 1 tablet by mouth every 8 (eight) hours as needed for Pain (MAX 3/DAY). 90 tablet 0    HYDROCHLOROTHIAZIDE 25 MG Oral Tab Take 1 tablet by mouth once daily 90 tablet 0    metoprolol Tartrate 25 MG Oral Tab Take 1 tablet (25 mg total) by mouth 2 (two) times daily. 180 tablet 1    simvastatin 20 MG Oral Tab Take 1 tablet (20 mg total) by mouth every evening. 90 tablet 1    ibuprofen 600 MG Oral Tab TAKE 1 TABLET EVERY 12 HOURS AS NEEDED FOR PAIN (SUBSTITUTED FOR MOTRIN) 180 tablet 0    Tadalafil 20 MG Oral Tab Take 1  tablet (20 mg total) by mouth daily as needed for Erectile Dysfunction. 10 tablet 11        ALLERGIES:  No Known Allergies    SURGICAL HISTORY:  Past Surgical History:   Procedure Laterality Date    APPENDECTOMY  1963    KNEE ARTHROSCOPY      per NG: bilat knees 2x each; arthroscopy - brash    PROSTATE BIOPSIES  2013    REPAIR ROTATOR CUFF,ACUTE Right 2007    TONSILLECTOMY         REVIEW OF SYSTEMS:   A comprehensive review of systems was negative except for:   MEDICAL HISTORY:  Patient Active Problem List:     Prostate cancer (HCC)     Erectile dysfunction     Cervical radicular pain     Mixed hyperlipidemia     Essential hypertension with goal blood pressure less than 140/90     Class 1 obesity due to excess calories with serious comorbidity in adult     Physical exam, annual     Decreased visual acuity    Past Medical History:   Diagnosis Date    Appendicitis 1963    Back problem     Benign prostatic hypertrophy     Carpal tunnel syndrome     Dysplastic nevus 2017    left flank    Elevated PSA 2008    bx negative for cancer    H/O arthroscopy of knee bilateral    History of brachytherapy     Neck pain     Obesity, unspecified     Prostate cancer (HCC)     brachytherapy    PVD (peripheral vascular disease) ([facility])     Rotator cuff tear 2007    right    Rotator cuff tear     R - 2007    Tonsillitis 1960       FAMILY HISTORY:  Family History   Problem Relation Age of Onset    Diabetes Mother     Diabetes Sister     Obesity Sister     Lipids Other         family h/o hyperlipidemia and vascular disease       SOCIAL HISTORY:  Social History    Socioeconomic History      Marital status:       Spouse name: Not on file      Number of children: 2      Years of education: Not on file      Highest education level: Not on file    Occupational History      Occupation: ordering        Comment: retired    Social Needs      Financial resource strain: Not on file      Food insecurity        Worry: Not on file         Inability: Not on file      Transportation needs        Medical: Not on file        Non-medical: Not on file    Tobacco Use      Smoking status: Never Smoker      Smokeless tobacco: Never Used    Substance and Sexual Activity      Alcohol use: No        Alcohol/week: 0.0 standard drinks      Drug use: No      Sexual activity: Not on file    Lifestyle      Physical activity        Days per week: Not on file        Minutes per session: Not on file      Stress: Not on file    Relationships      Social connections        Talks on phone: Not on file        Gets together: Not on file        Attends Bahai service: Not on file        Active member of club or organization: Not on file        Attends meetings of clubs or organizations: Not on file        Relationship status: Not on file      Intimate partner violence        Fear of current or ex partner: Not on file        Emotionally abused: Not on file        Physically abused: Not on file        Forced sexual activity: Not on file    Other Topics      Concerns:         Service: Not Asked        Blood Transfusions: Not Asked        Caffeine Concern: Yes          soda, 1 liter/day        Occupational Exposure: Not Asked        Hobby Hazards: Not Asked        Sleep Concern: Not Asked        Stress Concern: Not Asked        Weight Concern: Not Asked        Special Diet: Not Asked        Back Care: Not Asked        Exercise: Not Asked        Bike Helmet: Not Asked        Seat Belt: Not Asked        Self-Exams: Not Asked        Grew up on a farm: Not Asked        History of tanning: Not Asked        Outdoor occupation: Not Asked        Pt has a pacemaker: No        Pt has a defibrillator: No        Reaction to local anesthetic: No    Social History Narrative      Not on file      ADVANCE CARE PLANNING:  Advance Care Plan NOT discussed.    PHYSICAL EXAMINATION:  There were no vitals filed for this visit.  General: Alert and oriented x3  Affect:  NAD  Head:  normocephalic, atraumatic  Eyes: anicteric; no injection  Chest: S1, S2, RRR  Respiratory: CTAB  Gait: Normal; cane user - No  Spine: Normal    ROM:   Lumbar spine  Flexion  dec  Extension dec  Cervical Spine  Flexion dec  Extensiondec  MOTOR EXAMINATION:  UPPER EXTREMITY      LEFT RIGHT   Deltoid 5/5 5/5   Biceps 5/5 5/5   Triceps 5/5 5/5   Brachioradialis 5/5 5/5   Wrist Flexors 5/5 5/5   Wrist Extensors 5/5 5/5   Intrinsic Hand 5/5 5/5    5/5 5/5     LOWER EXTREMITY      LEFT RIGHT   Iliopsoas 5/5 5/5   Quadriceps 5/5 5/5   Foot DF 5/5 5/5   Foot EHL 5/5 5/5   Gastrocnemius 5/5 5/5     PULSES      LEFT RIGHT   Radial 2/4 2/4   Dorsalis Pedis 2/4 2/4   Posterior Tibial 2/4 2/4   Brachial 2/4 2/4     SLR: negative  SIJ tenderness negative  Joint Exam: Normal  TPs:  Present within lumbo-sacral paraspinal muscles  Left occipital region is very tender to palpation can replicates the pain that he gets in the back of his head  Right Deep tendon reflexes: Normal   Left Deep tendon reflexes: Normal   Babinski Reflex: absent bilaterally   Temperature:  normal to touch bilateral upper and lower extremities  Edema - Absent  Stasis ulcer on the left leg is healed    Sensation (light touch/pinprick/temperature):     Right Lower Extremity:  Normal  Left Lower Extremity: Normal  Right Upper Extremity:  Normal  Left Upper Extremity:  Normal    IMAGING:  No results found.    LABS:  Lab Results   Component Value Date    WBC 10.3 07/06/2020    RBC 5.45 07/06/2020    HGB 14.1 07/06/2020    HCT 45.2 07/06/2020    MCV 82.9 07/06/2020    MCH 25.9 (L) 07/06/2020    MCHC 31.2 07/06/2020    RDW 19.0 (H) 07/06/2020    .0 07/06/2020    MPV 8.1 06/28/2018     Lab Results   Component Value Date     07/06/2020    K 3.8 07/06/2020     07/06/2020    CO2 29.0 07/06/2020    BUN 25 (H) 07/06/2020    GLU 92 07/06/2020    CA 8.8 07/06/2020     No results found for: PT    [facility] [facility] MONITORING PROGRAM  REVIEWED  Yes      ASSESSMENT:   70 year old old male with cervical and lumbar spinal stenosis   Has responded in the past epidural injections cervical and lumbar  Started on the immunologic for his ankylosing spondylitis which has helped his pain  Low back pain bilateral radicular pain  Chronic use  of Norco a day for pain control   increasing neck pain radiating down both arms   bilateral knee pain the care of Dr. Singh for that DJD bilateral knee  Recent injections for the knees  I      PLAN:  RECOMMENDATIONS:  4 Norco a day    Continue with rheumatology and orthopedic care  No need for injection therapy at this point      Epidurals for cervical is still benefiting him no need for repeat as and at this time  Bilateral knee pain he is to see orthopedic surgeon status post Synvisc injections which is helped also trying hyaluronic acid injections currently         r    Cardiac eval in process

## 2024-05-13 NOTE — PROGRESS NOTES
Patient presents in office today with reported pain in neck and lower back and bilateral knee    Current pain level reported = 3-4/10    Last reported dose of Norco this morning       Narcotic Contract renewal 07.26.24    Urine Drug screen 07.26.23

## 2024-05-13 NOTE — PATIENT INSTRUCTIONS
Refill policies:    Allow 2-3 business days for refills; controlled substances may take longer.  Contact your pharmacy at least 5 days prior to running out of medication and have them send an electronic request or submit request through the “request refill” option in your [employer] account.  Refills are not addressed on weekends; covering physicians do not authorize routine medications on weekends.  No narcotics or controlled substances are refilled after noon on Fridays or by on call physicians.  By law, narcotics must be electronically prescribed.  A 30 day supply with no refills is the maximum allowed.  If your prescription is due for a refill, you may be due for a follow up appointment.  To best provide you care, patients receiving routine medications need to be seen at least once a year.  Patients receiving narcotic/controlled substance medications need to be seen at least once every 3 months.  In the event that your preferred pharmacy does not have the requested medication in stock (e.g. Backordered), it is your responsibility to find another pharmacy that has the requested medication available.  We will gladly send a new prescription to that pharmacy at your request.    Scheduling Tests:    If your physician has ordered radiology tests such as MRI or CT scans, please contact Central Scheduling at [phone] right away to schedule the test.  Once scheduled, the [facility] Centralized Referral Team will work with your insurance carrier to obtain pre-certification or prior authorization.  Depending on your insurance carrier, approval may take 3-10 days.  It is highly recommended patients assure they have received an authorization before having a test performed.  If test is done without insurance authorization, patient may be responsible for the entire amount billed.      Precertification and Prior Authorizations:  If your physician has recommended that you have a procedure or additional testing performed the [facility]  Centralized Referral Team will contact your insurance carrier to obtain pre-certification or prior authorization.    You are strongly encouraged to contact your insurance carrier to verify that your procedure/test has been approved and is a COVERED benefit.  Although the [facility] Centralized Referral Team does its due diligence, the insurance carrier gives the disclaimer that \"Although the procedure is authorized, this does not guarantee payment.\"    Ultimately the patient is responsible for payment.   Thank you for your understanding in this matter.  Paperwork Completion:  If you require FMLA or disability paperwork for your recovery, please make sure to either drop it off or have it faxed to our office at [phone]. Be sure the form has your name and date of birth on it.  The form will be faxed to our Forms Department and they will complete it for you.  There is a 25$ fee for all forms that need to be filled out.  Please be aware there is a 10-14 day turnaround time.  You will need to sign a release of information (SYL) form if your paperwork does not come with one.  You may call the Forms Department with any questions at [phone].  Their fax number is [phone].

## 2024-05-16 ENCOUNTER — NURSE ONLY (OUTPATIENT)
Dept: RHEUMATOLOGY | Facility: CLINIC | Age: 74
End: 2024-05-16

## 2024-05-16 DIAGNOSIS — M45.3 ANKYLOSING SPONDYLITIS OF CERVICOTHORACIC REGION (HCC): Primary | ICD-10-CM

## 2024-05-16 PROCEDURE — 96372 THER/PROPH/DIAG INJ SC/IM: CPT | Performed by: INTERNAL MEDICINE

## 2024-05-16 NOTE — PROGRESS NOTES
Name and  verified. Pt aware he was to receive injection of Cimza; denies current illness or antibiotic use. Injections given in bilateral anterior thighs and pt tolerated well. Possible local side effects discussed with pt. Pt aware to follow up in 4 weeks for next injection.

## 2024-05-23 ENCOUNTER — TELEPHONE (OUTPATIENT)
Dept: INTERNAL MEDICINE CLINIC | Facility: CLINIC | Age: 74
End: 2024-05-23

## 2024-05-23 DIAGNOSIS — I10 ESSENTIAL HYPERTENSION WITH GOAL BLOOD PRESSURE LESS THAN 140/90: Primary | ICD-10-CM

## 2024-05-23 RX ORDER — AMLODIPINE BESYLATE 5 MG/1
5 TABLET ORAL 2 TIMES DAILY
Qty: 180 TABLET | Refills: 3 | Status: SHIPPED | OUTPATIENT
Start: 2024-05-23

## 2024-05-23 NOTE — TELEPHONE ENCOUNTER
Patient is requesting a refill for Amlodipine 5 mgs to be take twice daily. Per his chart it's listed as external. Patient advised it was last sent by Dr. Aguilera, Cardiology on 11/15/2023. Per patient \"he was told by Dr. ROBB Hull that if his blood pressure is running low he should only take one daily.\"  I reviewed the chart and didn't see any notes regarding this. \"He does have a blood pressure cuff at home and he advised last one was about 115/72.\"      Please advise    Medication is pended    Please review; protocol failed. (Due to GFR)  Hypertensive Medications  Protocol Criteria:  Appointment scheduled in the past 6 months or in the next 3 months  BMP or CMP in the past 12 months  Creatinine result < 2  Recent Outpatient Visits              1 week ago Ankylosing spondylitis of cervicothoracic region ([facility])    [facility]    Nurse Only    1 week ago Ankylosing spondylitis of cervical region ([facility])    [facility] Moses Mclaughlin MD    Office Visit    2 weeks ago Cervical dystonia    [facility] Chad Manuel MD    Office Visit    1 month ago Ankylosing spondylitis of cervicothoracic region ([facility])    [facility]    Nurse Only    1 month ago Ankylosing spondylitis of multiple sites in spine ([facility])    [facility] Alex Hull MD    Office Visit          Future Appointments         Provider Department Appt Notes    In 3 weeks Pending sale to [facility] RN RHEUMATOLOGY [facility] Cimzia injections    In 2 months Devante Bianchi MD [facility] 6 month    In 2 months Chad Manuel MD [facility] Insurance: Active Medicare Part B Member ID# [id number]  Medication: Botox 200 units  Number of visits  Approved: 1 (will need to confirm active insurance prior to appt as no auth is required when being performed in office based on medical necessity however insurance is verified on month to month basis)   Dx: cervical dystonia   Last Botox done: 5/9/24  Next Botox due around: 8/9/24  Authorization #n/a  Valid n/a  BU[employer]BILL    In 2 months Alex Hull MD [facility]     In 3 months Sheron Dalton MD [facility] f/u          Lab Results   Component Value Date     (H) 04/02/2024    BUN 24 (H) 04/02/2024    CREATSERUM 1.30 04/02/2024    BUNCREA 16.4 03/07/2024    GFRNAA 66 06/08/2022    GFRAA 76 06/08/2022    CA 9.9 04/02/2024    ALKPHOS 110 (H) 02/15/2016    AST 21 04/02/2024    ALT 34 04/02/2024    BILT 0.8 03/07/2024    TP 7.2 03/07/2024    ALB 3.8 04/02/2024     04/02/2024    K 3.8 04/08/2024     04/02/2024    CO2 27.0 04/02/2024    GLOBULIN 2.8 03/07/2024    AGRATIO 0.8 (L) 02/15/2016    ANIONGAP 8 04/02/2024    OSMOCALC 294 04/02/2024

## 2024-05-23 NOTE — TELEPHONE ENCOUNTER
Patient called this morning discussing blood pressure medication (see refill encounter). He was inquiring about what to do for issues with constipation. Per his last visit with pcp on 4/9/24, he was advised to eat more fiber and if that does not help to take stool softener's. I did advise the same, discussed prunes, possible Milk of Magnesia over the counter. Per patient he know's he won't take stool softener's. He also mentioned possibly having another colonoscopy however, his next one is due 2026.

## 2024-05-24 NOTE — TELEPHONE ENCOUNTER
Patient called back on this and nurse confirmed that medication was sent yesterday.  No further action.

## 2024-06-17 ENCOUNTER — NURSE ONLY (OUTPATIENT)
Dept: RHEUMATOLOGY | Facility: CLINIC | Age: 74
End: 2024-06-17

## 2024-06-17 DIAGNOSIS — M45.3 ANKYLOSING SPONDYLITIS OF CERVICOTHORACIC REGION (HCC): Primary | ICD-10-CM

## 2024-06-17 PROCEDURE — 96372 THER/PROPH/DIAG INJ SC/IM: CPT | Performed by: INTERNAL MEDICINE

## 2024-06-17 NOTE — PROGRESS NOTES
Name and  verified. Pt aware he was to receive injection of Cimza. Injections given B/L  Anterior Thigh and pt tolerated well. Possible local side effects discussed with pt. Pt aware to follow up in 4 weeks for next injection and to follow up with provider in 3 month after labs completed.        Future Appointments   Date Time Provider Department Center   2024 10:00 AM Moses Mclaughlin MD [facility] PAIN [facility]   7/15/2024 10:00 AM [facility]   2024 10:30 AM Devante Bianchi MD [facility]   2024 11:00 AM [facility]   2024 10:30 AM Alex Hull MD [facility]   2024 11:40 AM Sheron Dalton MD [facility]

## 2024-07-02 ENCOUNTER — OFFICE VISIT (OUTPATIENT)
Dept: PAIN CLINIC | Facility: HOSPITAL | Age: 74
End: 2024-07-02
Attending: ANESTHESIOLOGY
Payer: MEDICARE

## 2024-07-02 VITALS
SYSTOLIC BLOOD PRESSURE: 120 MMHG | OXYGEN SATURATION: 96 % | HEART RATE: 63 BPM | DIASTOLIC BLOOD PRESSURE: 73 MMHG | BODY MASS INDEX: 34 KG/M2 | WEIGHT: 230 LBS

## 2024-07-02 DIAGNOSIS — M25.561 BILATERAL CHRONIC KNEE PAIN: ICD-10-CM

## 2024-07-02 DIAGNOSIS — M54.50 LOW BACK PAIN AT MULTIPLE SITES: ICD-10-CM

## 2024-07-02 DIAGNOSIS — G89.29 BILATERAL CHRONIC KNEE PAIN: ICD-10-CM

## 2024-07-02 DIAGNOSIS — M25.562 BILATERAL CHRONIC KNEE PAIN: ICD-10-CM

## 2024-07-02 DIAGNOSIS — G89.29 OTHER CHRONIC PAIN: ICD-10-CM

## 2024-07-02 PROCEDURE — 99211 OFF/OP EST MAY X REQ PHY/QHP: CPT

## 2024-07-02 RX ORDER — HYDROCODONE BITARTRATE AND ACETAMINOPHEN 10; 325 MG/1; MG/1
1 TABLET ORAL EVERY 6 HOURS PRN
Qty: 120 TABLET | Refills: 0 | Status: SHIPPED | OUTPATIENT
Start: 2024-08-13 | End: 2024-09-12

## 2024-07-02 RX ORDER — HYDROCODONE BITARTRATE AND ACETAMINOPHEN 10; 325 MG/1; MG/1
1 TABLET ORAL EVERY 6 HOURS PRN
Qty: 120 TABLET | Refills: 0 | Status: SHIPPED | OUTPATIENT
Start: 2024-07-14 | End: 2024-08-13

## 2024-07-02 NOTE — PATIENT INSTRUCTIONS
Refill policies:    Allow 2-3 business days for refills; controlled substances may take longer.  Contact your pharmacy at least 5 days prior to running out of medication and have them send an electronic request or submit request through the “request refill” option in your [employer] account.  Refills are not addressed on weekends; covering physicians do not authorize routine medications on weekends.  No narcotics or controlled substances are refilled after noon on Fridays or by on call physicians.  By law, narcotics must be electronically prescribed.  A 30 day supply with no refills is the maximum allowed.  If your prescription is due for a refill, you may be due for a follow up appointment.  To best provide you care, patients receiving routine medications need to be seen at least once a year.  Patients receiving narcotic/controlled substance medications need to be seen at least once every 3 months.  In the event that your preferred pharmacy does not have the requested medication in stock (e.g. Backordered), it is your responsibility to find another pharmacy that has the requested medication available.  We will gladly send a new prescription to that pharmacy at your request.    Scheduling Tests:    If your physician has ordered radiology tests such as MRI or CT scans, please contact Central Scheduling at [phone] right away to schedule the test.  Once scheduled, the [facility] Centralized Referral Team will work with your insurance carrier to obtain pre-certification or prior authorization.  Depending on your insurance carrier, approval may take 3-10 days.  It is highly recommended patients assure they have received an authorization before having a test performed.  If test is done without insurance authorization, patient may be responsible for the entire amount billed.      Precertification and Prior Authorizations:  If your physician has recommended that you have a procedure or additional testing performed the [facility]  Centralized Referral Team will contact your insurance carrier to obtain pre-certification or prior authorization.    You are strongly encouraged to contact your insurance carrier to verify that your procedure/test has been approved and is a COVERED benefit.  Although the [facility] Centralized Referral Team does its due diligence, the insurance carrier gives the disclaimer that \"Although the procedure is authorized, this does not guarantee payment.\"    Ultimately the patient is responsible for payment.   Thank you for your understanding in this matter.  Paperwork Completion:  If you require FMLA or disability paperwork for your recovery, please make sure to either drop it off or have it faxed to our office at [phone]. Be sure the form has your name and date of birth on it.  The form will be faxed to our Forms Department and they will complete it for you.  There is a 25$ fee for all forms that need to be filled out.  Please be aware there is a 10-14 day turnaround time.  You will need to sign a release of information (SYL) form if your paperwork does not come with one.  You may call the Forms Department with any questions at [phone].  Their fax number is [phone].

## 2024-07-02 NOTE — PROGRESS NOTES
Patient presents in office today with reported pain in neck and lower back and bilateral knee     Current pain level reported = 3-4/10     Last reported dose of Norco 4am this morning        Narcotic Contract renewal 07.26.24     Urine Drug screen 07.26.23

## 2024-07-02 NOTE — CHRONIC PAIN
Initial Consultation Note      HISTORY OF PRESENT ILLNESS:  Moreno Khalil is a 70 year old old male referred to the pain clinic  for evaluation treatment of his low back and cervical neck pain .  Patient has ankylosing spondylitis along with degenerative arthritis of his knees he continues to take 4 Norco a day for pain control Moreno also has bilateral knee arthritis in need of replacement but is hold off on as long as he can he takes 4 Norco a day for pain control no side effects still decreases pain by 50 to 60% throughout the day he is walking a little bit slower these days and little more kyphotic in nature.  Moreno had a cervical epidural steroid injection as well as lumbar injections in the past was pretty good results.  The medication as well as intermittent and injections have helped decrease his pain allows him to be more functionable.    Since his last visit he was started on an immunologic medications for his ankylosing spondylitis as well as Botox injection by the neurologist which has helped  PAIN COURSE AND PREVIOUS INTERVENTIONS:    Interventions: Epidural injection  Adjuvants: Small amounts Norco every day    BLOOD THINNING MEDICATIONS:  None    CURRENT MEDICATIONS:  Current Outpatient Medications   Medication Sig Dispense Refill    [START ON 11/8/2020] HYDROcodone-acetaminophen  MG Oral Tab Take 1 tablet by mouth every 8 (eight) hours as needed for Pain (max 3/day). 90 tablet 0    [START ON 10/9/2020] HYDROcodone-acetaminophen  MG Oral Tab Take 1 tablet by mouth every 8 (eight) hours as needed for Pain (MAX 3/DAY). 90 tablet 0    HYDROCHLOROTHIAZIDE 25 MG Oral Tab Take 1 tablet by mouth once daily 90 tablet 0    metoprolol Tartrate 25 MG Oral Tab Take 1 tablet (25 mg total) by mouth 2 (two) times daily. 180 tablet 1    simvastatin 20 MG Oral Tab Take 1 tablet (20 mg total) by mouth every evening. 90 tablet 1    ibuprofen 600 MG Oral Tab TAKE 1 TABLET EVERY 12 HOURS AS NEEDED FOR  PAIN (SUBSTITUTED FOR MOTRIN) 180 tablet 0    Tadalafil 20 MG Oral Tab Take 1 tablet (20 mg total) by mouth daily as needed for Erectile Dysfunction. 10 tablet 11        ALLERGIES:  No Known Allergies    SURGICAL HISTORY:  Past Surgical History:   Procedure Laterality Date    APPENDECTOMY  1963    KNEE ARTHROSCOPY      per NG: bilat knees 2x each; arthroscopy - brash    PROSTATE BIOPSIES  2013    REPAIR ROTATOR CUFF,ACUTE Right 2007    TONSILLECTOMY         REVIEW OF SYSTEMS:   A comprehensive review of systems was negative except for:   MEDICAL HISTORY:  Patient Active Problem List:     Prostate cancer (HCC)     Erectile dysfunction     Cervical radicular pain     Mixed hyperlipidemia     Essential hypertension with goal blood pressure less than 140/90     Class 1 obesity due to excess calories with serious comorbidity in adult     Physical exam, annual     Decreased visual acuity    Past Medical History:   Diagnosis Date    Appendicitis 1963    Back problem     Benign prostatic hypertrophy     Carpal tunnel syndrome     Dysplastic nevus 2017    left flank    Elevated PSA 2008    bx negative for cancer    H/O arthroscopy of knee bilateral    History of brachytherapy     Neck pain     Obesity, unspecified     Prostate cancer (HCC)     brachytherapy    PVD (peripheral vascular disease) ([facility])     Rotator cuff tear 2007    right    Rotator cuff tear     R - 2007    Tonsillitis 1960       FAMILY HISTORY:  Family History   Problem Relation Age of Onset    Diabetes Mother     Diabetes Sister     Obesity Sister     Lipids Other         family h/o hyperlipidemia and vascular disease       SOCIAL HISTORY:  Social History    Socioeconomic History      Marital status:       Spouse name: Not on file      Number of children: 2      Years of education: Not on file      Highest education level: Not on file    Occupational History      Occupation: ordering        Comment: retired    Social Needs      Financial resource  strain: Not on file      Food insecurity        Worry: Not on file        Inability: Not on file      Transportation needs        Medical: Not on file        Non-medical: Not on file    Tobacco Use      Smoking status: Never Smoker      Smokeless tobacco: Never Used    Substance and Sexual Activity      Alcohol use: No        Alcohol/week: 0.0 standard drinks      Drug use: No      Sexual activity: Not on file    Lifestyle      Physical activity        Days per week: Not on file        Minutes per session: Not on file      Stress: Not on file    Relationships      Social connections        Talks on phone: Not on file        Gets together: Not on file        Attends Islam service: Not on file        Active member of club or organization: Not on file        Attends meetings of clubs or organizations: Not on file        Relationship status: Not on file      Intimate partner violence        Fear of current or ex partner: Not on file        Emotionally abused: Not on file        Physically abused: Not on file        Forced sexual activity: Not on file    Other Topics      Concerns:         Service: Not Asked        Blood Transfusions: Not Asked        Caffeine Concern: Yes          soda, 1 liter/day        Occupational Exposure: Not Asked        Hobby Hazards: Not Asked        Sleep Concern: Not Asked        Stress Concern: Not Asked        Weight Concern: Not Asked        Special Diet: Not Asked        Back Care: Not Asked        Exercise: Not Asked        Bike Helmet: Not Asked        Seat Belt: Not Asked        Self-Exams: Not Asked        Grew up on a farm: Not Asked        History of tanning: Not Asked        Outdoor occupation: Not Asked        Pt has a pacemaker: No        Pt has a defibrillator: No        Reaction to local anesthetic: No    Social History Narrative      Not on file      ADVANCE CARE PLANNING:  Advance Care Plan NOT discussed.    PHYSICAL EXAMINATION:  There were no vitals filed for  this visit.  General: Alert and oriented x3  Affect:  NAD  Head: normocephalic, atraumatic  Eyes: anicteric; no injection  Chest: S1, S2, RRR  Respiratory: CTAB  Gait: Normal; cane user - No  Spine: Normal    ROM:   Lumbar spine  Flexion  dec  Extension dec  Cervical Spine  Flexion dec  Extensiondec  MOTOR EXAMINATION:  UPPER EXTREMITY      LEFT RIGHT   Deltoid 5/5 5/5   Biceps 5/5 5/5   Triceps 5/5 5/5   Brachioradialis 5/5 5/5   Wrist Flexors 5/5 5/5   Wrist Extensors 5/5 5/5   Intrinsic Hand 5/5 5/5    5/5 5/5     LOWER EXTREMITY      LEFT RIGHT   Iliopsoas 5/5 5/5   Quadriceps 5/5 5/5   Foot DF 5/5 5/5   Foot EHL 5/5 5/5   Gastrocnemius 5/5 5/5     PULSES      LEFT RIGHT   Radial 2/4 2/4   Dorsalis Pedis 2/4 2/4   Posterior Tibial 2/4 2/4   Brachial 2/4 2/4     SLR: negative  SIJ tenderness negative  Joint Exam: Normal  TPs:  Present within lumbo-sacral paraspinal muscles  Left occipital region is very tender to palpation can replicates the pain that he gets in the back of his head  Right Deep tendon reflexes: Normal   Left Deep tendon reflexes: Normal   Babinski Reflex: absent bilaterally   Temperature:  normal to touch bilateral upper and lower extremities  Edema - Absent  Stasis ulcer on the left leg is healed    Sensation (light touch/pinprick/temperature):     Right Lower Extremity:  Normal  Left Lower Extremity: Normal  Right Upper Extremity:  Normal  Left Upper Extremity:  Normal    IMAGING:  No results found.    LABS:  Lab Results   Component Value Date    WBC 10.3 07/06/2020    RBC 5.45 07/06/2020    HGB 14.1 07/06/2020    HCT 45.2 07/06/2020    MCV 82.9 07/06/2020    MCH 25.9 (L) 07/06/2020    MCHC 31.2 07/06/2020    RDW 19.0 (H) 07/06/2020    .0 07/06/2020    MPV 8.1 06/28/2018     Lab Results   Component Value Date     07/06/2020    K 3.8 07/06/2020     07/06/2020    CO2 29.0 07/06/2020    BUN 25 (H) 07/06/2020    GLU 92 07/06/2020    CA 8.8 07/06/2020     No results found for:  PT    [facility] [facility] MONITORING PROGRAM REVIEWED  Yes      ASSESSMENT:   70 year old old male with cervical and lumbar spinal stenosis   Has responded in the past epidural injections cervical and lumbar  Started on the immunologic for his ankylosing spondylitis which has helped his pain  Low back pain bilateral radicular pain  Chronic use  of Norco a day for pain control     Botox injections are helping the cervical pain     Bilateral knee pain the care of Dr. Singh for that DJD bilateral knee  Recent injections for the knees  I      PLAN:  RECOMMENDATIONS:  4 Norco a day    Continue with rheumatology and orthopedic care  No need for injection therapy at this point  Continue with Botox injection per neuro cervical    Epidurals for cervical is still benefiting him no need for repeat as and at this time  Bilateral knee pain he is to see orthopedic surgeon status post Synvisc injections which is helped also trying hyaluronic acid injections current

## 2024-07-15 ENCOUNTER — NURSE ONLY (OUTPATIENT)
Dept: RHEUMATOLOGY | Facility: CLINIC | Age: 74
End: 2024-07-15

## 2024-07-15 DIAGNOSIS — M45.3 ANKYLOSING SPONDYLITIS OF CERVICOTHORACIC REGION (HCC): Primary | ICD-10-CM

## 2024-07-15 NOTE — PROGRESS NOTES
Name and  verified. Pt aware he was to receive injection of Cimza. Denies current illness or antibiotic use. Injections given in bilateral anterior thighs and pt tolerated well. Possible local side effects discussed with pt. Pt aware to follow up in 4 weeks for next injection.    Pt asking about benefit of trying back brace. Consulted with Dr Dalton and informed pt he can try a brace for pain relief but it will not effect ankylosing spondylitis.

## 2024-07-29 ENCOUNTER — LAB ENCOUNTER (OUTPATIENT)
Dept: LAB | Age: 74
End: 2024-07-29
Attending: UROLOGY
Payer: MEDICARE

## 2024-07-29 DIAGNOSIS — R97.21 BIOCHEMICALLY RECURRENT MALIGNANT NEOPLASM OF PROSTATE (HCC): ICD-10-CM

## 2024-07-29 DIAGNOSIS — C61 BIOCHEMICALLY RECURRENT MALIGNANT NEOPLASM OF PROSTATE (HCC): ICD-10-CM

## 2024-07-29 LAB — PSA SERPL-MCNC: 3.85 NG/ML (ref ?–4)

## 2024-07-29 PROCEDURE — 84153 ASSAY OF PSA TOTAL: CPT

## 2024-08-01 ENCOUNTER — OFFICE VISIT (OUTPATIENT)
Dept: SURGERY | Facility: CLINIC | Age: 74
End: 2024-08-01

## 2024-08-01 VITALS — SYSTOLIC BLOOD PRESSURE: 118 MMHG | HEART RATE: 65 BPM | DIASTOLIC BLOOD PRESSURE: 72 MMHG

## 2024-08-01 DIAGNOSIS — N40.1 BPH WITH OBSTRUCTION/LOWER URINARY TRACT SYMPTOMS: ICD-10-CM

## 2024-08-01 DIAGNOSIS — N52.01 ERECTILE DYSFUNCTION DUE TO ARTERIAL INSUFFICIENCY: ICD-10-CM

## 2024-08-01 DIAGNOSIS — Z92.3 HISTORY OF BRACHYTHERAPY: ICD-10-CM

## 2024-08-01 DIAGNOSIS — C61 BIOCHEMICALLY RECURRENT MALIGNANT NEOPLASM OF PROSTATE (HCC): ICD-10-CM

## 2024-08-01 DIAGNOSIS — C61 PROSTATE CANCER (HCC): Primary | ICD-10-CM

## 2024-08-01 DIAGNOSIS — R97.21 BIOCHEMICALLY RECURRENT MALIGNANT NEOPLASM OF PROSTATE (HCC): ICD-10-CM

## 2024-08-01 DIAGNOSIS — N13.8 BPH WITH OBSTRUCTION/LOWER URINARY TRACT SYMPTOMS: ICD-10-CM

## 2024-08-01 PROCEDURE — 99214 OFFICE O/P EST MOD 30 MIN: CPT | Performed by: UROLOGY

## 2024-08-01 NOTE — PROGRESS NOTES
[facility]  Follow-Up Visit    HPI: Moreno Khalil is a 73 year old male presents for a follow up visit. Patient was last seen on 2/1/2024.    INTERVAL HISTORY: Former patient of Dr. THAPA.    Biochemically recurrent prostate cancer, status post brachytherapy seed implants 2014 for definitive local therapy of intermediate risk disease.    PSA junior of 1.3 ng/mL.  Rising PSA levels since 2020.    Repeat prostate biopsy 12/2021 was negative.    PSMA PET scan 6/2022 without radiographic evidence of recurrent or metastatic disease.    Patient elected continued monitoring.    Repeat PSMA PET scan 8/16/2023 without evidence of local or metastatic disease.    Multiparametric MRI of the prostate completed 9/28/2023 with artifactual degradation secondary to brachytherapy seeds however within these parameters no focal suspicious lesions identified.  Borderline sized pelvic lymph nodes that are grossly unchanged.    PSA 7/2024 decreased to 3.85 ng/mL.    His IPSS is 17 (2/2/3/2/2/2/4) with a quality-of-life score of 3.  Reports nocturia x 4 and intermittency.    He denies fevers or chills, chest pain or shortness of breath.  No bone pain or unintentional weight loss.  No change in appetite.    No gross hematuria or dysuria.      1. Prostate Cancer w/ Biochemical Recurrence  History of multiple previous prostate biopsies.    Diagnosed with intermediate risk prostate cancer upon saturation prostate biopsy 1/9/2014 with up to [place] 3+4=7 disease in 8/40 cores.    Treated with brachytherapy seed implants at the [place] [facility] 2/2014.    PSA junior reportedly around 1.3 ng/mL.    PSA levels have been slowly rising since 2020.    Prostate MRI 11/2021 without any definite prostatic lesions.  No PI-RADS assessment given for posttreatment gland.    Patient underwent a TRUS-guided prostate biopsy by Dr. Oreilly 12/2021 which was negative.    PSA up to 4.07 ng/mL 6/7/2022.    PSMA PET scan 6/2022  without evidence of recurrent or metastatic disease.    Seen by medical oncology 6/2022.  Patient electing continued monitoring as opposed to starting ADT.    - 10/2022 F/U: PSA decreased to 3.76 ng/mL.  Patient elects continued monitoring.    - 4/2023 F/U: PSA increased to 5.44 ng/mL.  Patient elects continued monitoring with a repeat in 3 months.    - 7/2023 F/U: PSA increased to 6.68 ng/mL.  Obtain updated PSMA PET scan.    - 10/2023 F/U: PSMA PET scan negative.  Prostate MRI without suspicious lesions.  PSA 6.48 ng/mL.  Patient elects continued monitoring.    - 1/2024 F/U: PSA decreased to 4.16 ng/mL.  Patient elects continued monitoring.    - 8/2024 F/U: PSA decreased to 3.85 ng/mL.  Patient elects continued monitoring.      2. ED  Chronic and longstanding.  Previously on tadalafil as needed.  Patient no longer sexually active.      3. BPH with LUTS  Chronic and longstanding LUTS.  Not on any medical therapy.    - 10/2022 F/U: IPSS score is 18 (3/2/2/2/3/2/4) with a quality-of-life score of 3.  Patient not interested in any treatment at this point.    - 7/2023 F/U: IPSS 23 (4/2/5/2/3/3/4) with a quality-of-life score of 3.  Patient drinks water deliberately in the evening as well as when he wakes up at night to urinate.  Elects behavioral changes and fluid management.    - 8/2024 F/U: IPSS is 17 (2/2/3/2/2/2/4) with a quality-of-life score of 3.  Reports nocturia x 4 and intermittency.  Elects continued monitoring.      Reviewed past medical, surgical, family, and social history.  Reviewed med list and allergies.      REVIEW OF SYSTEMS:  Pertinent positives and negatives per HPI. A 10-point ROS was performed and is otherwise negative.       EXAM:  /72 (BP Location: Left arm, Patient Position: Sitting, Cuff Size: adult)   Pulse 65      Physical Exam  Constitutional:       Appearance: He is well-developed.   HENT:      Head: Normocephalic.   Eyes:      General: No scleral icterus.  Cardiovascular:       Rate and Rhythm: Normal rate.   Pulmonary:      Effort: Pulmonary effort is normal.   Genitourinary:     Comments: [facility] 11/2023 revealing a firm, small prostate without any palpable nodules or masses.  Skin:     General: Skin is warm and dry.   Neurological:      Mental Status: He is alert and oriented to person, place, and time.   Psychiatric:         Mood and Affect: Mood normal.         Behavior: Behavior normal.       PATHOLOGY:  See HPI for details.      LABS:    Component PSA   Latest Ref Rng & Units <=4.00 ng/mL   7/29/2024 3.85   1/29/2024 4.16 (H)   10/27/2023 6.48 (H)   7/26/2023 6.68 (H)   4/19/2023 5.44 (H)   10/19/2022 3.76   6/7/2022 4.07 (H)   10/26/2021 3.03   6/16/2021 2.80   2/19/2021 2.75   10/15/2020 2.48   6/16/2020 2.18   2/5/2020 2.15   8/29/2019 1.89   3/1/2019 1.73   11/2/2018 2.0   7/3/2018 1.5   2/26/2018 1.6   11/21/2017 1.6   5/16/2017 1.3   11/15/2016 1.4   5/11/2016 1.6   1/12/2016 1.9   9/15/2015 1.8   3/10/2015 2.8   8/13/2014 4.6 (H)   12/4/2013 7.9 (H)   9/1/2013 8.2 (H)   7/28/2013 10.2 (H)   11/1/2012 7.4 (H)   12/11/2011 8.3 (H)       IMAGING:    MRI PROSTATE (9/29/23): 1. No PI-RADS score assessment is provided for a posttreatment gland.  2. There is artifactual degradation throughout the prostate gland secondary to the presence of brachytherapy seeds. Within these parameters, no focal suspicious lesion is identified.  3. Substantially elevated PSA density, compatible with biochemical recurrence.  4. Borderline-sized pelvic lymph nodes are grossly unchanged.  5. No suspicious osseous lesions are perceived in the included region.  6. Distal colonic diverticulosis without MR evidence of acute complication in the imaged volume.  7. Lesser incidental findings as above.        PET FOR PROSTATE CARCINOMA (8/16/2023): There is history of prostate cancer.  Prostate has normal size with brachytherapy seeds.  No evidence for local or metastatic disease.         UROLOGY PROCEDURE:  Not  performed today.      IMPRESSION:  73 year old male with history of prostate cancer status post brachytherapy seed implants in 2014.    Patient with biochemical evidence of disease recurrence given rising PSA levels, since 2020.    Negative prostate biopsy 12/2021.    Negative PSMA PET scan May 2023 and negative prostate MRI 9/2023.    Recent PSA level continues to downtrend.    Findings reviewed with patient at length.      Discussed management options including continued monitoring or initiating ADT.      We also discussed salvage treatments for prostate cancer including prostatectomy, cryotherapy, or HIFU.  For that he would need to have evidence of recurrent prostate cancer in the prostate upon biopsy.      Patient not interested at this time and would like to continue monitoring.    Baseline BPH with LUTS, not on medical therapy.  Stable symptoms.    Longstanding chronic ED, patient no longer sexually active.  Not interested in further treatment at this time.      PLAN:  1.  Continue to monitor biochemically recurrent prostate cancer without radiographic evidence of local or distant disease.    2.  Conservative management/watchful waiting of ED and BPH with LUTS.    Follow-up in 6 months with a PSA.    MDM complexity: Moderate.  Discussing management of biochemically recurrent prostate cancer following initial definitive local therapy.    Devante Bianchi MD  8/1/2024

## 2024-08-01 NOTE — PATIENT INSTRUCTIONS
Follow-up in 6 months and complete PSA blood test a few days before your appointment.    Monitor urination symptoms.  Let us know if you are interested in starting medical therapy for your enlarged prostate.

## 2024-08-09 ENCOUNTER — OFFICE VISIT (OUTPATIENT)
Dept: NEUROLOGY | Facility: CLINIC | Age: 74
End: 2024-08-09
Payer: MEDICARE

## 2024-08-09 DIAGNOSIS — G24.3 CERVICAL DYSTONIA: Primary | ICD-10-CM

## 2024-08-09 NOTE — PROGRESS NOTES
Paperwork noting that patient may bear financial responsibility for procedure(s) performed in clinic today signed prior to proceeding with procedure(s).    Furthermore, patient notified that they should contact their insurer to verify that your procedure/test has been approved and is a COVERED benefit.  Although the [facility] staff does its due diligence, the insurance carrier gives the disclaimer that \"Although the procedure is authorized, this does not guarantee payment.\"    Ultimately the patient is responsible for payment.    Botox is:  [x] Buy and Bill  [] Patient Supplied      Botox Reauthorization Questions:  Has the patient experienced a reduction in frequency of migraines since starting Botox? yes  If yes, by what percentage? 75%  Has the intensity of migraines decreased since starting Botox? yes  If yes, by what percentage? 75%    [x] I have discussed with patient that if their insurance changes they must contact the office right away with that information so that a new prior authorization can be completed.  Patient verbalized understanding that Botox cannot be performed without a current prior authorization in place with correct insurance.

## 2024-08-13 ENCOUNTER — OFFICE VISIT (OUTPATIENT)
Facility: CLINIC | Age: 74
End: 2024-08-13

## 2024-08-13 ENCOUNTER — MED REC SCAN ONLY (OUTPATIENT)
Dept: NEUROLOGY | Facility: CLINIC | Age: 74
End: 2024-08-13

## 2024-08-13 VITALS
OXYGEN SATURATION: 99 % | DIASTOLIC BLOOD PRESSURE: 72 MMHG | BODY MASS INDEX: 32 KG/M2 | HEART RATE: 68 BPM | SYSTOLIC BLOOD PRESSURE: 126 MMHG | WEIGHT: 220 LBS

## 2024-08-13 DIAGNOSIS — Z12.11 SCREEN FOR COLON CANCER: ICD-10-CM

## 2024-08-13 DIAGNOSIS — I10 ESSENTIAL HYPERTENSION WITH GOAL BLOOD PRESSURE LESS THAN 140/90: ICD-10-CM

## 2024-08-13 DIAGNOSIS — E78.2 MIXED HYPERLIPIDEMIA: ICD-10-CM

## 2024-08-13 DIAGNOSIS — M45.0 ANKYLOSING SPONDYLITIS OF MULTIPLE SITES IN SPINE (HCC): Primary | ICD-10-CM

## 2024-08-13 PROCEDURE — 99214 OFFICE O/P EST MOD 30 MIN: CPT | Performed by: INTERNAL MEDICINE

## 2024-08-13 PROCEDURE — G2211 COMPLEX E/M VISIT ADD ON: HCPCS | Performed by: INTERNAL MEDICINE

## 2024-08-13 NOTE — PROGRESS NOTES
Moreno Khalil is a 73 year old male.  Chief Complaint   Patient presents with    Follow - Up     Is still having issus with walking and has been having generalized pain, and would like to discuss colonoscopy due to gastro issues     HPI:   73-year-old pleasant gentleman here for follow-up.  He continues to have neck and back pain.  He is going to see rheumatology in couple of days.  Denies any chest pain.  Breathing is at baseline.  Denies any abdominal pain nausea or vomiting.  He continues to follow with the pain clinic.      Current Outpatient Medications   Medication Sig Dispense Refill    HYDROcodone-acetaminophen  MG Oral Tab Take 1 tablet by mouth every 6 (six) hours as needed for Pain (MAX 4/DAY). 120 tablet 0    atorvastatin 40 MG Oral Tab TAKE 1 TABLET(40 MG) BY MOUTH EVERY NIGHT 90 tablet 3    aspirin 81 MG Oral Chew Tab Chew 1 tablet (81 mg total) by mouth daily. 90 tablet 3    losartan 100 MG Oral Tab Take 1 tablet (100 mg total) by mouth daily. 90 tablet 3    hydroCHLOROthiazide 25 MG Oral Tab       HYDROcodone-acetaminophen  MG Oral Tab Take 1 tablet by mouth every 6 (six) hours as needed for Pain. (Patient not taking: Reported on 8/13/2024) 120 tablet 0    amLODIPine 5 MG Oral Tab Take 1 tablet (5 mg total) by mouth daily. (Patient not taking: Reported on 8/13/2024)        Past Medical History:    Abnormal blood chemistry    Acute dermatitis due to solar radiation    Acute upper respiratory infection    Appendicitis    Back problem    Backache    Benign neoplasm of skin    Benign prostatic hypertrophy    Calcaneal spur    Carpal tunnel syndrome    Cervical radicular pain    Cough variant asthma (HCC)    Disorder of kidney and ureter    Disorder of rotator cuff syndrome of shoulder and allied disorder    Dizziness    Dysfunction of eustachian tube    Dysplastic nevus    left flank    Dyspnea and respiratory abnormality    Elevated prostate specific antigen (PSA)    Elevated PSA    bx  negative for cancer    Essential hypertension    Generalized pain    H/O arthroscopy of knee    History of brachytherapy    Hyponatremia    Intestinal infection    Malaise and fatigue    Neck pain    Nocturia    Obesity, unspecified    Petechial hemorrhage    Plantar fascial fibromatosis    Pressure ulcer, stage 1    Prostate cancer (HCC)    brachytherapy    Proteinuria    PVD (peripheral vascular disease) ([facility])    Rotator cuff tear    right    Rotator cuff tear    R - 2007    Sensorineural hearing loss    Shoulder pain, left    Sleep disturbance    Tonsillitis      Past Surgical History:   Procedure Laterality Date    Appendectomy  1963    Knee arthroscopy      per NG: bilat knees 2x each; arthroscopy - brash    Prostate biopsies  2013    Repair rotator cuff,acute Right 2007    Tonsillectomy        Social History:  Social History     Socioeconomic History    Marital status:     Number of children: 2   Occupational History    Occupation: ordering     Comment: retired   Tobacco Use    Smoking status: Never     Passive exposure: Never    Smokeless tobacco: Never   Vaping Use    Vaping status: Never Used   Substance and Sexual Activity    Alcohol use: No     Alcohol/week: 0.0 standard drinks of alcohol    Drug use: No    Sexual activity: Yes   Other Topics Concern    Caffeine Concern Yes     Comment: soda, 1 liter/day    Exercise Yes    Pt has a pacemaker No    Pt has a defibrillator No    Reaction to local anesthetic No     Social Determinants of Health     Financial Resource Strain: Low Risk  (2/8/2022)    Received from [facility]    Overall Financial Resource Strain (CARDIA)     Difficulty of Paying Living Expenses: Not very hard   Transportation Needs: No Transportation Needs (2/8/2022)    Received from [facility]    PRAPARE - Transportation     Lack of Transportation (Medical): No     Lack of Transportation (Non-Medical): No      Family History    Problem Relation Age of Onset    Diabetes Mother     Diabetes Sister     Obesity Sister     Lipids Other         family h/o hyperlipidemia and vascular disease      No Known Allergies     REVIEW OF SYSTEMS:   Review of Systems   Review of Systems   Constitutional: Negative for activity change, appetite change and fever.   HENT: Negative for congestion and voice change.    Respiratory: Negative for cough and shortness of breath.    Cardiovascular: Negative for chest pain.   Gastrointestinal: Negative for abdominal distention, abdominal pain and vomiting.   Genitourinary: Negative for hematuria.   Skin: Negative for wound.   Psychiatric/Behavioral: Negative for behavioral problems.   Back pain present  Wt Readings from Last 5 Encounters:   08/13/24 220 lb (99.8 kg)   07/02/24 230 lb (104.3 kg)   05/13/24 220 lb (99.8 kg)   04/09/24 230 lb (104.3 kg)   04/05/24 230 lb (104.3 kg)     Body mass index is 32.49 kg/m².      EXAM:   /72   Pulse 68   Wt 220 lb (99.8 kg)   SpO2 99%   BMI 32.49 kg/m²   Physical Exam   Constitutional:       Appearance: Normal appearance.   HENT:      Head: Normocephalic.   Eyes:      Conjunctiva/sclera: Conjunctivae normal.   Cardiovascular:      Rate and Rhythm: Normal rate and regular rhythm.      Heart sounds: Normal heart sounds. No murmur heard.  Pulmonary:      Effort: Pulmonary effort is normal.      Breath sounds: Normal breath sounds. No rhonchi or rales.   Abdominal:      General: Bowel sounds are normal.      Palpations: Abdomen is soft.      Tenderness: There is no abdominal tenderness.   Musculoskeletal:      Cervical back: Neck supple.      Right lower leg: No edema.      Left lower leg: No edema.   Skin:     General: Skin is warm and dry.   Neurological:      General: No focal deficit present.      Mental Status: He is alert and oriented to person, place, and time. Mental status is at baseline.   Psychiatric:         Mood and Affect: Mood normal.         Behavior:  Behavior normal.   Kyphosis present.    ASSESSMENT AND PLAN:   1. Ankylosing spondylitis of multiple sites in spine (HCC)  I will follow rheumatology recommendations regarding this.  Continue to follow with the pain clinic.  I have encouraged him to avoid chiropractic manipulation without talking to rheumatology.  In    2. Mixed hyperlipidemia  Lab Results   Component Value Date    LDL 61 04/02/2024    AST 21 04/02/2024    ALT 34 04/02/2024      Encouraged patient to eat healthy.  Avoid fat fried foods and increase activity as tolerated.  We will monitor lipid profile and liver function test.      3. Essential hypertension with goal blood pressure less than 140/90  Lab Results   Component Value Date    CREATSERUM 1.30 04/02/2024    TSH 2.570 04/02/2024     Will continue to monitor blood pressure.  Encouraged patient to avoid salt.  Continue current medical regimen.      4. Screen for colon cancer    - GASTRO - INTERNAL    Plan: As above.  I will see him back in 6 months.  Meanwhile, if there is any concerns, he will contact me.      The patient indicates understanding of these issues and agrees to the plan.  No follow-ups on file.    This note was prepared using Dragon Medical voice recognition dictation software. As a result errors may occur. When identified these errors have been corrected. While every attempt is made to correct errors during dictation discrepancies may still exist.

## 2024-08-16 ENCOUNTER — TELEPHONE (OUTPATIENT)
Dept: RHEUMATOLOGY | Facility: CLINIC | Age: 74
End: 2024-08-16

## 2024-08-16 NOTE — TELEPHONE ENCOUNTER
Patient canceled his Cimzia shot appointment for today with nurse.  Call patient to rescheduled at home phone:   Home phone*  [phone]   Okay to leave a voicemail if he does not answer

## 2024-08-16 NOTE — TELEPHONE ENCOUNTER
Name and  verified. Pt given appointment for injection.      Future Appointments   Date Time Provider Department Center   2024 11:00 AM EC [facility]   2024 10:00 AM Moses Mclaughlin MD EM PAIN EM [facility]   2024 11:40 AM Sheron Dalton [facility]   2/3/2025 10:30 AM [facility]   2025 10:30 AM Alex Hull MD [facility]   2025 10:30 AM [facility]

## 2024-08-16 NOTE — TELEPHONE ENCOUNTER
Patient called again asking to speak to a Rheumatology nurse.  I said none was available.  He wants to reschedule his Cimzia shot.  I told patient someone will call him back to reschedule it per below note I already sent to Dr Dalton's site.  He wanted to be transferred to Dr Dalton's office, I told him we cannot do that.

## 2024-08-19 ENCOUNTER — NURSE ONLY (OUTPATIENT)
Dept: RHEUMATOLOGY | Facility: CLINIC | Age: 74
End: 2024-08-19

## 2024-08-19 DIAGNOSIS — M45.3 ANKYLOSING SPONDYLITIS OF CERVICOTHORACIC REGION (HCC): Primary | ICD-10-CM

## 2024-08-19 RX ORDER — LOSARTAN POTASSIUM 100 MG/1
100 TABLET ORAL DAILY
Qty: 90 TABLET | Refills: 3 | Status: SHIPPED | OUTPATIENT
Start: 2024-08-19

## 2024-08-19 NOTE — PROGRESS NOTES
Verified name and . Patient aware receiving Cimzia injection. Injections given. Patient tolerated well. Patient aware to follow up in 1 month for next injection. Will see provider at next visit.     Future Appointments   Date Time Provider Department Center   2024 10:00 AM Moses Mclaughlin MD [facility] PAIN [facility]   2024 11:40 AM Sheron Dalton MD [facility]   2/3/2025 10:30 AM [facility]   2025 10:30 AM Alex Hull MD [facility]   2025 10:30 AM [facility]

## 2024-08-19 NOTE — TELEPHONE ENCOUNTER
REFILL PASSED PER [facility] PROTOCOLS    Requested Prescriptions   Pending Prescriptions Disp Refills    LOSARTAN 100 MG Oral Tab [Pharmacy Med Name: LOSARTAN 100MG TABLETS] 90 tablet 3     Sig: TAKE 1 TABLET(100 MG) BY MOUTH DAILY       Hypertension Medications Protocol Passed - 8/19/2024  4:09 PM        Passed - CMP or BMP in past 12 months        Passed - Last BP reading less than 140/90     BP Readings from Last 1 Encounters:   08/13/24 126/72               Passed - In person appointment or virtual visit in the past 12 mos or appointment in next 3 mos     Recent Outpatient Visits              Today Ankylosing spondylitis of cervicothoracic region ([facility])    [facility]    Nurse Only    6 days ago Ankylosing spondylitis of multiple sites in spine ([facility])    [facility] Alex Hull MD    Office Visit    1 week ago Cervical dystonia    [facility] Chad Manuel MD    Office Visit    2 weeks ago Prostate cancer ([facility])    [facility] Devante Bianchi MD    Office Visit    1 month ago Ankylosing spondylitis of cervicothoracic region ([facility])    [facility]    Nurse Only          Future Appointments         Provider Department Appt Notes    In 2 weeks Moses Mclaughlin MD [facility] med refill    In 4 weeks Sheron Dalton MD [facility] +INJ f/u    In 5 months Devante Bianchi MD [facility] 6 month    In 6 months Alex Hull MD [facility]     In 6 months Xavi Johnston MD [facility] Constipation, Bloating (Policy informed)                    Passed - EGFRCR or GFRNAA > 50     GFR  Evaluation  EGFRCR: 58 , resulted on 4/2/2024               Future Appointments         Provider Department Appt Notes    In 2 weeks Moses Mclaughlin MD [facility] med refill    In 4 weeks Sheron Dalton MD [facility] +INJ f/u    In 5 months Devante Bianchi MD [facility] 6 month    In 6 months Alex Hull MD [facility]     In 6 months Xavi Johnston MD [facility] Constipation, Bloating (Policy informed)          Recent Outpatient Visits              Today Ankylosing spondyl[facility] cerv[facility]othoracic [facility]    [facility]    Nurse Only    6 days ago Ankylosing spondylitis of multiple sites in spine ([facility])    [facility] Alex Hull MD    Office Visit    1 week ago Cervical dystonia    [facility] Chad Manuel MD    Office Visit    2 weeks ago Prostate cancer ([facility])    [facility] Devante Bianchi MD    Office Visit    1 month ago Ankylosing spondylitis of cervicothoracic region ([facility])    [facility]    Nurse Only

## 2024-09-06 ENCOUNTER — HOSPITAL ENCOUNTER (OUTPATIENT)
Dept: GENERAL RADIOLOGY | Facility: HOSPITAL | Age: 74
Discharge: HOME | End: 2024-09-06
Attending: ANESTHESIOLOGY
Payer: MEDICARE

## 2024-09-06 ENCOUNTER — OFFICE VISIT (OUTPATIENT)
Dept: PAIN CLINIC | Facility: HOSPITAL | Age: 74
End: 2024-09-06
Attending: ANESTHESIOLOGY
Payer: MEDICARE

## 2024-09-06 VITALS
OXYGEN SATURATION: 95 % | HEART RATE: 52 BPM | WEIGHT: 220 LBS | SYSTOLIC BLOOD PRESSURE: 115 MMHG | DIASTOLIC BLOOD PRESSURE: 76 MMHG | BODY MASS INDEX: 32 KG/M2

## 2024-09-06 DIAGNOSIS — M54.50 LOW BACK PAIN AT MULTIPLE SITES: ICD-10-CM

## 2024-09-06 DIAGNOSIS — M25.562 CHRONIC PAIN OF BOTH KNEES: Primary | ICD-10-CM

## 2024-09-06 DIAGNOSIS — M25.561 CHRONIC PAIN OF BOTH KNEES: ICD-10-CM

## 2024-09-06 DIAGNOSIS — M25.562 CHRONIC PAIN OF BOTH KNEES: ICD-10-CM

## 2024-09-06 DIAGNOSIS — G89.29 OTHER CHRONIC PAIN: ICD-10-CM

## 2024-09-06 DIAGNOSIS — G89.29 CHRONIC PAIN OF BOTH KNEES: Primary | ICD-10-CM

## 2024-09-06 DIAGNOSIS — M25.561 BILATERAL CHRONIC KNEE PAIN: ICD-10-CM

## 2024-09-06 DIAGNOSIS — G89.29 CHRONIC PAIN OF BOTH KNEES: ICD-10-CM

## 2024-09-06 DIAGNOSIS — G89.29 BILATERAL CHRONIC KNEE PAIN: ICD-10-CM

## 2024-09-06 DIAGNOSIS — M25.562 BILATERAL CHRONIC KNEE PAIN: ICD-10-CM

## 2024-09-06 DIAGNOSIS — M25.561 CHRONIC PAIN OF BOTH KNEES: Primary | ICD-10-CM

## 2024-09-06 LAB
AMPHET UR QL SCN: NEGATIVE
BARBITURATES UR QL SCN: NEGATIVE
BENZODIAZ UR QL SCN: NEGATIVE
CANNABINOIDS UR QL SCN: NEGATIVE
COCAINE UR QL: NEGATIVE
CREAT UR-SCNC: 79.9 MG/DL
FENTANYL UR QL SCN: NEGATIVE
MDMA UR QL SCN: NEGATIVE
METHADONE UR QL SCN: NEGATIVE
OXYCODONE UR QL SCN: NEGATIVE
PCP UR QL SCN: NEGATIVE

## 2024-09-06 PROCEDURE — 80361 OPIATES 1 OR MORE: CPT | Performed by: ANESTHESIOLOGY

## 2024-09-06 PROCEDURE — 80307 DRUG TEST PRSMV CHEM ANLYZR: CPT | Performed by: ANESTHESIOLOGY

## 2024-09-06 PROCEDURE — 73560 X-RAY EXAM OF KNEE 1 OR 2: CPT | Performed by: ANESTHESIOLOGY

## 2024-09-06 PROCEDURE — 99211 OFF/OP EST MAY X REQ PHY/QHP: CPT

## 2024-09-06 RX ORDER — HYDROCODONE BITARTRATE AND ACETAMINOPHEN 10; 325 MG/1; MG/1
1 TABLET ORAL EVERY 6 HOURS PRN
Qty: 120 TABLET | Refills: 0 | Status: SHIPPED | OUTPATIENT
Start: 2024-09-12 | End: 2024-10-12

## 2024-09-06 RX ORDER — HYDROCODONE BITARTRATE AND ACETAMINOPHEN 10; 325 MG/1; MG/1
1 TABLET ORAL EVERY 6 HOURS PRN
Qty: 120 TABLET | Refills: 0 | Status: SHIPPED | OUTPATIENT
Start: 2024-10-12 | End: 2024-11-11

## 2024-09-06 NOTE — PATIENT INSTRUCTIONS
Refill policies:    Allow 2-3 business days for refills; controlled substances may take longer.  Contact your pharmacy at least 5 days prior to running out of medication and have them send an electronic request or submit request through the “request refill” option in your [employer] account.  Refills are not addressed on weekends; covering physicians do not authorize routine medications on weekends.  No narcotics or controlled substances are refilled after noon on Fridays or by on call physicians.  By law, narcotics must be electronically prescribed.  A 30 day supply with no refills is the maximum allowed.  If your prescription is due for a refill, you may be due for a follow up appointment.  To best provide you care, patients receiving routine medications need to be seen at least once a year.  Patients receiving narcotic/controlled substance medications need to be seen at least once every 3 months.  In the event that your preferred pharmacy does not have the requested medication in stock (e.g. Backordered), it is your responsibility to find another pharmacy that has the requested medication available.  We will gladly send a new prescription to that pharmacy at your request.    Scheduling Tests:    If your physician has ordered radiology tests such as MRI or CT scans, please contact Central Scheduling at [phone] right away to schedule the test.  Once scheduled, the [facility] Centralized Referral Team will work with your insurance carrier to obtain pre-certification or prior authorization.  Depending on your insurance carrier, approval may take 3-10 days.  It is highly recommended patients assure they have received an authorization before having a test performed.  If test is done without insurance authorization, patient may be responsible for the entire amount billed.      Precertification and Prior Authorizations:  If your physician has recommended that you have a procedure or additional testing performed the [facility]  Centralized Referral Team will contact your insurance carrier to obtain pre-certification or prior authorization.    You are strongly encouraged to contact your insurance carrier to verify that your procedure/test has been approved and is a COVERED benefit.  Although the [facility] Centralized Referral Team does its due diligence, the insurance carrier gives the disclaimer that \"Although the procedure is authorized, this does not guarantee payment.\"    Ultimately the patient is responsible for payment.   Thank you for your understanding in this matter.  Paperwork Completion:  If you require FMLA or disability paperwork for your recovery, please make sure to either drop it off or have it faxed to our office at [phone]. Be sure the form has your name and date of birth on it.  The form will be faxed to our Forms Department and they will complete it for you.  There is a 25$ fee for all forms that need to be filled out.  Please be aware there is a 10-14 day turnaround time.  You will need to sign a release of information (SYL) form if your paperwork does not come with one.  You may call the Forms Department with any questions at [phone].  Their fax number is [phone].

## 2024-09-06 NOTE — PROGRESS NOTES
Patient presents in office today with reported pain in neck and lower back and bilateral knee     Current pain level reported = 3-4/10     Last reported dose of Norco this morning        Narcotic Contract renewal 09.06.25     Urine Drug screen 09.06.24    Increased bilateral knee pain

## 2024-09-06 NOTE — CHRONIC PAIN
Initial Consultation Note      HISTORY OF PRESENT ILLNESS:  Moreno Khalil is a 70 year old old male referred to the pain clinic  for evaluation treatment of his low back and cervical neck pain .  Patient has ankylosing spondylitis along with degenerative arthritis of his knees he continues to take 4 Norco a day for pain control Moreno also has bilateral knee arthritis in need of replacement but is hold off on as long as he can he takes 4 Norco a day for pain control no side effects still decreases pain by 50 to 60% throughout the day he is walking a little bit slower these days and little more kyphotic in nature.  Moreno had a cervical epidural steroid injection as well as lumbar injections in the past was pretty good results.  The medication as well as intermittent and injections have helped decrease his pain allows him to be more functionable.    Since his last visit he was started on an immunologic medications for his ankylosing spondylitis as well as Botox injection by the neurologist which has helped  PAIN COURSE AND PREVIOUS INTERVENTIONS:    Interventions: Epidural injection  Adjuvants: Small amounts Norco every day    BLOOD THINNING MEDICATIONS:  None    CURRENT MEDICATIONS:  Current Outpatient Medications   Medication Sig Dispense Refill    [START ON 11/8/2020] HYDROcodone-acetaminophen  MG Oral Tab Take 1 tablet by mouth every 8 (eight) hours as needed for Pain (max 3/day). 90 tablet 0    [START ON 10/9/2020] HYDROcodone-acetaminophen  MG Oral Tab Take 1 tablet by mouth every 8 (eight) hours as needed for Pain (MAX 3/DAY). 90 tablet 0    HYDROCHLOROTHIAZIDE 25 MG Oral Tab Take 1 tablet by mouth once daily 90 tablet 0    metoprolol Tartrate 25 MG Oral Tab Take 1 tablet (25 mg total) by mouth 2 (two) times daily. 180 tablet 1    simvastatin 20 MG Oral Tab Take 1 tablet (20 mg total) by mouth every evening. 90 tablet 1    ibuprofen 600 MG Oral Tab TAKE 1 TABLET EVERY 12 HOURS AS NEEDED FOR  PAIN (SUBSTITUTED FOR MOTRIN) 180 tablet 0    Tadalafil 20 MG Oral Tab Take 1 tablet (20 mg total) by mouth daily as needed for Erectile Dysfunction. 10 tablet 11        ALLERGIES:  No Known Allergies    SURGICAL HISTORY:  Past Surgical History:   Procedure Laterality Date    APPENDECTOMY  1963    KNEE ARTHROSCOPY      per NG: bilat knees 2x each; arthroscopy - brash    PROSTATE BIOPSIES  2013    REPAIR ROTATOR CUFF,ACUTE Right 2007    TONSILLECTOMY         REVIEW OF SYSTEMS:   A comprehensive review of systems was negative except for:   MEDICAL HISTORY:  Patient Active Problem List:     Prostate cancer (HCC)     Erectile dysfunction     Cervical radicular pain     Mixed hyperlipidemia     Essential hypertension with goal blood pressure less than 140/90     Class 1 obesity due to excess calories with serious comorbidity in adult     Physical exam, annual     Decreased visual acuity    Past Medical History:   Diagnosis Date    Appendicitis 1963    Back problem     Benign prostatic hypertrophy     Carpal tunnel syndrome     Dysplastic nevus 2017    left flank    Elevated PSA 2008    bx negative for cancer    H/O arthroscopy of knee bilateral    History of brachytherapy     Neck pain     Obesity, unspecified     Prostate cancer (HCC)     brachytherapy    PVD (peripheral vascular disease) ([facility])     Rotator cuff tear 2007    right    Rotator cuff tear     R - 2007    Tonsillitis 1960       FAMILY HISTORY:  Family History   Problem Relation Age of Onset    Diabetes Mother     Diabetes Sister     Obesity Sister     Lipids Other         family h/o hyperlipidemia and vascular disease       SOCIAL HISTORY:  Social History    Socioeconomic History      Marital status:       Spouse name: Not on file      Number of children: 2      Years of education: Not on file      Highest education level: Not on file    Occupational History      Occupation: ordering        Comment: retired    Social Needs      Financial resource  strain: Not on file      Food insecurity        Worry: Not on file        Inability: Not on file      Transportation needs        Medical: Not on file        Non-medical: Not on file    Tobacco Use      Smoking status: Never Smoker      Smokeless tobacco: Never Used    Substance and Sexual Activity      Alcohol use: No        Alcohol/week: 0.0 standard drinks      Drug use: No      Sexual activity: Not on file    Lifestyle      Physical activity        Days per week: Not on file        Minutes per session: Not on file      Stress: Not on file    Relationships      Social connections        Talks on phone: Not on file        Gets together: Not on file        Attends Mosque service: Not on file        Active member of club or organization: Not on file        Attends meetings of clubs or organizations: Not on file        Relationship status: Not on file      Intimate partner violence        Fear of current or ex partner: Not on file        Emotionally abused: Not on file        Physically abused: Not on file        Forced sexual activity: Not on file    Other Topics      Concerns:         Service: Not Asked        Blood Transfusions: Not Asked        Caffeine Concern: Yes          soda, 1 liter/day        Occupational Exposure: Not Asked        Hobby Hazards: Not Asked        Sleep Concern: Not Asked        Stress Concern: Not Asked        Weight Concern: Not Asked        Special Diet: Not Asked        Back Care: Not Asked        Exercise: Not Asked        Bike Helmet: Not Asked        Seat Belt: Not Asked        Self-Exams: Not Asked        Grew up on a farm: Not Asked        History of tanning: Not Asked        Outdoor occupation: Not Asked        Pt has a pacemaker: No        Pt has a defibrillator: No        Reaction to local anesthetic: No    Social History Narrative      Not on file      ADVANCE CARE PLANNING:  Advance Care Plan NOT discussed.    PHYSICAL EXAMINATION:  There were no vitals filed for  this visit.  General: Alert and oriented x3  Affect:  NAD  Head: normocephalic, atraumatic  Eyes: anicteric; no injection  Chest: S1, S2, RRR  Respiratory: CTAB  Gait: Normal; cane user - No  Spine: Normal    ROM:   Lumbar spine  Flexion  dec  Extension dec  Cervical Spine  Flexion dec  Extensiondec  MOTOR EXAMINATION:  UPPER EXTREMITY      LEFT RIGHT   Deltoid 5/5 5/5   Biceps 5/5 5/5   Triceps 5/5 5/5   Brachioradialis 5/5 5/5   Wrist Flexors 5/5 5/5   Wrist Extensors 5/5 5/5   Intrinsic Hand 5/5 5/5    5/5 5/5     LOWER EXTREMITY      LEFT RIGHT   Iliopsoas 5/5 5/5   Quadriceps 5/5 5/5   Foot DF 5/5 5/5   Foot EHL 5/5 5/5   Gastrocnemius 5/5 5/5     PULSES      LEFT RIGHT   Radial 2/4 2/4   Dorsalis Pedis 2/4 2/4   Posterior Tibial 2/4 2/4   Brachial 2/4 2/4     SLR: negative  SIJ tenderness negative  Joint Exam: Normal  TPs:  Present within lumbo-sacral paraspinal muscles  Left occipital region is very tender to palpation can replicates the pain that he gets in the back of his head  Right Deep tendon reflexes: Normal   Left Deep tendon reflexes: Normal   Babinski Reflex: absent bilaterally   Temperature:  normal to touch bilateral upper and lower extremities  Edema - Absent  Stasis ulcer on the left leg is healed    Sensation (light touch/pinprick/temperature):     Right Lower Extremity:  Normal  Left Lower Extremity: Normal  Right Upper Extremity:  Normal  Left Upper Extremity:  Normal    IMAGING:  No results found.    LABS:  Lab Results   Component Value Date    WBC 10.3 07/06/2020    RBC 5.45 07/06/2020    HGB 14.1 07/06/2020    HCT 45.2 07/06/2020    MCV 82.9 07/06/2020    MCH 25.9 (L) 07/06/2020    MCHC 31.2 07/06/2020    RDW 19.0 (H) 07/06/2020    .0 07/06/2020    MPV 8.1 06/28/2018     Lab Results   Component Value Date     07/06/2020    K 3.8 07/06/2020     07/06/2020    CO2 29.0 07/06/2020    BUN 25 (H) 07/06/2020    GLU 92 07/06/2020    CA 8.8 07/06/2020     No results found for:  PT    [facility] MONITORING PROGRAM REVIEWED  Yes      ASSESSMENT:   70 year old old male with cervical and lumbar spinal stenosis   Has responded in the past epidural injections cervical and lumbar  Started on the immunologic for his ankylosing spondylitis which has helped his pain  Low back pain bilateral radicular pain  Chronic use  of Norco a day for pain control     Botox injections are helping the cervical pain     Bilateral knee pain the care of Dr. Singh for that DJD bilateral knee  Recent injections for the knees  I      PLAN:  RECOMMENDATIONS:  4 Norco a day    Continue with rheumatology and orthopedic care  No need for injection therapy at this point  Continue with Botox injection per neuro cervical    Epidurals for cervical is still benefiting him no need for repeat as and at this time  Bilateral knee pain he is to see orthopedic surgeon status post Synvisc injections which is helped also trying hyaluronic acid injections current will order x-ray of bilateral knees

## 2024-09-13 ENCOUNTER — LAB ENCOUNTER (OUTPATIENT)
Dept: LAB | Age: 74
End: 2024-09-13
Attending: INTERNAL MEDICINE
Payer: MEDICARE

## 2024-09-13 LAB
CODEINE UR: NEGATIVE
HYDROCODONE CONF UR: 701 NG/ML
HYDROCODONE UR: POSITIVE
HYDROMORPH CONF UR: 148 NG/ML
HYDROMORPH UR: POSITIVE
MORPHINE UR: NEGATIVE
OPIATES CLASS UR: POSITIVE NG/ML

## 2024-09-17 ENCOUNTER — OFFICE VISIT (OUTPATIENT)
Dept: RHEUMATOLOGY | Facility: CLINIC | Age: 74
End: 2024-09-17

## 2024-09-17 VITALS
WEIGHT: 220 LBS | SYSTOLIC BLOOD PRESSURE: 137 MMHG | BODY MASS INDEX: 32 KG/M2 | HEART RATE: 69 BPM | DIASTOLIC BLOOD PRESSURE: 84 MMHG

## 2024-09-17 DIAGNOSIS — M25.512 CHRONIC PAIN OF BOTH SHOULDERS: ICD-10-CM

## 2024-09-17 DIAGNOSIS — M25.511 CHRONIC PAIN OF BOTH SHOULDERS: ICD-10-CM

## 2024-09-17 DIAGNOSIS — M45.3 ANKYLOSING SPONDYLITIS OF CERVICOTHORACIC REGION (HCC): Primary | ICD-10-CM

## 2024-09-17 DIAGNOSIS — G89.29 CHRONIC PAIN OF BOTH SHOULDERS: ICD-10-CM

## 2024-09-17 DIAGNOSIS — Z51.81 ENCOUNTER FOR THERAPEUTIC DRUG MONITORING: ICD-10-CM

## 2024-09-17 PROCEDURE — 99214 OFFICE O/P EST MOD 30 MIN: CPT | Performed by: INTERNAL MEDICINE

## 2024-09-17 PROCEDURE — 96372 THER/PROPH/DIAG INJ SC/IM: CPT | Performed by: INTERNAL MEDICINE

## 2024-09-17 NOTE — PROGRESS NOTES
Moreno Khalil is a 73 year old male.    HPI:     Chief Complaint   Patient presents with    Ankylosing Spondylitis       I had the pleasure of seeing Moreno Khalil on 9/17/2024 for follow up chronic neck, lower back and shoulder pain 2/2 Ankylosing spondylitis     Current medications:  Norco 10/325 1 pill 3-4 times a day- pain specialist prescribes it   Cimzia monthly in-office injections- started Dec 14, 2023  Blood work:  Neg RF, CCP, HLA-B27  ESR 59--> 34, CRP 5.5 mg/dL--> 2.92 mg/dL  Inflammation markers now normal     Interval History:  This is a 72 yo M with hx of HTN, HLD, Recurrent Prostate cancer s/p brachytherapy seed implants 2014 (no recurrence) presents with chronic joint pain.  He reports having chronic pain in his cervical neck and lower spine.  He follows with pain specialist Dr. Moses Rick and is on Norco 3 times a day.  It does keep some of his pain controlled.  He also has chronic bilateral knee pain and follows with the joint [place].  He was told that his knees are bone-on-bone.  He has had 3 hyaluronic acid injections in both knees and has 2 more left.  It is helped.  Continues to have chronic neck and lower back pain.  He also gets Botox injections in his neck region by neurology.  Reports pain in his hips, thighs and quadriceps.  At times it feels very stiff.  He has limited range of motion of his neck.  He also has chronic shoulder pain, hard to lift both shoulders.  He was given a Medrol Dosepak in the past which helped a lot of his pain.  Denies any history of psoriasis.    12/6/2023:  Presents for follow-up of chronic neck, lower back and shoulder pain  During his last visit blood work showed elevated formation markers.  He was started on a prednisone taper over 9 days which should help his symptoms significantly.  He had pain around his shoulders, thighs and neck which did help  Repeat inflammation markers did improve, still slightly elevated  He also went to the ED recently  for dizziness and neck pain and had a CT of the neck done.  CT cervical spine showed cervical thoracic spine ankylosis, scattered endplate osteophytes and syndesmophytes within the spine.  X-ray of the SI joint was also done which were normal  Blood work did show a negative HLA-B27    4/5/2024:  Months for follow-up of ankylosing spondylitis  He started Cimzia December 14, 2023, continues monthly Cimzia  Doing really well, less pain in the neck and thighs  Able to lift shoulder better  Inflammation markers also normal now  She sees joint [facility] and gets gel injections in the right knee, he had a cortisone injection 2 weeks ago     9/17/2024:  Presents for follow-up of ankylosing spondylitis  He is on Cimzia monthly  Recent blood work shows normal inflammation markers  He had recent xrays of the knees showing severe OA and chondrocalcinosis  She sees joint [facility] for cortisone injections and gel injections. Feels like the injections are not helpingas much  Having less neck and lower back pain          HISTORY:  Past Medical History:    Abnormal blood chemistry    Acute dermatitis due to solar radiation    Acute upper respiratory infection    Appendicitis    Back problem    Backache    Benign neoplasm of skin    Benign prostatic hypertrophy    Calcaneal spur    Carpal tunnel syndrome    Cervical radicular pain    Cough variant asthma (HCC)    Disorder of kidney and ureter    Disorder of rotator cuff syndrome of shoulder and allied disorder    Dizziness    Dysfunction of eustachian tube    Dysplastic nevus    left flank    Dyspnea and respiratory abnormality    Elevated prostate specific antigen (PSA)    Elevated PSA    bx negative for cancer    Essential hypertension    Generalized pain    H/O arthroscopy of knee    History of brachytherapy    Hyponatremia    Intestinal infection    Malaise and fatigue    Neck pain    Nocturia    Obesity, unspecified    Petechial hemorrhage    Plantar fascial fibromatosis     Pressure ulcer, stage 1    Prostate cancer ([facility])    brachytherapy    Proteinuria    PVD (peripheral vascular disease) ([facility])    Rotator cuff tear    right    Rotator cuff tear    R - 2007    Sensorineural hearing loss    Shoulder pain, left    Sleep disturbance    Tonsillitis      Social Hx Reviewed   Family Hx Reviewed     Medications (Active prior to today's visit):  Current Outpatient Medications   Medication Sig Dispense Refill    HYDROcodone-acetaminophen  MG Oral Tab Take 1 tablet by mouth every 6 (six) hours as needed for Pain. 120 tablet 0    losartan 100 MG Oral Tab Take 1 tablet (100 mg total) by mouth daily. 90 tablet 3    atorvastatin 40 MG Oral Tab TAKE 1 TABLET(40 MG) BY MOUTH EVERY NIGHT 90 tablet 3    aspirin 81 MG Oral Chew Tab Chew 1 tablet (81 mg total) by mouth daily. 90 tablet 3    amLODIPine 5 MG Oral Tab Take 1 tablet (5 mg total) by mouth daily.      hydroCHLOROthiazide 25 MG Oral Tab Take 1 tablet (25 mg total) by mouth daily.      [START ON 10/12/2024] HYDROcodone-acetaminophen  MG Oral Tab Take 1 tablet by mouth every 6 (six) hours as needed for Pain (MAX 4/DAY). (Patient not taking: Reported on 9/17/2024) 120 tablet 0     .cmed  Allergies:  No Known Allergies      ROS:   All other ROS are negative.     PHYSICAL EXAM:   GEN: AAOx3, NAD  HEENT: EOMI, PERRLA, no injection or icterus, oral mucosa moist, no oral lesions. No lymphadenopathy. No facial rash  CVS: RRR, no murmurs rubs or gallops. Equal 2+ distal pulses.   LUNGS: CTAB, no increased work of breathing  ABDOMEN:  soft NT/ND, +BS, no HSM  SKIN: No rashes or skin lesions. No nail findings  MSK:  B/L shoulder abduction limited to 160 degrees  Occiput to wall distance greater than 5 cm  NEURO: Cranial nerves II-XII intact grossly. 5/5 strength throughout in both upper and lower extremities, sensation intact.  PSYCH: normal mood       LABS:     Component      Latest Ref Rng 11/14/2023 11/19/2023   C-REACTIVE PROTEIN      <0.30  mg/dL 5.50 (H)     C-Citrullinated Peptide IgG AB      0.0 - 6.9 U/mL 1.3     SED RATE      0 - 20 mm/Hr 59 (H)     RHEUMATOID FACTOR      <14 IU/mL <10     HLA-B27 Negative       Component      Latest Ref Rng 12/4/2023   C-REACTIVE PROTEIN      <0.30 mg/dL 2.92 (H)    C-Citrullinated Peptide IgG AB      0.0 - 6.9 U/mL    SED RATE      0 - 20 mm/Hr 34 (H)    RHEUMATOID FACTOR      <14 IU/mL    HLA-B27      Imaging:     CT cervical spine:  CONCLUSION: No acute osseous abnormality of the cervical spine.  Cervical-thoracic spine ankylosis is present.     ASSESSMENT/PLAN:     Ankylosing spondylitis- improved   - Found to have negative HLA-B27 but elevated formation markers, ESR 59 and CRP 5.5 mg/dL.  X-ray of the SI joint was normal but CT of the cervical spine showed evidence of cervical thoracic ankylosis and syndesmophytes  - He was placed on a 9-day taper of prednisone which helped his symptoms.  - He also received epidural injections in his neck and lower spine which have helped  - He is now on Cimzia in office injections monthly since December 2023, symptoms have improved.  Less pain in his neck, has more range of motion his shoulders and less pain in his thighs.  Overall feels better on Cimzia  - Also recent blood work shows normal inflammation markers  - He is also on Norco for chronic pain.  - Blood work every 6 mos     Bilateral knee pain due to osteoarthritis  - Following with joint [place] and gets hyaluronic acid injections  - Xray showing severe OA and chondrocalcinosis     Pt will f/u in 6 mos     There is a longitudinal care relationship with me, the care plan reflects the ongoing nature of the continuous relationship of care, and the medical record indicates that there is ongoing treatment of a serious/complex medical condition which I am currently managing.  is Applicable.     Sheron Dalton MD  9/17/2024  11:37 AM

## 2024-09-17 NOTE — PATIENT INSTRUCTIONS
You were seen today for ankylosing spondylitis  Symptoms seem stable  Continue Cimzia every month  You also have significant arthritis in both knees.  Continue to get injections as needed but if they do not work you may have to see orthopedic    Orthopedic, Dr. Grijalva, Dr. Zamorano    For the COVID-vaccine you can get the COVID-vaccine 1 week before your next Cimzia shot    Blood work in 6 months  See me in 6 months

## 2024-10-15 ENCOUNTER — NURSE ONLY (OUTPATIENT)
Dept: RHEUMATOLOGY | Facility: CLINIC | Age: 74
End: 2024-10-15

## 2024-10-15 ENCOUNTER — TELEPHONE (OUTPATIENT)
Dept: RHEUMATOLOGY | Facility: CLINIC | Age: 74
End: 2024-10-15

## 2024-10-15 DIAGNOSIS — M45.3 ANKYLOSING SPONDYLITIS OF CERVICOTHORACIC REGION (HCC): Primary | ICD-10-CM

## 2024-10-15 PROCEDURE — 96372 THER/PROPH/DIAG INJ SC/IM: CPT | Performed by: INTERNAL MEDICINE

## 2024-10-15 NOTE — PROGRESS NOTES
Verified name and . Patient aware receiving Cimzia injection. Injections given. Patient tolerated well. Patient aware to follow up in 1 month for next injection.

## 2024-10-15 NOTE — TELEPHONE ENCOUNTER
Rescheduled.     Future Appointments   Date Time Provider Department Center   11/8/2024 10:00 AM Moses Mclaughlin MD [facility] PAIN EM [facility]   11/12/2024 10:45 AM Chad Manuel MD ENIEL[facility] [place] [facility]   11/14/2024 10:00 AM EC [facility]   2/3/2025 10:30 AM Devante Bianchi MD [facility]   2/17/2025 10:30 AM Alex Hull MD [facility]   2/26/2025 10:30 AM Xavi Johnston MD [facility]   3/17/2025 11:00 AM Sheron Dalton MD [facility]

## 2024-10-15 NOTE — TELEPHONE ENCOUNTER
Patient is requesting a new appointment from appointment scheduled on 11/12/24 with the same male nurse that provided the injection today, 10/15/24.    Requesting an appointment for the injection on 11/13/24 or 11/14/24 about 10 AM.    Please advise.       Scribe Attestation (For Scribes USE Only)... Attending Attestation (For Attendings USE Only).../Scribe Attestation (For Scribes USE Only)...

## 2024-10-15 NOTE — TELEPHONE ENCOUNTER
Patient is returning a missed call. Patient states he is scheduled 11/12 and is needing to re-schedule appointment.

## 2024-10-15 NOTE — TELEPHONE ENCOUNTER
Patient called stating he needs to reschedule his appointment on 11/12/2024 for his Cimzia injection.

## 2024-10-31 ENCOUNTER — TELEPHONE (OUTPATIENT)
Dept: NEUROLOGY | Facility: CLINIC | Age: 74
End: 2024-10-31

## 2024-11-08 ENCOUNTER — OFFICE VISIT (OUTPATIENT)
Dept: PAIN CLINIC | Facility: HOSPITAL | Age: 74
End: 2024-11-08
Attending: ANESTHESIOLOGY
Payer: MEDICARE

## 2024-11-08 VITALS
HEART RATE: 61 BPM | WEIGHT: 220 LBS | OXYGEN SATURATION: 93 % | BODY MASS INDEX: 32 KG/M2 | DIASTOLIC BLOOD PRESSURE: 60 MMHG | SYSTOLIC BLOOD PRESSURE: 90 MMHG

## 2024-11-08 DIAGNOSIS — M25.561 BILATERAL CHRONIC KNEE PAIN: ICD-10-CM

## 2024-11-08 DIAGNOSIS — G89.29 OTHER CHRONIC PAIN: ICD-10-CM

## 2024-11-08 DIAGNOSIS — G89.29 BILATERAL CHRONIC KNEE PAIN: ICD-10-CM

## 2024-11-08 DIAGNOSIS — M25.562 BILATERAL CHRONIC KNEE PAIN: ICD-10-CM

## 2024-11-08 DIAGNOSIS — M54.50 LOW BACK PAIN AT MULTIPLE SITES: ICD-10-CM

## 2024-11-08 RX ORDER — HYDROCODONE BITARTRATE AND ACETAMINOPHEN 10; 325 MG/1; MG/1
1 TABLET ORAL EVERY 6 HOURS PRN
Qty: 120 TABLET | Refills: 0 | Status: SHIPPED | OUTPATIENT
Start: 2024-11-11 | End: 2024-12-11

## 2024-11-08 RX ORDER — HYDROCODONE BITARTRATE AND ACETAMINOPHEN 10; 325 MG/1; MG/1
1 TABLET ORAL EVERY 6 HOURS PRN
Qty: 120 TABLET | Refills: 0 | Status: SHIPPED | OUTPATIENT
Start: 2024-12-11 | End: 2025-01-10

## 2024-11-08 NOTE — CHRONIC PAIN
Initial Consultation Note      HISTORY OF PRESENT ILLNESS:  Moreno Khalil is a 70 year old old male referred to the pain clinic  for evaluation treatment of his low back and cervical neck pain .  Patient has ankylosing spondylitis along with degenerative arthritis of his knees he continues to take 4 Norco a day for pain control Moreno also has bilateral knee arthritis in need of replacement but is hold off on as long as he can he takes 4 Norco a day for pain control no side effects still decreases pain by 50 to 60% throughout the day he is walking a little bit slower these days and little more kyphotic in nature.  Moreno had a cervical epidural steroid injection as well as lumbar injections in the past was pretty good results in the past.  The medication as well as intermittent and injections have helped decrease his pain allows him to be more functionable.    Since his last visit he was started on an immunologic medications for his ankylosing spondylitis as well as Botox injection by the neurologist which has helped  PAIN COURSE AND PREVIOUS INTERVENTIONS:    Interventions: Epidural injection  Adjuvants: Small amounts Norco every day    BLOOD THINNING MEDICATIONS:  None    CURRENT MEDICATIONS:  Current Outpatient Medications   Medication Sig Dispense Refill    [START ON 11/8/2020] HYDROcodone-acetaminophen  MG Oral Tab Take 1 tablet by mouth every 8 (eight) hours as needed for Pain (max 3/day). 90 tablet 0    [START ON 10/9/2020] HYDROcodone-acetaminophen  MG Oral Tab Take 1 tablet by mouth every 8 (eight) hours as needed for Pain (MAX 3/DAY). 90 tablet 0    HYDROCHLOROTHIAZIDE 25 MG Oral Tab Take 1 tablet by mouth once daily 90 tablet 0    metoprolol Tartrate 25 MG Oral Tab Take 1 tablet (25 mg total) by mouth 2 (two) times daily. 180 tablet 1    simvastatin 20 MG Oral Tab Take 1 tablet (20 mg total) by mouth every evening. 90 tablet 1    ibuprofen 600 MG Oral Tab TAKE 1 TABLET EVERY 12 HOURS AS  NEEDED FOR PAIN (SUBSTITUTED FOR MOTRIN) 180 tablet 0    Tadalafil 20 MG Oral Tab Take 1 tablet (20 mg total) by mouth daily as needed for Erectile Dysfunction. 10 tablet 11        ALLERGIES:  No Known Allergies    SURGICAL HISTORY:  Past Surgical History:   Procedure Laterality Date    APPENDECTOMY  1963    KNEE ARTHROSCOPY      per NG: bilat knees 2x each; arthroscopy - brash    PROSTATE BIOPSIES  2013    REPAIR ROTATOR CUFF,ACUTE Right 2007    TONSILLECTOMY         REVIEW OF SYSTEMS:   A comprehensive review of systems was negative except for:   MEDICAL HISTORY:  Patient Active Problem List:     Prostate cancer (HCC)     Erectile dysfunction     Cervical radicular pain     Mixed hyperlipidemia     Essential hypertension with goal blood pressure less than 140/90     Class 1 obesity due to excess calories with serious comorbidity in adult     Physical exam, annual     Decreased visual acuity    Past Medical History:   Diagnosis Date    Appendicitis 1963    Back problem     Benign prostatic hypertrophy     Carpal tunnel syndrome     Dysplastic nevus 2017    left flank    Elevated PSA 2008    bx negative for cancer    H/O arthroscopy of knee bilateral    History of brachytherapy     Neck pain     Obesity, unspecified     Prostate cancer (HCC)     brachytherapy    PVD (peripheral vascular disease) ([facility])     Rotator cuff tear 2007    right    Rotator cuff tear     R - 2007    Tonsillitis 1960       FAMILY HISTORY:  Family History   Problem Relation Age of Onset    Diabetes Mother     Diabetes Sister     Obesity Sister     Lipids Other         family h/o hyperlipidemia and vascular disease       SOCIAL HISTORY:  Social History    Socioeconomic History      Marital status:       Spouse name: Not on file      Number of children: 2      Years of education: Not on file      Highest education level: Not on file    Occupational History      Occupation: ordering        Comment: retired    Social Needs      Financial  resource strain: Not on file      Food insecurity        Worry: Not on file        Inability: Not on file      Transportation needs        Medical: Not on file        Non-medical: Not on file    Tobacco Use      Smoking status: Never Smoker      Smokeless tobacco: Never Used    Substance and Sexual Activity      Alcohol use: No        Alcohol/week: 0.0 standard drinks      Drug use: No      Sexual activity: Not on file    Lifestyle      Physical activity        Days per week: Not on file        Minutes per session: Not on file      Stress: Not on file    Relationships      Social connections        Talks on phone: Not on file        Gets together: Not on file        Attends Rastafarian service: Not on file        Active member of club or organization: Not on file        Attends meetings of clubs or organizations: Not on file        Relationship status: Not on file      Intimate partner violence        Fear of current or ex partner: Not on file        Emotionally abused: Not on file        Physically abused: Not on file        Forced sexual activity: Not on file    Other Topics      Concerns:         Service: Not Asked        Blood Transfusions: Not Asked        Caffeine Concern: Yes          soda, 1 liter/day        Occupational Exposure: Not Asked        Hobby Hazards: Not Asked        Sleep Concern: Not Asked        Stress Concern: Not Asked        Weight Concern: Not Asked        Special Diet: Not Asked        Back Care: Not Asked        Exercise: Not Asked        Bike Helmet: Not Asked        Seat Belt: Not Asked        Self-Exams: Not Asked        Grew up on a farm: Not Asked        History of tanning: Not Asked        Outdoor occupation: Not Asked        Pt has a pacemaker: No        Pt has a defibrillator: No        Reaction to local anesthetic: No    Social History Narrative      Not on file      ADVANCE CARE PLANNING:  Advance Care Plan NOT discussed.    PHYSICAL EXAMINATION:  There were no vitals  filed for this visit.  General: Alert and oriented x3  Affect:  NAD  Head: normocephalic, atraumatic  Eyes: anicteric; no injection  Chest: S1, S2, RRR  Respiratory: CTAB  Gait: Normal; cane user - No  Spine: Normal    ROM:   Lumbar spine  Flexion  dec  Extension dec  Cervical Spine  Flexion dec  Extensiondec  MOTOR EXAMINATION:  UPPER EXTREMITY      LEFT RIGHT   Deltoid 5/5 5/5   Biceps 5/5 5/5   Triceps 5/5 5/5   Brachioradialis 5/5 5/5   Wrist Flexors 5/5 5/5   Wrist Extensors 5/5 5/5   Intrinsic Hand 5/5 5/5    5/5 5/5     LOWER EXTREMITY      LEFT RIGHT   Iliopsoas 5/5 5/5   Quadriceps 5/5 5/5   Foot DF 5/5 5/5   Foot EHL 5/5 5/5   Gastrocnemius 5/5 5/5     PULSES      LEFT RIGHT   Radial 2/4 2/4   Dorsalis Pedis 2/4 2/4   Posterior Tibial 2/4 2/4   Brachial 2/4 2/4     SLR: negative  SIJ tenderness negative  Joint Exam: Normal  TPs:  Present within lumbo-sacral paraspinal muscles  Left occipital region is very tender to palpation can replicates the pain that he gets in the back of his head  Right Deep tendon reflexes: Normal   Left Deep tendon reflexes: Normal   Babinski Reflex: absent bilaterally   Temperature:  normal to touch bilateral upper and lower extremities  Edema - Absent  Stasis ulcer on the left leg is healed    Sensation (light touch/pinprick/temperature):     Right Lower Extremity:  Normal  Left Lower Extremity: Normal  Right Upper Extremity:  Normal  Left Upper Extremity:  Normal    IMAGING:  No results found.    LABS:  Lab Results   Component Value Date    WBC 10.3 07/06/2020    RBC 5.45 07/06/2020    HGB 14.1 07/06/2020    HCT 45.2 07/06/2020    MCV 82.9 07/06/2020    MCH 25.9 (L) 07/06/2020    MCHC 31.2 07/06/2020    RDW 19.0 (H) 07/06/2020    .0 07/06/2020    MPV 8.1 06/28/2018     Lab Results   Component Value Date     07/06/2020    K 3.8 07/06/2020     07/06/2020    CO2 29.0 07/06/2020    BUN 25 (H) 07/06/2020    GLU 92 07/06/2020    CA 8.8 07/06/2020     No results  found for: PT    [place] [facility] MONITORING PROGRAM REVIEWED  Yes      ASSESSMENT:   70 year old old male with cervical and lumbar spinal stenosis   Has responded in the past epidural injections cervical and lumbar  Started on the immunologic for his ankylosing spondylitis which has helped his pain  Low back pain bilateral radicular pain  Chronic use  of Norco a day for pain control     Botox injections are helping the cervical pain     Bilateral knee pain the care of Dr. Singh for that DJD bilateral knee  Recent injections for the knees        PLAN:  RECOMMENDATIONS:  4 Norco a day continue with the [place]  Continue with rheumatology and orthopedic care  No need for injection therapy at this point  Continue with Botox injection per neuro cervical    Epidurals for cervical is still benefiting him no need for repeat as and at this time  Bilateral knee pain he is to see orthopedic surgeon status post Synvisc injections which is helped also trying hyaluronic acid injections current will order x-ray of bilateral knees

## 2024-11-08 NOTE — PROGRESS NOTES
Patient presents in office today with reported pain in neck and lower back and bilateral knee    Current pain level reported = 5-6/10     Last reported dose of Norco this morning        Narcotic Contract renewal 09.06.25     Urine Drug screen 09.06.24     Increased bilateral knee pain

## 2024-11-08 NOTE — PATIENT INSTRUCTIONS
Refill policies:    Allow 2-3 business days for refills; controlled substances may take longer.  Contact your pharmacy at least 5 days prior to running out of medication and have them send an electronic request or submit request through the “request refill” option in your [employer] account.  Refills are not addressed on weekends; covering physicians do not authorize routine medications on weekends.  No narcotics or controlled substances are refilled after noon on Fridays or by on call physicians.  By law, narcotics must be electronically prescribed.  A 30 day supply with no refills is the maximum allowed.  If your prescription is due for a refill, you may be due for a follow up appointment.  To best provide you care, patients receiving routine medications need to be seen at least once a year.  Patients receiving narcotic/controlled substance medications need to be seen at least once every 3 months.  In the event that your preferred pharmacy does not have the requested medication in stock (e.g. Backordered), it is your responsibility to find another pharmacy that has the requested medication available.  We will gladly send a new prescription to that pharmacy at your request.    Scheduling Tests:    If your physician has ordered radiology tests such as MRI or CT scans, please contact Central Scheduling at [phone] right away to schedule the test.  Once scheduled, the [facility] Centralized Referral Team will work with your insurance carrier to obtain pre-certification or prior authorization.  Depending on your insurance carrier, approval may take 3-10 days.  It is highly recommended patients assure they have received an authorization before having a test performed.  If test is done without insurance authorization, patient may be responsible for the entire amount billed.      Precertification and Prior Authorizations:  If your physician has recommended that you have a procedure or additional testing performed the [facility]  Centralized Referral Team will contact your insurance carrier to obtain pre-certification or prior authorization.    You are strongly encouraged to contact your insurance carrier to verify that your procedure/test has been approved and is a COVERED benefit.  Although the [facility] Centralized Referral Team does its due diligence, the insurance carrier gives the disclaimer that \"Although the procedure is authorized, this does not guarantee payment.\"    Ultimately the patient is responsible for payment.   Thank you for your understanding in this matter.  Paperwork Completion:  If you require FMLA or disability paperwork for your recovery, please make sure to either drop it off or have it faxed to our office at [phone]. Be sure the form has your name and date of birth on it.  The form will be faxed to our Forms Department and they will complete it for you.  There is a 25$ fee for all forms that need to be filled out.  Please be aware there is a 10-14 day turnaround time.  You will need to sign a release of information (SYL) form if your paperwork does not come with one.  You may call the Forms Department with any questions at [phone].  Their fax number is [phone].

## 2024-11-12 ENCOUNTER — OFFICE VISIT (OUTPATIENT)
Dept: NEUROLOGY | Facility: CLINIC | Age: 74
End: 2024-11-12
Payer: MEDICARE

## 2024-11-12 DIAGNOSIS — G24.3 CERVICAL DYSTONIA: Primary | ICD-10-CM

## 2024-11-13 ENCOUNTER — MED REC SCAN ONLY (OUTPATIENT)
Dept: NEUROLOGY | Facility: CLINIC | Age: 74
End: 2024-11-13

## 2024-11-14 ENCOUNTER — NURSE ONLY (OUTPATIENT)
Dept: RHEUMATOLOGY | Facility: CLINIC | Age: 74
End: 2024-11-14

## 2024-11-14 ENCOUNTER — TELEPHONE (OUTPATIENT)
Dept: RHEUMATOLOGY | Facility: CLINIC | Age: 74
End: 2024-11-14

## 2024-11-14 DIAGNOSIS — M45.3 ANKYLOSING SPONDYLITIS OF CERVICOTHORACIC REGION (HCC): Primary | ICD-10-CM

## 2024-11-14 PROCEDURE — 96372 THER/PROPH/DIAG INJ SC/IM: CPT | Performed by: INTERNAL MEDICINE

## 2024-11-14 NOTE — PROGRESS NOTES
Verified name and . Patient aware receiving Cimzia injection. Injections given. Patient tolerated well. Patient aware to follow up in 1 month for next injection.      Patient does note at visit that while the cimzia has helped he a lot, he notices a little decrease in effectiveness after 3 weeks.       Future Appointments   Date Time Provider Department Center   2024 10:00 AM [facility]   2025 12:30 PM Moses Mclaughlin MD [facility] PAIN [facility]   2/3/2025 10:30 AM [facility]   2025 10:30 AM Alex Hull MD [facility]   2025 10:30 AM [facility]   3/17/2025 11:00 AM Sheron Dalton MD [facility]

## 2024-11-18 ENCOUNTER — TELEPHONE (OUTPATIENT)
Dept: INTERNAL MEDICINE CLINIC | Facility: CLINIC | Age: 74
End: 2024-11-18

## 2024-11-18 DIAGNOSIS — I10 ESSENTIAL HYPERTENSION WITH GOAL BLOOD PRESSURE LESS THAN 140/90: ICD-10-CM

## 2024-11-18 RX ORDER — AMLODIPINE BESYLATE 5 MG/1
5 TABLET ORAL 2 TIMES DAILY
Qty: 180 TABLET | Refills: 3 | Status: CANCELLED | OUTPATIENT
Start: 2024-11-18

## 2024-11-18 NOTE — TELEPHONE ENCOUNTER
Patient is requesting refill for amLODIPine 5 MG Oral Tab        [id number] - [place], [place] - [address] AT [facility] [address], [phone], [phone]

## 2024-11-19 RX ORDER — AMLODIPINE BESYLATE 5 MG/1
5 TABLET ORAL DAILY
Qty: 90 TABLET | Refills: 0 | Status: SHIPPED | OUTPATIENT
Start: 2024-11-19

## 2024-11-19 RX ORDER — AMLODIPINE BESYLATE 10 MG/1
10 TABLET ORAL DAILY
Qty: 90 TABLET | Refills: 0 | Status: CANCELLED | OUTPATIENT
Start: 2024-11-19

## 2024-11-19 NOTE — TELEPHONE ENCOUNTER
Please review; protocol failed/No Protocol    Medication is listed as patient reported.    Requested Prescriptions   Pending Prescriptions Disp Refills    amLODIPine 5 MG Oral Tab 90 tablet 0     Sig: Take 1 tablet (5 mg total) by mouth daily.       There is no refill protocol information for this order        Future Appointments         Provider Department Appt Notes    In 3 weeks [facility] RN RHEUMATOLOGY [facility] Cimzia injection    In 1 month Moses Mclaughlin MD [facility] Pain Management 2 Month f/u    In 2 months Devante Bianchi MD [facility] 6 month    In 3 months Alex Hull MD [facility]     In 3 months Xavi Johnston MD [facility] Constipation, Bloating (Policy informed)    In 3 months Sheron Dalton MD [facility] +INJ f/u          Recent Outpatient Visits              5 days ago Ankylosing spondylitis of cervicothoracic region ([facility])    [facility]    Nurse Only    1 week ago Cervical dystonia    [facility] Chda Manuel MD    Office Visit    1 week ago Other chronic pain    [facility] Moses Mclaughlin MD    Office Visit    1 month ago Ankylosing spondylitis of cervicothoracic region ([facility])    [facility]    Nurse Only    2 months ago Ankylosing spondylitis of cervicothoracic region ([facility])    [facility] Sheron Dalton MD    Office Visit

## 2024-12-12 ENCOUNTER — NURSE ONLY (OUTPATIENT)
Dept: RHEUMATOLOGY | Facility: CLINIC | Age: 74
End: 2024-12-12

## 2024-12-12 DIAGNOSIS — M45.3 ANKYLOSING SPONDYLITIS OF CERVICOTHORACIC REGION (HCC): Primary | ICD-10-CM

## 2024-12-12 PROCEDURE — 96372 THER/PROPH/DIAG INJ SC/IM: CPT | Performed by: INTERNAL MEDICINE

## 2024-12-12 NOTE — PROGRESS NOTES
Verified name and . Patient aware receiving Cimzia injection. Injections given. Patient tolerated well. Patient aware to follow up in 1 month for next injection.    Future Appointments   Date Time Provider Department Center   2025 12:30 PM Moses Mclaughlin MD [facility] PAIN [facility]   2025 10:00 AM [facility] RN RHEUMATOLOGY [facility]   2/3/2025 10:30 AM [facility]   2025 10:30 AM Alex Hull MD [facility]   2025 10:30 AM [facility]   3/17/2025 11:00 AM Sheron Dalton MD [facility]

## 2025-01-06 ENCOUNTER — OFFICE VISIT (OUTPATIENT)
Dept: PAIN CLINIC | Facility: HOSPITAL | Age: 75
End: 2025-01-06
Attending: ANESTHESIOLOGY
Payer: MEDICARE

## 2025-01-06 VITALS — SYSTOLIC BLOOD PRESSURE: 127 MMHG | DIASTOLIC BLOOD PRESSURE: 78 MMHG | HEART RATE: 52 BPM | OXYGEN SATURATION: 95 %

## 2025-01-06 DIAGNOSIS — M54.50 LOW BACK PAIN AT MULTIPLE SITES: ICD-10-CM

## 2025-01-06 DIAGNOSIS — M25.561 BILATERAL CHRONIC KNEE PAIN: ICD-10-CM

## 2025-01-06 DIAGNOSIS — G89.29 OTHER CHRONIC PAIN: ICD-10-CM

## 2025-01-06 DIAGNOSIS — G89.29 BILATERAL CHRONIC KNEE PAIN: ICD-10-CM

## 2025-01-06 DIAGNOSIS — M25.562 BILATERAL CHRONIC KNEE PAIN: ICD-10-CM

## 2025-01-06 PROCEDURE — 99214 OFFICE O/P EST MOD 30 MIN: CPT | Performed by: ANESTHESIOLOGY

## 2025-01-06 RX ORDER — HYDROCODONE BITARTRATE AND ACETAMINOPHEN 10; 325 MG/1; MG/1
1 TABLET ORAL EVERY 6 HOURS PRN
Qty: 120 TABLET | Refills: 0 | Status: SHIPPED | OUTPATIENT
Start: 2025-01-10 | End: 2025-02-09

## 2025-01-06 RX ORDER — HYDROCODONE BITARTRATE AND ACETAMINOPHEN 10; 325 MG/1; MG/1
1 TABLET ORAL EVERY 6 HOURS PRN
Qty: 120 TABLET | Refills: 0 | Status: SHIPPED | OUTPATIENT
Start: 2025-02-09 | End: 2025-03-11

## 2025-01-06 NOTE — CHRONIC PAIN
Initial Consultation Note      HISTORY OF PRESENT ILLNESS:  Moreno Khalil is a 70 year old old male referred to the pain clinic  for evaluation treatment of his low back and cervical neck pain .  Patient has ankylosing spondylitis along with degenerative arthritis of his knees he continues to take 4 Norco a day for pain control Moreno also has bilateral knee arthritis in need of replacement but is hold off on as long as he can he takes 4 Norco a day for pain control no side effects still decreases pain by 50 to 60% throughout the day he is walking a little bit slower these days and little more kyphotic in nature.  Moreno had a cervical epidural steroid injection as well as lumbar injections in the past was pretty good results in the past.  The medication as well as intermittent and injections have helped decrease his pain allows him to be more functionable.    Since his last visit he was started on an immunologic medications for his ankylosing spondylitis as well as Botox injection by the neurologist which has helped  PAIN COURSE AND PREVIOUS INTERVENTIONS:    Interventions: Epidural injection  Adjuvants: Small amounts Norco every day    BLOOD THINNING MEDICATIONS:  None    CURRENT MEDICATIONS:  Current Outpatient Medications   Medication Sig Dispense Refill    [START ON 11/8/2020] HYDROcodone-acetaminophen  MG Oral Tab Take 1 tablet by mouth every 8 (eight) hours as needed for Pain (max 3/day). 90 tablet 0    [START ON 10/9/2020] HYDROcodone-acetaminophen  MG Oral Tab Take 1 tablet by mouth every 8 (eight) hours as needed for Pain (MAX 3/DAY). 90 tablet 0    HYDROCHLOROTHIAZIDE 25 MG Oral Tab Take 1 tablet by mouth once daily 90 tablet 0    metoprolol Tartrate 25 MG Oral Tab Take 1 tablet (25 mg total) by mouth 2 (two) times daily. 180 tablet 1    simvastatin 20 MG Oral Tab Take 1 tablet (20 mg total) by mouth every evening. 90 tablet 1    ibuprofen 600 MG Oral Tab TAKE 1 TABLET EVERY 12 HOURS AS  NEEDED FOR PAIN (SUBSTITUTED FOR MOTRIN) 180 tablet 0    Tadalafil 20 MG Oral Tab Take 1 tablet (20 mg total) by mouth daily as needed for Erectile Dysfunction. 10 tablet 11        ALLERGIES:  No Known Allergies    SURGICAL HISTORY:  Past Surgical History:   Procedure Laterality Date    APPENDECTOMY  1963    KNEE ARTHROSCOPY      per NG: bilat knees 2x each; arthroscopy - brash    PROSTATE BIOPSIES  2013    REPAIR ROTATOR CUFF,ACUTE Right 2007    TONSILLECTOMY         REVIEW OF SYSTEMS:   A comprehensive review of systems was negative except for:   MEDICAL HISTORY:  Patient Active Problem List:     Prostate cancer (HCC)     Erectile dysfunction     Cervical radicular pain     Mixed hyperlipidemia     Essential hypertension with goal blood pressure less than 140/90     Class 1 obesity due to excess calories with serious comorbidity in adult     Physical exam, annual     Decreased visual acuity    Past Medical History:   Diagnosis Date    Appendicitis 1963    Back problem     Benign prostatic hypertrophy     Carpal tunnel syndrome     Dysplastic nevus 2017    left flank    Elevated PSA 2008    bx negative for cancer    H/O arthroscopy of knee bilateral    History of brachytherapy     Neck pain     Obesity, unspecified     Prostate cancer (HCC)     brachytherapy    PVD (peripheral vascular disease) ([facility])     Rotator cuff tear 2007    right    Rotator cuff tear     R - 2007    Tonsillitis 1960       FAMILY HISTORY:  Family History   Problem Relation Age of Onset    Diabetes Mother     Diabetes Sister     Obesity Sister     Lipids Other         family h/o hyperlipidemia and vascular disease       SOCIAL HISTORY:  Social History    Socioeconomic History      Marital status:       Spouse name: Not on file      Number of children: 2      Years of education: Not on file      Highest education level: Not on file    Occupational History      Occupation: ordering        Comment: retired    Social Needs      Financial  resource strain: Not on file      Food insecurity        Worry: Not on file        Inability: Not on file      Transportation needs        Medical: Not on file        Non-medical: Not on file    Tobacco Use      Smoking status: Never Smoker      Smokeless tobacco: Never Used    Substance and Sexual Activity      Alcohol use: No        Alcohol/week: 0.0 standard drinks      Drug use: No      Sexual activity: Not on file    Lifestyle      Physical activity        Days per week: Not on file        Minutes per session: Not on file      Stress: Not on file    Relationships      Social connections        Talks on phone: Not on file        Gets together: Not on file        Attends Protestant service: Not on file        Active member of club or organization: Not on file        Attends meetings of clubs or organizations: Not on file        Relationship status: Not on file      Intimate partner violence        Fear of current or ex partner: Not on file        Emotionally abused: Not on file        Physically abused: Not on file        Forced sexual activity: Not on file    Other Topics      Concerns:         Service: Not Asked        Blood Transfusions: Not Asked        Caffeine Concern: Yes          soda, 1 liter/day        Occupational Exposure: Not Asked        Hobby Hazards: Not Asked        Sleep Concern: Not Asked        Stress Concern: Not Asked        Weight Concern: Not Asked        Special Diet: Not Asked        Back Care: Not Asked        Exercise: Not Asked        Bike Helmet: Not Asked        Seat Belt: Not Asked        Self-Exams: Not Asked        Grew up on a farm: Not Asked        History of tanning: Not Asked        Outdoor occupation: Not Asked        Pt has a pacemaker: No        Pt has a defibrillator: No        Reaction to local anesthetic: No    Social History Narrative      Not on file      ADVANCE CARE PLANNING:  Advance Care Plan NOT discussed.    PHYSICAL EXAMINATION:  There were no vitals  filed for this visit.  General: Alert and oriented x3  Affect:  NAD  Head: normocephalic, atraumatic  Eyes: anicteric; no injection  Chest: S1, S2, RRR  Respiratory: CTAB  Gait: Normal; cane user - No  Spine: Normal    ROM:   Lumbar spine  Flexion  dec  Extension dec  Cervical Spine  Flexion dec  Extensiondec  MOTOR EXAMINATION:  UPPER EXTREMITY      LEFT RIGHT   Deltoid 5/5 5/5   Biceps 5/5 5/5   Triceps 5/5 5/5   Brachioradialis 5/5 5/5   Wrist Flexors 5/5 5/5   Wrist Extensors 5/5 5/5   Intrinsic Hand 5/5 5/5    5/5 5/5     LOWER EXTREMITY      LEFT RIGHT   Iliopsoas 5/5 5/5   Quadriceps 5/5 5/5   Foot DF 5/5 5/5   Foot EHL 5/5 5/5   Gastrocnemius 5/5 5/5     PULSES      LEFT RIGHT   Radial 2/4 2/4   Dorsalis Pedis 2/4 2/4   Posterior Tibial 2/4 2/4   Brachial 2/4 2/4     SLR: negative  SIJ tenderness negative  Joint Exam: Normal  TPs:  Present within lumbo-sacral paraspinal muscles  Left occipital region i nd right occipital region is very tender to palpation can replicates the pain that he gets in the back of his head  Right Deep tendon reflexes: Normal   Left Deep tendon reflexes: Normal   Babinski Reflex: absent bilaterally   Temperature:  normal to touch bilateral upper and lower extremities  Edema - Absent  Stasis ulcer on the left leg is healed    Sensation (light touch/pinprick/temperature):     Right Lower Extremity:  Normal  Left Lower Extremity: Normal  Right Upper Extremity:  Normal  Left Upper Extremity:  Normal    IMAGING:  No results found.    LABS:  Lab Results   Component Value Date    WBC 10.3 07/06/2020    RBC 5.45 07/06/2020    HGB 14.1 07/06/2020    HCT 45.2 07/06/2020    MCV 82.9 07/06/2020    MCH 25.9 (L) 07/06/2020    MCHC 31.2 07/06/2020    RDW 19.0 (H) 07/06/2020    .0 07/06/2020    MPV 8.1 06/28/2018     Lab Results   Component Value Date     07/06/2020    K 3.8 07/06/2020     07/06/2020    CO2 29.0 07/06/2020    BUN 25 (H) 07/06/2020    GLU 92 07/06/2020    CA  8.8 07/06/2020     No results found for: PT    [facility] MONITORING PROGRAM REVIEWED  Yes      ASSESSMENT:   70 year old old male with cervical and lumbar spinal stenosis   Has responded in the past epidural injections cervical and lumbar  Started on the immunologic for his ankylosing spondylitis which has helped his pain  Low back pain bilateral radicular pain  Chronic use  of Norco a day for pain control     Botox injections are helping the cervical pain     Bilateral knee pain the care of Dr. Singh for that DJD bilateral knee  Recent injections for the knees        PLAN:  RECOMMENDATIONS:  4 Norco a day continue with the [place]  Continue with rheumatology and orthopedic care  No need for injection therapy at this point  Continue with Botox injection per neuro cervical    Epidurals for cervical is still benefiting him no need for repeat as and at this time  Bilateral knee pain he is to see orthopedic surgeon status post Synvisc injections which is helped also trying hyaluronic acid injections current will order x-ray of bilateral knees

## 2025-01-06 NOTE — PATIENT INSTRUCTIONS
Refill policies:    Allow 2-3 business days for refills; controlled substances may take longer.  Contact your pharmacy at least 5 days prior to running out of medication and have them send an electronic request or submit request through the “request refill” option in your [employer] account.  Refills are not addressed on weekends; covering physicians do not authorize routine medications on weekends.  No narcotics or controlled substances are refilled after noon on Fridays or by on call physicians.  By law, narcotics must be electronically prescribed.  A 30 day supply with no refills is the maximum allowed.  If your prescription is due for a refill, you may be due for a follow up appointment.  To best provide you care, patients receiving routine medications need to be seen at least once a year.  Patients receiving narcotic/controlled substance medications need to be seen at least once every 3 months.  In the event that your preferred pharmacy does not have the requested medication in stock (e.g. Backordered), it is your responsibility to find another pharmacy that has the requested medication available.  We will gladly send a new prescription to that pharmacy at your request.    Scheduling Tests:    If your physician has ordered radiology tests such as MRI or CT scans, please contact Central Scheduling at [phone] right away to schedule the test.  Once scheduled, the [facility] Centralized Referral Team will work with your insurance carrier to obtain pre-certification or prior authorization.  Depending on your insurance carrier, approval may take 3-10 days.  It is highly recommended patients assure they have received an authorization before having a test performed.  If test is done without insurance authorization, patient may be responsible for the entire amount billed.      Precertification and Prior Authorizations:  If your physician has recommended that you have a procedure or additional testing performed the [facility]  Centralized Referral Team will contact your insurance carrier to obtain pre-certification or prior authorization.    You are strongly encouraged to contact your insurance carrier to verify that your procedure/test has been approved and is a COVERED benefit.  Although the [facility] Centralized Referral Team does its due diligence, the insurance carrier gives the disclaimer that \"Although the procedure is authorized, this does not guarantee payment.\"    Ultimately the patient is responsible for payment.   Thank you for your understanding in this matter.  Paperwork Completion:  If you require FMLA or disability paperwork for your recovery, please make sure to either drop it off or have it faxed to our office at [phone]. Be sure the form has your name and date of birth on it.  The form will be faxed to our Forms Department and they will complete it for you.  There is a 25$ fee for all forms that need to be filled out.  Please be aware there is a 10-14 day turnaround time.  You will need to sign a release of information (SYL) form if your paperwork does not come with one.  You may call the Forms Department with any questions at [phone].  Their fax number is [phone].

## 2025-01-06 NOTE — PROGRESS NOTES
Patient presents in office today with reported pain in neck and lower back and bilateral knee     Current pain level reported = 4-5/10     Last reported dose of Norco this morning        Narcotic Contract renewal 09.06.25     Urine Drug screen 09.06.24     Increased pain due to the weather

## 2025-01-09 ENCOUNTER — NURSE ONLY (OUTPATIENT)
Dept: RHEUMATOLOGY | Facility: CLINIC | Age: 75
End: 2025-01-09

## 2025-01-09 DIAGNOSIS — M45.3 ANKYLOSING SPONDYLITIS OF CERVICOTHORACIC REGION (HCC): Primary | ICD-10-CM

## 2025-01-09 PROCEDURE — 96372 THER/PROPH/DIAG INJ SC/IM: CPT | Performed by: INTERNAL MEDICINE

## 2025-01-09 NOTE — PROGRESS NOTES
Verified name and . Patient aware receiving Cimzia injection. Injections given in bilateral lower abdomen. Patient tolerated well. Patient aware to follow up in 1 month for next injection.    Future Appointments   Date Time Provider Department Center   2/3/2025 10:30 AM [facility]   2025 10:00 AM [facility] RN RHEUMATOLOGY [facility]   2025 10:30 AM Alex Hull MD [facility]   2025 10:30 AM [facility]   3/17/2025 11:00 AM Sheron Dalton MD [facility]

## 2025-01-11 RX ORDER — HYDROCHLOROTHIAZIDE 25 MG/1
25 TABLET ORAL DAILY
Qty: 90 TABLET | Refills: 3 | Status: SHIPPED | OUTPATIENT
Start: 2025-01-11

## 2025-01-11 NOTE — TELEPHONE ENCOUNTER
Patient only has 2 pills remaining. This was previously prescribed by  who has released patient. Please see notes below.

## 2025-01-11 NOTE — TELEPHONE ENCOUNTER
Patient would like to ask  if he could refill Rx hydrochlorothiazide 25MG. Patient said he called 's office and patient said 's office released him. Patient has upcoming appointment with  on 2/17/25. Patient said he will be out of medication in 2 days. Please advise             Current Outpatient Medications   Medication Sig Dispense Refill    hydroCHLOROthiazide 25 MG Oral Tab Take 1 tablet (25 mg total) by mouth daily.

## 2025-01-11 NOTE — TELEPHONE ENCOUNTER
Refill passed per [facility] protocol.    Requested Prescriptions   Pending Prescriptions Disp Refills    hydroCHLOROthiazide 25 MG Oral Tab 90 tablet 3     Sig: Take 1 tablet (25 mg total) by mouth daily.       Hypertension Medications Protocol Passed - 1/11/2025 10:10 AM        Passed - CMP or BMP in past 12 months        Passed - Last BP reading less than 140/90     BP Readings from Last 1 Encounters:   01/06/25 127/78               Passed - In person appointment or virtual visit in the past 12 mos or appointment in next 3 mos     Recent Outpatient Visits              2 days ago Ankylosing spondylitis of cervicothoracic region ([facility])    [facility]    Nurse Only    5 days ago Other chronic pain    [facility] Moses Mclaughlin MD    Office Visit    1 month ago Ankylosing spondylitis of cervicothoracic region ([facility])    [facility]    Nurse Only    1 month ago Ankylosing spondylitis of cervicothoracic region ([facility])    [facility]    Nurse Only    2 months ago Cervical dystonia    [facility] Chad Manuel MD    Office Visit          Future Appointments         Provider Department Appt Notes    In 3 weeks Devante Bianchi MD [facility] 6 month    In 3 weeks [facility] RN RHEUMATOLOGY [facility] Cimzia injection    In 1 month Chad Manuel MD [facility] Insurance: Medicare Part B  Member ID# [id number]   Medication: Botox 300 units -increased per Dr. Manuel  Number of visits Approved: 1 (PLEASE INFORM THE PATIENT TO CONTACT THE OFFICE IF HE INSURANCE CHANGES WITHIN THE YEAR AS HE/SHE WILL BE RESPONSIBLE TO UPDATE THE OFFICE IF INSURANCE CHANGES SO THAT PROCEDURE IS BILLED CORRECTLY)  this will be 1 of 1  Dx: G24.3   Last Botox done: 11/12/24  Next Botox due around: 2/12/25  Authorization # N/A  BUY&BILL    In 1 month Alex Hull MD [facility]     In 1 month Xavi Johnston MD [facility] Constipation, Bloating (Policy informed)    In 2 months Sheron Dalton MD [facility] +INJ f/u                    Passed - EGFRCR or GFRNAA > 50     GFR Evaluation  EGFRCR: 57 , resulted on 9/13/2024          Passed - Medication is active on med list             Recent Outpatient Visits              2 days ago Ankylosing spondylitis of cervicothoracic region ([facility])    [facility]    Nurse Only    5 days ago Other chronic pain    [facility] Moses Mclaughlin MD    Office Visit    1 month ago Ankylosing spondylitis of cervicothoracic region ([facility])    [facility]    Nurse Only    1 month ago Ankylosing spondylitis of cervicothoracic region ([facility])    [facility]    Nurse Only    2 months ago Cervical dystonia    [facility] Chad Manuel MD    Office Visit            Future Appointments         Provider Department Appt Notes    In 3 weeks Devante Bianchi MD [facility] 6 month    In 3 weeks [facility] RN RHEUMATOLOGY [facility] Cimzia injection    In 1 month Chad Manuel MD [facility] Insurance: Medicare Part B  Member ID# [id number]   Medication: Botox 300 units -increased per Dr. Manuel  Number of visits Approved: 1 (PLEASE INFORM THE PATIENT TO CONTACT THE OFFICE IF HE INSURANCE CHANGES WITHIN THE YEAR AS HE/SHE WILL BE RESPONSIBLE TO UPDATE THE  OFFICE IF INSURANCE CHANGES SO THAT PROCEDURE IS BILLED CORRECTLY) this will be 1 of 1  Dx: G24.3   Last Botox done: 11/12/24  Next Botox due around: 2/12/25  Authorization # N/A  BUY&BILL    In 1 month Alex Hull MD [facility]     In 1 month Xavi Johnston MD [facility] Constipation, Bloating (Policy informed)    In 2 months Sheron Dalton MD [facility] +INJ f/u

## 2025-01-13 NOTE — TELEPHONE ENCOUNTER
Patient calling, for status of hydrochlorothiazide Refill request,verified name and date of birth.  Advised patient that refill was sent to [facility] on 1/11/24, call pharmacy to check on availability for .  Patient verbalizes understanding.

## 2025-02-06 ENCOUNTER — TELEPHONE (OUTPATIENT)
Dept: RHEUMATOLOGY | Facility: CLINIC | Age: 75
End: 2025-02-06

## 2025-02-06 ENCOUNTER — TELEPHONE (OUTPATIENT)
Dept: INTERNAL MEDICINE CLINIC | Facility: CLINIC | Age: 75
End: 2025-02-06

## 2025-02-06 DIAGNOSIS — E78.2 MIXED HYPERLIPIDEMIA: Primary | ICD-10-CM

## 2025-02-06 DIAGNOSIS — E66.811 CLASS 1 OBESITY DUE TO EXCESS CALORIES WITH SERIOUS COMORBIDITY IN ADULT, UNSPECIFIED BMI: ICD-10-CM

## 2025-02-06 DIAGNOSIS — R73.03 PREDIABETES: ICD-10-CM

## 2025-02-06 DIAGNOSIS — Z12.5 SCREENING PSA (PROSTATE SPECIFIC ANTIGEN): ICD-10-CM

## 2025-02-06 DIAGNOSIS — E66.09 CLASS 1 OBESITY DUE TO EXCESS CALORIES WITH SERIOUS COMORBIDITY IN ADULT, UNSPECIFIED BMI: ICD-10-CM

## 2025-02-06 DIAGNOSIS — I10 ESSENTIAL HYPERTENSION WITH GOAL BLOOD PRESSURE LESS THAN 140/90: ICD-10-CM

## 2025-02-06 NOTE — TELEPHONE ENCOUNTER
Patient is calling to advise he cancelled the 10 AM Cimzia shot appointment with the nurse today because of the sheet of ice and unable to drive out.   Patient is requesting to reschedule the appointment for this afternoon.

## 2025-02-07 ENCOUNTER — LAB ENCOUNTER (OUTPATIENT)
Dept: LAB | Facility: HOSPITAL | Age: 75
End: 2025-02-07
Attending: UROLOGY
Payer: MEDICARE

## 2025-02-07 ENCOUNTER — NURSE ONLY (OUTPATIENT)
Dept: RHEUMATOLOGY | Facility: CLINIC | Age: 75
End: 2025-02-07

## 2025-02-07 DIAGNOSIS — R97.21 BIOCHEMICALLY RECURRENT MALIGNANT NEOPLASM OF PROSTATE (HCC): ICD-10-CM

## 2025-02-07 DIAGNOSIS — C61 PROSTATE CANCER (HCC): ICD-10-CM

## 2025-02-07 DIAGNOSIS — C61 BIOCHEMICALLY RECURRENT MALIGNANT NEOPLASM OF PROSTATE (HCC): ICD-10-CM

## 2025-02-07 DIAGNOSIS — M45.3 ANKYLOSING SPONDYLITIS OF CERVICOTHORACIC REGION (HCC): Primary | ICD-10-CM

## 2025-02-07 DIAGNOSIS — Z92.3 HISTORY OF BRACHYTHERAPY: ICD-10-CM

## 2025-02-07 LAB — PSA SERPL-MCNC: 3.86 NG/ML (ref ?–4)

## 2025-02-07 PROCEDURE — 84153 ASSAY OF PSA TOTAL: CPT

## 2025-02-07 PROCEDURE — 36415 COLL VENOUS BLD VENIPUNCTURE: CPT

## 2025-02-07 PROCEDURE — 96372 THER/PROPH/DIAG INJ SC/IM: CPT | Performed by: INTERNAL MEDICINE

## 2025-02-07 NOTE — PROGRESS NOTES
Verified name and . Patient aware receiving Cimzia injection. Injections given.bilateral lower abdomen Patient tolerated well. Patient aware to follow up in 1 month for next injection.    Future Appointments   Date Time Provider Department [place]   2025 10:15 AM Chad Manuel MD [facility]   2025 10:30 AM [facility]   2025 10:30 AM Alex Hull MD [facility]   2025  8:00 AM Moses Mclaughlin MD [facility] PAIN EM [facility]   2025 10:30 AM [facility]   3/17/2025 11:00 AM Sheron Dalton MD [facility]

## 2025-02-07 NOTE — TELEPHONE ENCOUNTER
Dr. Hull the patient is requesting labs prior to his upcoming appointment. TBC labs plus Ha1c pended for your review. Last Ha1c was a little high.

## 2025-02-07 NOTE — TELEPHONE ENCOUNTER
Called patient in regards to message below ( identified name and date of birth )   Informed the lab have been ordered as requested     Patient verbalizes understanding and agrees with plan.     Alex Hull MD  Em Triage Support3 hours ago (1:54 PM)     Ordered.  Thank you      Fall with Harm Risk

## 2025-02-11 ENCOUNTER — TELEPHONE (OUTPATIENT)
Dept: SURGERY | Facility: CLINIC | Age: 75
End: 2025-02-11

## 2025-02-11 NOTE — TELEPHONE ENCOUNTER
Patient would like to know if he's appointment for Thursday 2/13 can be a phone appointment because due to weather he would prefer to not go out.Please advise

## 2025-02-12 ENCOUNTER — TELEPHONE (OUTPATIENT)
Dept: NEUROLOGY | Facility: CLINIC | Age: 75
End: 2025-02-12

## 2025-02-12 ENCOUNTER — OFFICE VISIT (OUTPATIENT)
Dept: NEUROLOGY | Facility: CLINIC | Age: 75
End: 2025-02-12
Payer: MEDICARE

## 2025-02-12 ENCOUNTER — TELEPHONE (OUTPATIENT)
Dept: SURGERY | Facility: CLINIC | Age: 75
End: 2025-02-12

## 2025-02-12 ENCOUNTER — TELEPHONE (OUTPATIENT)
Dept: PHYSICAL MEDICINE AND REHAB | Facility: CLINIC | Age: 75
End: 2025-02-12

## 2025-02-12 ENCOUNTER — MED REC SCAN ONLY (OUTPATIENT)
Dept: NEUROLOGY | Facility: CLINIC | Age: 75
End: 2025-02-12

## 2025-02-12 DIAGNOSIS — G24.3 CERVICAL DYSTONIA: Primary | ICD-10-CM

## 2025-02-12 PROCEDURE — 64616 CHEMODENERV MUSC NECK DYSTON: CPT | Performed by: OTHER

## 2025-02-12 PROCEDURE — 95874 GUIDE NERV DESTR NEEDLE EMG: CPT | Performed by: OTHER

## 2025-02-12 NOTE — TELEPHONE ENCOUNTER
Insurance: Medicare Part B Member ID# [id number]   Medication: Botox 300 units  Number of visits Approved: 1  (PLEASE INFORM THE PATIENT TO CONTACT THE OFFICE IF THE INSURANCE CHANGES WITHIN THE YEAR AS HE/SHE WILL BE RESPONSIBLE TO UPDATE THE OFFICE IF INSURANCE CHANGES SO THAT PROCEDURE IS BILLED CORRECTLY) this will be 1 of 1  Dx: G24.3  Last Botox done: 2/12/25  Next Botox due around: 5/12/25  Authorization #   BUY&BILL  Authorization is not required based on medical necessity, however, is not a guarantee of payment and may be subject to review once claim is submitted.

## 2025-02-12 NOTE — PROCEDURES
We did Botox injections for dystonia today.  PROCEDURE: Botox Injections (Cervical Dystonia PROTOCOL)   PRE-OPERATIVE DIAGNOSIS: Cervical dystonia, anterocollis.  POST-OPERATIVE DIAGNOSIS: same as above   BLOOD LOSS: minimal COMPLICATIONS: none     DESCRIPTION OF PROCEDURE: After a discussion of the risks and benefits, the patient consented to the procedure. The patient was taken to the procedure room. The upper back, neck area was prepped with alcohol swabs. The 3 Botox vials containing 100 units each, were reconstituted with 20 cc saline.     Procedure was performed under EMG guidance.    Following muscles and units were injected:    Scalenus anterior/medius 75 units on each side (total of 150 units).    Splenius Capita muscle bialterally 75 units each (total of 150 units)     Patient tolerated the procedure well.    Total of 300 Units of botulinum toxin were used and 0 Units were wasted.

## 2025-02-12 NOTE — TELEPHONE ENCOUNTER
Pt has not received a call back.   Pt has an appointment Thursday 2-13-25 at 10:30 am.  Can appointment be phone call.  Please call

## 2025-02-12 NOTE — TELEPHONE ENCOUNTER
Pt  had a appointment  28/13/25 which he is unable to come in dur to weather. Rescheduled to  03/6/25. He would like his PSA results .

## 2025-02-12 NOTE — TELEPHONE ENCOUNTER
-Per Dr. Bianchi, pt needs to RS when he can come to clinic.   - called pt who RS on 3/6/25.  -Encounter complete.

## 2025-02-13 RX ORDER — AMLODIPINE BESYLATE 5 MG/1
5 TABLET ORAL DAILY
Qty: 90 TABLET | Refills: 3 | Status: SHIPPED | OUTPATIENT
Start: 2025-02-13

## 2025-02-26 ENCOUNTER — OFFICE VISIT (OUTPATIENT)
Dept: PAIN CLINIC | Facility: HOSPITAL | Age: 75
End: 2025-02-26
Attending: ANESTHESIOLOGY
Payer: MEDICARE

## 2025-02-26 ENCOUNTER — TELEPHONE (OUTPATIENT)
Facility: CLINIC | Age: 75
End: 2025-02-26

## 2025-02-26 ENCOUNTER — OFFICE VISIT (OUTPATIENT)
Facility: CLINIC | Age: 75
End: 2025-02-26

## 2025-02-26 ENCOUNTER — TELEPHONE (OUTPATIENT)
Dept: ENDOCRINOLOGY | Facility: HOSPITAL | Age: 75
End: 2025-02-26

## 2025-02-26 VITALS
WEIGHT: 225 LBS | HEART RATE: 51 BPM | OXYGEN SATURATION: 95 % | DIASTOLIC BLOOD PRESSURE: 88 MMHG | SYSTOLIC BLOOD PRESSURE: 134 MMHG | BODY MASS INDEX: 33 KG/M2

## 2025-02-26 VITALS
SYSTOLIC BLOOD PRESSURE: 137 MMHG | HEIGHT: 70 IN | BODY MASS INDEX: 32.21 KG/M2 | DIASTOLIC BLOOD PRESSURE: 75 MMHG | WEIGHT: 225 LBS | HEART RATE: 60 BPM

## 2025-02-26 DIAGNOSIS — M54.50 LOW BACK PAIN AT MULTIPLE SITES: ICD-10-CM

## 2025-02-26 DIAGNOSIS — M25.562 BILATERAL CHRONIC KNEE PAIN: ICD-10-CM

## 2025-02-26 DIAGNOSIS — M45.2 ANKYLOSING SPONDYLITIS OF CERVICAL REGION (HCC): Primary | ICD-10-CM

## 2025-02-26 DIAGNOSIS — K59.00 CONSTIPATION, UNSPECIFIED CONSTIPATION TYPE: Primary | ICD-10-CM

## 2025-02-26 DIAGNOSIS — Z79.891 ENCOUNTER FOR MONITORING OPIOID MAINTENANCE THERAPY: ICD-10-CM

## 2025-02-26 DIAGNOSIS — G89.29 OTHER CHRONIC PAIN: ICD-10-CM

## 2025-02-26 DIAGNOSIS — M48.061 SPINAL STENOSIS OF LUMBAR REGION WITHOUT NEUROGENIC CLAUDICATION: ICD-10-CM

## 2025-02-26 DIAGNOSIS — Z51.81 ENCOUNTER FOR MONITORING OPIOID MAINTENANCE THERAPY: ICD-10-CM

## 2025-02-26 DIAGNOSIS — K59.03 DRUG-INDUCED CONSTIPATION: Primary | ICD-10-CM

## 2025-02-26 DIAGNOSIS — Z12.11 COLON CANCER SCREENING: ICD-10-CM

## 2025-02-26 DIAGNOSIS — M25.561 BILATERAL CHRONIC KNEE PAIN: ICD-10-CM

## 2025-02-26 DIAGNOSIS — R10.9 ABDOMINAL PAIN, UNSPECIFIED ABDOMINAL LOCATION: ICD-10-CM

## 2025-02-26 DIAGNOSIS — G89.29 BILATERAL CHRONIC KNEE PAIN: ICD-10-CM

## 2025-02-26 DIAGNOSIS — R14.0 BLOATING: ICD-10-CM

## 2025-02-26 PROCEDURE — 99214 OFFICE O/P EST MOD 30 MIN: CPT | Performed by: ANESTHESIOLOGY

## 2025-02-26 PROCEDURE — 99204 OFFICE O/P NEW MOD 45 MIN: CPT | Performed by: INTERNAL MEDICINE

## 2025-02-26 RX ORDER — HYDROCODONE BITARTRATE AND ACETAMINOPHEN 10; 325 MG/1; MG/1
1 TABLET ORAL EVERY 6 HOURS PRN
Qty: 120 TABLET | Refills: 0 | Status: SHIPPED | OUTPATIENT
Start: 2025-04-10 | End: 2025-05-10

## 2025-02-26 RX ORDER — HYDROCODONE BITARTRATE AND ACETAMINOPHEN 10; 325 MG/1; MG/1
1 TABLET ORAL EVERY 6 HOURS PRN
Qty: 120 TABLET | Refills: 0 | Status: SHIPPED | OUTPATIENT
Start: 2025-03-11 | End: 2025-04-10

## 2025-02-26 RX ORDER — HYDROCODONE BITARTRATE AND ACETAMINOPHEN 10; 325 MG/1; MG/1
1 TABLET ORAL EVERY 6 HOURS PRN
Qty: 120 TABLET | Refills: 0 | Status: SHIPPED | OUTPATIENT
Start: 2025-04-10 | End: 2025-02-26

## 2025-02-26 RX ORDER — HYDROCODONE BITARTRATE AND ACETAMINOPHEN 10; 325 MG/1; MG/1
1 TABLET ORAL EVERY 6 HOURS PRN
Qty: 120 TABLET | Refills: 0 | Status: SHIPPED | OUTPATIENT
Start: 2025-03-11 | End: 2025-02-26

## 2025-02-26 NOTE — PATIENT INSTRUCTIONS
Constipation /bloating  - increase fiber in diet and ok to add in Metamucil daily   - make sure getting enough fluid/water  - colonoscopy with Golytely and MAC at hospital

## 2025-02-26 NOTE — TELEPHONE ENCOUNTER
Scheduled for:  Colonoscopy 10107  Provider Name:    Date:  6/19/2025  Location:  [facility]  Sedation:  MAC  Time:  (pt is aware that [facility] will call the day before to confirm arrival time)  Prep:  Golytely  Meds/Allergies Reconciled?: Physician reviewed   Diagnosis with codes:  Constipation K59.00, Bloating R14.0  Was patient informed to call insurance with codes (Y/N): Yes  Referral sent?: Referral was sent at the time of electronic surgical scheduling.   EM or [facility] notified?:  I sent an electronic request to Endo Scheduling and received a confirmation today.   Medication Orders:  N/A  Misc Orders:  N/A     Further instructions given by staff: I discussed the prep instructions with the patient which he verbally understood. Provided patient with prep instructions and cancellation policy at the time of office visit.

## 2025-02-26 NOTE — TELEPHONE ENCOUNTER
Patient returned and request that his medication scripts to go to [facility] in [place] instead of [facility].

## 2025-02-26 NOTE — PROGRESS NOTES
Patient presents in office today with reported pain in neck, lower back and bilateral knee     Current pain level reported -3/10     Last reported dose of Norco this morning        Narcotic Contract renewal 09.06.25     Urine Drug screen 09.06.24     Increased pain due to the weather

## 2025-02-26 NOTE — PATIENT INSTRUCTIONS
Refill policies:    Allow 2-3 business days for refills; controlled substances may take longer.  Contact your pharmacy at least 5 days prior to running out of medication and have them send an electronic request or submit request through the “request refill” option in your [employer] account.  Refills are not addressed on weekends; covering physicians do not authorize routine medications on weekends.  No narcotics or controlled substances are refilled after noon on Fridays or by on call physicians.  By law, narcotics must be electronically prescribed.  A 30 day supply with no refills is the maximum allowed.  If your prescription is due for a refill, you may be due for a follow up appointment.  To best provide you care, patients receiving routine medications need to be seen at least once a year.  Patients receiving narcotic/controlled substance medications need to be seen at least once every 3 months.  In the event that your preferred pharmacy does not have the requested medication in stock (e.g. Backordered), it is your responsibility to find another pharmacy that has the requested medication available.  We will gladly send a new prescription to that pharmacy at your request.    Scheduling Tests:    If your physician has ordered radiology tests such as MRI or CT scans, please contact Central Scheduling at [phone] right away to schedule the test.  Once scheduled, the [facility] Centralized Referral Team will work with your insurance carrier to obtain pre-certification or prior authorization.  Depending on your insurance carrier, approval may take 3-10 days.  It is highly recommended patients assure they have received an authorization before having a test performed.  If test is done without insurance authorization, patient may be responsible for the entire amount billed.      Precertification and Prior Authorizations:  If your physician has recommended that you have a procedure or additional testing performed the [facility]  Centralized Referral Team will contact your insurance carrier to obtain pre-certification or prior authorization.    You are strongly encouraged to contact your insurance carrier to verify that your procedure/test has been approved and is a COVERED benefit.  Although the [facility] Centralized Referral Team does its due diligence, the insurance carrier gives the disclaimer that \"Although the procedure is authorized, this does not guarantee payment.\"    Ultimately the patient is responsible for payment.   Thank you for your understanding in this matter.  Paperwork Completion:  If you require FMLA or disability paperwork for your recovery, please make sure to either drop it off or have it faxed to our office at [phone]. Be sure the form has your name and date of birth on it.  The form will be faxed to our Forms Department and they will complete it for you.  There is a 25$ fee for all forms that need to be filled out.  Please be aware there is a 10-14 day turnaround time.  You will need to sign a release of information (SYL) form if your paperwork does not come with one.  You may call the Forms Department with any questions at [phone].  Their fax number is [phone].

## 2025-02-26 NOTE — CHRONIC PAIN
Initial Consultation Note      HISTORY OF PRESENT ILLNESS:  Moreno Khalil is a 70 year old old male referred to the pain clinic  for evaluation treatment of his low back and cervical neck pain .  Patient has ankylosing spondylitis along with degenerative arthritis of his knees he continues to take 4 Norco a day for pain control Moreno also has bilateral knee arthritis in need of replacement but is hold off on as long as he can he takes 4 Norco a day for pain control no side effects still decreases pain by 50 to 60% throughout the day he is walking a little bit slower these days and little more kyphotic in nature.  Moreno had a cervical epidural steroid injection as well as lumbar injections in the past was pretty good results in the past.  The medication as well as intermittent and injections have helped decrease his pain allows him to be more functionable.    Since his last visit he was started on an immunologic medications for his ankylosing spondylitis as well as Botox injection by the neurologist which has helped  PAIN COURSE AND PREVIOUS INTERVENTIONS:    Interventions: Epidural injection  Adjuvants: Small amounts Norco every day    BLOOD THINNING MEDICATIONS:  None    CURRENT MEDICATIONS:  Current Outpatient Medications   Medication Sig Dispense Refill    [START ON 11/8/2020] HYDROcodone-acetaminophen  MG Oral Tab Take 1 tablet by mouth every 8 (eight) hours as needed for Pain (max 3/day). 90 tablet 0    [START ON 10/9/2020] HYDROcodone-acetaminophen  MG Oral Tab Take 1 tablet by mouth every 8 (eight) hours as needed for Pain (MAX 3/DAY). 90 tablet 0    HYDROCHLOROTHIAZIDE 25 MG Oral Tab Take 1 tablet by mouth once daily 90 tablet 0    metoprolol Tartrate 25 MG Oral Tab Take 1 tablet (25 mg total) by mouth 2 (two) times daily. 180 tablet 1    simvastatin 20 MG Oral Tab Take 1 tablet (20 mg total) by mouth every evening. 90 tablet 1    ibuprofen 600 MG Oral Tab TAKE 1 TABLET EVERY 12 HOURS AS  NEEDED FOR PAIN (SUBSTITUTED FOR MOTRIN) 180 tablet 0    Tadalafil 20 MG Oral Tab Take 1 tablet (20 mg total) by mouth daily as needed for Erectile Dysfunction. 10 tablet 11        ALLERGIES:  No Known Allergies    SURGICAL HISTORY:  Past Surgical History:   Procedure Laterality Date    APPENDECTOMY  1963    KNEE ARTHROSCOPY      per NG: bilat knees 2x each; arthroscopy - brash    PROSTATE BIOPSIES  2013    REPAIR ROTATOR CUFF,ACUTE Right 2007    TONSILLECTOMY         REVIEW OF SYSTEMS:   A comprehensive review of systems was negative except for:   MEDICAL HISTORY:  Patient Active Problem List:     Prostate cancer (HCC)     Erectile dysfunction     Cervical radicular pain     Mixed hyperlipidemia     Essential hypertension with goal blood pressure less than 140/90     Class 1 obesity due to excess calories with serious comorbidity in adult     Physical exam, annual     Decreased visual acuity    Past Medical History:   Diagnosis Date    Appendicitis 1963    Back problem     Benign prostatic hypertrophy     Carpal tunnel syndrome     Dysplastic nevus 2017    left flank    Elevated PSA 2008    bx negative for cancer    H/O arthroscopy of knee bilateral    History of brachytherapy     Neck pain     Obesity, unspecified     Prostate cancer (HCC)     brachytherapy    PVD (peripheral vascular disease) ([facility])     Rotator cuff tear 2007    right    Rotator cuff tear     R - 2007    Tonsillitis 1960       FAMILY HISTORY:  Family History   Problem Relation Age of Onset    Diabetes Mother     Diabetes Sister     Obesity Sister     Lipids Other         family h/o hyperlipidemia and vascular disease       SOCIAL HISTORY:  Social History    Socioeconomic History      Marital status:       Spouse name: Not on file      Number of children: 2      Years of education: Not on file      Highest education level: Not on file    Occupational History      Occupation: ordering        Comment: retired    Social Needs      Financial  resource strain: Not on file      Food insecurity        Worry: Not on file        Inability: Not on file      Transportation needs        Medical: Not on file        Non-medical: Not on file    Tobacco Use      Smoking status: Never Smoker      Smokeless tobacco: Never Used    Substance and Sexual Activity      Alcohol use: No        Alcohol/week: 0.0 standard drinks      Drug use: No      Sexual activity: Not on file    Lifestyle      Physical activity        Days per week: Not on file        Minutes per session: Not on file      Stress: Not on file    Relationships      Social connections        Talks on phone: Not on file        Gets together: Not on file        Attends Orthodox service: Not on file        Active member of club or organization: Not on file        Attends meetings of clubs or organizations: Not on file        Relationship status: Not on file      Intimate partner violence        Fear of current or ex partner: Not on file        Emotionally abused: Not on file        Physically abused: Not on file        Forced sexual activity: Not on file    Other Topics      Concerns:         Service: Not Asked        Blood Transfusions: Not Asked        Caffeine Concern: Yes          soda, 1 liter/day        Occupational Exposure: Not Asked        Hobby Hazards: Not Asked        Sleep Concern: Not Asked        Stress Concern: Not Asked        Weight Concern: Not Asked        Special Diet: Not Asked        Back Care: Not Asked        Exercise: Not Asked        Bike Helmet: Not Asked        Seat Belt: Not Asked        Self-Exams: Not Asked        Grew up on a farm: Not Asked        History of tanning: Not Asked        Outdoor occupation: Not Asked        Pt has a pacemaker: No        Pt has a defibrillator: No        Reaction to local anesthetic: No    Social History Narrative      Not on file      ADVANCE CARE PLANNING:  Advance Care Plan NOT discussed.    PHYSICAL EXAMINATION:  There were no vitals  filed for this visit.  General: Alert and oriented x3  Affect:  NAD  Head: normocephalic, atraumatic  Eyes: anicteric; no injection  Chest: S1, S2, RRR  Respiratory: CTAB  Gait: Normal; cane user - No  Spine: Normal    ROM:   Lumbar spine  Flexion  dec  Extension dec  Cervical Spine  Flexion dec  Extensiondec  MOTOR EXAMINATION:  UPPER EXTREMITY      LEFT RIGHT   Deltoid 5/5 5/5   Biceps 5/5 5/5   Triceps 5/5 5/5   Brachioradialis 5/5 5/5   Wrist Flexors 5/5 5/5   Wrist Extensors 5/5 5/5   Intrinsic Hand 5/5 5/5    5/5 5/5     LOWER EXTREMITY      LEFT RIGHT   Iliopsoas 5/5 5/5   Quadriceps 5/5 5/5   Foot DF 5/5 5/5   Foot EHL 5/5 5/5   Gastrocnemius 5/5 5/5     PULSES      LEFT RIGHT   Radial 2/4 2/4   Dorsalis Pedis 2/4 2/4   Posterior Tibial 2/4 2/4   Brachial 2/4 2/4     SLR: negative  SIJ tenderness negative  Joint Exam: Normal  TPs:  Present within lumbo-sacral paraspinal muscles  Left occipital region i nd right occipital region is very tender to palpation can replicates the pain that he gets in the back of his head  Right Deep tendon reflexes: Normal   Left Deep tendon reflexes: Normal   Babinski Reflex: absent bilaterally   Temperature:  normal to touch bilateral upper and lower extremities  Edema - Absent  Stasis ulcer on the left leg is healed    Sensation (light touch/pinprick/temperature):     Right Lower Extremity:  Normal  Left Lower Extremity: Normal  Right Upper Extremity:  Normal  Left Upper Extremity:  Normal    IMAGING:  No results found.    LABS:  Lab Results   Component Value Date    WBC 10.3 07/06/2020    RBC 5.45 07/06/2020    HGB 14.1 07/06/2020    HCT 45.2 07/06/2020    MCV 82.9 07/06/2020    MCH 25.9 (L) 07/06/2020    MCHC 31.2 07/06/2020    RDW 19.0 (H) 07/06/2020    .0 07/06/2020    MPV 8.1 06/28/2018     Lab Results   Component Value Date     07/06/2020    K 3.8 07/06/2020     07/06/2020    CO2 29.0 07/06/2020    BUN 25 (H) 07/06/2020    GLU 92 07/06/2020    CA  8.8 07/06/2020     No results found for: PT    [place] [facility] MONITORING PROGRAM REVIEWED  Yes      ASSESSMENT:   70 year old old male with cervical and lumbar spinal stenosis   Has responded in the past epidural injections cervical and lumbar  Started on the immunologic for his ankylosing spondylitis which has helped his pain  Low back pain bilateral radicular pain  Chronic use  of Norco a day for pain control     Botox injections are helping the cervical pain     Bilateral knee pain the care of Dr. Singh for that DJD bilateral knee  Recent injections for the knees        PLAN:  RECOMMENDATIONS:  4 Norco a day continue with the [place]  Continue with rheumatology and orthopedic care  No need for injection therapy at this point  Continue with Botox injection per neuro cervical    Epidurals for cervical is still benefiting him no need for repeat as and at this time  Bilateral knee pain he is to see orthopedic surgeon status post Synvisc injections which is helped also trying hyaluronic acid injections current will order x-ray of bilateral knees    Moses Mclaughlin MD

## 2025-02-26 NOTE — PROGRESS NOTES
Moreno Khalil is a 74 year old male.    HPI:     Chief Complaint   Patient presents with    Constipation    Bloating   The patient is a 74-year-old male who has a history of back issues on narcotic pain medication who presents with constipation and bloating.    The patient reports difficulty with expelling stool, has noted a change in stool character has become harder and more brick like per his description.  No blood per rectum.  Prior colonoscopy from 2016 with 10-year recall.    The patient does get episodes of bloating particular when he is constipated.  He denies any other symptoms no reflux, nausea vomiting.    HISTORY:  Past Medical History:    Abnormal blood chemistry    Acute dermatitis due to solar radiation    Acute upper respiratory infection    Appendicitis    Back problem    Backache    Benign neoplasm of skin    Benign prostatic hypertrophy    Calcaneal spur    Carpal tunnel syndrome    Cervical radicular pain    Cough variant asthma (HCC)    Disorder of kidney and ureter    Disorder of rotator cuff syndrome of shoulder and allied disorder    Dizziness    Dysfunction of eustachian tube    Dysplastic nevus    left flank    Dyspnea and respiratory abnormality    Elevated prostate specific antigen (PSA)    Elevated PSA    bx negative for cancer    Essential hypertension    Generalized pain    H/O arthroscopy of knee    History of brachytherapy    Hyponatremia    Intestinal infection    Malaise and fatigue    Neck pain    Nocturia    Obesity, unspecified    Petechial hemorrhage    Plantar fascial fibromatosis    Pressure ulcer, stage 1    Prostate cancer (HCC)    brachytherapy    Proteinuria    PVD (peripheral vascular disease)    Rotator cuff tear    right    Rotator cuff tear    R - 2007    Sensorineural hearing loss    Shoulder pain, left    Sleep disturbance    Tonsillitis      Past Surgical History:   Procedure Laterality Date    Appendectomy  1963    Knee arthroscopy      per NG: bilat knees 2x  each; [facility]roscopy - [facility]    Prostate biopsies  2013    Repair rotator cuff,acute Right 2007    Tonsillectomy        Family History   Problem Relation Age of Onset    Diabetes Mother     Diabetes Sister     Obesity Sister     Lipids Other         family h/o hyperlipidemia and vascular disease      Social History:   Social History     Socioeconomic History    Marital status:     Number of children: 2   Occupational History    Occupation: ordering     Comment: retired   Tobacco Use    Smoking status: Never     Passive exposure: Never    Smokeless tobacco: Never   Vaping Use    Vaping status: Never Used   Substance and Sexual Activity    Alcohol use: No     Alcohol/week: 0.0 standard drinks of alcohol    Drug use: No    Sexual activity: Yes   Other Topics Concern    Caffeine Concern Yes     Comment: soda, 1 liter/day    Exercise Yes    Pt has a pacemaker No    Pt has a defibrillator No    Reaction to local anesthetic No     Social Drivers of Health     Transportation Needs: No Transportation Needs (2/8/2022)    Received from [facility], [facility] - Transportation     Lack of Transportation (Medical): No     Lack of Transportation (Non-Medical): No        Medications (Active prior to today's visit):  Current Outpatient Medications   Medication Sig Dispense Refill    [START ON 3/11/2025] HYDROcodone-acetaminophen  MG Oral Tab Take 1 tablet by mouth every 6 (six) hours as needed for Pain. 120 tablet 0    [START ON 4/10/2025] HYDROcodone-acetaminophen  MG Oral Tab Take 1 tablet by mouth every 6 (six) hours as needed for Pain (MAX 4/DAY). 120 tablet 0    amLODIPine 5 MG Oral Tab Take 1 tablet (5 mg total) by mouth daily. 90 tablet 3    hydroCHLOROthiazide 25 MG Oral Tab Take 1 tablet (25 mg total) by mouth daily. 90 tablet 3    losartan 100 MG Oral Tab Take 1 tablet (100 mg total) by mouth daily. 90 tablet 3    atorvastatin 40 MG Oral Tab TAKE 1 TABLET(40 MG) BY MOUTH EVERY  NIGHT 90 tablet 3    aspirin 81 MG Oral Chew Tab Chew 1 tablet (81 mg total) by mouth daily. 90 tablet 3       Allergies:  Allergies[1]      ROS:   The patient denies any chest pain or shortness of breath,  No neurologic or dermatologic symptoms.     PHYSICAL EXAM:   Blood pressure 137/75, pulse 60, height 5' 10\" (1.778 m), weight 225 lb (102.1 kg).    The patient appears their stated age and is in no acute distress  HEENT- anicteric sclera, neck no lymphadnopathy, OP- clear with no masses or lesions  Chest- Clear bilaterally, no wheezing,  Heart- regular rate, no murmur or gallop  Abdomen- Soft and nontender, nondistended  Ext- no clubbing or cyanosis  Skin- no rashes or lesions  Neuro- appropriate response, alert, no confusion  .  ASSESSMENT/PLAN:   Assessment     The patient is a 74-year-old male who has a history of back pain on chronic narcotic pain medication who presents with constipation and change in stool character, also bloating.    Discussed management of constipation, he will increase fiber in his diet okay to add Metamucil daily.  Have discussed that he should make sure he is getting a fluid and water.  We could also set up gentle laxative such as MiraLAX or go to medication designed for opioid-induced constipation which would be Relistor Movantik.    We also discussed colonoscopy for colon screening, evaluation of symptoms including change in bowel habits/constipation as well as bloating/pain.    Plan  Constipation /bloating  - increase fiber in diet and ok to add in Metamucil daily   - make sure getting enough fluid/water  - colonoscopy with Golytely and MAC at hospital     Colonoscopy consent: I have discussed the risks, benefits, and alternatives to colonoscopy with the patient [who demonstrated understanding], including but not limited to the risks of bleeding, infection, pain, death, as well as the risks of anesthesia and perforation all leading to prolonged hospitalization, surgical  intervention, or even death. I also specifically mentioned the miss rate of colonoscopy of 5-10% in the best of all circumstances. All questions were answered to the patient’s satisfaction. The patient signed informed consent and elected to proceed with colonoscopy with intervention [i.e. polypectomy, stent placement, etc.] as indicated.         Orders This Visit:  No orders of the defined types were placed in this encounter.      Meds This Visit:  Requested Prescriptions      No prescriptions requested or ordered in this encounter       Imaging & Referrals:  None       Xavi Johnston MD  [facility]              [1] No Known Allergies

## 2025-03-06 ENCOUNTER — OFFICE VISIT (OUTPATIENT)
Dept: SURGERY | Facility: CLINIC | Age: 75
End: 2025-03-06

## 2025-03-06 DIAGNOSIS — C61 BIOCHEMICALLY RECURRENT MALIGNANT NEOPLASM OF PROSTATE (HCC): Primary | ICD-10-CM

## 2025-03-06 DIAGNOSIS — N40.1 BPH WITH OBSTRUCTION/LOWER URINARY TRACT SYMPTOMS: ICD-10-CM

## 2025-03-06 DIAGNOSIS — Z92.3 HISTORY OF BRACHYTHERAPY: ICD-10-CM

## 2025-03-06 DIAGNOSIS — R97.21 BIOCHEMICALLY RECURRENT MALIGNANT NEOPLASM OF PROSTATE (HCC): Primary | ICD-10-CM

## 2025-03-06 DIAGNOSIS — N13.8 BPH WITH OBSTRUCTION/LOWER URINARY TRACT SYMPTOMS: ICD-10-CM

## 2025-03-06 PROCEDURE — 99214 OFFICE O/P EST MOD 30 MIN: CPT | Performed by: UROLOGY

## 2025-03-06 RX ORDER — TAMSULOSIN HYDROCHLORIDE 0.4 MG/1
0.4 CAPSULE ORAL
Qty: 90 CAPSULE | Refills: 1 | Status: SHIPPED | OUTPATIENT
Start: 2025-03-06

## 2025-03-06 NOTE — PROGRESS NOTES
[facility]  Follow-Up Visit    HPI: Moreno Khalil is a 74 year old male presents for a follow up visit. Patient was last seen on 8/1/2024.    INTERVAL HISTORY: Former patient of Dr. THAPA.    Biochemically recurrent prostate cancer, status post brachytherapy seed implants 2014 for definitive local therapy of intermediate risk disease.    PSA junior of 1.3 ng/mL.  Rising PSA levels since 2020.    Repeat prostate biopsy 12/2021 was negative.    PSMA PET scan 6/2022 without radiographic evidence of recurrent or metastatic disease.    Patient elected continued monitoring.    Repeat PSMA PET scan 8/16/2023 without evidence of local or metastatic disease.    Multiparametric MRI of the prostate completed 9/28/2023 with artifactual degradation secondary to brachytherapy seeds however within these parameters no focal suspicious lesions identified.  Borderline sized pelvic lymph nodes that are grossly unchanged.    PSA 2/7/2025 stable at 3.86 ng/mL.    Worsening LUTS over the past 6 months.  His IPSS is 23 (4/1/4/2/4/4/4) with a quality-of-life score of 3.  Reports nocturia x 4 and intermittency.    He denies fevers or chills, chest pain or shortness of breath.  No bone pain or unintentional weight loss.  No change in appetite.    No gross hematuria or dysuria.      1. Prostate Cancer w/ Biochemical Recurrence  History of multiple previous prostate biopsies.    Diagnosed with intermediate risk prostate cancer upon saturation prostate biopsy 1/9/2014 with up to Tito 3+4=7 disease in 8/40 cores.    Treated with brachytherapy seed implants at the [facility] 2/2014.    PSA junior reportedly around 1.3 ng/mL.    PSA levels have been slowly rising since 2020.    Prostate MRI 11/2021 without any definite prostatic lesions.  No PI-RADS assessment given for posttreatment gland.    Patient underwent a TRUS-guided prostate biopsy by Dr. Oreilly 12/2021 which was negative.    PSA up to 4.07  ng/mL 6/7/2022.    PSMA PET scan 6/2022 without evidence of recurrent or metastatic disease.    Seen by medical oncology 6/2022.  Patient electing continued monitoring as opposed to starting ADT.    - 10/2022 F/U: PSA decreased to 3.76 ng/mL.  Patient elects continued monitoring.    - 4/2023 F/U: PSA increased to 5.44 ng/mL.  Patient elects continued monitoring with a repeat in 3 months.    - 7/2023 F/U: PSA increased to 6.68 ng/mL.  Obtain updated PSMA PET scan.    - 10/2023 F/U: PSMA PET scan negative.  Prostate MRI without suspicious lesions.  PSA 6.48 ng/mL.  Patient elects continued monitoring.    - 1/2024 F/U: PSA decreased to 4.16 ng/mL.  Patient elects continued monitoring.    - 8/2024 F/U: PSA decreased to 3.85 ng/mL.  Patient elects continued monitoring.    - 3/2025 F/U:  PSA stable at 3.86 ng/mL.  Patient elects continued monitoring.      2. ED  Chronic and longstanding.  Previously on tadalafil as needed.  Patient no longer sexually active.      3. BPH with LUTS  Chronic and longstanding LUTS.  Not on any medical therapy.    - 10/2022 F/U: IPSS score is 18 (3/2/2/2/3/2/4) with a quality-of-life score of 3.  Patient not interested in any treatment at this point.    - 7/2023 F/U: IPSS 23 (4/2/5/2/3/3/4) with a quality-of-life score of 3.  Patient drinks water deliberately in the evening as well as when he wakes up at night to urinate.  Elects behavioral changes and fluid management.    - 8/2024 F/U: IPSS is 17 (2/2/3/2/2/2/4) with a quality-of-life score of 3.  Reports nocturia x 4 and intermittency.  Elects continued monitoring.    - 3/2025 F/U: Worsening LUTS.  IPSS 23 with a QOL score of 3.  Start Flomax.      Reviewed past medical, surgical, family, and social history.  Reviewed med list and allergies.      REVIEW OF SYSTEMS:  Pertinent positives and negatives per HPI. A 10-point ROS was performed and is otherwise negative.       EXAM:  There were no vitals taken for this visit.     Physical  Exam  Constitutional:       Appearance: He is well-developed.   HENT:      Head: Normocephalic.   Eyes:      General: No scleral icterus.  Cardiovascular:      Rate and Rhythm: Normal rate.   Pulmonary:      Effort: Pulmonary effort is normal.   Genitourinary:     Comments: PARISH 11/2023 revealing a firm, small prostate without any palpable nodules or masses.  Skin:     General: Skin is warm and dry.   Neurological:      Mental Status: He is alert and oriented to person, place, and time.   Psychiatric:         Mood and Affect: Mood normal.         Behavior: Behavior normal.       PATHOLOGY:  See HPI for details.      LABS:    Component PSA   Latest Ref Rng & Units <=4.00 ng/mL   2/7/2025 3.86   7/29/2024 3.85   1/29/2024 4.16 (H)   10/27/2023 6.48 (H)   7/26/2023 6.68 (H)   4/19/2023 5.44 (H)   10/19/2022 3.76   6/7/2022 4.07 (H)   10/26/2021 3.03   6/16/2021 2.80   2/19/2021 2.75   10/15/2020 2.48   6/16/2020 2.18   2/5/2020 2.15   8/29/2019 1.89   3/1/2019 1.73   11/2/2018 2.0   7/3/2018 1.5   2/26/2018 1.6   11/21/2017 1.6   5/16/2017 1.3   11/15/2016 1.4   5/11/2016 1.6   1/12/2016 1.9   9/15/2015 1.8   3/10/2015 2.8   8/13/2014 4.6 (H)   12/4/2013 7.9 (H)   9/1/2013 8.2 (H)   7/28/2013 10.2 (H)   11/1/2012 7.4 (H)   12/11/2011 8.3 (H)       IMAGING:    MRI PROSTATE (9/29/23): 1. No PI-RADS score assessment is provided for a posttreatment gland.  2. There is artifactual degradation throughout the prostate gland secondary to the presence of brachytherapy seeds. Within these parameters, no focal suspicious lesion is identified.  3. Substantially elevated PSA density, compatible with biochemical recurrence.  4. Borderline-sized pelvic lymph nodes are grossly unchanged.  5. No suspicious osseous lesions are perceived in the included region.  6. Distal colonic diverticulosis without MR evidence of acute complication in the imaged volume.  7. Lesser incidental findings as above.        PET FOR PROSTATE CARCINOMA  (8/16/2023): There is history of prostate cancer.  Prostate has normal size with brachytherapy seeds.  No evidence for local or metastatic disease.         UROLOGY PROCEDURE:  Not performed today.      IMPRESSION:  74 year old male with history of prostate cancer status post brachytherapy seed implants in 2014.    Patient with biochemical evidence of disease recurrence given rising PSA levels, since 2020.    Negative prostate biopsy 12/2021.    Negative PSMA PET scan May 2023 and negative prostate MRI 9/2023.    Recent PSA level is stable.    Findings reviewed with patient at length.      Discussed management options including continued monitoring or initiating ADT.      We also discussed salvage treatments for prostate cancer including prostatectomy, cryotherapy, or HIFU.  For that he would need to have evidence of recurrent prostate cancer in the prostate upon biopsy.      Patient not interested at this time and would like to continue monitoring.    Baseline BPH with LUTS, not on medical therapy.  Symptoms have worsened over the past 6 months.  Discussed initiating medical therapy versus considering minimally invasive surgical treatment options versus continued watchful waiting.    Benefits, risk, and alternatives reviewed.  Patient agreeable to starting tamsulosin.    Longstanding chronic ED, patient no longer sexually active.  Not interested in further treatment at this time.      PLAN:  1.  Continue to monitor biochemically recurrent prostate cancer without radiographic evidence of local or distant disease.    2.  Start tamsulosin 0.4 mg daily for BPH with LUTS.    Follow-up in 6 months with a PSA.    MDM complexity: Moderate.  Discussing management of biochemically recurrent prostate cancer following initial definitive local therapy.  BPH with worsening LUTS.    Devante Bianchi MD  3/6/2025

## 2025-03-06 NOTE — PATIENT INSTRUCTIONS
Come back in 6 months with a repeat PSA blood test before your office visit.    Start taking tamsulosin 0.4 mg nightly for your enlarged prostate and urination symptoms.

## 2025-03-13 ENCOUNTER — TELEPHONE (OUTPATIENT)
Dept: RHEUMATOLOGY | Facility: CLINIC | Age: 75
End: 2025-03-13

## 2025-03-13 ENCOUNTER — LAB ENCOUNTER (OUTPATIENT)
Dept: LAB | Age: 75
End: 2025-03-13
Attending: INTERNAL MEDICINE
Payer: MEDICARE

## 2025-03-13 DIAGNOSIS — M45.3 ANKYLOSING SPONDYLITIS OF CERVICOTHORACIC REGION (HCC): ICD-10-CM

## 2025-03-13 DIAGNOSIS — Z51.81 ENCOUNTER FOR THERAPEUTIC DRUG MONITORING: Primary | ICD-10-CM

## 2025-03-13 DIAGNOSIS — Z51.81 ENCOUNTER FOR THERAPEUTIC DRUG MONITORING: ICD-10-CM

## 2025-03-13 LAB
ALBUMIN SERPL-MCNC: 4.4 G/DL (ref 3.2–4.8)
ALT SERPL-CCNC: 37 U/L
AST SERPL-CCNC: 30 U/L (ref ?–34)
BASOPHILS # BLD AUTO: 0.1 X10(3) UL (ref 0–0.2)
BASOPHILS NFR BLD AUTO: 1.4 %
CREAT BLD-MCNC: 1.56 MG/DL
CRP SERPL-MCNC: 1 MG/DL (ref ?–0.5)
EGFRCR SERPLBLD CKD-EPI 2021: 46 ML/MIN/1.73M2 (ref 60–?)
EOSINOPHIL # BLD AUTO: 0.29 X10(3) UL (ref 0–0.7)
EOSINOPHIL NFR BLD AUTO: 4.1 %
ERYTHROCYTE [DISTWIDTH] IN BLOOD BY AUTOMATED COUNT: 14.7 %
ERYTHROCYTE [SEDIMENTATION RATE] IN BLOOD: 14 MM/HR
HCT VFR BLD AUTO: 44.6 %
HGB BLD-MCNC: 15.3 G/DL
IMM GRANULOCYTES # BLD AUTO: 0.03 X10(3) UL (ref 0–1)
IMM GRANULOCYTES NFR BLD: 0.4 %
LYMPHOCYTES # BLD AUTO: 1.79 X10(3) UL (ref 1–4)
LYMPHOCYTES NFR BLD AUTO: 25.2 %
MCH RBC QN AUTO: 29.3 PG (ref 26–34)
MCHC RBC AUTO-ENTMCNC: 34.3 G/DL (ref 31–37)
MCV RBC AUTO: 85.4 FL
MONOCYTES # BLD AUTO: 0.81 X10(3) UL (ref 0.1–1)
MONOCYTES NFR BLD AUTO: 11.4 %
NEUTROPHILS # BLD AUTO: 4.07 X10 (3) UL (ref 1.5–7.7)
NEUTROPHILS # BLD AUTO: 4.07 X10(3) UL (ref 1.5–7.7)
NEUTROPHILS NFR BLD AUTO: 57.5 %
PLATELET # BLD AUTO: 223 10(3)UL (ref 150–450)
RBC # BLD AUTO: 5.22 X10(6)UL
WBC # BLD AUTO: 7.1 X10(3) UL (ref 4–11)

## 2025-03-13 PROCEDURE — 84460 ALANINE AMINO (ALT) (SGPT): CPT

## 2025-03-13 PROCEDURE — 86140 C-REACTIVE PROTEIN: CPT

## 2025-03-13 PROCEDURE — 85025 COMPLETE CBC W/AUTO DIFF WBC: CPT

## 2025-03-13 PROCEDURE — 85652 RBC SED RATE AUTOMATED: CPT

## 2025-03-13 PROCEDURE — 84450 TRANSFERASE (AST) (SGOT): CPT

## 2025-03-13 PROCEDURE — 36415 COLL VENOUS BLD VENIPUNCTURE: CPT

## 2025-03-13 PROCEDURE — 82565 ASSAY OF CREATININE: CPT

## 2025-03-13 PROCEDURE — 82040 ASSAY OF SERUM ALBUMIN: CPT

## 2025-03-13 NOTE — TELEPHONE ENCOUNTER
Patient called stating he is waiting at the lab to complete lab orders for . There were no active orders. I contacted the office and the RN stated she will place the orders now and to wait 10-15mins. When I went to inform the patient of this he was not responding to me when I was saying hello. Call was disconnected.

## 2025-03-17 ENCOUNTER — OFFICE VISIT (OUTPATIENT)
Age: 75
End: 2025-03-17

## 2025-03-17 VITALS
DIASTOLIC BLOOD PRESSURE: 75 MMHG | RESPIRATION RATE: 16 BRPM | BODY MASS INDEX: 33 KG/M2 | WEIGHT: 230 LBS | SYSTOLIC BLOOD PRESSURE: 110 MMHG | HEART RATE: 60 BPM

## 2025-03-17 DIAGNOSIS — M25.511 CHRONIC PAIN OF BOTH SHOULDERS: ICD-10-CM

## 2025-03-17 DIAGNOSIS — M45.3 ANKYLOSING SPONDYLITIS OF CERVICOTHORACIC REGION (HCC): Primary | ICD-10-CM

## 2025-03-17 DIAGNOSIS — M25.512 CHRONIC PAIN OF BOTH SHOULDERS: ICD-10-CM

## 2025-03-17 DIAGNOSIS — Z51.81 ENCOUNTER FOR THERAPEUTIC DRUG MONITORING: ICD-10-CM

## 2025-03-17 DIAGNOSIS — G89.29 CHRONIC PAIN OF BOTH SHOULDERS: ICD-10-CM

## 2025-03-17 PROCEDURE — 96372 THER/PROPH/DIAG INJ SC/IM: CPT | Performed by: INTERNAL MEDICINE

## 2025-03-17 PROCEDURE — 99214 OFFICE O/P EST MOD 30 MIN: CPT | Performed by: INTERNAL MEDICINE

## 2025-03-17 NOTE — PROGRESS NOTES
Name and  verified. Pt aware he was to receive injection of Cimza. Injections given in Bilateral Lower Abdomen, and pt tolerated well. Possible local side effects discussed with pt. Pt aware to follow up in 4 weeks for next injection.

## 2025-03-17 NOTE — PATIENT INSTRUCTIONS
You were seen today for ankylosing spondylitis  Overall your back pain has improved on Cimzia  Continue Norco for your chronic pain  You will get Cimzia today  Kidney function is slightly high, I will send a message to your primary care doctor  Avoid any Aleve or Motrin ibuprofen  Blood work in 6 months

## 2025-03-17 NOTE — PROGRESS NOTES
Moreno Khalil is a 74 year old male.    HPI:     Chief Complaint   Patient presents with    Follow - Up     Ankylosing spondylitis of cervicothoracic region (HCC)  Patient here for injection and discuss lab test results  Back paion pain scale 3/10         I had the pleasure of seeing Moreno Khalil on 3/17/2025 for follow up chronic neck, lower back and shoulder pain 2/2 Ankylosing spondylitis     Current medications:  Norco 10/325 1 pill 3-4 times a day- pain specialist prescribes it   Cimzia monthly in-office injections- started Dec 14, 2023  Blood work:  Neg RF, CCP, HLA-B27  ESR 59--> 34, CRP 5.5 mg/dL--> 2.92 mg/dL  Inflammation markers now normal     Interval History:  This is a 74 yo M with hx of HTN, HLD, Recurrent Prostate cancer s/p brachytherapy seed implants 2014 (no recurrence) presents with chronic joint pain.  He reports having chronic pain in his cervical neck and lower spine.  He follows with pain specialist Dr. Moses Rick and is on Norco 3 times a day.  It does keep some of his pain controlled.  He also has chronic bilateral knee pain and follows with the joint [place].  He was told that his knees are bone-on-bone.  He has had 3 hyaluronic acid injections in both knees and has 2 more left.  It is helped.  Continues to have chronic neck and lower back pain.  He also gets Botox injections in his neck region by neurology.  Reports pain in his hips, thighs and quadriceps.  At times it feels very stiff.  He has limited range of motion of his neck.  He also has chronic shoulder pain, hard to lift both shoulders.  He was given a Medrol Dosepak in the past which helped a lot of his pain.  Denies any history of psoriasis.    12/6/2023:  Presents for follow-up of chronic neck, lower back and shoulder pain  During his last visit blood work showed elevated formation markers.  He was started on a prednisone taper over 9 days which should help his symptoms significantly.  He had pain around his  shoulders, thighs and neck which did help  Repeat inflammation markers did improve, still slightly elevated  He also went to the ED recently for dizziness and neck pain and had a CT of the neck done.  CT cervical spine showed cervical thoracic spine ankylosis, scattered endplate osteophytes and syndesmophytes within the spine.  X-ray of the SI joint was also done which were normal  Blood work did show a negative HLA-B27    4/5/2024:  Months for follow-up of ankylosing spondylitis  He started Cimzia December 14, 2023, continues monthly Cimzia  Doing really well, less pain in the neck and thighs  Able to lift shoulder better  Inflammation markers also normal now  She sees [facility] [place] and gets gel injections in the right knee, he had a cortisone injection 2 weeks ago     9/17/2024:  Presents for follow-up of ankylosing spondylitis  He is on Cimzia monthly  Recent blood work shows normal inflammation markers  He had recent xrays of the knees showing severe OA and chondrocalcinosis  She sees [facility] for cortisone injections and gel injections. Feels like the injections are not helpingas much  Having less neck and lower back pain    3/17/2025:  Presents for follow-up of ankylosing spondylitis  He is on Cimzia monthly  Recent blood work shows mildly elevated CRP 1.00. also showed elevated Cr 1.56, GFR 46. He has hx of prostate cancer and BPH. He was given flomax but has not take it, concerned with dizziness   He does have trouble emptying the bladder   He takes aleve as needed  He has some nausea in them morning, will be having colonoscopy in June  His back pain and joint pain has improved on Cimzia. Notices some pain coming back a few days before the Cimzia injection            HISTORY:  Past Medical History:    Abnormal blood chemistry    Acute dermatitis due to solar radiation    Acute upper respiratory infection    Appendicitis    Back problem    Backache    Benign neoplasm of skin    Benign prostatic  hypertrophy    Calcaneal spur    Carpal tunnel syndrome    Cervical radicular pain    Cough variant asthma (HCC)    Disorder of kidney and ureter    Disorder of rotator cuff syndrome of shoulder and allied disorder    Dizziness    Dysfunction of eustachian tube    Dysplastic nevus    left flank    Dyspnea and respiratory abnormality    Elevated prostate specific antigen (PSA)    Elevated PSA    bx negative for cancer    Essential hypertension    Generalized pain    H/O arthroscopy of knee    History of brachytherapy    Hyponatremia    Intestinal infection    Malaise and fatigue    Neck pain    Nocturia    Obesity, unspecified    Petechial hemorrhage    Plantar fascial fibromatosis    Pressure ulcer, stage 1    Prostate cancer (HCC)    brachytherapy    Proteinuria    PVD (peripheral vascular disease)    Rotator cuff tear    right    Rotator cuff tear    R - 2007    Sensorineural hearing loss    Shoulder pain, left    Sleep disturbance    Tonsillitis      Social Hx Reviewed   Family Hx Reviewed     Medications (Active prior to today's visit):  Current Outpatient Medications   Medication Sig Dispense Refill    tamsulosin 0.4 MG Oral Cap Take 1 capsule (0.4 mg total) by mouth After dinner. Take 1/2 hour following the same meal each day 90 capsule 1    HYDROcodone-acetaminophen  MG Oral Tab Take 1 tablet by mouth every 6 (six) hours as needed for Pain. 120 tablet 0    [START ON 4/10/2025] HYDROcodone-acetaminophen  MG Oral Tab Take 1 tablet by mouth every 6 (six) hours as needed for Pain (MAX 4/DAY). 120 tablet 0    amLODIPine 5 MG Oral Tab Take 1 tablet (5 mg total) by mouth daily. 90 tablet 3    hydroCHLOROthiazide 25 MG Oral Tab Take 1 tablet (25 mg total) by mouth daily. 90 tablet 3    losartan 100 MG Oral Tab Take 1 tablet (100 mg total) by mouth daily. 90 tablet 3    atorvastatin 40 MG Oral Tab TAKE 1 TABLET(40 MG) BY MOUTH EVERY NIGHT 90 tablet 3    aspirin 81 MG Oral Chew Tab Chew 1 tablet (81 mg  total) by mouth daily. 90 tablet 3     .cmed  Allergies:  No Known Allergies      ROS:   All other ROS are negative.     PHYSICAL EXAM:   GEN: AAOx3, NAD  HEENT: EOMI, PERRLA, no injection or icterus, oral mucosa moist, no oral lesions. No lymphadenopathy. No facial rash  CVS: RRR, no murmurs rubs or gallops. Equal 2+ distal pulses.   LUNGS: CTAB, no increased work of breathing  ABDOMEN:  soft NT/ND, +BS, no HSM  SKIN: No rashes or skin lesions. No nail findings  MSK:  B/L shoulder abduction limited to 160 degrees  Occiput to wall distance greater than 5 cm  Lower spine limited flexion   NEURO: Cranial nerves II-XII intact grossly. 5/5 strength throughout in both upper and lower extremities, sensation intact.  PSYCH: normal mood       LABS:     Component      Latest Ref Rng 11/14/2023 11/19/2023   C-REACTIVE PROTEIN      <0.30 mg/dL 5.50 (H)     C-Citrullinated Peptide IgG AB      0.0 - 6.9 U/mL 1.3     SED RATE      0 - 20 mm/Hr 59 (H)     RHEUMATOID FACTOR      <14 IU/mL <10     HLA-B27 Negative       Component      Latest Ref Rng 12/4/2023   C-REACTIVE PROTEIN      <0.30 mg/dL 2.92 (H)    C-Citrullinated Peptide IgG AB      0.0 - 6.9 U/mL    SED RATE      0 - 20 mm/Hr 34 (H)    RHEUMATOID FACTOR      <14 IU/mL    HLA-B27      Imaging:     CT cervical spine:  CONCLUSION: No acute osseous abnormality of the cervical spine.  Cervical-thoracic spine ankylosis is present.     ASSESSMENT/PLAN:     Ankylosing spondylitis- improved   - Found to have negative HLA-B27 but elevated formation markers, ESR 59 and CRP 5.5 mg/dL.  X-ray of the SI joint was normal but CT of the cervical spine showed evidence of cervical thoracic ankylosis and syndesmophytes  - He was placed on a 9-day taper of prednisone which helped his symptoms.  - He also received epidural injections in his neck and lower spine which have helped  - He is now on Cimzia in office injections monthly since December 2023, symptoms have improved.  Less pain in his  neck, has more range of motion his shoulders and less pain in his thighs.  Overall feels better on Cimzia  - received Cimzia today   - Also recent blood work shows normal inflammation markers  - He is also on Norco for chronic pain.  - Blood work every 6 mos     Chronic neck pain 2/2 above  - he also sees neurology and gets botox injections     Bilateral knee pain due to osteoarthritis  - Following with joint [place] and gets hyaluronic acid injections  - Xray showing severe OA and chondrocalcinosis     Elevated Creatinine  - he does have hx of prostate cancer  - will send message to PCP  - advised to avoid NSAIDs    Pt will f/u in 6 mos     There is a longitudinal care relationship with me, the care plan reflects the ongoing nature of the continuous relationship of care, and the medical record indicates that there is ongoing treatment of a serious/complex medical condition which I am currently managing.  is Applicable.     Sheron Dalton MD  3/17/2025  10:57 AM

## 2025-03-27 ENCOUNTER — NURSE TRIAGE (OUTPATIENT)
Dept: INTERNAL MEDICINE CLINIC | Facility: CLINIC | Age: 75
End: 2025-03-27

## 2025-03-27 NOTE — TELEPHONE ENCOUNTER
Action Requested: Summary for Provider     []  Critical Lab, Recommendations Needed  [] Need Additional Advice  []   FYI    []   Need Orders  [] Need Medications Sent to Pharmacy  []  Other     SUMMARY: Disposition per protocol  is to be seen in office today or tomorrow.  Patient declined available appointments and [facility] and will consider going to [facility].      Reason for call: Cough  Onset: 2 weeks     Patient calling,verified name and date of birth. Congested Cough x 2 weeks, intermittent nausea, knee pain, body aches, urinating more frequently at night.. Cough is worse at night and is occasionally productive of white mucous. On Symbia from rheumatologist who told him kidney function tests were elevated and advised to increase fluids intake. Recent hyaluronic injections in knees. Reviewed care advice to follow rheumatologist advice regarding increase fluids and avoid nsaid's, humidifier, consider [facility] or call back to schedule an appointment, call back if cough worsens or if new symptoms such as fever, chest pain or shortness of breath develop. Patient verbalizes understanding.    Reason for Disposition   Patient wants to be seen    Protocols used: Cough-A-[place]

## 2025-04-14 ENCOUNTER — TELEPHONE (OUTPATIENT)
Age: 75
End: 2025-04-14

## 2025-04-14 ENCOUNTER — NURSE ONLY (OUTPATIENT)
Age: 75
End: 2025-04-14

## 2025-04-14 DIAGNOSIS — M45.3 ANKYLOSING SPONDYLITIS OF CERVICOTHORACIC REGION (HCC): Primary | ICD-10-CM

## 2025-04-14 PROCEDURE — 96372 THER/PROPH/DIAG INJ SC/IM: CPT | Performed by: INTERNAL MEDICINE

## 2025-04-14 NOTE — TELEPHONE ENCOUNTER
Left message regarding patient's question about Cimzia and Synvisc One. Spoke with Dr Dalton and she states Cimzia does not make Synvisc One less effective.

## 2025-04-21 ENCOUNTER — OFFICE VISIT (OUTPATIENT)
Dept: PAIN CLINIC | Facility: HOSPITAL | Age: 75
End: 2025-04-21
Attending: ANESTHESIOLOGY
Payer: MEDICARE

## 2025-04-21 VITALS
DIASTOLIC BLOOD PRESSURE: 77 MMHG | HEART RATE: 56 BPM | OXYGEN SATURATION: 95 % | WEIGHT: 230 LBS | SYSTOLIC BLOOD PRESSURE: 127 MMHG | BODY MASS INDEX: 33 KG/M2

## 2025-04-21 DIAGNOSIS — G89.29 BILATERAL CHRONIC KNEE PAIN: ICD-10-CM

## 2025-04-21 DIAGNOSIS — M25.562 BILATERAL CHRONIC KNEE PAIN: ICD-10-CM

## 2025-04-21 DIAGNOSIS — G89.29 OTHER CHRONIC PAIN: ICD-10-CM

## 2025-04-21 DIAGNOSIS — M54.50 LOW BACK PAIN AT MULTIPLE SITES: ICD-10-CM

## 2025-04-21 DIAGNOSIS — M25.561 BILATERAL CHRONIC KNEE PAIN: ICD-10-CM

## 2025-04-21 PROCEDURE — 99214 OFFICE O/P EST MOD 30 MIN: CPT | Performed by: ANESTHESIOLOGY

## 2025-04-21 RX ORDER — HYDROCODONE BITARTRATE AND ACETAMINOPHEN 10; 325 MG/1; MG/1
1 TABLET ORAL EVERY 6 HOURS PRN
Qty: 120 TABLET | Refills: 0 | Status: SHIPPED | OUTPATIENT
Start: 2025-06-09 | End: 2025-07-09

## 2025-04-21 RX ORDER — HYDROCODONE BITARTRATE AND ACETAMINOPHEN 10; 325 MG/1; MG/1
1 TABLET ORAL EVERY 6 HOURS PRN
Qty: 120 TABLET | Refills: 0 | Status: SHIPPED | OUTPATIENT
Start: 2025-05-10 | End: 2025-06-09

## 2025-04-21 NOTE — CHRONIC PAIN
Initial Consultation Note      HISTORY OF PRESENT ILLNESS:  Moreno Khalil is a 70 year old old male referred to the pain clinic  for evaluation treatment of his low back and cervical neck pain .  Patient has ankylosing spondylitis along with degenerative arthritis of his knees he continues to take 4 Norco a day for pain control Moreno also has bilateral knee arthritis in need of replacement but is hold off on as long as he can he takes 4 Norco a day for pain control no side effects still decreases pain by 50 to 60% throughout the day he is walking a little bit slower these days and little more kyphotic in nature.  Moreno had a cervical epidural steroid injection as well as lumbar injections in the past was pretty good results in the past.  The medication as well as intermittent and injections have helped decrease his pain allows him to be more functionable.    Since his last visit he was started on an immunologic medications for his ankylosing spondylitis as well as Botox injection by the neurologist which has helped  So overall the pain is fairly well-controlled with a combination of small dose opioids and intermittent epidural injections  PAIN COURSE AND PREVIOUS INTERVENTIONS:    Interventions: Epidural injection  Adjuvants: Small amounts Norco every day    BLOOD THINNING MEDICATIONS:  None    CURRENT MEDICATIONS:  Current Outpatient Medications   Medication Sig Dispense Refill    [START ON 11/8/2020] HYDROcodone-acetaminophen  MG Oral Tab Take 1 tablet by mouth every 8 (eight) hours as needed for Pain (max 3/day). 90 tablet 0    [START ON 10/9/2020] HYDROcodone-acetaminophen  MG Oral Tab Take 1 tablet by mouth every 8 (eight) hours as needed for Pain (MAX 3/DAY). 90 tablet 0    HYDROCHLOROTHIAZIDE 25 MG Oral Tab Take 1 tablet by mouth once daily 90 tablet 0    metoprolol Tartrate 25 MG Oral Tab Take 1 tablet (25 mg total) by mouth 2 (two) times daily. 180 tablet 1    simvastatin 20 MG Oral Tab  Take 1 tablet (20 mg total) by mouth every evening. 90 tablet 1    ibuprofen 600 MG Oral Tab TAKE 1 TABLET EVERY 12 HOURS AS NEEDED FOR PAIN (SUBSTITUTED FOR MOTRIN) 180 tablet 0    Tadalafil 20 MG Oral Tab Take 1 tablet (20 mg total) by mouth daily as needed for Erectile Dysfunction. 10 tablet 11        ALLERGIES:  No Known Allergies    SURGICAL HISTORY:  Past Surgical History:   Procedure Laterality Date    APPENDECTOMY  1963    KNEE ARTHROSCOPY      per NG: bilat knees 2x each; arthroscopy - brash    PROSTATE BIOPSIES  2013    REPAIR ROTATOR CUFF,ACUTE Right 2007    TONSILLECTOMY         REVIEW OF SYSTEMS:   A comprehensive review of systems was negative except for:   MEDICAL HISTORY:  Patient Active Problem List:     Prostate cancer (HCC)     Erectile dysfunction     Cervical radicular pain     Mixed hyperlipidemia     Essential hypertension with goal blood pressure less than 140/90     Class 1 obesity due to excess calories with serious comorbidity in adult     Physical exam, annual     Decreased visual acuity    Past Medical History:   Diagnosis Date    Appendicitis 1963    Back problem     Benign prostatic hypertrophy     Carpal tunnel syndrome     Dysplastic nevus 2017    left flank    Elevated PSA 2008    bx negative for cancer    H/O arthroscopy of knee bilateral    History of brachytherapy     Neck pain     Obesity, unspecified     Prostate cancer (HCC)     brachytherapy    PVD (peripheral vascular disease) ([facility])     Rotator cuff tear 2007    right    Rotator cuff tear     R - 2007    Tonsillitis 1960       FAMILY HISTORY:  Family History   Problem Relation Age of Onset    Diabetes Mother     Diabetes Sister     Obesity Sister     Lipids Other         family h/o hyperlipidemia and vascular disease       SOCIAL HISTORY:  Social History    Socioeconomic History      Marital status:       Spouse name: Not on file      Number of children: 2      Years of education: Not on file      Highest education  level: Not on file    Occupational History      Occupation: ordering        Comment: retired    Social Needs      Financial resource strain: Not on file      Food insecurity        Worry: Not on file        Inability: Not on file      Transportation needs        Medical: Not on file        Non-medical: Not on file    Tobacco Use      Smoking status: Never Smoker      Smokeless tobacco: Never Used    Substance and Sexual Activity      Alcohol use: No        Alcohol/week: 0.0 standard drinks      Drug use: No      Sexual activity: Not on file    Lifestyle      Physical activity        Days per week: Not on file        Minutes per session: Not on file      Stress: Not on file    Relationships      Social connections        Talks on phone: Not on file        Gets together: Not on file        Attends Alevism service: Not on file        Active member of club or organization: Not on file        Attends meetings of clubs or organizations: Not on file        Relationship status: Not on file      Intimate partner violence        Fear of current or ex partner: Not on file        Emotionally abused: Not on file        Physically abused: Not on file        Forced sexual activity: Not on file    Other Topics      Concerns:         Service: Not Asked        Blood Transfusions: Not Asked        Caffeine Concern: Yes          soda, 1 liter/day        Occupational Exposure: Not Asked        Hobby Hazards: Not Asked        Sleep Concern: Not Asked        Stress Concern: Not Asked        Weight Concern: Not Asked        Special Diet: Not Asked        Back Care: Not Asked        Exercise: Not Asked        Bike Helmet: Not Asked        Seat Belt: Not Asked        Self-Exams: Not Asked        Grew up on a farm: Not Asked        History of tanning: Not Asked        Outdoor occupation: Not Asked        Pt has a pacemaker: No        Pt has a defibrillator: No        Reaction to local anesthetic: No    Social History Narrative       Not on file      ADVANCE CARE PLANNING:  Advance Care Plan NOT discussed.    PHYSICAL EXAMINATION:  There were no vitals filed for this visit.  General: Alert and oriented x3  Affect:  NAD  Head: normocephalic, atraumatic  Eyes: anicteric; no injection  Chest: S1, S2, RRR  Respiratory: CTAB  Gait: Normal; cane user - No  Spine: Normal    ROM:   Lumbar spine  Flexion  dec  Extension dec  Cervical Spine  Flexion dec  Extensiondec  MOTOR EXAMINATION:  UPPER EXTREMITY      LEFT RIGHT   Deltoid 5/5 5/5   Biceps 5/5 5/5   Triceps 5/5 5/5   Brachioradialis 5/5 5/5   Wrist Flexors 5/5 5/5   Wrist Extensors 5/5 5/5   Intrinsic Hand 5/5 5/5    5/5 5/5     LOWER EXTREMITY      LEFT RIGHT   Iliopsoas 5/5 5/5   Quadriceps 5/5 5/5   Foot DF 5/5 5/5   Foot EHL 5/5 5/5   Gastrocnemius 5/5 5/5     PULSES      LEFT RIGHT   Radial 2/4 2/4   Dorsalis Pedis 2/4 2/4   Posterior Tibial 2/4 2/4   Brachial 2/4 2/4     SLR: negative  SIJ tenderness negative  Joint Exam: Normal  TPs:  Present within lumbo-sacral paraspinal muscles  Left occipital region i nd right occipital region is very tender to palpation can replicates the pain that he gets in the back of his head  Right Deep tendon reflexes: Normal   Left Deep tendon reflexes: Normal   Babinski Reflex: absent bilaterally   Temperature:  normal to touch bilateral upper and lower extremities  Edema - Absent  Stasis ulcer on the left leg is healed    Sensation (light touch/pinprick/temperature):     Right Lower Extremity:  Normal  Left Lower Extremity: Normal  Right Upper Extremity:  Normal  Left Upper Extremity:  Normal    IMAGING:  No results found.    LABS:  Lab Results   Component Value Date    WBC 10.3 07/06/2020    RBC 5.45 07/06/2020    HGB 14.1 07/06/2020    HCT 45.2 07/06/2020    MCV 82.9 07/06/2020    MCH 25.9 (L) 07/06/2020    MCHC 31.2 07/06/2020    RDW 19.0 (H) 07/06/2020    .0 07/06/2020    MPV 8.1 06/28/2018     Lab Results   Component Value Date      07/06/2020    K 3.8 07/06/2020     07/06/2020    CO2 29.0 07/06/2020    BUN 25 (H) 07/06/2020    GLU 92 07/06/2020    CA 8.8 07/06/2020     No results found for: PT    [facility] [facility] MONITORING PROGRAM REVIEWED  Yes      ASSESSMENT:   70 year old old male with cervical and lumbar spinal stenosis   Has responded in the past epidural injections cervical and lumbar  Started on the immunologic for his ankylosing spondylitis which has helped his pain  Low back pain bilateral radicular pain  Chronic use  of Norco a day for pain control     Botox injections are helping the cervical pain     Bilateral knee pain the care of Dr. Singh for that DJD bilateral knee  Recent injections for the knees resolved        PLAN:  RECOMMENDATIONS:  4 Norco a day continue with the Norco no issues with the pain medications still providing good relief  Continue with rheumatology and orthopedic care  No need for injection therapy at this point  Continue with Botox injection per neuro cervical    Epidurals for cervical is still benefiting him no need for repeat as and at this time  Bilateral knee pain he is to see orthopedic surgeon status post Synvisc injections which is helped also trying hyaluronic acid injections current will order x-ray of bilateral knees    Moses Mclaughlin MD

## 2025-04-21 NOTE — PATIENT INSTRUCTIONS
Refill policies:    Allow 2-3 business days for refills; controlled substances may take longer.  Contact your pharmacy at least 5 days prior to running out of medication and have them send an electronic request or submit request through the “request refill” option in your [employer] account.  Refills are not addressed on weekends; covering physicians do not authorize routine medications on weekends.  No narcotics or controlled substances are refilled after noon on Fridays or by on call physicians.  By law, narcotics must be electronically prescribed.  A 30 day supply with no refills is the maximum allowed.  If your prescription is due for a refill, you may be due for a follow up appointment.  To best provide you care, patients receiving routine medications need to be seen at least once a year.  Patients receiving narcotic/controlled substance medications need to be seen at least once every 3 months.  In the event that your preferred pharmacy does not have the requested medication in stock (e.g. Backordered), it is your responsibility to find another pharmacy that has the requested medication available.  We will gladly send a new prescription to that pharmacy at your request.    Scheduling Tests:    If your physician has ordered radiology tests such as MRI or CT scans, please contact Central Scheduling at [phone] right away to schedule the test.  Once scheduled, the [facility] Centralized Referral Team will work with your insurance carrier to obtain pre-certification or prior authorization.  Depending on your insurance carrier, approval may take 3-10 days.  It is highly recommended patients assure they have received an authorization before having a test performed.  If test is done without insurance authorization, patient may be responsible for the entire amount billed.      Precertification and Prior Authorizations:  If your physician has recommended that you have a procedure or additional testing performed the [facility]  Centralized Referral Team will contact your insurance carrier to obtain pre-certification or prior authorization.    You are strongly encouraged to contact your insurance carrier to verify that your procedure/test has been approved and is a COVERED benefit.  Although the [facility] Centralized Referral Team does its due diligence, the insurance carrier gives the disclaimer that \"Although the procedure is authorized, this does not guarantee payment.\"    Ultimately the patient is responsible for payment.   Thank you for your understanding in this matter.  Paperwork Completion:  If you require FMLA or disability paperwork for your recovery, please make sure to either drop it off or have it faxed to our office at [phone]. Be sure the form has your name and date of birth on it.  The form will be faxed to our Forms Department and they will complete it for you.  There is a 25$ fee for all forms that need to be filled out.  Please be aware there is a 10-14 day turnaround time.  You will need to sign a release of information (SYL) form if your paperwork does not come with one.  You may call the Forms Department with any questions at [phone].  Their fax number is [phone].

## 2025-04-21 NOTE — PROGRESS NOTES
Patient presents in office today with reported pain in neck, lower back and bilateral knee     Current pain level reported -3-4/10     Last reported dose of Norco this morning        Narcotic Contract renewal 09.06.25     Urine Drug screen 09.06.24     Increased pain due to the weather

## 2025-04-25 ENCOUNTER — LAB ENCOUNTER (OUTPATIENT)
Dept: LAB | Age: 75
End: 2025-04-25
Attending: INTERNAL MEDICINE
Payer: MEDICARE

## 2025-04-25 DIAGNOSIS — E66.811 CLASS 1 OBESITY DUE TO EXCESS CALORIES WITH SERIOUS COMORBIDITY IN ADULT, UNSPECIFIED BMI: ICD-10-CM

## 2025-04-25 DIAGNOSIS — Z51.81 ENCOUNTER FOR THERAPEUTIC DRUG MONITORING: ICD-10-CM

## 2025-04-25 DIAGNOSIS — E66.09 CLASS 1 OBESITY DUE TO EXCESS CALORIES WITH SERIOUS COMORBIDITY IN ADULT, UNSPECIFIED BMI: ICD-10-CM

## 2025-04-25 DIAGNOSIS — M45.3 ANKYLOSING SPONDYLITIS OF CERVICOTHORACIC REGION (HCC): ICD-10-CM

## 2025-04-25 DIAGNOSIS — I10 ESSENTIAL HYPERTENSION WITH GOAL BLOOD PRESSURE LESS THAN 140/90: ICD-10-CM

## 2025-04-25 DIAGNOSIS — E78.2 MIXED HYPERLIPIDEMIA: ICD-10-CM

## 2025-04-25 DIAGNOSIS — R73.03 PREDIABETES: ICD-10-CM

## 2025-04-25 LAB
ALBUMIN SERPL-MCNC: 4.5 G/DL (ref 3.2–4.8)
ALBUMIN/GLOB SERPL: 1.9 {RATIO} (ref 1–2)
ALP LIVER SERPL-CCNC: 93 U/L (ref 45–117)
ALT SERPL-CCNC: 30 U/L (ref 10–49)
ANION GAP SERPL CALC-SCNC: 12 MMOL/L (ref 0–18)
AST SERPL-CCNC: 26 U/L (ref ?–34)
BASOPHILS # BLD AUTO: 0.11 X10(3) UL (ref 0–0.2)
BASOPHILS NFR BLD AUTO: 1.2 %
BILIRUB SERPL-MCNC: 0.9 MG/DL (ref 0.2–1.1)
BUN BLD-MCNC: 22 MG/DL (ref 9–23)
CALCIUM BLD-MCNC: 9.2 MG/DL (ref 8.7–10.6)
CHLORIDE SERPL-SCNC: 104 MMOL/L (ref 98–112)
CHOLEST SERPL-MCNC: 134 MG/DL (ref ?–200)
CO2 SERPL-SCNC: 24 MMOL/L (ref 21–32)
CREAT BLD-MCNC: 1.44 MG/DL (ref 0.7–1.3)
CRP SERPL-MCNC: 1 MG/DL (ref ?–0.5)
EGFRCR SERPLBLD CKD-EPI 2021: 51 ML/MIN/1.73M2 (ref 60–?)
EOSINOPHIL # BLD AUTO: 0.36 X10(3) UL (ref 0–0.7)
EOSINOPHIL NFR BLD AUTO: 3.8 %
ERYTHROCYTE [DISTWIDTH] IN BLOOD BY AUTOMATED COUNT: 15 %
ERYTHROCYTE [SEDIMENTATION RATE] IN BLOOD: 11 MM/HR (ref 0–20)
EST. AVERAGE GLUCOSE BLD GHB EST-MCNC: 120 MG/DL (ref 68–126)
FASTING PATIENT LIPID ANSWER: YES
FASTING STATUS PATIENT QL REPORTED: YES
GLOBULIN PLAS-MCNC: 2.4 G/DL (ref 2–3.5)
GLUCOSE BLD-MCNC: 102 MG/DL (ref 70–99)
HBA1C MFR BLD: 5.8 % (ref ?–5.7)
HCT VFR BLD AUTO: 46.2 % (ref 39–53)
HDLC SERPL-MCNC: 50 MG/DL (ref 40–59)
HGB BLD-MCNC: 15.5 G/DL (ref 13–17.5)
IMM GRANULOCYTES # BLD AUTO: 0.07 X10(3) UL (ref 0–1)
IMM GRANULOCYTES NFR BLD: 0.7 %
LDLC SERPL CALC-MCNC: 65 MG/DL (ref ?–100)
LYMPHOCYTES # BLD AUTO: 1.96 X10(3) UL (ref 1–4)
LYMPHOCYTES NFR BLD AUTO: 20.6 %
MCH RBC QN AUTO: 28.9 PG (ref 26–34)
MCHC RBC AUTO-ENTMCNC: 33.5 G/DL (ref 31–37)
MCV RBC AUTO: 86 FL (ref 80–100)
MONOCYTES # BLD AUTO: 1.02 X10(3) UL (ref 0.1–1)
MONOCYTES NFR BLD AUTO: 10.7 %
NEUTROPHILS # BLD AUTO: 6 X10 (3) UL (ref 1.5–7.7)
NEUTROPHILS # BLD AUTO: 6 X10(3) UL (ref 1.5–7.7)
NEUTROPHILS NFR BLD AUTO: 63 %
NONHDLC SERPL-MCNC: 84 MG/DL (ref ?–130)
OSMOLALITY SERPL CALC.SUM OF ELEC: 294 MOSM/KG (ref 275–295)
PLATELET # BLD AUTO: 236 10(3)UL (ref 150–450)
POTASSIUM SERPL-SCNC: 3.4 MMOL/L (ref 3.5–5.1)
PROT SERPL-MCNC: 6.9 G/DL (ref 5.7–8.2)
RBC # BLD AUTO: 5.37 X10(6)UL (ref 3.8–5.8)
SODIUM SERPL-SCNC: 140 MMOL/L (ref 136–145)
TRIGL SERPL-MCNC: 103 MG/DL (ref 30–149)
TSI SER-ACNC: 2.95 UIU/ML (ref 0.55–4.78)
VLDLC SERPL CALC-MCNC: 15 MG/DL (ref 0–30)
WBC # BLD AUTO: 9.5 X10(3) UL (ref 4–11)

## 2025-04-25 PROCEDURE — 84443 ASSAY THYROID STIM HORMONE: CPT

## 2025-04-25 PROCEDURE — 80061 LIPID PANEL: CPT

## 2025-04-25 PROCEDURE — 36415 COLL VENOUS BLD VENIPUNCTURE: CPT

## 2025-04-25 PROCEDURE — 85025 COMPLETE CBC W/AUTO DIFF WBC: CPT

## 2025-04-25 PROCEDURE — 80053 COMPREHEN METABOLIC PANEL: CPT

## 2025-04-25 PROCEDURE — 83036 HEMOGLOBIN GLYCOSYLATED A1C: CPT

## 2025-04-25 PROCEDURE — 86140 C-REACTIVE PROTEIN: CPT

## 2025-04-29 ENCOUNTER — OFFICE VISIT (OUTPATIENT)
Dept: INTERNAL MEDICINE CLINIC | Facility: CLINIC | Age: 75
End: 2025-04-29

## 2025-04-29 VITALS
SYSTOLIC BLOOD PRESSURE: 132 MMHG | OXYGEN SATURATION: 96 % | HEART RATE: 61 BPM | BODY MASS INDEX: 33.21 KG/M2 | DIASTOLIC BLOOD PRESSURE: 82 MMHG | HEIGHT: 70 IN | TEMPERATURE: 97 F | WEIGHT: 232 LBS

## 2025-04-29 DIAGNOSIS — G89.4 CHRONIC PAIN SYNDROME: ICD-10-CM

## 2025-04-29 DIAGNOSIS — E66.811 CLASS 1 OBESITY DUE TO EXCESS CALORIES WITH SERIOUS COMORBIDITY IN ADULT, UNSPECIFIED BMI: ICD-10-CM

## 2025-04-29 DIAGNOSIS — I10 ESSENTIAL HYPERTENSION WITH GOAL BLOOD PRESSURE LESS THAN 140/90: ICD-10-CM

## 2025-04-29 DIAGNOSIS — M25.561 PAIN IN BOTH KNEES, UNSPECIFIED CHRONICITY: ICD-10-CM

## 2025-04-29 DIAGNOSIS — Z85.46 HISTORY OF PROSTATE CANCER: ICD-10-CM

## 2025-04-29 DIAGNOSIS — E66.09 CLASS 1 OBESITY DUE TO EXCESS CALORIES WITH SERIOUS COMORBIDITY IN ADULT, UNSPECIFIED BMI: ICD-10-CM

## 2025-04-29 DIAGNOSIS — R91.1 SOLITARY PULMONARY NODULE: ICD-10-CM

## 2025-04-29 DIAGNOSIS — M17.0 PRIMARY OSTEOARTHRITIS OF BOTH KNEES: ICD-10-CM

## 2025-04-29 DIAGNOSIS — N40.0 HYPERPLASIA OF PROSTATE WITHOUT LOWER URINARY TRACT SYMPTOMS (LUTS): ICD-10-CM

## 2025-04-29 DIAGNOSIS — L98.9 SKIN LESION: Primary | ICD-10-CM

## 2025-04-29 DIAGNOSIS — E78.2 MIXED HYPERLIPIDEMIA: ICD-10-CM

## 2025-04-29 DIAGNOSIS — M45.3 ANKYLOSING SPONDYLITIS OF CERVICOTHORACIC REGION (HCC): ICD-10-CM

## 2025-04-29 DIAGNOSIS — M25.562 PAIN IN BOTH KNEES, UNSPECIFIED CHRONICITY: ICD-10-CM

## 2025-04-29 DIAGNOSIS — N18.31 STAGE 3A CHRONIC KIDNEY DISEASE (HCC): ICD-10-CM

## 2025-04-29 DIAGNOSIS — N52.9 VASCULOGENIC ERECTILE DYSFUNCTION, UNSPECIFIED VASCULOGENIC ERECTILE DYSFUNCTION TYPE: ICD-10-CM

## 2025-04-29 PROCEDURE — 99213 OFFICE O/P EST LOW 20 MIN: CPT | Performed by: INTERNAL MEDICINE

## 2025-04-29 PROCEDURE — G0439 PPPS, SUBSEQ VISIT: HCPCS | Performed by: INTERNAL MEDICINE

## 2025-04-29 PROCEDURE — 99499 UNLISTED E&M SERVICE: CPT | Performed by: INTERNAL MEDICINE

## 2025-04-29 RX ORDER — ASPIRIN 81 MG/1
81 TABLET, CHEWABLE ORAL DAILY
Qty: 90 TABLET | Refills: 3 | Status: SHIPPED | OUTPATIENT
Start: 2025-04-29

## 2025-04-29 RX ORDER — ATORVASTATIN CALCIUM 40 MG/1
40 TABLET, FILM COATED ORAL NIGHTLY
Qty: 90 TABLET | Refills: 3 | Status: SHIPPED | OUTPATIENT
Start: 2025-04-29

## 2025-04-29 NOTE — PROGRESS NOTES
Subjective:   Moreno Khalil is a 74 year old male who presents for a Medicare Subsequent Annual Wellness visit (Pt already had Initial Annual Wellness) and scheduled follow up of multiple significant but stable problems and acute exacerbation of a chronic illness.   History of Present Illness    74-year-old gentleman here for Medicare annual wellness visit and also for concern of skin wart his abdomen and also for worsening knee pain.  He reports that-he took out the skin board but it has been coming back and has been bothering him.  Significant DJD.  He does not want knee replacement surgery.  No falls reported.  No abuse no depression reported.  History/Other:   Fall Risk Assessment:   He has been screened for Falls and is High Risk. Fall Prevention information provided to patient in After Visit Summary.    Do you feel unsteady when standing or walking?: Yes  Do you worry about falling?: No  Have you fallen in the past year?: No     Cognitive Assessment:   He had a completely normal cognitive assessment - see flowsheet entries     Functional Ability/Status:   Moreno Khalil has some abnormal functions as listed below:  He has Hearing problems based on screening of functional status.He has Vision problems based on screening of functional status. He has Walking problems based on screening of functional status.       Depression Screening (PHQ):  PHQ-2 SCORE: 1  , done 4/29/2025   Feeling down, depressed, or hopeless: 1         <5 minutes spent screening and counseling for depression    Advanced Directives:   He does NOT have a Living Will. [Do you have a living will?: No]  He does NOT have a Power of  for Health Care. [Do you have a healthcare power of ?: No]  Discussed Advance Care Planning with patient (and family/surrogate if present). Standard forms made available to patient in After Visit Summary.      Problem List[1]  Allergies:  He has no known allergies.    Current Medications:  Active  Meds, Sig Only[2]    Medical History:  He  has a past medical history of Abnormal blood chemistry (12/09/2013), Acute dermatitis due to solar radiation (11/26/2012), Acute upper respiratory infection (01/22/2009), Appendicitis (1963), Back problem, Backache (07/05/2005), Benign neoplasm of skin (07/10/2008), Benign prostatic hypertrophy, Calcaneal spur (08/04/2011), Carpal tunnel syndrome, Cervical radicular pain (06/23/2015), Cough variant asthma (HCC) (03/02/2011), Disorder of kidney and ureter (09/08/2013), Disorder of rotator cuff syndrome of shoulder and allied disorder (06/13/2007), Dizziness (02/05/2022), Dysfunction of eustachian tube (01/22/2009), Dysplastic nevus (2017), Dyspnea and respiratory abnormality (04/01/2011), Elevated prostate specific antigen (PSA) (06/09/2008), Elevated PSA (2008), Essential hypertension, Generalized pain (04/01/2011), H/O arthroscopy of knee (bilateral), History of brachytherapy, Hyponatremia (02/05/2022), Intestinal infection (11/22/2006), Malaise and fatigue (06/03/2008), Neck pain, Nocturia (07/09/2008), Obesity, unspecified, Petechial hemorrhage (02/05/2022), Plantar fascial fibromatosis (08/04/2011), Pressure ulcer, stage 1 (12/28/2012), Prostate cancer (HCC), Proteinuria (12/25/2011), PVD (peripheral vascular disease), Rotator cuff tear (2007), Rotator cuff tear, Sensorineural hearing loss (06/01/2009), Shoulder pain, left (11/28/2017), Sleep disturbance (06/03/2008), and Tonsillitis (1960).  Surgical History:  He  has a past surgical history that includes tonsillectomy; appendectomy (1963); knee arthroscopy; prostate biopsies (2013); and repair rotator cuff,acute (Right, 2007).   Family History:  His family history includes Diabetes in his mother and sister; Lipids in an other family member; Obesity in his sister.  Social History:  He  reports that he has never smoked. He has never been exposed to tobacco smoke. He has never used smokeless tobacco. He reports that he  does not drink alcohol and does not use drugs.    Tobacco:  He has never smoked tobacco.    CAGE Alcohol Screen:   CAGE screening score of 0 on 4/29/2025, showing low risk of alcohol abuse.      Patient Care Team:  Alex Hull MD as PCP - General (Internal Medicine)  Moses Mclaughlin MD (Anesthesiology)  Giuseppe Camargo MD (Anesthesiology)    Review of Systems   Constitutional:  Negative for activity change, appetite change and fever.   HENT:  Negative for congestion and voice change.    Respiratory:  Negative for cough and shortness of breath.    Cardiovascular:  Negative for chest pain.   Gastrointestinal:  Negative for abdominal distention, abdominal pain and vomiting.   Genitourinary:  Negative for hematuria.   Musculoskeletal:  Positive for arthralgias and gait problem.   Skin:  Negative for wound.   Psychiatric/Behavioral:  Negative for behavioral problems.          Objective:   Physical Exam  Constitutional:       Appearance: Normal appearance.   HENT:      Head: Normocephalic.   Eyes:      Conjunctiva/sclera: Conjunctivae normal.   Cardiovascular:      Rate and Rhythm: Normal rate and regular rhythm.      Heart sounds: Normal heart sounds. No murmur heard.  Pulmonary:      Effort: Pulmonary effort is normal.      Breath sounds: Normal breath sounds. No rhonchi or rales.   Abdominal:      General: Bowel sounds are normal.      Palpations: Abdomen is soft.      Tenderness: There is no abdominal tenderness.   Musculoskeletal:      Cervical back: Neck supple.      Right lower leg: No edema.      Left lower leg: No edema.   Skin:     General: Skin is warm and dry.   Neurological:      General: No focal deficit present.      Mental Status: He is alert and oriented to person, place, and time. Mental status is at baseline.   Psychiatric:         Mood and Affect: Mood normal.         Behavior: Behavior normal.     Skin wart present on the lateral aspect of the lower abdomen  Crepitus present bilaterally    BP  132/82   Pulse 61   Temp 97.4 °F (36.3 °C) (Temporal)   Ht 5' 10\" (1.778 m)   Wt 232 lb (105.2 kg)   SpO2 96%   BMI 33.29 kg/m²  Estimated body mass index is 33.29 kg/m² as calculated from the following:    Height as of this encounter: 5' 10\" (1.778 m).    Weight as of this encounter: 232 lb (105.2 kg).    Medicare Hearing Assessment:   Hearing Screening    Time taken: 4/29/2025 11:47 AM  Screening Method: Questionnaire  I have a problem hearing over the telephone: Yes I have trouble following the conversations when two or more people are talking at the same time: Sometimes   I have trouble understanding things on the TV: Yes I have to strain to understand conversations: Yes   I have to worry about missing the telephone ring or doorbell: Yes I have trouble hearing conversations in a noisy background such as a crowded room or restaurant: No   I get confused about where sounds come from: Yes I misunderstand some words in a sentence and need to ask people to repeat themselves: Sometimes   I especially have trouble understanding the speech of women and children: Sometimes I have trouble understanding the speaker in a large room such as at a meeting or place of Religion: No   Many people I talk to seem to mumble (or don't speak clearly): Sometimes People get annoyed because I misunderstand what they say: Yes   I misunderstand what others are saying and make inappropriate responses: Yes I avoid social activities because I cannot hear well and fear I will reply improperly: Sometimes   Family members and friends have told me they think I may have hearing loss: Yes                   Assessment & Plan:   Moreno Khalil is a 74 year old male who presents for a Medicare Assessment.       2. Ankylosing spondylitis of cervicothoracic region (HCC) stable.  Overview:  On layton from Dr Dalton  3. Mixed hyperlipidemia monitor lipid profile and LFTs  4. Essential hypertension with goal blood pressure less than  140/90-controlled  -     Basic Metabolic Panel (8); Future; Expected date: 04/29/2025  5. Solitary pulmonary nodule-she is not a smoker  Overview:  PET -ve 2022  6. Class 1 obesity due to excess calories with serious comorbidity in adult, unspecified BMI encouraged weight loss  7. Chronic pain syndrome  Overview:  On norco - follows up in pain clinic  8. Primary osteoarthritis of both knees worsening knee pain.  Encouraged patient regarding knee replacement surgery.  He wants to hold off  9. Vasculogenic erectile dysfunction, unspecified vasculogenic erectile dysfunction type stable   10. Hyperplasia of prostate without lower urinary tract symptoms (LUTS) stable  11. History of prostate cancer  Overview:  Follows up with Dr KWAKU Ortega apart from Medicare annual wellness visit  12. Pain in both knees, unspecified chronicity is a flareup of his DJD.  Can apply Voltaren gel.  Avoid NSAIDs because of CKD.  Discussed regarding knee replacement surgery.  13. Stage 3a chronic kidney disease (HCC) encouraged patient to avoid NSAIDs.  Adequate hydration.  Will recheck metabolic panel before next visit  Other orders  -     Aspirin; Chew 1 tablet (81 mg total) by mouth daily.  Dispense: 90 tablet; Refill: 3  -     Atorvastatin Calcium; Take 1 tablet (40 mg total) by mouth nightly.  Dispense: 90 tablet; Refill: 3    1. Skin lesion (Primary)-most likely viral wart-referral placed for dermatology  -     Derm Referral - Sade Johnson)  Assessment & Plan    The patient indicates understanding of these issues and agrees to the plan.  Reinforced healthy diet, lifestyle, and exercise.      No follow-ups on file.     Alex Hull MD, 4/29/2025     Supplementary Documentation:   General Health:  In the past six months, have you lost more than 10 pounds without trying?: 2 - No  Has your appetite been poor?: No  Type of Diet: Other  How does the patient maintain a good energy level?: Other  How would you describe your daily  physical activity?: Light  How would you describe your current health state?: Fair  How do you maintain positive mental well-being?: Visiting Family  On a scale of 0 to 10, with 0 being no pain and 10 being severe pain, what is your pain level?: 4 - (Moderate)  In the past six months, have you experienced urine leakage?: 0-No  At any time do you feel concerned for the safety/well-being of yourself and/or your children, in your home or elsewhere?: No  Have you had any immunizations at another office such as Influenza, Hepatitis B, Tetanus, or Pneumococcal?: Yes    Health Maintenance   Topic Date Due    Zoster Vaccines (1 of 2) Never done    COVID-19 Vaccine (4 - 2024-25 season) 09/01/2024    TB Screen  12/11/2024    Influenza Vaccine (Season Ended) 10/01/2025    PSA  02/07/2026    Annual Physical  04/29/2026    Colorectal Cancer Screening  06/02/2026    Annual Depression Screening  Completed    Fall Risk Screening (Annual)  Completed    Pneumococcal Vaccine: 50+ Years  Completed    Meningococcal B Vaccine  Aged Out            [1]   Patient Active Problem List  Diagnosis    Erectile dysfunction    Mixed hyperlipidemia    Essential hypertension with goal blood pressure less than 140/90    Class 1 obesity due to excess calories with serious comorbidity in adult    Chronic pain syndrome    Primary osteoarthritis of both knees    Carpal tunnel syndrome    Chronic otitis externa    Hyperplasia of prostate without lower urinary tract symptoms (LUTS)    Solitary pulmonary nodule    Sleep disturbance    Cervical spondylosis    Ankylosing spondylitis of cervicothoracic region (HCC)    History of prostate cancer    Stage 3a chronic kidney disease (HCC)   [2]   Outpatient Medications Marked as Taking for the 4/29/25 encounter (Office Visit) with Alex Hull MD   Medication Sig    aspirin 81 MG Oral Chew Tab Chew 1 tablet (81 mg total) by mouth daily.    atorvastatin 40 MG Oral Tab Take 1 tablet (40 mg total) by mouth  nightly.    [START ON 5/10/2025] HYDROcodone-acetaminophen  MG Oral Tab Take 1 tablet by mouth every 6 (six) hours as needed for Pain (MAX 4/DAY).    amLODIPine 5 MG Oral Tab Take 1 tablet (5 mg total) by mouth daily.    hydroCHLOROthiazide 25 MG Oral Tab Take 1 tablet (25 mg total) by mouth daily.    losartan 100 MG Oral Tab Take 1 tablet (100 mg total) by mouth daily.     Current Facility-Administered Medications for the 4/29/25 encounter (Office Visit) with Alex Hull MD   Medication    Certolizumab Pegol  mg

## 2025-05-12 PROCEDURE — 96372 THER/PROPH/DIAG INJ SC/IM: CPT | Performed by: INTERNAL MEDICINE

## 2025-05-13 ENCOUNTER — NURSE ONLY (OUTPATIENT)
Age: 75
End: 2025-05-13

## 2025-05-13 DIAGNOSIS — M45.3 ANKYLOSING SPONDYLITIS OF CERVICOTHORACIC REGION (HCC): Primary | ICD-10-CM

## 2025-05-13 PROCEDURE — 99211 OFF/OP EST MAY X REQ PHY/QHP: CPT | Performed by: INTERNAL MEDICINE

## 2025-05-13 NOTE — PROGRESS NOTES
Verified name and . Patient aware receiving Cimzia injection. Injections given bilateral lower abdomen. Patient tolerated well. Patient aware to follow up in 1 month for next injection.    Future Appointments   Date Time Provider Department Center   2025 11:00 AM Chad Manuel MD ENIELHUR [facility]   2025 10:00 AM [facility] RN RHEUMATOLOGY [facility]UL[facility]   2025  2:55 PM LAURA, PROCEDURE [id number] None   2025 11:30 AM Moses Mclaughlin MD EM PAIN EM [facility]   2025 10:30 AM Devante Bianchi MD CCFHURO [facility]   2025 10:20 [facility]

## 2025-05-14 ENCOUNTER — MED REC SCAN ONLY (OUTPATIENT)
Dept: NEUROLOGY | Facility: CLINIC | Age: 75
End: 2025-05-14

## 2025-05-14 ENCOUNTER — OFFICE VISIT (OUTPATIENT)
Dept: NEUROLOGY | Facility: CLINIC | Age: 75
End: 2025-05-14
Payer: MEDICARE

## 2025-05-14 DIAGNOSIS — G24.3 CERVICAL DYSTONIA: Primary | ICD-10-CM

## 2025-05-14 NOTE — PROGRESS NOTES
Paperwork noting that patient may bear financial responsibility for procedure(s) performed in clinic today signed prior to proceeding with procedure(s).    Furthermore, patient notified that they should contact their insurer to verify that your procedure/test has been approved and is a COVERED benefit.  Although the [facility] staff does its due diligence, the insurance carrier gives the disclaimer that \"Although the procedure is authorized, this does not guarantee payment.\"    Ultimately the patient is responsible for payment.    Botox is:  [x] Buy and Bill  [] Patient Supplied      Botox 300 Cervical dystonia     [x] I have discussed with patient that if their insurance changes they must contact the office right away with that information so that a new prior authorization can be completed.  Patient verbalized understanding that Botox cannot be performed without a current prior authorization in place with correct insurance.

## 2025-06-11 ENCOUNTER — TELEPHONE (OUTPATIENT)
Dept: INTERNAL MEDICINE CLINIC | Facility: CLINIC | Age: 75
End: 2025-06-11

## 2025-06-11 DIAGNOSIS — G89.4 CHRONIC PAIN SYNDROME: ICD-10-CM

## 2025-06-11 DIAGNOSIS — M47.812 OSTEOARTHRITIS OF CERVICAL SPINE, UNSPECIFIED SPINAL OSTEOARTHRITIS COMPLICATION STATUS: Primary | ICD-10-CM

## 2025-06-11 NOTE — TELEPHONE ENCOUNTER
Called and notified patient, he verbalized understanding and will schedule physical therapy after colonoscopy on 6/19/2025

## 2025-06-11 NOTE — TELEPHONE ENCOUNTER
Patient has a history of cervical spine fusion, chronic pain syndrome, cervical spondylosis and sees Dr. Dalton for injections. Patient would like to follow up with physical therapy. Pended for review.

## 2025-06-12 ENCOUNTER — TELEPHONE (OUTPATIENT)
Dept: PAIN CLINIC | Facility: CLINIC | Age: 75
End: 2025-06-12

## 2025-06-12 ENCOUNTER — NURSE ONLY (OUTPATIENT)
Age: 75
End: 2025-06-12

## 2025-06-12 DIAGNOSIS — M45.3 ANKYLOSING SPONDYLITIS OF CERVICOTHORACIC REGION (HCC): Primary | ICD-10-CM

## 2025-06-12 PROCEDURE — 96372 THER/PROPH/DIAG INJ SC/IM: CPT | Performed by: INTERNAL MEDICINE

## 2025-06-12 NOTE — TELEPHONE ENCOUNTER
Patient requesting Dr. Mclaughlin order XRAY Lumbar Spine and Cervical Spine  Patient states he is starting physical therapy and would like to have the images available for when he starts therapy.

## 2025-06-12 NOTE — PROGRESS NOTES
Name and  verified. Pt aware he was to receive injection of Cimza. Injections given in Bilateral Lower Abdomen and pt tolerated well. Possible local side effects discussed with pt. Pt aware to follow up in 4 weeks for next injection.

## 2025-06-13 ENCOUNTER — TELEPHONE (OUTPATIENT)
Dept: PHYSICAL MEDICINE AND REHAB | Facility: CLINIC | Age: 75
End: 2025-06-13

## 2025-06-13 NOTE — TELEPHONE ENCOUNTER
Per CMS Guidelines -no authorization is required for CPT , [address]     Insurance: Medicare Part B Member ID# [id number]   Medication: Botox 300 units  Number of visits Approved: 1  (PLEASE INFORM THE PATIENT TO CONTACT THE OFFICE IF THE INSURANCE CHANGES WITHIN THE YEAR AS HE/SHE WILL BE RESPONSIBLE TO UPDATE THE OFFICE IF INSURANCE CHANGES SO THAT PROCEDURE IS BILLED CORRECTLY) this will be 1 of 1  Dx: G24.3  Last Botox done: 5/14/25  Next Botox due around: 8/6/25  Authorization #   [id number]  Authorization is not required based on medical necessity, however, is not a guarantee of payment and may be subject to review once claim is submitted.

## 2025-06-13 NOTE — TELEPHONE ENCOUNTER
Adonay Mclaughlin!    Please see message below,    Pt requesting if an order can be placed for L-SP and C-SP prior to starting PT?    LOV 4/21/25    PLAN:  RECOMMENDATIONS:  4 Norco a day continue with the Norco no issues with the pain medications still providing good relief  Continue with rheumatology and orthopedic care  No need for injection therapy at this point  Continue with Botox injection per neuro cervical    Epidurals for cervical is still benefiting him no need for repeat as and at this time  Bilateral knee pain he is to see orthopedic surgeon status post Synvisc injections which is helped also trying hyaluronic acid injections current will order x-ray of bilateral knees      Ok to order or does pt need appt in office?    Thank you!

## 2025-06-18 ENCOUNTER — ANESTHESIA EVENT (OUTPATIENT)
Dept: ENDOSCOPY | Facility: HOSPITAL | Age: 75
End: 2025-06-18
Payer: MEDICARE

## 2025-06-19 ENCOUNTER — HOSPITAL ENCOUNTER (OUTPATIENT)
Facility: HOSPITAL | Age: 75
Setting detail: HOSPITAL OUTPATIENT SURGERY
Discharge: HOME | End: 2025-06-19
Attending: INTERNAL MEDICINE | Admitting: INTERNAL MEDICINE
Payer: MEDICARE

## 2025-06-19 ENCOUNTER — ANESTHESIA (OUTPATIENT)
Dept: ENDOSCOPY | Facility: HOSPITAL | Age: 75
End: 2025-06-19
Payer: MEDICARE

## 2025-06-19 VITALS
WEIGHT: 225 LBS | SYSTOLIC BLOOD PRESSURE: 108 MMHG | RESPIRATION RATE: 12 BRPM | DIASTOLIC BLOOD PRESSURE: 73 MMHG | HEART RATE: 59 BPM | OXYGEN SATURATION: 97 % | BODY MASS INDEX: 32.21 KG/M2 | HEIGHT: 70 IN

## 2025-06-19 DIAGNOSIS — K59.00 CONSTIPATION, UNSPECIFIED CONSTIPATION TYPE: ICD-10-CM

## 2025-06-19 DIAGNOSIS — R14.0 BLOATING: ICD-10-CM

## 2025-06-19 PROBLEM — Z12.11 COLON CANCER SCREENING: Status: ACTIVE | Noted: 2025-06-19

## 2025-06-19 PROCEDURE — 45385 COLONOSCOPY W/LESION REMOVAL: CPT | Performed by: INTERNAL MEDICINE

## 2025-06-19 DEVICE — REPLAY HEMOSTASIS CLIP, 11MM SPAN
Type: IMPLANTABLE DEVICE | Site: COLON | Status: FUNCTIONAL
Brand: REPLAY

## 2025-06-19 RX ORDER — SODIUM CHLORIDE, SODIUM LACTATE, POTASSIUM CHLORIDE, CALCIUM CHLORIDE 600; 310; 30; 20 MG/100ML; MG/100ML; MG/100ML; MG/100ML
INJECTION, SOLUTION INTRAVENOUS CONTINUOUS
Status: DISCONTINUED | OUTPATIENT
Start: 2025-06-19 | End: 2025-06-19

## 2025-06-19 RX ORDER — LIDOCAINE HYDROCHLORIDE 10 MG/ML
INJECTION, SOLUTION EPIDURAL; INFILTRATION; INTRACAUDAL; PERINEURAL AS NEEDED
Status: DISCONTINUED | OUTPATIENT
Start: 2025-06-19 | End: 2025-06-19 | Stop reason: SURG

## 2025-06-19 RX ADMIN — SODIUM CHLORIDE, SODIUM LACTATE, POTASSIUM CHLORIDE, CALCIUM CHLORIDE: 600; 310; 30; 20 INJECTION, SOLUTION INTRAVENOUS at 11:11:00

## 2025-06-19 RX ADMIN — LIDOCAINE HYDROCHLORIDE 50 MG: 10 INJECTION, SOLUTION EPIDURAL; INFILTRATION; INTRACAUDAL; PERINEURAL at 11:14:00

## 2025-06-19 RX ADMIN — SODIUM CHLORIDE, SODIUM LACTATE, POTASSIUM CHLORIDE, CALCIUM CHLORIDE: 600; 310; 30; 20 INJECTION, SOLUTION INTRAVENOUS at 11:39:00

## 2025-06-19 RX ADMIN — SODIUM CHLORIDE, SODIUM LACTATE, POTASSIUM CHLORIDE, CALCIUM CHLORIDE: 600; 310; 30; 20 INJECTION, SOLUTION INTRAVENOUS at 11:35:00

## 2025-06-19 NOTE — ANESTHESIA POSTPROCEDURE EVALUATION
Patient: Moreno Khalil    Procedure Summary       Date: 06/19/25 Room / Location: [facility] ENDOSCOPY 04 / [facility] ENDOSCOPY    Anesthesia Start: 1111 Anesthesia Stop: 1139    Procedure: COLONOSCOPY Diagnosis:       Constipation, unspecified constipation type      Bloating      (Polyps, diverticulosis, hemorrhoids)    Surgeons: Xavi Johnston MD Anesthesiologist: Jami Mejia CRNA    Anesthesia Type: MAC ASA Status: 3            Anesthesia Type: MAC    Vitals Value Taken Time   /81 06/19/25 11:55   Temp 97 06/19/25 12:04   Pulse 48 06/19/25 11:55   Resp 14 06/19/25 11:55   SpO2 97 % 06/19/25 11:55   Vitals shown include unfiled device data.    [facility] AN Post Evaluation:   Patient Evaluated in PACU  Patient Participation: waiting for patient participation  Pain Management: adequate  Airway Patency:patent  Yes    Nausea/Vomiting: none  Cardiovascular Status: acceptable  Respiratory Status: acceptable and nasal cannula  Postoperative Hydration acceptable      Jami Mejia CRNA  6/19/2025 12:04 PM

## 2025-06-19 NOTE — OPERATIVE REPORT
[facility] Endoscopy Report  Date of procedure-June 19, 2025    Preoperative Diagnosis:  - Colon cancer screening      Postoperative Diagnosis:  - Colon polyps x 2  - Diverticulosis  - Internal hemorrhoids      Procedure:    Colonoscopy      Surgeon:  Xavi Johnston M.D.    Anesthesia:  MAC    Technique:  After informed consent, the patient was placed in the left lateral recumbent position.  Digital rectal examination revealed no palpable intraluminal abnormalities.  An Olympus variable stiffness 190 series HD colonoscope was inserted into the rectum and advanced under direct vision by following the lumen to the cecum.  The colon was examined upon withdrawal in the left lateral position.  The procedure was well tolerated without immediate complication.      Findings:  The preparation of the colon was good.  The terminal ileum was examined for 4 cm and visually normal.  The ileocecal valve was well preserved. The visualized colonic mucosa from the cecum to the anal verge was normal with an intact vascular pattern.    Colon polyps x 2, both removed from the descending colon, the first polyp was sessile 4 mm in size and cold snare removed.  Second polyp was semipedunculated 15 mm polyp removed by hot snare with placement of a single Endo Clip across the mucosal defect.  No bleeding was noted at either the polypectomy sites and specimens retrieved and sent for analysis.    Diverticular disease located in the descending and sigmoid colon, no diverticulitis.    Small internal hemorrhoids noted on retroflexed view.    Estimated blood loss-insignificant  Specimens-see above    Impression:  - Colon polyps x 2  - Diverticulosis  - Internal hemorrhoids    Recommendations:  - Post polypectomy instructions given  - Repeat colonoscopy in 3 years  - High fiber diet for diverticular disease  - Symptomatic treatment of hemorrhoids          Xavi Johnston MD  6/19/2025  11:36 AM

## 2025-06-19 NOTE — H&P
History & Physical Examination    Patient Name: Moreno Khalil  MRN: [id number]  CSN: [id number]  YOB: 1950    Diagnosis: colon screening  constipation      Prescriptions Prior to Admission[1]  Current Hospital Medications[2]    Allergies: Allergies[3]    Past Medical History[4]  Past Surgical History[5]  Family History[6]  Social History     Tobacco Use    Smoking status: Never     Passive exposure: Never    Smokeless tobacco: Never   Substance Use Topics    Alcohol use: No     Alcohol/week: 0.0 standard drinks of alcohol       SYSTEM Check if Review is Normal Check if Physical Exam is Normal If not normal, please explain:   HEENT [x ] [ x]    NECK & BACK [x ] [x ]    HEART [x ] [ x]    LUNGS [x ] [ x]    ABDOMEN [x ] [x ]    UROGENITAL [ ] [ ]    EXTREMITIES [x ] [x ]    OTHER        [ x ] I have discussed the risks and benefits and alternatives with the patient/family.  They understand and agree to proceed with plan of care.  [ x ] I have reviewed the History and Physical done within the last 30 days.  Any changes noted above.    Xavi Johnston MD  6/19/2025  11:04 AM         [1]   Facility-Administered Medications Prior to Admission   Medication Dose Route Frequency Provider Last Rate Last Admin    [COMPLETED] onabotulinumtoxinA (Botox) injection 200 Units  200 Units Injection Once    200 Units at 05/14/25 1135    [COMPLETED] onabotulinumtoxinA (Botox) injection 100 Units  100 Units Injection Once    100 Units at 05/14/25 1134    Certolizumab Pegol  mg  400 mg Subcutaneous Q28 Days Sheron Dalton MD   400 mg at 06/12/25 1028     Medications Prior to Admission   Medication Sig Dispense Refill Last Dose/Taking    aspirin 81 MG Oral Chew Tab Chew 1 tablet (81 mg total) by mouth daily. 90 tablet 3 6/15/2025    atorvastatin 40 MG Oral Tab Take 1 tablet (40 mg total) by mouth nightly. 90 tablet 3 6/18/2025    HYDROcodone-acetaminophen  MG Oral Tab Take 1 tablet by mouth every 6 (six)  hours as needed for Pain. 120 tablet 0 2025 Morning    amLODIPine 5 MG Oral Tab Take 1 tablet (5 mg total) by mouth daily. 90 tablet 3 2025    hydroCHLOROthiazide 25 MG Oral Tab Take 1 tablet (25 mg total) by mouth daily. 90 tablet 3 2025    losartan 100 MG Oral Tab Take 1 tablet (100 mg total) by mouth daily. 90 tablet 3 2025    [] HYDROcodone-acetaminophen  MG Oral Tab Take 1 tablet by mouth every 6 (six) hours as needed for Pain (MAX 4/DAY). 120 tablet 0     tamsulosin 0.4 MG Oral Cap Take 1 capsule (0.4 mg total) by mouth After dinner. Take 1/2 hour following the same meal each day (Patient not taking: Reported on 2025) 90 capsule 1    [2]   Current Facility-Administered Medications   Medication Dose Route Frequency    lactated ringers infusion   Intravenous Continuous   [3] No Known Allergies  [4]   Past Medical History:   Abnormal blood chemistry    Acute dermatitis due to solar radiation    Acute upper respiratory infection    Appendicitis    Back problem    Backache    Benign neoplasm of skin    Benign prostatic hypertrophy    Calcaneal spur    Carpal tunnel syndrome    Cervical radicular pain    Cough variant asthma (HCC)    Disorder of kidney and ureter    Disorder of rotator cuff syndrome of shoulder and allied disorder    Dizziness    Dysfunction of eustachian tube    Dysplastic nevus    left flank    Dyspnea and respiratory abnormality    Elevated prostate specific antigen (PSA)    Elevated PSA    bx negative for cancer    Essential hypertension    Generalized pain    H/O arthroscopy of knee    High blood pressure    History of brachytherapy    Hyponatremia    Intestinal infection    Malaise and fatigue    Neck pain    Nocturia    Obesity, unspecified    Petechial hemorrhage    Plantar fascial fibromatosis    Pressure ulcer, stage 1    Prostate cancer (HCC)    brachytherapy    Proteinuria    PVD (peripheral vascular disease)    Rotator cuff tear    right    Rotator  cuff tear    R - 2007    Sensorineural hearing loss    Shoulder pain, left    Sleep disturbance    Tonsillitis   [5]   Past Surgical History:  Procedure Laterality Date    Appendectomy  01/01/1963    Brachytherapy (seed implant) (dmg)  2014    Colonoscopy      Knee arthroscopy      per NG: bilat knees 2x each; arthroscopy - brash    Prostate biopsies  01/01/2013    Repair rotator cuff,acute Right 01/01/2007    Tonsillectomy     [6]   Family History  Problem Relation Age of Onset    Diabetes Mother     Diabetes Sister     Obesity Sister     Lipids Other         family h/o hyperlipidemia and vascular disease

## 2025-06-19 NOTE — DISCHARGE INSTRUCTIONS
Home Care Instructions for Colonoscopy  Diet:  - Resume your regular diet as tolerated unless otherwise instructed.  - Start with light meals to minimize bloating.  - Do not drink alcohol today.    Medication:  - If you have questions about resuming your normal medications, please contact your Primary Care Physician.    Activities:  - Take it easy today. Do not return to work today.  - Do not drive today.  - Do not operate any machinery today (including kitchen equipment).    Colonoscopy:  - You may notice some rectal \"spotting\" (a little blood on the toilet tissue) for a day or two after the exam. This is normal.  - If you experience any rectal bleeding (not spotting), persistent tenderness or sharp severe abdominal pains, oral temperature over 100 degrees Fahrenheit, light-headedness or dizziness, or any other problems, contact your doctor.      **If unable to reach your doctor, please go to the [facility] Emergency Room**    - Your referring physician will receive a full report of your examination.  - If you do not hear from your doctor's office within two weeks of your biopsy, please call them for your results.    You may be able to see your laboratory results in [facility] between 4 and 7 business days.  In some cases, your physician may not have viewed the results before they are released to [facility].  If you have questions regarding your results contact the physician who ordered the test/exam by phone or via [facility] by choosing \"Ask a Medical Question.\"

## 2025-06-19 NOTE — ANESTHESIA PREPROCEDURE EVALUATION
Anesthesia PreOp Note    HPI:     Moreno Khalil is a 74 year old male who presents for preoperative consultation requested by: Xavi Johnston MD    Date of Surgery: 6/19/2025    Procedure(s):  COLONOSCOPY  Indication: Constipation, unspecified constipation type / Bloating    Relevant Problems   No relevant active problems       NPO:  Last Liquid Consumption Date: 06/19/25  Last Liquid Consumption Time: 0645  Last Solid Consumption Date: 06/18/25  Last Solid Consumption Time: 1000  Last Liquid Consumption Date: 06/19/25          History Review:  Patient Active Problem List    Diagnosis Date Noted    Stage 3a chronic kidney disease (HCC) 04/29/2025    History of prostate cancer 04/09/2024    Ankylosing spondylitis of cervicothoracic region (HCC) 03/06/2024    Primary osteoarthritis of both knees 12/15/2021    Chronic pain syndrome 07/07/2021    Class 1 obesity due to excess calories with serious comorbidity in adult 09/26/2018    Cervical spondylosis 11/28/2017    Essential hypertension with goal blood pressure less than 140/90 04/05/2017    Mixed hyperlipidemia 06/23/2015    Erectile dysfunction 08/15/2014    Solitary pulmonary nodule 01/16/2014    Hyperplasia of prostate without lower urinary tract symptoms (LUTS) 09/08/2013    Chronic otitis externa 10/07/2008    Sleep disturbance 06/03/2008    Carpal tunnel syndrome 05/07/2007       Past Medical History[1]    Past Surgical History[2]    Prescriptions Prior to Admission[3]  Current Medications and Prescriptions Ordered in Epic[4]    Allergies[5]    Family History[6]  Social Hx on file[7]    Available pre-op labs reviewed.  Lab Results   Component Value Date    WBC 9.5 04/25/2025    RBC 5.37 04/25/2025    HGB 15.5 04/25/2025    HCT 46.2 04/25/2025    MCV 86.0 04/25/2025    MCH 28.9 04/25/2025    MCHC 33.5 04/25/2025    RDW 15.0 04/25/2025    .0 04/25/2025     Lab Results   Component Value Date     04/25/2025    K 3.4 (L) 04/25/2025      04/25/2025    CO2 24.0 04/25/2025    BUN 22 04/25/2025    CREATSERUM 1.44 (H) 04/25/2025     (H) 04/25/2025    CA 9.2 04/25/2025          Vital Signs:  Body mass index is 32.28 kg/m².   height is 1.778 m (5' 10\") and weight is 102.1 kg (225 lb). His blood pressure is 116/79 and his pulse is 64. His respiration is 11 and oxygen saturation is 96%.   Vitals:    06/13/25 1206 06/19/25 1020   BP:  116/79   Pulse:  64   Resp:  11   SpO2:  96%   Weight: 104.3 kg (230 lb) 102.1 kg (225 lb)   Height: 1.778 m (5' 10\") 1.778 m (5' 10\")        Anesthesia Evaluation     Patient summary reviewed and Nursing notes reviewed    Airway   Mallampati: IV  TM distance: >3 FB  Neck ROM: limited  Dental - Dentition appears grossly intact     Pulmonary - negative ROS and normal exam    breath sounds clear to auscultation  (+) asthma, shortness of breath  Cardiovascular - negative ROS and normal exam  Exercise tolerance: (Limited d/t ankylosing spondilitis)  (+) hypertension    ECG reviewed  Rhythm: regular  Rate: normal    Neuro/Psych - negative ROS   (+)  neuromuscular disease,        GI/Hepatic/Renal - negative ROS     Endo/Other - negative ROS   Abdominal  - normal exam  (+) obese                 Anesthesia Plan:   ASA:  3  Plan:   MAC  Informed Consent Plan and Risks Discussed With:  Patient  Use of Blood Products Discussed With:  Patient  Discussed plan with:  Attending      I have informed Moreno Khalil and/or legal guardian or family member of the nature of the anesthetic plan, benefits, risks including possible dental damage if relevant, major complications, and any alternative forms of anesthetic management.   All of the patient's questions were answered to the best of my ability. The patient desires the anesthetic management as planned.  Jami Mejia CRNA  6/19/2025 10:31 AM  Present on Admission:  **None**           [1]   Past Medical History:   Abnormal blood chemistry    Acute dermatitis due to solar radiation     Acute upper respiratory infection    Appendicitis    Back problem    Backache    Benign neoplasm of skin    Benign prostatic hypertrophy    Calcaneal spur    Carpal tunnel syndrome    Cervical radicular pain    Cough variant asthma (HCC)    Disorder of kidney and ureter    Disorder of rotator cuff syndrome of shoulder and allied disorder    Dizziness    Dysfunction of eustachian tube    Dysplastic nevus    left flank    Dyspnea and respiratory abnormality    Elevated prostate specific antigen (PSA)    Elevated PSA    bx negative for cancer    Essential hypertension    Generalized pain    H/O arthroscopy of knee    High blood pressure    History of brachytherapy    Hyponatremia    Intestinal infection    Malaise and fatigue    Neck pain    Nocturia    Obesity, unspecified    Petechial hemorrhage    Plantar fascial fibromatosis    Pressure ulcer, stage 1    Prostate cancer (HCC)    brachytherapy    Proteinuria    PVD (peripheral vascular disease)    Rotator cuff tear    right    Rotator cuff tear    R - 2007    Sensorineural hearing loss    Shoulder pain, left    Sleep disturbance    Tonsillitis   [2]   Past Surgical History:  Procedure Laterality Date    Appendectomy  01/01/1963    Brachytherapy (seed implant) (dmg)  2014    Colonoscopy      Knee arthroscopy      per NG: bilat knees 2x each; arthroscopy - brash    Prostate biopsies  01/01/2013    Repair rotator cuff,acute Right 01/01/2007    Tonsillectomy     [3]   Facility-Administered Medications Prior to Admission   Medication Dose Route Frequency Provider Last Rate Last Admin    [COMPLETED] onabotulinumtoxinA (Botox) injection 200 Units  200 Units Injection Once    200 Units at 05/14/25 1135    [COMPLETED] onabotulinumtoxinA (Botox) injection 100 Units  100 Units Injection Once    100 Units at 05/14/25 1134    Certolizumab Pegol  mg  400 mg Subcutaneous Q28 Days Sheron Dalton MD   400 mg at 06/12/25 1028     Medications Prior to Admission   Medication Sig  Dispense Refill Last Dose/Taking    aspirin 81 MG Oral Chew Tab Chew 1 tablet (81 mg total) by mouth daily. 90 tablet 3 6/15/2025    atorvastatin 40 MG Oral Tab Take 1 tablet (40 mg total) by mouth nightly. 90 tablet 3 2025    HYDROcodone-acetaminophen  MG Oral Tab Take 1 tablet by mouth every 6 (six) hours as needed for Pain. 120 tablet 0 2025 Morning    amLODIPine 5 MG Oral Tab Take 1 tablet (5 mg total) by mouth daily. 90 tablet 3 2025    hydroCHLOROthiazide 25 MG Oral Tab Take 1 tablet (25 mg total) by mouth daily. 90 tablet 3 2025    losartan 100 MG Oral Tab Take 1 tablet (100 mg total) by mouth daily. 90 tablet 3 2025    [] HYDROcodone-acetaminophen  MG Oral Tab Take 1 tablet by mouth every 6 (six) hours as needed for Pain (MAX 4/DAY). 120 tablet 0     tamsulosin 0.4 MG Oral Cap Take 1 capsule (0.4 mg total) by mouth After dinner. Take 1/2 hour following the same meal each day (Patient not taking: Reported on 2025) 90 capsule 1    [4]   Current Facility-Administered Medications Ordered in Epic   Medication Dose Route Frequency Provider Last Rate Last Admin    lactated ringers infusion   Intravenous Continuous Xavi Johnston MD         No current [facility]-ordered outpatient medications on file.   [5] No Known Allergies  [6]   Family History  Problem Relation Age of Onset    Diabetes Mother     Diabetes Sister     Obesity Sister     Lipids Other         family h/o hyperlipidemia and vascular disease   [7]   Social History  Socioeconomic History    Marital status:     Number of children: 2   Occupational History    Occupation: ordering     Comment: retired   Tobacco Use    Smoking status: Never     Passive exposure: Never    Smokeless tobacco: Never   Vaping Use    Vaping status: Never Used   Substance and Sexual Activity    Alcohol use: No     Alcohol/week: 0.0 standard drinks of alcohol    Drug use: No    Sexual activity: Yes   Other Topics Concern     Caffeine Concern Yes     Comment: soda, 1 liter/day    Exercise Yes    Pt has a pacemaker No    Pt has a defibrillator No    Reaction to local anesthetic No

## 2025-06-20 ENCOUNTER — TELEPHONE (OUTPATIENT)
Dept: INTERNAL MEDICINE CLINIC | Facility: CLINIC | Age: 75
End: 2025-06-20

## 2025-06-20 ENCOUNTER — TELEPHONE (OUTPATIENT)
Facility: CLINIC | Age: 75
End: 2025-06-20

## 2025-06-20 NOTE — TELEPHONE ENCOUNTER
Recall colonoscopy in 3 years per Dr. Johnston.     Last done 6/19/2025.     Recall entered into patient outreach for 6/19/2028.     Health maintenance updated.     \"Written by Xavi Johnston MD on 6/19/2025  5:05 PM CDT  Seen by patient Moreno Khalil on 6/20/2025  7:55 AM\"

## 2025-06-20 NOTE — TELEPHONE ENCOUNTER
----- Message from Xavi Johnston sent at 6/19/2025  5:05 PM CDT -----  I wanted to get back to you with your colonoscopy results.  You had 2 colon polyps removed which were benign.  I would advise a repeat colonoscopy in 3 years to make sure no new polyps are forming.      You also have internal hemorrhoids and diverticulosis.  Please stay on a high fiber diet and call with any questions.     ----- Message -----  From: Lab, Background User  Sent: 6/19/2025   5:02 PM CDT  To: Xavi Johnston MD

## 2025-06-20 NOTE — TELEPHONE ENCOUNTER
Patient requesting order for physical therapy to be faxed to [facility] in [place], [place] Fax # [phone]. Orders faxed. Confirmation received.

## 2025-06-24 ENCOUNTER — MED REC SCAN ONLY (OUTPATIENT)
Dept: INTERNAL MEDICINE CLINIC | Facility: CLINIC | Age: 75
End: 2025-06-24

## 2025-07-02 ENCOUNTER — OFFICE VISIT (OUTPATIENT)
Dept: PAIN CLINIC | Facility: HOSPITAL | Age: 75
End: 2025-07-02
Attending: ANESTHESIOLOGY
Payer: MEDICARE

## 2025-07-02 VITALS — SYSTOLIC BLOOD PRESSURE: 101 MMHG | HEART RATE: 56 BPM | OXYGEN SATURATION: 93 % | DIASTOLIC BLOOD PRESSURE: 64 MMHG

## 2025-07-02 DIAGNOSIS — G89.29 BILATERAL CHRONIC KNEE PAIN: ICD-10-CM

## 2025-07-02 DIAGNOSIS — M25.561 BILATERAL CHRONIC KNEE PAIN: ICD-10-CM

## 2025-07-02 DIAGNOSIS — M54.50 LOW BACK PAIN AT MULTIPLE SITES: ICD-10-CM

## 2025-07-02 DIAGNOSIS — G89.4 CHRONIC PAIN SYNDROME: ICD-10-CM

## 2025-07-02 DIAGNOSIS — G89.29 OTHER CHRONIC PAIN: ICD-10-CM

## 2025-07-02 DIAGNOSIS — M45.3 ANKYLOSING SPONDYLITIS OF CERVICOTHORACIC REGION (HCC): Primary | ICD-10-CM

## 2025-07-02 DIAGNOSIS — M25.562 BILATERAL CHRONIC KNEE PAIN: ICD-10-CM

## 2025-07-02 PROCEDURE — 99214 OFFICE O/P EST MOD 30 MIN: CPT | Performed by: ANESTHESIOLOGY

## 2025-07-02 RX ORDER — HYDROCODONE BITARTRATE AND ACETAMINOPHEN 10; 325 MG/1; MG/1
1 TABLET ORAL EVERY 6 HOURS PRN
Qty: 120 TABLET | Refills: 0 | Status: SHIPPED | OUTPATIENT
Start: 2025-07-09 | End: 2025-08-08

## 2025-07-02 RX ORDER — HYDROCODONE BITARTRATE AND ACETAMINOPHEN 10; 325 MG/1; MG/1
1 TABLET ORAL EVERY 6 HOURS PRN
Qty: 120 TABLET | Refills: 0 | Status: SHIPPED | OUTPATIENT
Start: 2025-08-08 | End: 2025-09-07

## 2025-07-02 NOTE — CHRONIC PAIN
Initial Consultation Note      HISTORY OF PRESENT ILLNESS:  Moreno Khalil is a 70 year old old male referred to the pain clinic  for evaluation treatment of his low back and cervical neck pain .  Patient has ankylosing spondylitis along with degenerative arthritis of his knees he continues to take 4 Norco a day for pain control Moreno also has bilateral knee arthritis in need of replacement but is hold off on as long as he can he takes 4 Norco a day for pain control no side effects still decreases pain by 50 to 60% throughout the day he is walking a little bit slower these days and little more kyphotic in nature.  Moreno had a cervical epidural steroid injection as well as lumbar injections in the past was pretty good results in the past.  The medication as well as intermittent and injections have helped decrease his pain allows him to be more functionable.    Since his last visit he was started on an immunologic medications for his ankylosing spondylitis as well as Botox injection by the neurologist which has helped  So overall the pain is fairly well-controlled with a combination of small dose opioids and intermittent epidural injections  PAIN COURSE AND PREVIOUS INTERVENTIONS:    Interventions: Epidural injection  Adjuvants: Small amounts Norco every day    BLOOD THINNING MEDICATIONS:  None    CURRENT MEDICATIONS:  Current Outpatient Medications   Medication Sig Dispense Refill    [START ON 11/8/2020] HYDROcodone-acetaminophen  MG Oral Tab Take 1 tablet by mouth every 8 (eight) hours as needed for Pain (max 3/day). 90 tablet 0    [START ON 10/9/2020] HYDROcodone-acetaminophen  MG Oral Tab Take 1 tablet by mouth every 8 (eight) hours as needed for Pain (MAX 3/DAY). 90 tablet 0    HYDROCHLOROTHIAZIDE 25 MG Oral Tab Take 1 tablet by mouth once daily 90 tablet 0    metoprolol Tartrate 25 MG Oral Tab Take 1 tablet (25 mg total) by mouth 2 (two) times daily. 180 tablet 1    simvastatin 20 MG Oral Tab  Take 1 tablet (20 mg total) by mouth every evening. 90 tablet 1    ibuprofen 600 MG Oral Tab TAKE 1 TABLET EVERY 12 HOURS AS NEEDED FOR PAIN (SUBSTITUTED FOR MOTRIN) 180 tablet 0    Tadalafil 20 MG Oral Tab Take 1 tablet (20 mg total) by mouth daily as needed for Erectile Dysfunction. 10 tablet 11        ALLERGIES:  No Known Allergies    SURGICAL HISTORY:  Past Surgical History:   Procedure Laterality Date    APPENDECTOMY  1963    KNEE ARTHROSCOPY      per NG: bilat knees 2x each; arthroscopy - brash    PROSTATE BIOPSIES  2013    REPAIR ROTATOR CUFF,ACUTE Right 2007    TONSILLECTOMY         REVIEW OF SYSTEMS:   A comprehensive review of systems was negative except for:   MEDICAL HISTORY:  Patient Active Problem List:     Prostate cancer (HCC)     Erectile dysfunction     Cervical radicular pain     Mixed hyperlipidemia     Essential hypertension with goal blood pressure less than 140/90     Class 1 obesity due to excess calories with serious comorbidity in adult     Physical exam, annual     Decreased visual acuity    Past Medical History:   Diagnosis Date    Appendicitis 1963    Back problem     Benign prostatic hypertrophy     Carpal tunnel syndrome     Dysplastic nevus 2017    left flank    Elevated PSA 2008    bx negative for cancer    H/O arthroscopy of knee bilateral    History of brachytherapy     Neck pain     Obesity, unspecified     Prostate cancer (HCC)     brachytherapy    PVD (peripheral vascular disease) ([facility])     Rotator cuff tear 2007    right    Rotator cuff tear     R - 2007    Tonsillitis 1960       FAMILY HISTORY:  Family History   Problem Relation Age of Onset    Diabetes Mother     Diabetes Sister     Obesity Sister     Lipids Other         family h/o hyperlipidemia and vascular disease       SOCIAL HISTORY:  Social History    Socioeconomic History      Marital status:       Spouse name: Not on file      Number of children: 2      Years of education: Not on file      Highest education  level: Not on file    Occupational History      Occupation: ordering        Comment: retired    Social Needs      Financial resource strain: Not on file      Food insecurity        Worry: Not on file        Inability: Not on file      Transportation needs        Medical: Not on file        Non-medical: Not on file    Tobacco Use      Smoking status: Never Smoker      Smokeless tobacco: Never Used    Substance and Sexual Activity      Alcohol use: No        Alcohol/week: 0.0 standard drinks      Drug use: No      Sexual activity: Not on file    Lifestyle      Physical activity        Days per week: Not on file        Minutes per session: Not on file      Stress: Not on file    Relationships      Social connections        Talks on phone: Not on file        Gets together: Not on file        Attends Protestant service: Not on file        Active member of club or organization: Not on file        Attends meetings of clubs or organizations: Not on file        Relationship status: Not on file      Intimate partner violence        Fear of current or ex partner: Not on file        Emotionally abused: Not on file        Physically abused: Not on file        Forced sexual activity: Not on file    Other Topics      Concerns:         Service: Not Asked        Blood Transfusions: Not Asked        Caffeine Concern: Yes          soda, 1 liter/day        Occupational Exposure: Not Asked        Hobby Hazards: Not Asked        Sleep Concern: Not Asked        Stress Concern: Not Asked        Weight Concern: Not Asked        Special Diet: Not Asked        Back Care: Not Asked        Exercise: Not Asked        Bike Helmet: Not Asked        Seat Belt: Not Asked        Self-Exams: Not Asked        Grew up on a farm: Not Asked        History of tanning: Not Asked        Outdoor occupation: Not Asked        Pt has a pacemaker: No        Pt has a defibrillator: No        Reaction to local anesthetic: No    Social History Narrative       Not on file      ADVANCE CARE PLANNING:  Advance Care Plan NOT discussed.    PHYSICAL EXAMINATION:  There were no vitals filed for this visit.  General: Alert and oriented x3  Affect:  NAD  Head: normocephalic, atraumatic  Eyes: anicteric; no injection  Chest: S1, S2, RRR  Respiratory: CTAB  Gait: Normal; cane user - No  Spine: Normal    ROM:   Lumbar spine  Flexion  dec  Extension dec  Cervical Spine  Flexion dec  Extensiondec  MOTOR EXAMINATION:  UPPER EXTREMITY      LEFT RIGHT   Deltoid 5/5 5/5   Biceps 5/5 5/5   Triceps 5/5 5/5   Brachioradialis 5/5 5/5   Wrist Flexors 5/5 5/5   Wrist Extensors 5/5 5/5   Intrinsic Hand 5/5 5/5    5/5 5/5     LOWER EXTREMITY      LEFT RIGHT   Iliopsoas 5/5 5/5   Quadriceps 5/5 5/5   Foot DF 5/5 5/5   Foot EHL 5/5 5/5   Gastrocnemius 5/5 5/5     PULSES      LEFT RIGHT   Radial 2/4 2/4   Dorsalis Pedis 2/4 2/4   Posterior Tibial 2/4 2/4   Brachial 2/4 2/4     SLR: negative  SIJ tenderness negative  Joint Exam: Normal  TPs:  Present within lumbo-sacral paraspinal muscles  Left occipital region i nd right occipital region is very tender to palpation can replicates the pain that he gets in the back of his head  Right Deep tendon reflexes: Normal   Left Deep tendon reflexes: Normal   Babinski Reflex: absent bilaterally   Temperature:  normal to touch bilateral upper and lower extremities  Edema - Absent  Stasis ulcer on the left leg is healed    Sensation (light touch/pinprick/temperature):     Right Lower Extremity:  Normal  Left Lower Extremity: Normal  Right Upper Extremity:  Normal  Left Upper Extremity:  Normal    IMAGING:  No results found.    LABS:  Lab Results   Component Value Date    WBC 10.3 07/06/2020    RBC 5.45 07/06/2020    HGB 14.1 07/06/2020    HCT 45.2 07/06/2020    MCV 82.9 07/06/2020    MCH 25.9 (L) 07/06/2020    MCHC 31.2 07/06/2020    RDW 19.0 (H) 07/06/2020    .0 07/06/2020    MPV 8.1 06/28/2018     Lab Results   Component Value Date      07/06/2020    K 3.8 07/06/2020     07/06/2020    CO2 29.0 07/06/2020    BUN 25 (H) 07/06/2020    GLU 92 07/06/2020    CA 8.8 07/06/2020     No results found for: PT    [place] [facility] MONITORING PROGRAM REVIEWED  Yes      ASSESSMENT:   70 year old old male with cervical and lumbar spinal stenosis   Has responded in the past epidural injections cervical and lumbar  Started on the immunologic for his ankylosing spondylitis which has helped his pain Dr. Griffith  Low back pain bilateral radicular pain  Chronic use  of Norco a day for pain control     Botox injections are helping the cervical pain     Bilateral knee pain the care of Dr. Singh for that DJD bilateral knee          PLAN:  RECOMMENDATIONS:  4 Norco a day continue with the Norco no issues with the pain medications still providing good relief  Continue with rheumatology and orthopedic care  No need for injection therapy at this point  Continue with Botox injection per neuro cervical    Epidurals for cervical is still benefiting him no need for repeat as and at this time  Bilateral knee pain he is to see orthopedic surgeon status post Synvisc injections which is helped also trying hyaluronic acid injections current will order x-ray of bilateral knees    Moses Mclaughlin MD

## 2025-07-02 NOTE — PROGRESS NOTES
Patient presents in office today with reported pain in neck, lower back and bilateral knee     Current pain level reported -2-3/10     Last reported dose of Norco this morning        Narcotic Contract renewal 09.06.25     Urine Drug screen 09.06.24     Increased pain due to the weather changes

## 2025-07-02 NOTE — PATIENT INSTRUCTIONS
Refill policies:    Allow 2-3 business days for refills; controlled substances may take longer.  Contact your pharmacy at least 5 days prior to running out of medication and have them send an electronic request or submit request through the “request refill” option in your [employer] account.  Refills are not addressed on weekends; covering physicians do not authorize routine medications on weekends.  No narcotics or controlled substances are refilled after noon on Fridays or by on call physicians.  By law, narcotics must be electronically prescribed.  A 30 day supply with no refills is the maximum allowed.  If your prescription is due for a refill, you may be due for a follow up appointment.  To best provide you care, patients receiving routine medications need to be seen at least once a year.  Patients receiving narcotic/controlled substance medications need to be seen at least once every 3 months.  In the event that your preferred pharmacy does not have the requested medication in stock (e.g. Backordered), it is your responsibility to find another pharmacy that has the requested medication available.  We will gladly send a new prescription to that pharmacy at your request.    Scheduling Tests:    If your physician has ordered radiology tests such as MRI or CT scans, please contact Central Scheduling at [phone] right away to schedule the test.  Once scheduled, the [facility] Centralized Referral Team will work with your insurance carrier to obtain pre-certification or prior authorization.  Depending on your insurance carrier, approval may take 3-10 days.  It is highly recommended patients assure they have received an authorization before having a test performed.  If test is done without insurance authorization, patient may be responsible for the entire amount billed.      Precertification and Prior Authorizations:  If your physician has recommended that you have a procedure or additional testing performed the [facility]  Centralized Referral Team will contact your insurance carrier to obtain pre-certification or prior authorization.    You are strongly encouraged to contact your insurance carrier to verify that your procedure/test has been approved and is a COVERED benefit.  Although the [facility] Centralized Referral Team does its due diligence, the insurance carrier gives the disclaimer that \"Although the procedure is authorized, this does not guarantee payment.\"    Ultimately the patient is responsible for payment.   Thank you for your understanding in this matter.  Paperwork Completion:  If you require FMLA or disability paperwork for your recovery, please make sure to either drop it off or have it faxed to our office at [phone]. Be sure the form has your name and date of birth on it.  The form will be faxed to our Forms Department and they will complete it for you.  There is a 25$ fee for all forms that need to be filled out.  Please be aware there is a 10-14 day turnaround time.  You will need to sign a release of information (SYL) form if your paperwork does not come with one.  You may call the Forms Department with any questions at [phone].  Their fax number is [phone].

## 2025-07-07 ENCOUNTER — APPOINTMENT (OUTPATIENT)
Dept: CT IMAGING | Facility: HOSPITAL | Age: 75
End: 2025-07-07
Attending: EMERGENCY MEDICINE
Payer: MEDICARE

## 2025-07-07 ENCOUNTER — HOSPITAL ENCOUNTER (EMERGENCY)
Facility: HOSPITAL | Age: 75
Discharge: HOME | End: 2025-07-08
Attending: EMERGENCY MEDICINE
Payer: MEDICARE

## 2025-07-07 ENCOUNTER — TELEPHONE (OUTPATIENT)
Facility: CLINIC | Age: 75
End: 2025-07-07

## 2025-07-07 DIAGNOSIS — K57.92 ACUTE DIVERTICULITIS: Primary | ICD-10-CM

## 2025-07-07 DIAGNOSIS — R10.32 LLQ PAIN: Primary | ICD-10-CM

## 2025-07-07 LAB
ALBUMIN SERPL-MCNC: 4.5 G/DL (ref 3.2–4.8)
ALBUMIN/GLOB SERPL: 1.9 {RATIO} (ref 1–2)
ALP LIVER SERPL-CCNC: 101 U/L (ref 45–117)
ALT SERPL-CCNC: 28 U/L (ref 10–49)
ANION GAP SERPL CALC-SCNC: 11 MMOL/L (ref 0–18)
AST SERPL-CCNC: 25 U/L (ref ?–34)
BASOPHILS # BLD AUTO: 0.06 X10(3) UL (ref 0–0.2)
BASOPHILS NFR BLD AUTO: 0.5 %
BILIRUB SERPL-MCNC: 0.9 MG/DL (ref 0.2–1.1)
BILIRUB UR QL: NEGATIVE
BUN BLD-MCNC: 19 MG/DL (ref 9–23)
BUN/CREAT SERPL: 12.8 (ref 10–20)
CALCIUM BLD-MCNC: 9.1 MG/DL (ref 8.7–10.4)
CHLORIDE SERPL-SCNC: 104 MMOL/L (ref 98–112)
CLARITY UR: CLEAR
CO2 SERPL-SCNC: 25 MMOL/L (ref 21–32)
CREAT BLD-MCNC: 1.49 MG/DL (ref 0.7–1.3)
DEPRECATED RDW RBC AUTO: 46.3 FL (ref 35.1–46.3)
EGFRCR SERPLBLD CKD-EPI 2021: 49 ML/MIN/1.73M2 (ref 60–?)
EOSINOPHIL # BLD AUTO: 0.34 X10(3) UL (ref 0–0.7)
EOSINOPHIL NFR BLD AUTO: 2.9 %
ERYTHROCYTE [DISTWIDTH] IN BLOOD BY AUTOMATED COUNT: 14.6 % (ref 11–15)
GLOBULIN PLAS-MCNC: 2.4 G/DL (ref 2–3.5)
GLUCOSE BLD-MCNC: 103 MG/DL (ref 70–99)
GLUCOSE UR-MCNC: NORMAL MG/DL
HCT VFR BLD AUTO: 43.5 % (ref 39–53)
HGB BLD-MCNC: 14.5 G/DL (ref 13–17.5)
HGB UR QL STRIP.AUTO: NEGATIVE
IMM GRANULOCYTES # BLD AUTO: 0.03 X10(3) UL (ref 0–1)
IMM GRANULOCYTES NFR BLD: 0.3 %
KETONES UR-MCNC: NEGATIVE MG/DL
LEUKOCYTE ESTERASE UR QL STRIP.AUTO: NEGATIVE
LIPASE SERPL-CCNC: 36 U/L (ref 12–53)
LYMPHOCYTES # BLD AUTO: 1.92 X10(3) UL (ref 1–4)
LYMPHOCYTES NFR BLD AUTO: 16.3 %
MCH RBC QN AUTO: 28.8 PG (ref 26–34)
MCHC RBC AUTO-ENTMCNC: 33.3 G/DL (ref 31–37)
MCV RBC AUTO: 86.3 FL (ref 80–100)
MONOCYTES # BLD AUTO: 1.05 X10(3) UL (ref 0.1–1)
MONOCYTES NFR BLD AUTO: 8.9 %
NEUTROPHILS # BLD AUTO: 8.36 X10 (3) UL (ref 1.5–7.7)
NEUTROPHILS # BLD AUTO: 8.36 X10(3) UL (ref 1.5–7.7)
NEUTROPHILS NFR BLD AUTO: 71.1 %
NITRITE UR QL STRIP.AUTO: NEGATIVE
OSMOLALITY SERPL CALC.SUM OF ELEC: 293 MOSM/KG (ref 275–295)
PH UR: 5.5 [PH] (ref 5–8)
PLATELET # BLD AUTO: 235 10(3)UL (ref 150–450)
POTASSIUM SERPL-SCNC: 3.2 MMOL/L (ref 3.5–5.1)
PROT SERPL-MCNC: 6.9 G/DL (ref 5.7–8.2)
PROT UR-MCNC: NEGATIVE MG/DL
RBC # BLD AUTO: 5.04 X10(6)UL (ref 3.8–5.8)
SODIUM SERPL-SCNC: 140 MMOL/L (ref 136–145)
SP GR UR STRIP: 1.02 (ref 1–1.03)
UROBILINOGEN UR STRIP-ACNC: NORMAL
WBC # BLD AUTO: 11.8 X10(3) UL (ref 4–11)

## 2025-07-07 PROCEDURE — 80053 COMPREHEN METABOLIC PANEL: CPT | Performed by: EMERGENCY MEDICINE

## 2025-07-07 PROCEDURE — 83690 ASSAY OF LIPASE: CPT | Performed by: EMERGENCY MEDICINE

## 2025-07-07 PROCEDURE — 99285 EMERGENCY DEPT VISIT HI MDM: CPT

## 2025-07-07 PROCEDURE — 85025 COMPLETE CBC W/AUTO DIFF WBC: CPT

## 2025-07-07 PROCEDURE — 81003 URINALYSIS AUTO W/O SCOPE: CPT | Performed by: EMERGENCY MEDICINE

## 2025-07-07 PROCEDURE — 83690 ASSAY OF LIPASE: CPT

## 2025-07-07 PROCEDURE — 80053 COMPREHEN METABOLIC PANEL: CPT

## 2025-07-07 PROCEDURE — 96374 THER/PROPH/DIAG INJ IV PUSH: CPT

## 2025-07-07 PROCEDURE — 85025 COMPLETE CBC W/AUTO DIFF WBC: CPT | Performed by: EMERGENCY MEDICINE

## 2025-07-07 PROCEDURE — 99284 EMERGENCY DEPT VISIT MOD MDM: CPT

## 2025-07-07 RX ORDER — MORPHINE SULFATE 4 MG/ML
4 INJECTION, SOLUTION INTRAMUSCULAR; INTRAVENOUS ONCE
Status: COMPLETED | OUTPATIENT
Start: 2025-07-07 | End: 2025-07-07

## 2025-07-07 NOTE — TELEPHONE ENCOUNTER
Dr. Johnston out of office/Dr. Olmstead    I spoke to Moreno  He had a recent colonoscopy on 6/19/25 and reports a history of diverticulitis a few years ago  States he has LLQ pain that is mild.  States it is nothing like when he went to the ER a few years ago, but is still concerned and wants to know what the doctor thinks it is and what to do about it.    If he sits still he won't feel the pain.  He is passing some gas but not much.  He has done some recent heavy lifting and did eat some spicy food a week ago.  Denies any nausea and regurgitation.  Denies any bloody or black stools.  Denies fevers and chills.  He is moving his bowels but states they are not as large as you would think they would be given how much he ate and they are hard.    Discussed constipation may be helpful to take something like miralax to help him evacuate more fully and easily.      Aware to go to ER if severe symptoms/fever/severe pain.    Thank you

## 2025-07-07 NOTE — TELEPHONE ENCOUNTER
Patient contacted, given number to central scheduling and he will call to set up the CT  Reviewed if severe pain or fever to go to the ER.

## 2025-07-07 NOTE — TELEPHONE ENCOUNTER
Please have him complete a STAT CT. If the pain worsens or he develops fevers, he should go to the ED.

## 2025-07-08 ENCOUNTER — TELEPHONE (OUTPATIENT)
Age: 75
End: 2025-07-08

## 2025-07-08 VITALS
OXYGEN SATURATION: 93 % | RESPIRATION RATE: 16 BRPM | HEIGHT: 70 IN | TEMPERATURE: 98 F | BODY MASS INDEX: 32.21 KG/M2 | HEART RATE: 47 BPM | DIASTOLIC BLOOD PRESSURE: 68 MMHG | WEIGHT: 225 LBS | SYSTOLIC BLOOD PRESSURE: 107 MMHG

## 2025-07-08 PROCEDURE — 74177 CT ABD & PELVIS W/CONTRAST: CPT | Performed by: STUDENT IN AN ORGANIZED HEALTH CARE EDUCATION/TRAINING PROGRAM

## 2025-07-08 RX ORDER — HYDROCODONE BITARTRATE AND ACETAMINOPHEN 5; 325 MG/1; MG/1
1 TABLET ORAL ONCE
Refills: 0 | Status: COMPLETED | OUTPATIENT
Start: 2025-07-08 | End: 2025-07-08

## 2025-07-08 NOTE — ED PROVIDER NOTES
Patient Seen in: [facility] Emergency Department        History  Chief Complaint   Patient presents with    Abdomen/Flank Pain     Stated Complaint: diverticulitis    Subjective:   HPI            Pt is 75 yo M s/p colonoscopy a few weeks ago who p/w LLQ abdominal pain x 1 week, getting worse. Rates 8/10. Had some relief with norco. No fevers, vomiting or diarrhea. No dysuria. Feels bloated and constipated. No melena or hematochezia. Minimal gas. Feels similar to diverticulitis in past.       Objective:     Past Medical History:    Abnormal blood chemistry    Acute dermatitis due to solar radiation    Acute upper respiratory infection    Appendicitis    Back problem    Backache    Benign neoplasm of skin    Benign prostatic hypertrophy    Calcaneal spur    Carpal tunnel syndrome    Cervical radicular pain    Cough variant asthma (HCC)    Disorder of kidney and ureter    Disorder of rotator cuff syndrome of shoulder and allied disorder    Diverticulitis    Dizziness    Dysfunction of eustachian tube    Dysplastic nevus    left flank    Dyspnea and respiratory abnormality    Elevated prostate specific antigen (PSA)    Elevated PSA    bx negative for cancer    Essential hypertension    Generalized pain    H/O arthroscopy of knee    High blood pressure    History of brachytherapy    Hyponatremia    Intestinal infection    Malaise and fatigue    Neck pain    Nocturia    Obesity, unspecified    Petechial hemorrhage    Plantar fascial fibromatosis    Pressure ulcer, stage 1    Prostate cancer (HCC)    brachytherapy    Proteinuria    PVD (peripheral vascular disease)    Rotator cuff tear    right    Rotator cuff tear    R - 2007    Sensorineural hearing loss    Shoulder pain, left    Sleep disturbance    Tonsillitis              Past Surgical History:   Procedure Laterality Date    Appendectomy  01/01/1963    Brachytherapy (seed implant) (dm)  2014    Colonoscopy      Colonoscopy N/A 6/19/2025    Procedure:  COLONOSCOPY;  Surgeon: Xavi Johnston MD;  Location: [facility] ENDOSCOPY    Knee arthroscopy      per NG: bilat knees 2x each; arthroscopy - [facility]    Prostate biopsies  01/01/2013    Repair rotator cuff,acute Right 01/01/2007    Tonsillectomy                  Social History     Socioeconomic History    Marital status:     Number of children: 2   Occupational History    Occupation: ordering     Comment: retired   Tobacco Use    Smoking status: Never     Passive exposure: Never    Smokeless tobacco: Never   Vaping Use    Vaping status: Never Used   Substance and Sexual Activity    Alcohol use: No     Alcohol/week: 0.0 standard drinks of alcohol    Drug use: No    Sexual activity: Yes   Other Topics Concern    Caffeine Concern Yes     Comment: soda, 1 liter/day    Exercise Yes    Pt has a pacemaker No    Pt has a defibrillator No    Reaction to local anesthetic No     Social Drivers of Health     Food Insecurity: No Food Insecurity (4/29/2025)    NCSS - Food Insecurity     Worried About Running Out of Food in the Last Year: No     Ran Out of Food in the Last Year: No   Transportation Needs: No Transportation Needs (4/29/2025)    NCSS - Transportation     Lack of Transportation: No   Housing Stability: Not At Risk (4/29/2025)    NCSS - Housing/Utilities     Has Housing: Yes     Worried About Losing Housing: No     Unable to Get Utilities: No                                Physical Exam    ED Triage Vitals [07/07/25 2109]   /68   Pulse 53   Resp 18   Temp 98 °F (36.7 °C)   Temp src Temporal   SpO2 96 %   O2 Device None (Room air)       Current Vitals:   Vital Signs  BP: 107/68  Pulse: (!) 47  Resp: 16  Temp: 98 °F (36.7 °C)  Temp src: Temporal  MAP (mmHg): 81    Oxygen Therapy  SpO2: 93 %  O2 Device: None (Room air)            Physical Exam  GENERAL: No acute distress, awake and alert  HEENT: EOMI, PERRL  Neck: supple  CV: RRR, no murmurs  Resp: CTAB, no wheezes or retractions  Ab: soft, TTP in LLQ, no guarding  or rebound; bs normal, no cvat  Extremities: FROM of all extremities  Neuro: CN intact, normal speech, normal gait, 5/5 motor strength in all extremities, no focal deficits  SKIN: warm, dry, no rashes          ED Course  Labs Reviewed   COMP METABOLIC PANEL (14) - Abnormal; Notable for the following components:       Result Value    Glucose 103 (*)     Potassium 3.2 (*)     Creatinine 1.49 (*)     eGFR-Cr 49 (*)     All other components within normal limits   CBC WITH DIFFERENTIAL WITH PLATELET - Abnormal; Notable for the following components:    WBC 11.8 (*)     Neutrophil Absolute Prelim 8.36 (*)     Neutrophil Absolute 8.36 (*)     Monocyte Absolute 1.05 (*)     All other components within normal limits   LIPASE - Normal   URINALYSIS WITH CULTURE REFLEX   RAINBOW DRAW BLUE   RAINBOW DRAW GOLD                      [facility]     Medical Decision Making  Ddx includes: SBO, constipation, diverticulitis, enteritis    Labs reassuring  Pain improved after morphine but pt requesting more pain meds prior to discharge. Offered stay in hospital but states he feels comfortable going home and takes norco regularly. Advised on return precautions, dietary instructions and antibiotics. Advised on close f/u     Amount and/or Complexity of Data Reviewed  External Data Reviewed: labs and notes.     Details: GI procedure notes and labs from 6/2025 reviewed  Labs: ordered.  Radiology: ordered.     Details: CT abdomen and pelvis with contrast    COMPARISON: 3/8/2024.    IMPRESSION:  Acute uncomplicated sigmoid diverticulitis. No pneumoperitoneum or abscess.    No other acute abnormality in the abdomen or pelvis.  The appendix is not definitely visualized, although there are no inflammatory signs adjacent to the cecum to suggest appendicitis.        Report finalized on 07/08/25 at 1:14 AM ET.      Dr. Aggie Morris MD      Risk  Prescription drug management.  Parenteral controlled substances.        Disposition and Plan     Clinical  Impression:  1. Acute diverticulitis         Disposition:  Discharge  7/8/2025 12:22 am    Follow-up:  Alex Hull MD  [address] [place] [id number]  [phone]    Follow up in 3 day(s)      We recommend that you schedule follow up care with a primary care provider within the next three months to obtain basic health screening including reassessment of your blood pressure.      Medications Prescribed:  Current Discharge Medication List        START taking these medications    Details   amoxicillin clavulanate 875-125 MG Oral Tab Take 1 tablet by mouth 2 (two) times daily for 10 days.  Qty: 20 tablet, Refills: 0                   Supplementary Documentation:

## 2025-07-08 NOTE — TELEPHONE ENCOUNTER
Patient states he was admitted to the ED 7/7/25. PT requests a call back from the nurse regarding a question with his antibiotics and Cimzia injection.    Voicemail left for patient to inform of appt made with Dr Lester

## 2025-07-08 NOTE — ED INITIAL ASSESSMENT (HPI)
Patient to the ED from home d/t abd pain. States he had a colonoscopy on 6/19, a few polyps were removed. States left sided flank and abd pain for a few day. 8/10. Denies N/V/D.

## 2025-07-09 ENCOUNTER — TELEPHONE (OUTPATIENT)
Facility: CLINIC | Age: 75
End: 2025-07-09

## 2025-07-09 NOTE — TELEPHONE ENCOUNTER
Patient would like to speak with nurse to provide condition update after being seen in the emergency department on 7/8. Please call at [phone],thanks.  *see closed telephone encounter 7/7

## 2025-07-11 NOTE — TELEPHONE ENCOUNTER
Attempted to call patient, left message to call office.   67y old male who presents with a chief complaint of Liver Cirrhosis with ascites, Liver cancer, and hyponatremia.     CHF, situs inversus, Cirrhosis   - Pt with known hx  HFrEF per family at bedside, does not have any providers here as he resides in [facility]   - with presentation for weakness, hyponatremia, liver cirrhosis with ascites, with concern for malignancy/HCC  - s/p paracentesis draining almost 5 L  - GI following     - Family reports LVEF of 25-30% in 2020. Results of previous TTE not available.   - S/p Lasix 40 mg IVP x 1 8/24. Creat 2.1. up trended.   - No evidence of any meaningful volume overload   - He is on Spironolactone. Would continue for now given liver issues   - Sodium trend: <--124, <--122, <--121  - on home digoxin 0.125mg M-F for HF, now being held. Continue holding 2/2 MAIA; digoxin level : 0.8 (8/22)   - Telemetry w/ improved rates 90-100s   - BP on low side 85-120s   - Continue Midodrine.   - Coreg switched to metoprolol. Would continue   - home ACEi on hold d/t MAIA, renal following   - f/u TTE    - No clear evidence of acute ischemia.  - Repeat EKG with leads in positions appropriate for situs inversus.  - Hold home medication atorvastatin 10mg in the setting of liver cirrhosis    - Monitor and replete lytes, keep K>4, Mg>2.  - Will continue to follow.    Sg Brower, MS FNP, AGACNP  Nurse Practitioner- Cardiology   Please call on TEAMS

## 2025-07-17 ENCOUNTER — MED REC SCAN ONLY (OUTPATIENT)
Dept: INTERNAL MEDICINE CLINIC | Facility: CLINIC | Age: 75
End: 2025-07-17

## 2025-07-17 ENCOUNTER — TELEPHONE (OUTPATIENT)
Age: 75
End: 2025-07-17

## 2025-07-17 NOTE — TELEPHONE ENCOUNTER
Patient is taking last dose of antibiotics today for diverticulitis. He would like to get Cimiza injection soon as possible. Scheduled appointment for next Friday-7/25/25. Should he be scheduled for Cimzia sooner?

## 2025-07-25 ENCOUNTER — NURSE ONLY (OUTPATIENT)
Age: 75
End: 2025-07-25

## 2025-07-25 DIAGNOSIS — M45.3 ANKYLOSING SPONDYLITIS OF CERVICOTHORACIC REGION (HCC): Primary | ICD-10-CM

## 2025-07-25 PROCEDURE — 96372 THER/PROPH/DIAG INJ SC/IM: CPT | Performed by: INTERNAL MEDICINE

## 2025-07-25 NOTE — PROGRESS NOTES
Name and  verified. Pt aware he was to receive injection of Cimzia. Injections given in Bilateral Lower Abdomen and pt tolerated well. Possible local side effects discussed with pt. Pt aware to follow up in 4 weeks for next injection.

## 2025-07-28 ENCOUNTER — TELEPHONE (OUTPATIENT)
Facility: CLINIC | Age: 75
End: 2025-07-28

## 2025-07-28 ENCOUNTER — HOSPITAL ENCOUNTER (EMERGENCY)
Facility: HOSPITAL | Age: 75
Discharge: HOME | End: 2025-07-28

## 2025-07-28 ENCOUNTER — APPOINTMENT (OUTPATIENT)
Dept: CT IMAGING | Facility: HOSPITAL | Age: 75
End: 2025-07-28
Attending: NURSE PRACTITIONER

## 2025-07-28 VITALS
BODY MASS INDEX: 33 KG/M2 | HEART RATE: 56 BPM | SYSTOLIC BLOOD PRESSURE: 122 MMHG | RESPIRATION RATE: 18 BRPM | OXYGEN SATURATION: 93 % | DIASTOLIC BLOOD PRESSURE: 65 MMHG | TEMPERATURE: 98 F | WEIGHT: 227 LBS

## 2025-07-28 DIAGNOSIS — R31.9 HEMATURIA, UNSPECIFIED TYPE: ICD-10-CM

## 2025-07-28 DIAGNOSIS — R10.9 ABDOMINAL PAIN, ACUTE: Primary | ICD-10-CM

## 2025-07-28 LAB
ALBUMIN SERPL-MCNC: 4.5 G/DL (ref 3.2–4.8)
ALBUMIN/GLOB SERPL: 1.7 (ref 1–2)
ALP LIVER SERPL-CCNC: 101 U/L (ref 45–117)
ALT SERPL-CCNC: 29 U/L (ref 10–49)
ANION GAP SERPL CALC-SCNC: 6 MMOL/L (ref 0–18)
AST SERPL-CCNC: 26 U/L (ref ?–34)
BASOPHILS # BLD AUTO: 0.08 X10(3) UL (ref 0–0.2)
BASOPHILS NFR BLD AUTO: 0.8 %
BILIRUB SERPL-MCNC: 1 MG/DL (ref 0.2–1.1)
BILIRUB UR QL: NEGATIVE
BUN BLD-MCNC: 22 MG/DL (ref 9–23)
BUN/CREAT SERPL: 14.5 (ref 10–20)
CALCIUM BLD-MCNC: 9.2 MG/DL (ref 8.7–10.4)
CHLORIDE SERPL-SCNC: 105 MMOL/L (ref 98–112)
CLARITY UR: CLEAR
CO2 SERPL-SCNC: 27 MMOL/L (ref 21–32)
CREAT BLD-MCNC: 1.52 MG/DL (ref 0.7–1.3)
DEPRECATED RDW RBC AUTO: 47.3 FL (ref 35.1–46.3)
EGFRCR SERPLBLD CKD-EPI 2021: 48 ML/MIN/1.73M2 (ref 60–?)
EOSINOPHIL # BLD AUTO: 0.23 X10(3) UL (ref 0–0.7)
EOSINOPHIL NFR BLD AUTO: 2.2 %
ERYTHROCYTE [DISTWIDTH] IN BLOOD BY AUTOMATED COUNT: 14.7 % (ref 11–15)
GLOBULIN PLAS-MCNC: 2.6 G/DL (ref 2–3.5)
GLUCOSE BLD-MCNC: 104 MG/DL (ref 70–99)
GLUCOSE UR-MCNC: NORMAL MG/DL
HCT VFR BLD AUTO: 45.6 % (ref 39–53)
HGB BLD-MCNC: 14.9 G/DL (ref 13–17.5)
IMM GRANULOCYTES # BLD AUTO: 0.03 X10(3) UL (ref 0–1)
IMM GRANULOCYTES NFR BLD: 0.3 %
KETONES UR-MCNC: NEGATIVE MG/DL
LEUKOCYTE ESTERASE UR QL STRIP.AUTO: NEGATIVE
LIPASE SERPL-CCNC: 29 U/L (ref 12–53)
LYMPHOCYTES # BLD AUTO: 1.47 X10(3) UL (ref 1–4)
LYMPHOCYTES NFR BLD AUTO: 14.3 %
MCH RBC QN AUTO: 28.8 PG (ref 26–34)
MCHC RBC AUTO-ENTMCNC: 32.7 G/DL (ref 31–37)
MCV RBC AUTO: 88 FL (ref 80–100)
MONOCYTES # BLD AUTO: 0.83 X10(3) UL (ref 0.1–1)
MONOCYTES NFR BLD AUTO: 8.1 %
NEUTROPHILS # BLD AUTO: 7.62 X10 (3) UL (ref 1.5–7.7)
NEUTROPHILS # BLD AUTO: 7.62 X10(3) UL (ref 1.5–7.7)
NEUTROPHILS NFR BLD AUTO: 74.3 %
NITRITE UR QL STRIP.AUTO: NEGATIVE
OSMOLALITY SERPL CALC.SUM OF ELEC: 290 MOSM/KG (ref 275–295)
PH UR: 5.5 (ref 5–8)
PLATELET # BLD AUTO: 230 10(3)UL (ref 150–450)
POTASSIUM SERPL-SCNC: 3.7 MMOL/L (ref 3.5–5.1)
PROT SERPL-MCNC: 7.1 G/DL (ref 5.7–8.2)
PROT UR-MCNC: NEGATIVE MG/DL
RBC # BLD AUTO: 5.18 X10(6)UL (ref 3.8–5.8)
RBC #/AREA URNS AUTO: >10 /HPF
SODIUM SERPL-SCNC: 138 MMOL/L (ref 136–145)
SP GR UR STRIP: 1.02 (ref 1–1.03)
UROBILINOGEN UR STRIP-ACNC: NORMAL
WBC # BLD AUTO: 10.3 X10(3) UL (ref 4–11)

## 2025-07-28 PROCEDURE — 85025 COMPLETE CBC W/AUTO DIFF WBC: CPT

## 2025-07-28 PROCEDURE — 99285 EMERGENCY DEPT VISIT HI MDM: CPT

## 2025-07-28 PROCEDURE — 80053 COMPREHEN METABOLIC PANEL: CPT

## 2025-07-28 PROCEDURE — 83690 ASSAY OF LIPASE: CPT

## 2025-07-28 PROCEDURE — 74177 CT ABD & PELVIS W/CONTRAST: CPT | Performed by: NURSE PRACTITIONER

## 2025-07-28 PROCEDURE — 99284 EMERGENCY DEPT VISIT MOD MDM: CPT

## 2025-07-28 PROCEDURE — 96374 THER/PROPH/DIAG INJ IV PUSH: CPT

## 2025-07-28 PROCEDURE — 81001 URINALYSIS AUTO W/SCOPE: CPT

## 2025-07-28 RX ORDER — MORPHINE SULFATE 2 MG/ML
2 INJECTION, SOLUTION INTRAMUSCULAR; INTRAVENOUS ONCE
Status: COMPLETED | OUTPATIENT
Start: 2025-07-28 | End: 2025-07-28

## 2025-07-28 NOTE — TELEPHONE ENCOUNTER
On Call Note:    Called in last 5 minutes as the on-call physician.  Patient notes he was recently diagnosed with diverticulitis and took a course of antibiotics which says improved his symptoms.  However he notes starting this morning has been having return of pain on his left side.  He is also reporting pain at the tip of his penis and asking what this could be from.  Discussed certainly the noted recent CT scan describing diverticulitis and noted prescription of course of antibiotics for this.  I discussed with him unclear over the phone of the exact nature of the symptoms certainly could be his diverticulitis though certainly other possibilities including kidney stones or other issues.  Discussed he may need to present to the emergency department if this is worsening or not improving.  He asked that I contact Dr. Johnston to have him call him first thing this morning.  I discussed with him that not sure that will be possible but I can certainly have our nursing staff follow-up with him later this morning.  Also discussed unfortunately the pain at the tip of his penis is out of my expertise and that he should contact his primary care physician.    GI staff:    Please call the patient this morning regarding above and follow up per direction of Dr. Preston Salazar MD  [facility] - Gastroenterology  7/28/2025  7:30 AM

## 2025-07-28 NOTE — TELEPHONE ENCOUNTER
Patient cancelled his appointment today and is in pain and going to the emergency department. Patient says if office needs to call him, please contact his wife at [phone],thanks.

## 2025-07-30 ENCOUNTER — TELEPHONE (OUTPATIENT)
Dept: SURGERY | Facility: CLINIC | Age: 75
End: 2025-07-30

## 2025-07-30 DIAGNOSIS — C61 BIOCHEMICALLY RECURRENT MALIGNANT NEOPLASM OF PROSTATE (HCC): Primary | ICD-10-CM

## 2025-07-30 DIAGNOSIS — R97.21 BIOCHEMICALLY RECURRENT MALIGNANT NEOPLASM OF PROSTATE (HCC): Primary | ICD-10-CM

## 2025-08-04 ENCOUNTER — TELEPHONE (OUTPATIENT)
Dept: SURGERY | Facility: CLINIC | Age: 75
End: 2025-08-04

## 2025-08-05 ENCOUNTER — TELEPHONE (OUTPATIENT)
Dept: INTERNAL MEDICINE CLINIC | Facility: CLINIC | Age: 75
End: 2025-08-05

## 2025-08-05 ENCOUNTER — TELEPHONE (OUTPATIENT)
Dept: SURGERY | Facility: CLINIC | Age: 75
End: 2025-08-05

## 2025-08-05 ENCOUNTER — LAB ENCOUNTER (OUTPATIENT)
Dept: LAB | Age: 75
End: 2025-08-05
Attending: UROLOGY

## 2025-08-05 DIAGNOSIS — C61 BIOCHEMICALLY RECURRENT MALIGNANT NEOPLASM OF PROSTATE (HCC): ICD-10-CM

## 2025-08-05 DIAGNOSIS — R97.21 BIOCHEMICALLY RECURRENT MALIGNANT NEOPLASM OF PROSTATE (HCC): ICD-10-CM

## 2025-08-05 LAB — PSA SERPL-MCNC: 4.21 NG/ML (ref ?–4)

## 2025-08-05 PROCEDURE — 84153 ASSAY OF PSA TOTAL: CPT

## 2025-08-05 PROCEDURE — 36415 COLL VENOUS BLD VENIPUNCTURE: CPT

## 2025-08-06 ENCOUNTER — TELEPHONE (OUTPATIENT)
Dept: SURGERY | Facility: CLINIC | Age: 75
End: 2025-08-06

## 2025-08-06 DIAGNOSIS — C61 PROSTATE CANCER (HCC): Primary | ICD-10-CM

## 2025-08-13 ENCOUNTER — OFFICE VISIT (OUTPATIENT)
Dept: NEUROLOGY | Facility: CLINIC | Age: 75
End: 2025-08-13

## 2025-08-13 ENCOUNTER — MED REC SCAN ONLY (OUTPATIENT)
Dept: NEUROLOGY | Facility: CLINIC | Age: 75
End: 2025-08-13

## 2025-08-13 DIAGNOSIS — G24.3 CERVICAL DYSTONIA: Primary | ICD-10-CM

## 2025-08-13 PROCEDURE — 64616 CHEMODENERV MUSC NECK DYSTON: CPT | Performed by: OTHER

## 2025-08-13 RX ORDER — LOSARTAN POTASSIUM 100 MG/1
100 TABLET ORAL DAILY
Qty: 90 TABLET | Refills: 3 | Status: SHIPPED | OUTPATIENT
Start: 2025-08-13

## 2025-08-21 ENCOUNTER — TELEPHONE (OUTPATIENT)
Dept: SURGERY | Facility: CLINIC | Age: 75
End: 2025-08-21

## 2025-08-22 ENCOUNTER — NURSE ONLY (OUTPATIENT)
Age: 75
End: 2025-08-22

## 2025-08-22 DIAGNOSIS — M45.3 ANKYLOSING SPONDYLITIS OF CERVICOTHORACIC REGION (HCC): Primary | ICD-10-CM

## 2025-08-22 PROCEDURE — 96372 THER/PROPH/DIAG INJ SC/IM: CPT | Performed by: INTERNAL MEDICINE

## 2025-08-27 ENCOUNTER — OFFICE VISIT (OUTPATIENT)
Dept: PAIN CLINIC | Facility: HOSPITAL | Age: 75
End: 2025-08-27
Attending: ANESTHESIOLOGY

## 2025-08-27 VITALS — OXYGEN SATURATION: 95 % | SYSTOLIC BLOOD PRESSURE: 134 MMHG | DIASTOLIC BLOOD PRESSURE: 70 MMHG | HEART RATE: 65 BPM

## 2025-08-27 DIAGNOSIS — M45.3 ANKYLOSING SPONDYLITIS OF CERVICOTHORACIC REGION (HCC): Primary | ICD-10-CM

## 2025-08-27 DIAGNOSIS — G89.29 OTHER CHRONIC PAIN: ICD-10-CM

## 2025-08-27 DIAGNOSIS — G89.29 BILATERAL CHRONIC KNEE PAIN: ICD-10-CM

## 2025-08-27 DIAGNOSIS — Z51.81 ENCOUNTER FOR MONITORING OPIOID MAINTENANCE THERAPY: ICD-10-CM

## 2025-08-27 DIAGNOSIS — M25.562 BILATERAL CHRONIC KNEE PAIN: ICD-10-CM

## 2025-08-27 DIAGNOSIS — G89.29 CHRONIC PAIN OF BOTH KNEES: ICD-10-CM

## 2025-08-27 DIAGNOSIS — M25.562 CHRONIC PAIN OF BOTH KNEES: ICD-10-CM

## 2025-08-27 DIAGNOSIS — Z79.891 ENCOUNTER FOR MONITORING OPIOID MAINTENANCE THERAPY: ICD-10-CM

## 2025-08-27 DIAGNOSIS — M25.561 BILATERAL CHRONIC KNEE PAIN: ICD-10-CM

## 2025-08-27 DIAGNOSIS — M54.50 LOW BACK PAIN AT MULTIPLE SITES: ICD-10-CM

## 2025-08-27 DIAGNOSIS — M25.561 CHRONIC PAIN OF BOTH KNEES: ICD-10-CM

## 2025-08-27 LAB
AMPHET UR QL SCN: NEGATIVE
BARBITURATES UR QL SCN: NEGATIVE
BENZODIAZ UR QL SCN: NEGATIVE
CANNABINOIDS UR QL SCN: NEGATIVE
COCAINE UR QL: NEGATIVE
CREAT UR-SCNC: 122.2 MG/DL
FENTANYL UR QL SCN: NEGATIVE
MDMA UR QL SCN: NEGATIVE
METHADONE UR QL SCN: NEGATIVE
OXYCODONE UR QL SCN: NEGATIVE
PCP UR QL SCN: NEGATIVE

## 2025-08-27 PROCEDURE — 99214 OFFICE O/P EST MOD 30 MIN: CPT | Performed by: STUDENT IN AN ORGANIZED HEALTH CARE EDUCATION/TRAINING PROGRAM

## 2025-08-27 RX ORDER — HYDROCODONE BITARTRATE AND ACETAMINOPHEN 10; 325 MG/1; MG/1
1 TABLET ORAL EVERY 6 HOURS PRN
Qty: 120 TABLET | Refills: 0 | Status: SHIPPED | OUTPATIENT
Start: 2025-10-08 | End: 2025-11-07

## 2025-08-27 RX ORDER — HYDROCODONE BITARTRATE AND ACETAMINOPHEN 10; 325 MG/1; MG/1
1 TABLET ORAL EVERY 6 HOURS PRN
Qty: 120 TABLET | Refills: 0 | Status: SHIPPED | OUTPATIENT
Start: 2025-09-07 | End: 2025-10-07

## (undated) DIAGNOSIS — Z02.9 ENCOUNTERS FOR UNSPECIFIED ADMINISTRATIVE PURPOSE: ICD-10-CM

## (undated) DIAGNOSIS — G89.4 CHRONIC PAIN SYNDROME: ICD-10-CM

## (undated) DIAGNOSIS — G24.3 CERVICAL DYSTONIA: Primary | ICD-10-CM

## (undated) DEVICE — KIT ENDO ORCAPOD 160/180/190

## (undated) DEVICE — LASSO POLYPECTOMY SNARE: Brand: LASSO

## (undated) DEVICE — PATIENT RETURN ELECTRODE, SINGLE-USE, CONTACT QUALITY MONITORING, ADULT, WITH 9FT CORD, FOR PATIENTS WEIGING OVER 33LBS. (15KG): Brand: MEGADYNE

## (undated) DEVICE — V2 SPECIMEN COLLECTION MANIFOLD KIT: Brand: NEPTUNE

## (undated) DEVICE — MEDI-VAC NON-CONDUCTIVE SUCTION TUBING 6MM X 1.8M (6FT.) L: Brand: CARDINAL HEALTH

## (undated) DEVICE — KIT CLEAN ENDOKIT 1.1OZ GOWNX2

## (undated) DEVICE — 60 ML SYRINGE REGULAR TIP: Brand: MONOJECT

## (undated) DEVICE — Device

## (undated) NOTE — LETTER
[facility]  [address], [place], [place]    Authorization for Surgical Operation and Procedure                               I hereby authorize Xavi Johnston MD, my physician and his/her assistants (if applicable), which may include medical students, residents, and/or fellows, to perform the following surgical operation/ procedure and administer such anesthesia as may be determined necessary by my physician: Operation/Procedure name (s) COLONOSCOPY on Moreno Khalil   2.   I recognize that during the surgical operation/procedure, unforeseen conditions may necessitate additional or different procedures than those listed above.  I, therefore, further authorize and request that the above-named surgeon, assistants, or designees perform such procedures as are, in their judgment, necessary and desirable.    3.   My surgeon/physician has discussed prior to my surgery the potential benefits, risks and side effects of this procedure; the likelihood of achieving goals; and potential problems that might occur during recuperation.  They also discussed reasonable alternatives to the procedure, including risks, benefits, and side effects related to the alternatives and risks related to not receiving this procedure.  I have had all my questions answered and I acknowledge that no guarantee has been made as to the result that may be obtained.    4.   Should the need arise during my operation/procedure, which includes change of level of care prior to discharge, I also consent to the administration of blood and/or blood products.  Further, I understand that despite careful testing and screening of blood or blood products by collecting agencies, I may still be subject to ill effects as a result of receiving a blood transfusion and/or blood products.  The following are some, but not all, of the potential risks that can occur: fever and allergic reactions, hemolytic reactions, transmission of diseases such as  Hepatitis, AIDS and Cytomegalovirus (CMV) and fluid overload.  In the event that I wish to have an autologous transfusion of my own blood, or a directed donor transfusion, I will discuss this with my physician.  Check only if Refusing Blood or Blood Products  I understand refusal of blood or blood products as deemed necessary by my physician may have serious consequences to my condition to include possible death. I hereby assume responsibility for my refusal and release the hospital, its personnel, and my physicians from any responsibility for the consequences of my refusal.    o  Refuse   5.   I authorize the use of any specimen, organs, tissues, body parts or foreign objects that may be removed from my body during the operation/procedure for diagnosis, research or teaching purposes and their subsequent disposal by hospital authorities.  I also authorize the release of specimen test results and/or written reports to my treating physician on the hospital medical staff or other referring or consulting physicians involved in my care, at the discretion of the Pathologist or my treating physician.    6.   I consent to the photographing or videotaping of the operations or procedures to be performed, including appropriate portions of my body for medical, scientific, or educational purposes, provided my identity is not revealed by the pictures or by descriptive texts accompanying them.  If the procedure has been photographed/videotaped, the surgeon will obtain the original picture, image, videotape or CD.  The hospital will not be responsible for storage, release or maintenance of the picture, image, tape or CD.    7.   I consent to the presence of a  or observers in the operating room as deemed necessary by my physician or their designees.    8.   I recognize that in the event my procedure results in extended X-Ray/fluoroscopy time, I may develop a skin reaction.    9. If I have a Do Not Attempt  Resuscitation (DNAR) order in place, that status will be suspended while in the operating room, procedural suite, and during the recovery period unless otherwise explicitly stated by me (or a person authorized to consent on my behalf). The surgeon or my attending physician will determine when the applicable recovery period ends for purposes of reinstating the DNAR order.  10. Patients having a sterilization procedure: I understand that if the procedure is successful the results will be permanent and it will therefore be impossible for me to inseminate, conceive, or bear children.  I also understand that the procedure is intended to result in sterility, although the result has not been guaranteed.   11. I acknowledge that my physician has explained sedation/analgesia administration to me including the risk and benefits I consent to the administration of sedation/analgesia as may be necessary or desirable in the judgment of my physician.    I CERTIFY THAT I HAVE READ AND FULLY UNDERSTAND THE ABOVE CONSENT TO OPERATION and/or OTHER PROCEDURE.     ____________________________________  _________________________________        ______________________________  Signature of Patient    Signature of Responsible Person                Printed Name of Responsible Person                                      ____________________________________  _____________________________                ________________________________  Signature of Witness        Date  Time         Relationship to Patient    STATEMENT OF PHYSICIAN My signature below affirms that prior to the time of the procedure; I have explained to the patient and/or his/her legal representative, the risks and benefits involved in the proposed treatment and any reasonable alternative to the proposed treatment. I have also explained the risks and benefits involved in refusal of the proposed treatment and alternatives to the proposed treatment and have answered the patient's  questions. If I have a significant financial interest in a co-management agreement or a significant financial interest in any product or implant, or other significant relationship used in this procedure/surgery, I have disclosed this and had a discussion with my patient.     _____________________________________________________              _____________________________  (Signature of Physician)                                                                                         (Date)                                   (Time)  Patient Name: Moreno Khalil      : 10/4/1950      Printed: 2025     Medical Record #: [id number]                                      Page 1 of 1

## (undated) NOTE — LETTER
May 22, 2018    [address] [place] [address]      Dear Moody Blizzard:    The following are the results of your recent tests. Please review the list of test results.   Your result is the value on the left; we have also supplied the ran Please call if you have further questions.     Thank you     Eufemia JUAREZN

## (undated) NOTE — LETTER
AUTHORIZATION FOR SURGICAL OPERATION OR OTHER PROCEDURE    1. I hereby authorize Dr. Catrachita Montiel and the [facility] staff assigned to my case to perform the following operation and/or procedure at the [facility]:    botox injection   _______________________________________________________________________________________________      _______________________________________________________________________________________________    2. My physician has explained the nature and purpose of the operation or other procedure, possible alternative methods of treatment, the risks involved, and the possibility of complication to me. I acknowledge that no guarantee has been made as to the result that may be obtained. 3.  I recognize that, during the course of this operation, or other procedure, unforseen conditions may necessitate additional or different procedure than those listed above. I, therefore, further authorize and request that the above named physician, his/her physician assistants or designees perform such procedures as are, in his/her professional opinion, necessary and desirable. 4.  Any tissue or organs removed in the operation or other procedure may be disposed of by and at the discretion of the [facility] staff and [facility]. 5.  I understand that in the event of a medical emergency, I will be transported by local paramedics to [facility] or other Hospitals in [place] emergency department. 6.  I certify that I have read and fully understand the above consent to operation and/or other procedure. 7.  I acknowledge that my physician has explained sedation/analgesia administration to me including the risks and benefits. I consent to the administration of sedation/analgesia as may be necessary or desirable in the judgement of my physician. Witness signature: ___________________________________________________ Date:  ______/______/_____                    Time:  ________ A. M.  P.M. Patient Name:  ___William G Malina___________________________________________________  (please print)       Patient signature:  ___________________________________________________             Relationship to Patient:           []  Parent    Responsible person                          []  Spouse  In case of minor or                    [] Other  _____________   Incompetent name:  __________________________________________________                               (please print)      _____________      Responsible person  In case of minor or  Incompetent signature:  _______________________________________________    Statement of Physician  My signature below affirms that prior to the time of the procedure, I have explained to the patient and/or his/her guardian, the risks and benefits involved in the proposed treatment and any reasonable alternative to the proposed treatment. I have also explained the risks and benefits involved in the refusal of the proposed treatment and have answered the patient's questions.                         Date:  ______/______/_______  Provider                      Signature:  __________________________________________________________       Time:  ___________ A.M    P.M.

## (undated) NOTE — LETTER
Notifier: [facility]       Patient Name: Moreno Khalil       Identification Number: [id number]                          Advance Beneficiary Notice of Noncoverage (ABN)   NOTE:  If Medicare doesn’t pay for D. Items/service(s) below, you may have to pay.  Medicare does not pay for everything, even some care that you or your health care provider have good reason to think you need. We expect Medicare may not pay for the D. items/service(s) below.  Items or Services  __X__Botox 200 Units Reason Medicare May Not Pay: Estimated Cost   __EKG ($129.00)  __Pap smear ($48.23) __Pelvic/Breast ($65.00)  __ Ear Irrigation ($149)  __ Injection(s)  ___ Tdap ($70)       ___ Meningitis ($206)   __Prevnar ($285)  ___ Td ($51)            ___Shingrix ($215)        __Prevnar 20 ($309)  ___ Hep A ($156)   ___Prolia ($1827.00)     __ Xiaflex ($              )   ___ Hep B ($167)      __Pneumovax ($155)                                            ___ Vaccine Administration ($31)   __ Medicare does not cover this service      _X_ Medicare may not pay for this   item/service for your condition     __ Medicare may not pay for this item/service as often as this        $4000   WHAT YOU NEED TO DO NOW:  Read this notice, so you can make an informed decision about your care.  Ask us any questions that you may have after you finish reading.  Choose an option below about whether to receive the D. item/service(s)  listed above.  Note: If you choose Option 1 or 2, we may help you to use any other insurance that you might have, but Medicare cannot require us to do this.  OPTIONS: Check only one box.  We cannot choose a box for you.   OPTION 1. I want the D. item/service(s) listed above. You may ask to be paid now, but I also want Medicare billed for an official decision on payment, which is sent to me on a Medicare Summary Notice (MSN). I understand that if Medicare doesn’t pay, I am responsible for payment, but I can appeal to Medicare  by following the directions on the MSN. If Medicare does pay, you will refund any payments I made to you, less co-pays or deductibles.  OPTION 2. I want the D. item/service(s) listed above, but do not bill Medicare. You may ask to be paid now as I am responsible for payment. I cannot appeal if Medicare is not billed.  OPTION 3. I don't want the D. item/service(s) listed above. I understand with this choice I am not responsible for payment, and I cannot appeal to see if Medicare would pay.    H. Additional Information:    This notice gives our opinion, not an official Medicare decision. If you have other questions on this notice or Medicare billing, call 1-800-MEDICARE (1-374.205.3941/TTY: [phone]). Signing below means that you have received and understand this notice. You also receive a copy.  Signature: Date:       You have the right to get Medicare information in an accessible format, like large print, Braille, or audio. You also have the right to file a complaint if you feel you’ve been discriminated against. Visit Medicare.gov/about- us/hevhfowctujwn-kyfewvlexujaxxhqd-iewmks.  According to the Paperwork Reduction Act of 1995, no persons are required to respond to a collection of information unless it displays a valid OMB control number. The valid OMB control number for this information collection is 4430-1730. The time required to complete this information collection is estimated to average 7 minutes per response, including the time to review instructions, search existing data resources, gather the data needed, and complete and review the information collection. If you have comments concerning the accuracy of the time estimate or suggestions for improving this form, please write to: CMS, [facility]     Leonardo Attn: [employer] Reports Clearance Officer, [place], [place] [address].  Form CMS-R-131 (Exp. 1/31/2026) Form Approved OMB No. [id number]

## (undated) NOTE — MR AVS SNAPSHOT
[address] [address]  [phone]               Thank you for choosing us for your health care visit with Wu Boswell MD.  We are glad to serve you and happy to provide you with this s Assoc Dx:  Essential hypertension with goal blood pressure less than 140/90 [I10]                 Scheduling Instructions     Wednesday April 05, 2017     Imaging:  XR KNEE COMPLETE BILAT OI([id number])    Instructions:   To schedule a test at any [facility] known as:  [address]   Start taking on:  5/5/2017           * HYDROcodone-acetaminophen  MG Tabs   Take 1 tablet by mouth every 4 (four) hours as needed for Pain. What changed:   You were already taking a medication with the same name, and this

## (undated) NOTE — MR AVS SNAPSHOT
[address] [address]  [phone]               Thank you for choosing us for your health care visit with Abel Boswell MD.  We are glad to serve you and happy to provide you with this s metoprolol Tartrate 25 MG Tabs   TAKE 1 TABLET TWICE DAILY   Commonly known as:  LOPRESSOR           simvastatin 20 MG Tabs   TAKE 1 TABLET EVERY NIGHT   Commonly known as:  ZOCOR           tadalafil 5 MG Tabs   Take 1 tablet (5 mg total) by mouth daily a Get your heart pumping – brisk walking, biking, swimming Even 10 minute increments are effective and add up over the week   2 ½ hours per week – spread out over time Use a kiki to keep you motivated   Don’t forget strength training with weights and resist

## (undated) NOTE — LETTER
AUTHORIZATION FOR SURGICAL OPERATION OR OTHER PROCEDURE    1. I hereby authorize Dr. Staci aCstillo and the [facility] staff assigned to my case to perform the following operation and/or procedure at the [facility]:    Botox Injections  _______________________________________________________________________________________________    2. My physician has explained the nature and purpose of the operation or other procedure, possible alternative methods of treatment, the risks involved, and the possibility of complication to me. I acknowledge that no guarantee has been made as to the result that may be obtained. 3.  I recognize that, during the course of this operation, or other procedure, unforseen conditions may necessitate additional or different procedure than those listed above. I, therefore, further authorize and request that the above named physician, his/her physician assistants or designees perform such procedures as are, in his/her professional opinion, necessary and desirable. 4.  Any tissue or organs removed in the operation or other procedure may be disposed of by and at the discretion of the [facility] staff and [facility] AT [facility]. 5.  I understand that in the event of a medical emergency, I will be transported by local paramedics to [facility] or other hospital emergency department. 6.  I certify that I have read and fully understand the above consent to operation and/or other procedure. 7.  I acknowledge that my physician has explained sedation/analgesia administration to me including the risks and benefits. I consent to the administration of sedation/analgesia as may be necessary or desirable in the judgement of my physician. Witness signature: ___________________________________________________ Date:  6/28/2022                    Time:  ________ A. M.  P.M.        Patient Name:  Nicolás Galaviz  (please print)       Patient signature: ___________________________________________________             Relationship to Patient:           []  Parent    Responsible person                          []  Spouse  In case of minor or                    [] Other  _____________   Incompetent name:  __________________________________________________                               (please print)      _____________      Responsible person  In case of minor or  Incompetent signature:  _______________________________________________    Statement of Physician  My signature below affirms that prior to the time of the procedure, I have explained to the patient and/or his/her guardian, the risks and benefits involved in the proposed treatment and any reasonable alternative to the proposed treatment. I have also explained the risks and benefits involved in the refusal of the proposed treatment and have answered the patient's questions.                         Date: 6/28/2022  Provider                      Signature:  __________________________________________________________       Time:  ___________ A.M    P.M.

## (undated) NOTE — LETTER
AUTHORIZATION FOR SURGICAL OPERATION OR OTHER PROCEDURE    1. I hereby authorize Dr. Saran Maldonado and the [facility] staff assigned to my case to perform the following operation and/or procedure at the [facility] Office:    _______________________________________________________________________________________________      Botox 200 units  _______________________________________________________________________________________________    2. My physician has explained the nature and purpose of the operation or other procedure, possible alternative methods of treatment, the risks involved, and the possibility of complication to me. I acknowledge that no guarantee has been made as to the result that may be obtained. 3.  I recognize that, during the course of this operation, or other procedure, unforseen conditions may necessitate additional or different procedure than those listed above. I, therefore, further authorize and request that the above named physician, his/her physician assistants or designees perform such procedures as are, in his/her professional opinion, necessary and desirable. 4.  Any tissue or organs removed in the operation or other procedure may be disposed of by and at the discretion of the [facility] staff and [facility] AT [facility]. 5.  I understand that in the event of a medical emergency, I will be transported by local paramedics to [facility] or other [facility] emergency department. 6.  I certify that I have read and fully understand the above consent to operation and/or other procedure. 7.  I acknowledge that my physician has explained sedation/analgesia administration to me including the risks and benefits. I consent to the administration of sedation/analgesia as may be necessary or desirable in the judgement of my physician. Witness signature: ___________________________________________________ Date:  ______/______/_____                    Time:  ________ A. M.  P.M. Jules Rae  10/4/1950  [id number]         Patient signature:  ___________________________________________________             Statement of Physician  My signature below affirms that prior to the time of the procedure, I have explained to the patient and/or his/her guardian, the risks and benefits involved in the proposed treatment and any reasonable alternative to the proposed treatment. I have also explained the risks and benefits involved in the refusal of the proposed treatment and have answered the patient's questions.                         Date:  ______/______/_______  Provider                      Signature:  __________________________________________________________       Time:  ___________ DANIELLE RIOS

## (undated) NOTE — LETTER
[facility]  Administration of Anesthesia  IMoreno agree to be cared for by a physician anesthesiologist alone and/or with a nurse anesthetist, who is specially trained to monitor me and give me medicine to put me to sleep or keep me comfortable during my procedure    I understand that my anesthesiologist and/or anesthetist is not an employee or agent of [facility] or [email or web address]. He or she works for [facility]    As the patient asking for anesthesia services, I agree to:  Allow the anesthesiologist (anesthesia doctor) to give me medicine and do additional procedures as necessary. Some examples are: Starting or using an “IV” to give me medicine, fluids or blood during my procedure, and having a breathing tube placed to help me breathe when I’m asleep (intubation). In the event that my heart stops working properly, I understand that my anesthesiologist will make every effort to sustain my life, unless otherwise directed by [facility] Do Not Resuscitate documents.  Tell my anesthesia doctor before my procedure:  If I am pregnant.  The last time that I ate or drank.  iii. All of the medicines I take (including prescriptions, herbal supplements, and pills I can buy without a prescription (including street drugs/illegal medications). Failure to inform my anesthesiologist about these medicines may increase my risk of anesthetic complications.  iv.If I am allergic to anything or have had a reaction to anesthesia before.  I understand how the anesthesia medicine will help me (benefits).  I understand that with any type of anesthesia medicine there are risks:  The most common risks are: nausea, vomiting, sore throat, muscle soreness, damage to my eyes, mouth, or teeth (from breathing tube placement).  Rare risks include: remembering what happened during my procedure, allergic reactions to medications, injury to my airway, heart, lungs, vision, nerves, or  muscles and in extremely rare instances death.  My doctor has explained to me other choices available to me for my care (alternatives).  Pregnant Patients (“epidural”):  I understand that the risks of having an epidural (medicine given into my back to help control pain during labor), include itching, low blood pressure, difficulty urinating, headache or slowing of the baby’s heart. Very rare risks include infection, bleeding, seizure, irregular heart rhythms and nerve injury.  Regional Anesthesia (“spinal”, “epidural”, & “nerve blocks”):  I understand that rare but potential complications include headache, bleeding, infection, seizure, irregular heart rhythms, and nerve injury.    _____________________________________________________________________________  Patient (or Representative) Signature/Relationship to Patient  Date   Time    _____________________________________________________________________________   Name (if used)    Language/Organization   Time    _____________________________________________________________________________  Nurse Anesthetist Signature     Date   Time  _____________________________________________________________________________  Anesthesiologist Signature     Date   Time  I have discussed the procedure and information above with the patient (or patient’s representative) and answered their questions. The patient or their representative has agreed to have anesthesia services.    _____________________________________________________________________________  Witness        Date   Time  I have verified that the signature is that of the patient or patient’s representative, and that it was signed before the procedure  Patient Name: Moreno Khalil     : 10/4/1950                 Printed: 2025 at 1:24 PM    Medical Record #: [id number]                                            Page 1 of 1  ----------ANESTHESIA CONSENT----------

## (undated) NOTE — Clinical Note
4/7/2017              [address] [facility] [address]         Dear Tammy Bennett,    The recent x rays of your knees indicate that you have mild to moderate arthritis developing in both knees.   Weight loss should greatly

## (undated) NOTE — LETTER
[place] Notifier: [employer]. Patient Name: Moreno Khalil Identification Number: [id number]                          Advance Beneficiary Notice of Noncoverage (ABN)   NOTE:  If Medicare doesn’t pay for D. item/service(s) below, you may have to pay.  Medicare does not pay for everything, even some care that you or your health care provider have good reason to think you need. We expect Medicare may not pay for the D. item/service(s) below.  D. Items or Services E. Reason Medicare May Not Pay: F. Estimated Cost   __ EKG ($87.00)  __ Pap smear ($101) __Pelvic/Breast ($147.00)  __ Ear Irrigation ($138)  _X_ Injection(s) BOTOX 200 UNITS  ___ Tdap ($181)       ___ Meningitis ($290)   __Prevnar ($555)  ___ Td ($66)              ___ Prevnar 20 ($549)  ___ Hep A ($152)     ___ Prolia ($1827)         __ Xiaflex ($            )   ___ Hep B ($150)     ___ Pneumovax ($287)                                         ___ Vaccine Administration ($65)   __ Medicare does not cover this service      _X_ Medicare may not pay for this   item/service for your condition     __ Medicare may not pay for this item/service as often as this       $4,000   WHAT YOU NEED TO DO NOW:  Read this notice, so you can make an informed decision about your care.  Ask us any questions that you may have after you finish reading.  Choose an option below about whether to receive the D. item/service(s) listed above.  Note: If you choose Option 1 or 2, we may help you to use any other insurance that you might have, but Medicare cannot require us to do this.  G. OPTIONS: Check only one box.  We cannot choose a box for you.   OPTION 1. I want the D. item/service(s) listed above. You may ask to be paid now, but I also want Medicare billed for an official decision on payment, which is sent to me on a Medicare Summary Notice (MSN). I understand that if Medicare doesn’t pay, I am responsible for payment, but I can appeal to Medicare by following the  directions on the MSN. If Medicare does pay, you will refund any payments I made to you, less co-pays or deductibles.  OPTION 2. I want the D. item/service(s) listed above, but do not bill Medicare. You may ask to be paid now as I am responsible for payment. I cannot appeal if Medicare is not billed.  OPTION 3. I don't want the D. item/service(s) listed above. I understand with this choice I am not responsible for payment, and I cannot appeal to see if Medicare would pay.    H. Additional Information:    This notice gives our opinion, not an official Medicare decision. If you have other questions on this notice or Medicare billing, call 1-800-MEDICARE (1-128.778.6990/TTY: 1-627.534.3535). Signing below means that you have received and understand this notice. You also receive a copy.  I. Signature: J. Date:  11/12/2024       You have the right to get Medicare information in an accessible format, like large print, Braille, or audio. You also have the right to file a complaint if you feel you’ve been discriminated against. Visit Medicare.gov/about- us/qfsqhpmwtapsx-zejpxazzrknklmdrf-pntpry.  According to the Paperwork Reduction Act of 1995, no persons are required to respond to a collection of information unless it displays a valid OMB control number. The valid OMB control number for this information collection is [id number]. The time required to complete this information collection is estimated to average 7 minutes per response, including the time to review instructions, search existing data resources, gather the data needed, and complete and review the information collection. If you have comments concerning the accuracy of the time estimate or suggestions for improving this form, please write to: CMS, [facility]     Leonardo Attn: [employer] Reports Clearance Officer, [place], [place] [address].  Form CMS-R-131 (Exp. 1/31/2026) Form Approved OMB No. [id number]

## (undated) NOTE — LETTER
AUTHORIZATION FOR SURGICAL OPERATION OR OTHER PROCEDURE    1. I hereby authorize Dr. Chad Manuel and the [facility] Office staff assigned to my case to perform the following operation and/or procedure at the [facility] Office:    _______________________________________________________________________________________________    Botox 200 units   _______________________________________________________________________________________________    2.  My physician has explained the nature and purpose of the operation or other procedure, possible alternative methods of treatment, the risks involved, and the possibility of complication to me.  I acknowledge that no guarantee has been made as to the result that may be obtained.  3.  I recognize that, during the course of this operation, or other procedure, unforseen conditions may necessitate additional or different procedure than those listed above.  I, therefore, further authorize and request that the above named physician, his/her physician assistants or designees perform such procedures as are, in his/her professional opinion, necessary and desirable.  4.  Any tissue or organs removed in the operation or other procedure may be disposed of by and at the discretion of the [facility] staff and [facility].  5.  I understand that in the event of a medical emergency, I will be transported by local paramedics to [facility] or other hospital emergency department.  6.  I certify that I have read and fully understand the above consent to operation and/or other procedure.    7.  I acknowledge that my physician has explained sedation/analgesia administration to me including the risks and benefits.  I consent to the administration of sedation/analgesia as may be necessary or desirable in the judgement of my physician.        Witness signature: ___________________________________________________ Date:  ______/______/_____                    Time:  ________  INDERJIT RIOS     Patient Name:    Moreno Khalil     [id number]     10/4/1950       Patient signature:  ___________________________________________________                 Statement of Physician  My signature below affirms that prior to the time of the procedure, I have explained to the patient and/or his/her guardian, the risks and benefits involved in the proposed treatment and any reasonable alternative to the proposed treatment.  I have also explained the risks and benefits involved in the refusal of the proposed treatment and have answered the patient's questions.                          Date:  ______/______/_______  Provider                      Signature:  __________________________________________________________       Time:  ___________ DANIELLE RIOS

## (undated) NOTE — LETTER
SONIA Notifier: [employer]. Patient Name: Moreno Khalil Identification Number: [id number]                          Advance Beneficiary Notice of Noncoverage (ABN)   NOTE:  If Medicare doesn’t pay for D. item/service(s) below, you may have to pay.  Medicare does not pay for everything, even some care that you or your health care provider have good reason to think you need. We expect Medicare may not pay for the D. item/service(s) below.  D. Items or Services E. Reason Medicare May Not Pay: F. Estimated Cost   __ EKG ($87.00)  __ Pap smear ($101) __Pelvic/Breast ($147.00)  __ Ear Irrigation ($138)  _x_ Injection(s) botox 300  ___ Tdap ($181)       ___ Meningitis ($290)   __Prevnar ($555)  ___ Td ($66)              ___ Prevnar 20 ($549)  ___ Hep A ($152)     ___ Prolia ($1827)         __ Xiaflex ($            )   ___ Hep B ($150)     ___ Pneumovax ($287)                                         ___ Vaccine Administration ($65)   __ Medicare does not cover this service      _x_ Medicare may not pay for this   item/service for your condition     __ Medicare may not pay for this item/service as often as this      $6,000   WHAT YOU NEED TO DO NOW:  Read this notice, so you can make an informed decision about your care.  Ask us any questions that you may have after you finish reading.  Choose an option below about whether to receive the D. item/service(s) listed above.  Note: If you choose Option 1 or 2, we may help you to use any other insurance that you might have, but Medicare cannot require us to do this.  G. OPTIONS: Check only one box.  We cannot choose a box for you.   OPTION 1. I want the D. item/service(s) listed above. You may ask to be paid now, but I also want Medicare billed for an official decision on payment, which is sent to me on a Medicare Summary Notice (MSN). I understand that if Medicare doesn’t pay, I am responsible for payment, but I can appeal to Medicare by following the  directions on the MSN. If Medicare does pay, you will refund any payments I made to you, less co-pays or deductibles.  OPTION 2. I want the D. item/service(s) listed above, but do not bill Medicare. You may ask to be paid now as I am responsible for payment. I cannot appeal if Medicare is not billed.  OPTION 3. I don't want the D. item/service(s) listed above. I understand with this choice I am not responsible for payment, and I cannot appeal to see if Medicare would pay.    H. Additional Information:    This notice gives our opinion, not an official Medicare decision. If you have other questions on this notice or Medicare billing, call 1-800-MEDICARE (1-267.473.3229/TTY: [phone]). Signing below means that you have received and understand this notice. You also receive a copy.  I. Signature: J. Date:  5/14/25       You have the right to get Medicare information in an accessible format, like large print, Braille, or audio. You also have the right to file a complaint if you feel you’ve been discriminated against. Visit Medicare.gov/about- us/ixqrtsitargmk-bwjsymqknyoqnmchb-xoeupe.  According to the Paperwork Reduction Act of 1995, no persons are required to respond to a collection of information unless it displays a valid OMB control number. The valid OMB control number for this information collection is 8037-6713. The time required to complete this information collection is estimated to average 7 minutes per response, including the time to review instructions, search existing data resources, gather the data needed, and complete and review the information collection. If you have comments concerning the accuracy of the time estimate or suggestions for improving this form, please write to: CMS, [facility]     Leonardo Attn: [employer] Reports Clearance Officer, [place], [place] [address].  Form CMS-R-131 (Exp. 1/31/2026) Form Approved OMB No. [id number]

## (undated) NOTE — LETTER
AUTHORIZATION FOR SURGICAL OPERATION OR OTHER PROCEDURE    1. I hereby authorize Dr. Lillie Travis and the [facility] staff assigned to my case to perform the following operation and/or procedure at the [facility]:    _botox___________________________________________________________________________________________      _______________________________________________________________________________________________    2. My physician has explained the nature and purpose of the operation or other procedure, possible alternative methods of treatment, the risks involved, and the possibility of complication to me. I acknowledge that no guarantee has been made as to the result that may be obtained. 3.  I recognize that, during the course of this operation, or other procedure, unforseen conditions may necessitate additional or different procedure than those listed above. I, therefore, further authorize and request that the above named physician, his/her physician assistants or designees perform such procedures as are, in his/her professional opinion, necessary and desirable. 4.  Any tissue or organs removed in the operation or other procedure may be disposed of by and at the discretion of the [facility] staff and [facility]. 5.  I understand that in the event of a medical emergency, I will be transported by local paramedics to [facility] or other hospital emergency department. 6.  I certify that I have read and fully understand the above consent to operation and/or other procedure. 7.  I acknowledge that my physician has explained sedation/analgesia administration to me including the risks and benefits. I consent to the administration of sedation/analgesia as may be necessary or desirable in the judgement of my physician. Witness signature: ___________________________________________________ Date:  ______/______/_____                    Time:  ________ A. M.  P.M.        Patient Name:  Alejandra Montano         Patient signature:  ___________________________________________________             Relationship to Patient:           []  Parent    Responsible person                          []  Spouse  In case of minor or                    [] Other  _____________   Incompetent name:  __________________________________________________                               (please print)      _____________      Responsible person  In case of minor or  Incompetent signature:  _______________________________________________    Statement of Physician  My signature below affirms that prior to the time of the procedure, I have explained to the patient and/or his/her guardian, the risks and benefits involved in the proposed treatment and any reasonable alternative to the proposed treatment. I have also explained the risks and benefits involved in the refusal of the proposed treatment and have answered the patient's questions.                         Date:  ______/______/_______  Provider                      Signature:  __________________________________________________________       Time:  ___________ A.M    P.M.

## (undated) NOTE — LETTER
Notifier: [employer]       Patient Name: Moreno Khalil       Identification Number: [id number]                          Advance Beneficiary Notice of Noncoverage (ABN)   NOTE:  If Medicare doesn’t pay for D. Items/service(s) below, you may have to pay.  Medicare does not pay for everything, even some care that you or your health care provider have good reason to think you need. We expect Medicare may not pay for the D. items/service(s) below.  Items or Services Reason Medicare May Not Pay: Estimated Cost   __EKG ($129.00)  __Pap smear ($48.23) __Pelvic/Breast ($65.00)  __ Ear Irrigation ($149)  _X_ Injection(s)  ___ Tdap ($70)       ___ Meningitis ($206)   __Prevnar ($285)  ___ Td ($51)            ___Shingrix ($215)        __Prevnar 20 ($309)  ___ Hep A ($156)   ___Prolia ($1827.00)     __ Xiaflex ($              )   ___ Hep B ($167)      __Pneumovax ($155)                                            ___ Vaccine Administration ($31)   __ Medicare does not cover this service      _X_ Medicare may not pay for this   item/service for your condition     __ Medicare may not pay for this item/service as often as this     $4000   WHAT YOU NEED TO DO NOW:  Read this notice, so you can make an informed decision about your care.  Ask us any questions that you may have after you finish reading.  Choose an option below about whether to receive the D. item/service(s)  listed above.  Note: If you choose Option 1 or 2, we may help you to use any other insurance that you might have, but Medicare cannot require us to do this.  OPTIONS: Check only one box.  We cannot choose a box for you.   OPTION 1. I want the D. item/service(s) listed above. You may ask to be paid now, but I also want Medicare billed for an official decision on payment, which is sent to me on a Medicare Summary Notice (MSN). I understand that if Medicare doesn’t pay, I am responsible for payment, but I can appeal to Medicare by following the directions on  the MSN. If Medicare does pay, you will refund any payments I made to you, less co-pays or deductibles.  OPTION 2. I want the D. item/service(s) listed above, but do not bill Medicare. You may ask to be paid now as I am responsible for payment. I cannot appeal if Medicare is not billed.  OPTION 3. I don't want the D. item/service(s) listed above. I understand with this choice I am not responsible for payment, and I cannot appeal to see if Medicare would pay.    H. Additional Information:    This notice gives our opinion, not an official Medicare decision. If you have other questions on this notice or Medicare billing, call 1-800-MEDICARE ([phone]/TTY: [phone]). Signing below means that you have received and understand this notice. You also receive a copy.  Signature: Date:       You have the right to get Medicare information in an accessible format, like large print, Braille, or audio. You also have the right to file a complaint if you feel you’ve been discriminated against. Visit Medicare.gov/about- us/nuszcwynsjjlg-scenbhqmtouymnvol-rdmhat.  According to the Paperwork Reduction Act of 1995, no persons are required to respond to a collection of information unless it displays a valid OMB control number. The valid OMB control number for this information collection is [id number]. The time required to complete this information collection is estimated to average 7 minutes per response, including the time to review instructions, search existing data resources, gather the data needed, and complete and review the information collection. If you have comments concerning the accuracy of the time estimate or suggestions for improving this form, please write to: CMS, [facility] Security     Leonardo, Attn: [employer] Reports Clearance Officer, [place], [place] [address].  Form CMS-R-131 (Exp. 1/31/2026) Form Approved OMB No. [id number]

## (undated) NOTE — LETTER
AUTHORIZATION FOR SURGICAL OPERATION OR OTHER PROCEDURE    1. I hereby authorize Dr. SRIRAM BOO and the [facility] staff assigned to my case to perform the following operation and/or procedure at the [facility]:    _______________________________________________________________________________________________    BOTOX 200 UNITS  _______________________________________________________________________________________________    2.  My physician has explained the nature and purpose of the operation or other procedure, possible alternative methods of treatment, the risks involved, and the possibility of complication to me.  I acknowledge that no guarantee has been made as to the result that may be obtained.  3.  I recognize that, during the course of this operation, or other procedure, unforseen conditions may necessitate additional or different procedure than those listed above.  I, therefore, further authorize and request that the above named physician, his/her physician assistants or designees perform such procedures as are, in his/her professional opinion, necessary and desirable.  4.  Any tissue or organs removed in the operation or other procedure may be disposed of by and at the discretion of the [facility] staff and [facility].  5.  I understand that in the event of a medical emergency, I will be transported by local paramedics to [facility] or other hospital emergency department.  6.  I certify that I have read and fully understand the above consent to operation and/or other procedure.    7.  I acknowledge that my physician has explained sedation/analgesia administration to me including the risks and benefits.  I consent to the administration of sedation/analgesia as may be necessary or desirable in the judgement of my physician.    Witness signature: ___________________________________________________ Date:  ______/______/_____                    Time:  ________ A.M.   P.M.       Patient Name:  Moreno Khalil 10/4/1950 [id number]        Patient signature:  ___________________________________________________               Statement of Physician  My signature below affirms that prior to the time of the procedure, I have explained to the patient and/or his/her guardian, the risks and benefits involved in the proposed treatment and any reasonable alternative to the proposed treatment.  I have also explained the risks and benefits involved in the refusal of the proposed treatment and have answered the patient's questions.                        Date:  ______/______/_______  Provider                      Signature:  __________________________________________________________       Time:  ___________ DANIELLE RIOS

## (undated) NOTE — LETTER
AUTHORIZATION FOR SURGICAL OPERATION OR OTHER PROCEDURE    1. I hereby authorize Dr. Norris and the [facility] staff assigned to my case to perform the following operation and/or procedure at the [facility]:    _____Botox 200 Units______________________________________________________________________________      _______________________________________________________________________________________________    2.  My physician has explained the nature and purpose of the operation or other procedure, possible alternative methods of treatment, the risks involved, and the possibility of complication to me.  I acknowledge that no guarantee has been made as to the result that may be obtained.  3.  I recognize that, during the course of this operation, or other procedure, unforseen conditions may necessitate additional or different procedure than those listed above.  I, therefore, further authorize and request that the above named physician, his/her physician assistants or designees perform such procedures as are, in his/her professional opinion, necessary and desirable.  4.  Any tissue or organs removed in the operation or other procedure may be disposed of by and at the discretion of the [facility] staff and [facility].  5.  I understand that in the event of a medical emergency, I will be transported by local paramedics to [facility] or other hospital emergency department.  6.  I certify that I have read and fully understand the above consent to operation and/or other procedure.    7.  I acknowledge that my physician has explained sedation/analgesia administration to me including the risks and benefits.  I consent to the administration of sedation/analgesia as may be necessary or desirable in the judgement of my physician.    Witness signature: ___________________________________________________ Date:  ______/______/_____                    Time:  ________ A.M.  P.M.       Patient  Name:  ____William G Simran   [id number] __________________________________________________  (please print)       Patient signature:  ___________________________________________________             Relationship to Patient:           []  Parent    Responsible person                          []  Spouse  In case of minor or                    [] Other  _____________   Incompetent name:  __________________________________________________                               (please print)      _____________      Responsible person  In case of minor or  Incompetent signature:  _______________________________________________    Statement of Physician  My signature below affirms that prior to the time of the procedure, I have explained to the patient and/or his/her guardian, the risks and benefits involved in the proposed treatment and any reasonable alternative to the proposed treatment.  I have also explained the risks and benefits involved in the refusal of the proposed treatment and have answered the patient's questions.                        Date:  ______/______/_______  Provider                      Signature:  __________________________________________________________       Time:  ___________ A.M    P.M.

## (undated) NOTE — MR AVS SNAPSHOT
[facility]  [address]  [phone]               Thank you for choosing us for your health care visit with Dariana Ryan MD.  We are glad to serve you and happy to provide you with this summ TABLETS DAILY   Commonly known as:  NORCO           * HYDROcodone-acetaminophen  MG Tabs   Take 1 tablet by mouth every 4 (four) hours as needed for Pain.    Commonly known as:  NORCO           * HYDROcodone-acetaminophen  MG Tabs   Take 1 table

## (undated) NOTE — LETTER
AUTHORIZATION FOR SURGICAL OPERATION OR OTHER PROCEDURE    1. I hereby authorize Dr. Chad Manuel and the [facility] staff assigned to my case to perform the following operation and/or procedure at the [facility]:  Botox 300 units BUY&BILL increased per Dr. Manuel   _______________________________________________________________________________________________    Cervical dystonia EMG guidance   _______________________________________________________________________________________________    2.  My physician has explained the nature and purpose of the operation or other procedure, possible alternative methods of treatment, the risks involved, and the possibility of complication to me.  I acknowledge that no guarantee has been made as to the result that may be obtained.  3.  I recognize that, during the course of this operation, or other procedure, unforseen conditions may necessitate additional or different procedure than those listed above.  I, therefore, further authorize and request that the above named physician, his/her physician assistants or designees perform such procedures as are, in his/her professional opinion, necessary and desirable.  4.  Any tissue or organs removed in the operation or other procedure may be disposed of by and at the discretion of the [facility] staff and [facility].  5.  I understand that in the event of a medical emergency, I will be transported by local paramedics to [facility] or other hospital emergency department.  6.  I certify that I have read and fully understand the above consent to operation and/or other procedure.    7.  I acknowledge that my physician has explained sedation/analgesia administration to me including the risks and benefits.  I consent to the administration of sedation/analgesia as may be necessary or desirable in the judgement of my physician.    Witness signature:  ___________________________________________________ Date:  ______/______/_____                    Time:  ________ A.M.  P.M.       Patient Name:  Moreno Khalil  10/4/1950  [id number]       Patient signature:  ___________________________________________________      Statement of Physician  My signature below affirms that prior to the time of the procedure, I have explained to the patient and/or his/her guardian, the risks and benefits involved in the proposed treatment and any reasonable alternative to the proposed treatment.  I have also explained the risks and benefits involved in the refusal of the proposed treatment and have answered the patient's questions.                        Date:  ______/______/_______  Provider                      Signature:  __________________________________________________________       Time:  ___________ A.M    P.M.

## (undated) NOTE — LETTER
AUTHORIZATION FOR SURGICAL OPERATION OR OTHER PROCEDURE    1. I hereby authorize Dr. Chad Manuel and the [facility] staff assigned to my case to perform the following operation and/or procedure at the [facility]:  Botox 300 units BUY&BILL *EMG guidance*  _______________________________________________________________________________________________    Cervical dystonia   _______________________________________________________________________________________________    2.  My physician has explained the nature and purpose of the operation or other procedure, possible alternative methods of treatment, the risks involved, and the possibility of complication to me.  I acknowledge that no guarantee has been made as to the result that may be obtained.  3.  I recognize that, during the course of this operation, or other procedure, unforseen conditions may necessitate additional or different procedure than those listed above.  I, therefore, further authorize and request that the above named physician, his/her physician assistants or designees perform such procedures as are, in his/her professional opinion, necessary and desirable.  4.  Any tissue or organs removed in the operation or other procedure may be disposed of by and at the discretion of the [facility] staff and [facility].  5.  I understand that in the event of a medical emergency, I will be transported by local paramedics to [facility] or other hospital emergency department.  6.  I certify that I have read and fully understand the above consent to operation and/or other procedure.    7.  I acknowledge that my physician has explained sedation/analgesia administration to me including the risks and benefits.  I consent to the administration of sedation/analgesia as may be necessary or desirable in the judgement of my physician.    Witness signature: ___________________________________________________ Date:   ______/______/_____                    Time:  ________ A.M.  P.M.       Patient Name:  Moreno Khalil 10/4/1950 [id number]         Patient signature:  ___________________________________________________               Statement of Physician  My signature below affirms that prior to the time of the procedure, I have explained to the patient and/or his/her guardian, the risks and benefits involved in the proposed treatment and any reasonable alternative to the proposed treatment.  I have also explained the risks and benefits involved in the refusal of the proposed treatment and have answered the patient's questions.                        Date:  ______/______/_______  Provider                      Signature:  __________________________________________________________       Time:  ___________ A.M    P.M.

## (undated) NOTE — LETTER
AUTHORIZATION FOR SURGICAL OPERATION OR OTHER PROCEDURE    1. I hereby authorize Dr. Chad Manuel and the [facility] staff assigned to my case to perform the following operation and/or procedure at the [facility]:    _______________________________________________________________________________________________     Botox 200 units -Dx: Cervical dystonia- [facility] guidance.   _______________________________________________________________________________________________    2.  My physician has explained the nature and purpose of the operation or other procedure, possible alternative methods of treatment, the risks involved, and the possibility of complication to me.  I acknowledge that no guarantee has been made as to the result that may be obtained.  3.  I recognize that, during the course of this operation, or other procedure, unforseen conditions may necessitate additional or different procedure than those listed above.  I, therefore, further authorize and request that the above named physician, his/her physician assistants or designees perform such procedures as are, in his/her professional opinion, necessary and desirable.  4.  Any tissue or organs removed in the operation or other procedure may be disposed of by and at the discretion of the [facility] staff and [facility].  5.  I understand that in the event of a medical emergency, I will be transported by local paramedics to [facility] or other hospital emergency department.  6.  I certify that I have read and fully understand the above consent to operation and/or other procedure.    7.  I acknowledge that my physician has explained sedation/analgesia administration to me including the risks and benefits.  I consent to the administration of sedation/analgesia as may be necessary or desirable in the judgement of my physician.        Witness signature: ___________________________________________________ Date:   ______/______/_____                    Time:  ________ A.M.  P.M.       Patient Name:  Moreno Khalil   10/4/1950   [id number]         Patient signature:  ___________________________________________________               Statement of Physician  My signature below affirms that prior to the time of the procedure, I have explained to the patient and/or his/her guardian, the risks and benefits involved in the proposed treatment and any reasonable alternative to the proposed treatment.  I have also explained the risks and benefits involved in the refusal of the proposed treatment and have answered the patient's questions.                        Date:  ______/______/_______    Provider                      Signature:  __________________________________________________________       Time:  ___________ A.M    P.M.

## (undated) NOTE — LETTER
7/8/2019              [id number] [facility] [id number]       Please evaluate above named patient for decrease in visual acuity recently.      Sincerely,        MD Maurice Conroy [address]

## (undated) NOTE — LETTER
Date: 2024      Patient Name: Moreno Khalil  : 10/4/1950      [id number]          Thank you for choosing [facility] as your health care provider. Your physician has deemed the following medical service(s) necessary. However, your insurance plan may not pay for all of your health care and costs and may deny payment for this service. The fact that your insurance plan does not pay for an item or service does not mean you should not receive it. The purpose of this form is to help you make an informed decision about whether or not you want to receive this service(s) that may not be paid for by your insurance plan.    CPT Code Description     Cost     _________ ______Botox 200 units _________  ___$4000_____      _________ ______________________________ _____________      _________ ______________________________ _____________      I understand that the above mentioned service(s) or supply may not be covered by my insurance company. I agree to be financially responsible for the cost of this service or supply in the event of my insurance denies payment as a non-covered benefit.        ______________________________________________________________________  Signature of Patient or Patient's Representative  Relationship  Date        ______________________________________________________________________  Signature of Witness to signing of form   Printed Name